# Patient Record
Sex: MALE | Race: WHITE | NOT HISPANIC OR LATINO | Employment: OTHER | URBAN - METROPOLITAN AREA
[De-identification: names, ages, dates, MRNs, and addresses within clinical notes are randomized per-mention and may not be internally consistent; named-entity substitution may affect disease eponyms.]

---

## 2017-04-27 ENCOUNTER — HOSPITAL ENCOUNTER (OUTPATIENT)
Dept: CARDIOLOGY | Facility: HOSPITAL | Age: 72
Discharge: HOME | End: 2017-04-27
Attending: INTERNAL MEDICINE | Admitting: INTERNAL MEDICINE

## 2017-08-29 ENCOUNTER — HOSPITAL ENCOUNTER (OUTPATIENT)
Dept: ORTHOPEDIC SURGERY | Facility: CLINIC | Age: 72
Discharge: HOME | End: 2017-08-29
Attending: ORTHOPAEDIC SURGERY | Admitting: ORTHOPAEDIC SURGERY

## 2017-10-24 ENCOUNTER — HOSPITAL ENCOUNTER (OUTPATIENT)
Dept: PAIN MEDICINE | Facility: HOSPITAL | Age: 72
Discharge: HOME | End: 2017-10-24
Attending: ANESTHESIOLOGY | Admitting: ANESTHESIOLOGY

## 2017-11-01 ENCOUNTER — HOSPITAL ENCOUNTER (OUTPATIENT)
Dept: PAIN MEDICINE | Facility: HOSPITAL | Age: 72
Discharge: HOME | End: 2017-11-01
Attending: ANESTHESIOLOGY | Admitting: ANESTHESIOLOGY

## 2017-11-29 ENCOUNTER — HOSPITAL ENCOUNTER (OUTPATIENT)
Dept: PAIN MEDICINE | Facility: HOSPITAL | Age: 72
Discharge: HOME | End: 2017-11-29
Attending: ANESTHESIOLOGY | Admitting: ANESTHESIOLOGY

## 2018-01-05 ENCOUNTER — HOSPITAL ENCOUNTER (OUTPATIENT)
Dept: PAIN MEDICINE | Facility: HOSPITAL | Age: 73
Discharge: HOME | End: 2018-01-05
Attending: ANESTHESIOLOGY | Admitting: ANESTHESIOLOGY

## 2018-03-29 ENCOUNTER — HOSPITAL ENCOUNTER (OUTPATIENT)
Dept: PAIN MEDICINE | Facility: HOSPITAL | Age: 73
Discharge: HOME | End: 2018-03-29
Attending: ANESTHESIOLOGY | Admitting: ANESTHESIOLOGY

## 2018-06-11 ENCOUNTER — HOSPITAL ENCOUNTER (OUTPATIENT)
Dept: OTHER | Facility: HOSPITAL | Age: 73
Discharge: HOME | End: 2018-06-11
Attending: INTERNAL MEDICINE | Admitting: INTERNAL MEDICINE

## 2018-06-11 LAB
ANION GAP SERPL CALC-SCNC: 14.2 MMOL/L (ref 10–20)
APTT BLD: 26.7 SEC (ref 24–31)
BASOPHILS # BLD AUTO: 0.1 10*3/UL (ref 0–0.2)
BASOPHILS NFR BLD AUTO: 1 % (ref 0–2)
BUN SERPL-MCNC: 19 MG/DL (ref 8–20)
BUN/CREAT SERPL: 17.3 (ref 6.2–20.3)
CALCIUM SERPL-MCNC: 9.1 MG/DL (ref 8.9–10.3)
CHLORIDE SERPL-SCNC: 102 MMOL/L (ref 101–111)
CONV CO2: 28 MMOL/L (ref 22–32)
CREAT UR-MCNC: 1.1 MG/DL (ref 0.7–1.2)
DIFFERENTIAL METHOD BLD: (no result)
EOSINOPHIL # BLD AUTO: 0.2 10*3/UL (ref 0–0.3)
EOSINOPHIL # BLD AUTO: 3 % (ref 0–3)
ERYTHROCYTE [DISTWIDTH] IN BLOOD BY AUTOMATED COUNT: 14.2 % (ref 11.5–14.5)
GLUCOSE SERPL-MCNC: 114 MG/DL (ref 65–99)
HCT VFR BLD AUTO: 33.4 % (ref 40–54)
HGB BLD-MCNC: 11 G/DL (ref 14–18)
INR PPP: 1.2
LYMPHOCYTES # BLD AUTO: 2 10*3/UL (ref 0.8–4.8)
LYMPHOCYTES NFR BLD AUTO: 31 % (ref 18–42)
MCH RBC QN AUTO: 29.2 PG (ref 26–32)
MCHC RBC AUTO-ENTMCNC: 33.1 G/DL (ref 32–36)
MCV RBC AUTO: 88.4 FL (ref 80–94)
MONOCYTES # BLD AUTO: 0.5 10*3/UL (ref 0.1–1.3)
MONOCYTES NFR BLD AUTO: 7 % (ref 2–11)
NEUTROPHILS # BLD AUTO: 3.9 10*3/UL (ref 2.3–8.6)
NEUTROPHILS NFR BLD AUTO: 58 % (ref 50–75)
NRBC BLD AUTO-RTO: 0 /100{WBCS}
NRBC/RBC NFR BLD MANUAL: 0 10*3/UL
PLATELET # BLD AUTO: (no result) 10*3/UL (ref 150–450)
PMV BLD AUTO: 7.8 FL (ref 7.4–10.4)
POTASSIUM SERPL-SCNC: 4.2 MMOL/L (ref 3.6–5.1)
PROTHROMBIN TIME: 12.9 SEC (ref 9.6–11.7)
RBC # BLD AUTO: 3.77 10*6/UL (ref 4.6–6)
SODIUM SERPL-SCNC: 140 MMOL/L (ref 136–144)
WBC # BLD AUTO: 6.6 10*3/UL (ref 4.5–11.5)

## 2018-09-04 ENCOUNTER — HOSPITAL ENCOUNTER (OUTPATIENT)
Dept: LAB | Facility: HOSPITAL | Age: 73
Discharge: HOME | End: 2018-09-04
Attending: INTERNAL MEDICINE | Admitting: INTERNAL MEDICINE

## 2018-09-04 LAB
ANION GAP SERPL CALC-SCNC: 15.5 MMOL/L (ref 10–20)
APTT BLD: 26 SEC (ref 24–31)
BASOPHILS # BLD AUTO: 0.1 10*3/UL (ref 0–0.2)
BASOPHILS NFR BLD AUTO: 1 % (ref 0–2)
BUN SERPL-MCNC: 21 MG/DL (ref 8–20)
BUN/CREAT SERPL: 17.5 (ref 6.2–20.3)
CALCIUM SERPL-MCNC: 9.7 MG/DL (ref 8.9–10.3)
CHLORIDE SERPL-SCNC: 101 MMOL/L (ref 101–111)
CONV CO2: 30 MMOL/L (ref 22–32)
CREAT UR-MCNC: 1.2 MG/DL (ref 0.7–1.2)
DIFFERENTIAL METHOD BLD: (no result)
EOSINOPHIL # BLD AUTO: 0.2 10*3/UL (ref 0–0.3)
EOSINOPHIL # BLD AUTO: 2 % (ref 0–3)
ERYTHROCYTE [DISTWIDTH] IN BLOOD BY AUTOMATED COUNT: 15.4 % (ref 11.5–14.5)
GLUCOSE SERPL-MCNC: 122 MG/DL (ref 65–99)
HCT VFR BLD AUTO: 35.1 % (ref 40–54)
HGB BLD-MCNC: 11.5 G/DL (ref 14–18)
INR PPP: 1.2
LYMPHOCYTES # BLD AUTO: 2.6 10*3/UL (ref 0.8–4.8)
LYMPHOCYTES NFR BLD AUTO: 31 % (ref 18–42)
MCH RBC QN AUTO: 27.5 PG (ref 26–32)
MCHC RBC AUTO-ENTMCNC: 32.6 G/DL (ref 32–36)
MCV RBC AUTO: 84.2 FL (ref 80–94)
MONOCYTES # BLD AUTO: 0.7 10*3/UL (ref 0.1–1.3)
MONOCYTES NFR BLD AUTO: 9 % (ref 2–11)
NEUTROPHILS # BLD AUTO: 4.8 10*3/UL (ref 2.3–8.6)
NEUTROPHILS NFR BLD AUTO: 57 % (ref 50–75)
NRBC BLD AUTO-RTO: 0 /100{WBCS}
NRBC/RBC NFR BLD MANUAL: 0 10*3/UL
PLATELET # BLD AUTO: 218 10*3/UL (ref 150–450)
PMV BLD AUTO: 7.8 FL (ref 7.4–10.4)
POTASSIUM SERPL-SCNC: 5.5 MMOL/L (ref 3.6–5.1)
PROTHROMBIN TIME: 12.3 SEC (ref 9.6–11.7)
RBC # BLD AUTO: 4.17 10*6/UL (ref 4.6–6)
SODIUM SERPL-SCNC: 141 MMOL/L (ref 136–144)
WBC # BLD AUTO: 8.3 10*3/UL (ref 4.5–11.5)

## 2018-10-15 ENCOUNTER — HOSPITAL ENCOUNTER (OUTPATIENT)
Dept: OTHER | Facility: HOSPITAL | Age: 73
Discharge: HOME | End: 2018-10-15
Attending: INTERNAL MEDICINE | Admitting: INTERNAL MEDICINE

## 2018-10-15 LAB
ALBUMIN SERPL-MCNC: 3.8 G/DL (ref 3.5–4.8)
ALBUMIN/GLOB SERPL: 1.6 {RATIO} (ref 1–1.7)
ALP SERPL-CCNC: 72 IU/L (ref 32–91)
ALT SERPL-CCNC: 21 IU/L (ref 17–63)
ANION GAP SERPL CALC-SCNC: 14.3 MMOL/L (ref 10–20)
AST SERPL-CCNC: 21 IU/L (ref 15–41)
BASOPHILS # BLD AUTO: 0.1 10*3/UL (ref 0–0.2)
BASOPHILS NFR BLD AUTO: 1 % (ref 0–2)
BILIRUB SERPL-MCNC: 0.8 MG/DL (ref 0.3–1.2)
BUN SERPL-MCNC: 19 MG/DL (ref 8–20)
BUN/CREAT SERPL: 19 (ref 6.2–20.3)
CALCIUM SERPL-MCNC: 9.4 MG/DL (ref 8.9–10.3)
CHLORIDE SERPL-SCNC: 104 MMOL/L (ref 101–111)
CONV CO2: 30 MMOL/L (ref 22–32)
CONV TOTAL PROTEIN: 6.2 G/DL (ref 6.1–7.9)
CREAT UR-MCNC: 1 MG/DL (ref 0.7–1.2)
DIFFERENTIAL METHOD BLD: (no result)
EOSINOPHIL # BLD AUTO: 0.1 10*3/UL (ref 0–0.3)
EOSINOPHIL # BLD AUTO: 2 % (ref 0–3)
ERYTHROCYTE [DISTWIDTH] IN BLOOD BY AUTOMATED COUNT: 16.1 % (ref 11.5–14.5)
GLOBULIN UR ELPH-MCNC: 2.4 G/DL (ref 2.5–3.8)
GLUCOSE SERPL-MCNC: 82 MG/DL (ref 65–99)
HCT VFR BLD AUTO: 33.3 % (ref 40–54)
HGB BLD-MCNC: 10.9 G/DL (ref 14–18)
LYMPHOCYTES # BLD AUTO: 2.1 10*3/UL (ref 0.8–4.8)
LYMPHOCYTES NFR BLD AUTO: 31 % (ref 18–42)
MAGNESIUM SERPL-MCNC: 1.6 MG/DL (ref 1.8–2.5)
MCH RBC QN AUTO: 26.8 PG (ref 26–32)
MCHC RBC AUTO-ENTMCNC: 32.7 G/DL (ref 32–36)
MCV RBC AUTO: 81.9 FL (ref 80–94)
MONOCYTES # BLD AUTO: 0.5 10*3/UL (ref 0.1–1.3)
MONOCYTES NFR BLD AUTO: 8 % (ref 2–11)
NEUTROPHILS # BLD AUTO: 4.1 10*3/UL (ref 2.3–8.6)
NEUTROPHILS NFR BLD AUTO: 58 % (ref 50–75)
NRBC BLD AUTO-RTO: 0 /100{WBCS}
NRBC/RBC NFR BLD MANUAL: 0 10*3/UL
PLATELET # BLD AUTO: 215 10*3/UL (ref 150–450)
PMV BLD AUTO: 7.5 FL (ref 7.4–10.4)
POTASSIUM SERPL-SCNC: 4.3 MMOL/L (ref 3.6–5.1)
RBC # BLD AUTO: 4.07 10*6/UL (ref 4.6–6)
SODIUM SERPL-SCNC: 144 MMOL/L (ref 136–144)
TSH SERPL-ACNC: 1.49 UIU/ML (ref 0.34–5.6)
WBC # BLD AUTO: 6.9 10*3/UL (ref 4.5–11.5)

## 2018-12-12 ENCOUNTER — HOSPITAL ENCOUNTER (OUTPATIENT)
Dept: PREADMISSION TESTING | Facility: HOSPITAL | Age: 73
Discharge: HOME | End: 2018-12-12
Attending: INTERNAL MEDICINE | Admitting: INTERNAL MEDICINE

## 2018-12-12 LAB
ANION GAP SERPL CALC-SCNC: 15.3 MMOL/L (ref 10–20)
APTT BLD: 24.5 SEC (ref 24–31)
BASOPHILS # BLD AUTO: 0 10*3/UL (ref 0–0.2)
BASOPHILS NFR BLD AUTO: 1 % (ref 0–2)
BUN SERPL-MCNC: 18 MG/DL (ref 8–20)
BUN/CREAT SERPL: 16.4 (ref 6.2–20.3)
CALCIUM SERPL-MCNC: 9.2 MG/DL (ref 8.9–10.3)
CHLORIDE SERPL-SCNC: 98 MMOL/L (ref 101–111)
CONV CO2: 29 MMOL/L (ref 22–32)
CREAT UR-MCNC: 1.1 MG/DL (ref 0.7–1.2)
DIFFERENTIAL METHOD BLD: (no result)
EOSINOPHIL # BLD AUTO: 0.1 10*3/UL (ref 0–0.3)
EOSINOPHIL # BLD AUTO: 2 % (ref 0–3)
ERYTHROCYTE [DISTWIDTH] IN BLOOD BY AUTOMATED COUNT: 17.3 % (ref 11.5–14.5)
GLUCOSE SERPL-MCNC: 114 MG/DL (ref 65–99)
HCT VFR BLD AUTO: 36.1 % (ref 40–54)
HGB BLD-MCNC: 11.6 G/DL (ref 14–18)
INR PPP: 1.1
LYMPHOCYTES # BLD AUTO: 2.4 10*3/UL (ref 0.8–4.8)
LYMPHOCYTES NFR BLD AUTO: 38 % (ref 18–42)
MCH RBC QN AUTO: 26.5 PG (ref 26–32)
MCHC RBC AUTO-ENTMCNC: 32.1 G/DL (ref 32–36)
MCV RBC AUTO: 82.6 FL (ref 80–94)
MICRO REPORT STATUS: NORMAL
MONOCYTES # BLD AUTO: 0.5 10*3/UL (ref 0.1–1.3)
MONOCYTES NFR BLD AUTO: 9 % (ref 2–11)
NEUTROPHILS # BLD AUTO: 3.3 10*3/UL (ref 2.3–8.6)
NEUTROPHILS NFR BLD AUTO: 50 % (ref 50–75)
NRBC BLD AUTO-RTO: 0 /100{WBCS}
NRBC/RBC NFR BLD MANUAL: 0 10*3/UL
PLATELET # BLD AUTO: 206 10*3/UL (ref 150–450)
PMV BLD AUTO: 8.1 FL (ref 7.4–10.4)
POTASSIUM SERPL-SCNC: 4.3 MMOL/L (ref 3.6–5.1)
PROTHROMBIN TIME: 11 SEC (ref 9.6–11.7)
RBC # BLD AUTO: 4.37 10*6/UL (ref 4.6–6)
SODIUM SERPL-SCNC: 138 MMOL/L (ref 136–144)
WBC # BLD AUTO: 6.4 10*3/UL (ref 4.5–11.5)

## 2019-07-10 ENCOUNTER — OFFICE VISIT (OUTPATIENT)
Dept: CARDIOLOGY | Facility: CLINIC | Age: 74
End: 2019-07-10

## 2019-07-10 VITALS
HEART RATE: 60 BPM | BODY MASS INDEX: 38.06 KG/M2 | HEIGHT: 72 IN | OXYGEN SATURATION: 97 % | WEIGHT: 281 LBS | SYSTOLIC BLOOD PRESSURE: 150 MMHG | DIASTOLIC BLOOD PRESSURE: 79 MMHG

## 2019-07-10 DIAGNOSIS — I10 ESSENTIAL HYPERTENSION: ICD-10-CM

## 2019-07-10 DIAGNOSIS — Z95.1 PRESENCE OF AORTOCORONARY BYPASS GRAFT: ICD-10-CM

## 2019-07-10 DIAGNOSIS — Z95.0 PRESENCE OF CARDIAC PACEMAKER: ICD-10-CM

## 2019-07-10 DIAGNOSIS — E78.2 MIXED HYPERLIPIDEMIA: ICD-10-CM

## 2019-07-10 DIAGNOSIS — I49.5 TACHYCARDIA-BRADYCARDIA (HCC): ICD-10-CM

## 2019-07-10 DIAGNOSIS — I25.10 CHRONIC CORONARY ARTERY DISEASE: ICD-10-CM

## 2019-07-10 DIAGNOSIS — I48.0 PAROXYSMAL ATRIAL FIBRILLATION (HCC): Primary | ICD-10-CM

## 2019-07-10 PROBLEM — IMO0002 EXCESSIVE ANTICOAGULATION: Status: ACTIVE | Noted: 2018-08-16

## 2019-07-10 PROBLEM — I48.91 ATRIAL FIBRILLATION: Status: ACTIVE | Noted: 2018-06-05

## 2019-07-10 PROBLEM — Z95.5 STATUS POST CORONARY ARTERY STENT PLACEMENT: Status: ACTIVE | Noted: 2018-06-05

## 2019-07-10 PROBLEM — I21.3 ST ELEVATION (STEMI) MYOCARDIAL INFARCTION: Status: ACTIVE | Noted: 2019-07-10

## 2019-07-10 PROBLEM — E78.5 HYPERLIPIDEMIA: Status: ACTIVE | Noted: 2019-07-10

## 2019-07-10 PROBLEM — R07.9 CHEST PAIN: Status: ACTIVE | Noted: 2018-08-28

## 2019-07-10 PROCEDURE — 99214 OFFICE O/P EST MOD 30 MIN: CPT | Performed by: INTERNAL MEDICINE

## 2019-07-10 PROCEDURE — 93000 ELECTROCARDIOGRAM COMPLETE: CPT | Performed by: INTERNAL MEDICINE

## 2019-07-10 RX ORDER — LISINOPRIL 20 MG/1
20 TABLET ORAL 2 TIMES DAILY
COMMUNITY
Start: 2013-07-25 | End: 2021-01-26 | Stop reason: SDUPTHER

## 2019-07-10 RX ORDER — METOPROLOL SUCCINATE 25 MG/1
25 TABLET, EXTENDED RELEASE ORAL 2 TIMES DAILY
COMMUNITY
Start: 2015-11-05 | End: 2022-02-17 | Stop reason: SDUPTHER

## 2019-07-10 RX ORDER — AMLODIPINE BESYLATE 5 MG/1
5 TABLET ORAL DAILY
COMMUNITY
End: 2021-01-25 | Stop reason: SDUPTHER

## 2019-07-10 RX ORDER — VITAMIN E 268 MG
400 CAPSULE ORAL DAILY
COMMUNITY
Start: 2015-11-19

## 2019-07-10 RX ORDER — GABAPENTIN 300 MG/1
300 CAPSULE ORAL 3 TIMES DAILY
COMMUNITY
Start: 2018-04-18 | End: 2020-12-03

## 2019-07-10 RX ORDER — CLOPIDOGREL BISULFATE 75 MG/1
75 TABLET ORAL DAILY
COMMUNITY
Start: 2015-11-19 | End: 2021-01-26 | Stop reason: SDUPTHER

## 2019-07-10 RX ORDER — CHLORAL HYDRATE 500 MG
CAPSULE ORAL
Status: ON HOLD | COMMUNITY
Start: 2015-11-17 | End: 2022-02-24

## 2019-07-10 RX ORDER — ATORVASTATIN CALCIUM 20 MG/1
20 TABLET, FILM COATED ORAL 2 TIMES DAILY
COMMUNITY
Start: 2013-07-25 | End: 2021-01-26 | Stop reason: SDUPTHER

## 2019-07-10 RX ORDER — FUROSEMIDE 40 MG/1
TABLET ORAL EVERY 24 HOURS
COMMUNITY
Start: 2018-09-18 | End: 2021-01-25 | Stop reason: SDUPTHER

## 2019-07-10 RX ORDER — CETIRIZINE HYDROCHLORIDE 10 MG/1
10 TABLET ORAL DAILY PRN
COMMUNITY
Start: 2018-05-15

## 2019-07-10 NOTE — PROGRESS NOTES
Encounter Date:07/10/2019  Last seen 4/8/2019      Patient ID: Benoit Newberry Jr. is a 73 y.o. male.    Chief Complaint:  Follow-up pacemaker atrial fibrillation coagulation management status post CABG      History of Present Illness     Since I have last seen, the patient has been without any chest discomfort ,shortness of breath, palpitations, dizziness or syncope.  Denies having any headache ,abdominal pain ,nausea, vomiting , diarrhea constipation, loss of weight or loss of appetite.  Denies having any excessive bruising ,hematuria or blood in the stool.    Review of all systems negative except as indicated      Assessment and Plan       [[[  Impression  =     -status post permanent dual-chamber pacemaker implantation ([employer]  MRI compatible) 12/04/2018     - atrial fibrillation.   status post Ablation 10/16/2018  Patient has converted to sinus  sinus bradycardia.   Patient had a recurrence of atrial fibrillation.  Status post cardioversion 06/13/2018 and 08/01/2018 09/05/2018.  Patient has converted but did not stay in sinus rhythm.      -anticoagulation Patient is on Xarelto.     -status post CABG  3/98     -Acute inferior myocardial infarction.  Status post stent placement to SVG to RCA for total occlusion .  11/02/2015   - Status post stent placement to totally occluded SVG to RCA11/02/2015 and stent placement to left main and mid circumflex coronary artery 11/04/2015.  Status post stent to PDA distal to SVG and native LAD beyond LIMA.  05/25/2018     cardiac catheterization 05/25/2018 revealed left ventricular diffuse hypocontractility with ejection fraction of 40%.  2+ mitral regurgitation.  Patent left main stent.  Total LAD.  Ramus intermedius has 90% disease in the proximal segment and distal vessel is filling from collaterals.  Patent circumflex stent.  Totally occluded RCA.  Anders to LAD is patent SVG to ramus branch marginal branch and RCA were patent.    Echocardiogram showed diffuse  left ventricular hypocontractility with ejection fraction of 40-45%) 04/27/2017)  Stress Cardiolite test revealed inferior myocardial infarction with mild inferior ischemia.     - diabetes dyslipidemia and hypertension   - status post impending inferior myocardial infarction prior to surgery    -family history of coronary artery disease   - History of asymptomatic right carotid bruit.  ===   Plan  ===   EKG showed dual-chamber pacemaker rhythm.Normal QTc interval at 452   Patient is on tikosyn at 500 mcg twice a day.  Patient is maintaining sinus rhythm  Medications were reviewed and updated.   patient is maintaining sinus rhythm  Continue tikosyn -observe Toxic effects  Anticoagulation status was reviewed  Continue Xarelto   patient is not having any angina pectoris or congestive heart failure.  Follow-up in office in 6 months with pacemaker interrogation  Further plan will depend on patient's progress  ]]]]]]]]]]]]]]]                 Diagnosis Plan   1. Paroxysmal atrial fibrillation (CMS/HCC)  ECG 12 Lead   2. Chronic coronary artery disease  ECG 12 Lead   3. Mixed hyperlipidemia  ECG 12 Lead   4. Essential hypertension  ECG 12 Lead   5. Presence of cardiac pacemaker  ECG 12 Lead   6. Presence of aortocoronary bypass graft  ECG 12 Lead   7. Tachycardia-bradycardia (CMS/HCC)  ECG 12 Lead   LAB RESULTS (LAST 7 DAYS)    CBC        BMP        CMP         BNP        TROPONIN        CoAg        Creatinine Clearance  CrCl cannot be calculated (Patient's most recent lab result is older than the maximum 30 days allowed.).    ABG        Radiology  No radiology results for the last day                The following portions of the patient's history were reviewed and updated as appropriate: allergies, current medications, past family history, past medical history, past social history, past surgical history and problem list.    Review of Systems   Constitution: Negative for malaise/fatigue.   Cardiovascular: Negative for chest  pain, leg swelling, palpitations and syncope.   Respiratory: Negative for shortness of breath.    Skin: Negative for rash.   Gastrointestinal: Negative for nausea and vomiting.   Neurological: Negative for dizziness, light-headedness and numbness.         Current Outpatient Medications:   •  amLODIPine (NORVASC) 5 MG tablet, Take 5 mg by mouth Daily., Disp: , Rfl:   •  aspirin 81 MG tablet, Take 81 mg by mouth Daily., Disp: , Rfl:   •  atorvastatin (LIPITOR) 20 MG tablet, Take 20 mg by mouth Daily., Disp: , Rfl:   •  cetirizine (zyrTEC) 10 MG tablet, Daily., Disp: , Rfl:   •  Cholecalciferol (VITAMIN D) 1000 units tablet, Take 1,000 Units by mouth Daily., Disp: , Rfl:   •  clopidogrel (PLAVIX) 75 MG tablet, Take 75 mg by mouth Daily., Disp: , Rfl:   •  furosemide (LASIX) 40 MG tablet, Daily., Disp: , Rfl:   •  gabapentin (NEURONTIN) 300 MG capsule, Take 300 mg by mouth 3 (Three) Times a Day., Disp: , Rfl:   •  lisinopril (PRINIVIL,ZESTRIL) 20 MG tablet, Take 20 mg by mouth Daily., Disp: , Rfl:   •  metFORMIN (GLUCOPHAGE) 1000 MG tablet, Take 1,000 mg by mouth., Disp: , Rfl:   •  metoprolol succinate XL (TOPROL-XL) 25 MG 24 hr tablet, Daily., Disp: , Rfl:   •  Multiple Vitamins-Minerals (MULTI VITAMIN/MINERALS) tablet, MULTI VITAMIN/MINERALS TABS, Disp: , Rfl:   •  Omega-3 Fatty Acids (FISH OIL) 1000 MG capsule capsule, FISH OIL 1000 MG CAPS, Disp: , Rfl:   •  rivaroxaban (XARELTO) 20 MG tablet, Daily., Disp: , Rfl:   •  vitamin E 400 UNIT capsule, Take 400 Units by mouth Daily., Disp: , Rfl:     Allergies   Allergen Reactions   • Clindamycin Hcl Unknown (See Comments)       Family History   Problem Relation Age of Onset   • Heart disease Mother    • Heart disease Father         MI   • Heart disease Sister         MI   • Heart disease Brother         s/p coronary stents in his 40's    • Stroke Maternal Grandfather        Past Surgical History:   Procedure Laterality Date   • APPENDECTOMY  1956   • CARDIAC ABLATION   "12/2018    x 1    • CARDIOVERSION  06/2018    Cardioversion 6/2018; x3  12/2018   • CORONARY ANGIOPLASTY Left 11/04/2015    Distal left main and in the mid circumflex artery    • CORONARY ANGIOPLASTY Right 11/02/2015    Right coronary artery    • CORONARY ARTERY BYPASS GRAFT  03/1998   • FOOT SURGERY  2010   • HERNIA REPAIR  2005   • OTHER SURGICAL HISTORY  05/2019    Stent    • PACEMAKER IMPLANTATION  12/14/2018    Dr. Saldaña    • REPLACEMENT TOTAL KNEE BILATERAL  2001       Past Medical History:   Diagnosis Date   • Arthritis    • Asymptomatic stenosis of right carotid artery    • Atrial fibrillation (CMS/HCC)    • Bradycardia    • Coronary artery disease    • Diabetes mellitus, type 2 (CMS/HCC)    • Hyperlipidemia    • Hypertension    • Myocardial infarction (CMS/HCC)        Family History   Problem Relation Age of Onset   • Heart disease Mother    • Heart disease Father         MI   • Heart disease Sister         MI   • Heart disease Brother         s/p coronary stents in his 40's    • Stroke Maternal Grandfather        Social History     Socioeconomic History   • Marital status:      Spouse name: Not on file   • Number of children: Not on file   • Years of education: Not on file   • Highest education level: Not on file   Tobacco Use   • Smoking status: Former Smoker   Substance and Sexual Activity   • Alcohol use: No     Frequency: Never   • Drug use: No           ECG 12 Lead  Date/Time: 7/10/2019 3:31 PM  Performed by: Karolina Saldaña MD  Authorized by: Karolina Saldaña MD   Comparison: compared with previous ECG   Comments: Dual-chamber pacemaker rhythm 60/min nonspecific ST-T wave changes indeterminate axis no ectopy.  No change from 4/8/2019.  Normal QTc interval              Objective:       Physical Exam    /79 (BP Location: Left arm, Patient Position: Sitting, Cuff Size: Large Adult)   Pulse 60   Ht 182.9 cm (72\")   Wt 127 kg (281 lb)   SpO2 97%   BMI 38.11 kg/m²   The patient is " alert, oriented and in no distress.    Vital signs as noted above.    Head and neck revealed no carotid bruits or jugular venous distension.  No thyromegaly or lymphadenopathy is present.    Lungs clear.  No wheezing.  Breath sounds are normal bilaterally.    Heart normal first and second heart sounds.  No murmur..  No pericardial rub is present.  No gallop is present.    Abdomen soft and nontender.  No organomegaly is present.    Extremities revealed good peripheral pulses without any pedal edema.    Skin warm and dry.Pacemaker site looks normal    Musculoskeletal system is grossly normal.    CNS grossly normal.

## 2019-08-21 ENCOUNTER — CLINICAL SUPPORT NO REQUIREMENTS (OUTPATIENT)
Dept: CARDIOLOGY | Facility: CLINIC | Age: 74
End: 2019-08-21

## 2019-08-21 DIAGNOSIS — I49.5 SICK SINUS SYNDROME (HCC): Primary | ICD-10-CM

## 2019-08-21 DIAGNOSIS — Z95.0 PACEMAKER: ICD-10-CM

## 2019-08-22 PROCEDURE — 93296 REM INTERROG EVL PM/IDS: CPT | Performed by: INTERNAL MEDICINE

## 2019-08-22 PROCEDURE — 93294 REM INTERROG EVL PM/LDLS PM: CPT | Performed by: INTERNAL MEDICINE

## 2019-11-25 ENCOUNTER — CLINICAL SUPPORT NO REQUIREMENTS (OUTPATIENT)
Dept: CARDIOLOGY | Facility: CLINIC | Age: 74
End: 2019-11-25

## 2019-11-25 DIAGNOSIS — I49.5 TACHY-BRADY SYNDROME (HCC): ICD-10-CM

## 2019-11-25 DIAGNOSIS — Z95.0 PACEMAKER: Primary | ICD-10-CM

## 2019-11-25 PROCEDURE — 93294 REM INTERROG EVL PM/LDLS PM: CPT | Performed by: INTERNAL MEDICINE

## 2019-11-25 PROCEDURE — 93296 REM INTERROG EVL PM/IDS: CPT | Performed by: INTERNAL MEDICINE

## 2020-01-10 RX ORDER — DOFETILIDE 0.5 MG/1
500 CAPSULE ORAL 2 TIMES DAILY
COMMUNITY
Start: 2019-10-15 | End: 2020-12-03 | Stop reason: SDUPTHER

## 2020-01-10 RX ORDER — INFLUENZA A VIRUS A/SINGAPORE/GP1908/2015 IVR-180A (H1N1) ANTIGEN (PROPIOLACTONE INACTIVATED), INFLUENZA A VIRUS A/SINGAPORE/INFIMH-16-0019/2016 IVR-186 (H3N2) ANTIGEN (PROPIOLACTONE INACTIVATED), INFLUENZA B VIRUS B/MARYLAND/15/2016 ANTIGEN (PROPIOLACTONE INACTIVATED), AND INFLUENZA B VIRUS B/PHUKET/3073/2013 BVR-1B ANTIGEN (PROPIOLACTONE INACTIVATED) 15; 15; 15; 15 UG/.5ML; UG/.5ML; UG/.5ML; UG/.5ML
INJECTION, SUSPENSION INTRAMUSCULAR
Refills: 0 | COMMUNITY
Start: 2019-10-17 | End: 2021-06-01

## 2020-01-10 RX ORDER — ASCORBIC ACID 500 MG
500 TABLET ORAL DAILY
COMMUNITY
Start: 2016-04-07

## 2020-01-22 ENCOUNTER — OFFICE VISIT (OUTPATIENT)
Dept: CARDIOLOGY | Facility: CLINIC | Age: 75
End: 2020-01-22

## 2020-01-22 ENCOUNTER — CLINICAL SUPPORT NO REQUIREMENTS (OUTPATIENT)
Dept: CARDIOLOGY | Facility: CLINIC | Age: 75
End: 2020-01-22

## 2020-01-22 VITALS
HEIGHT: 72 IN | SYSTOLIC BLOOD PRESSURE: 146 MMHG | DIASTOLIC BLOOD PRESSURE: 76 MMHG | OXYGEN SATURATION: 96 % | BODY MASS INDEX: 38.97 KG/M2 | WEIGHT: 287.75 LBS | HEART RATE: 60 BPM

## 2020-01-22 DIAGNOSIS — Z95.1 HX OF CABG: ICD-10-CM

## 2020-01-22 DIAGNOSIS — I49.5 TACHYCARDIA-BRADYCARDIA (HCC): ICD-10-CM

## 2020-01-22 DIAGNOSIS — I48.0 PAROXYSMAL ATRIAL FIBRILLATION (HCC): Primary | ICD-10-CM

## 2020-01-22 DIAGNOSIS — E78.2 MIXED HYPERLIPIDEMIA: ICD-10-CM

## 2020-01-22 DIAGNOSIS — Z95.5 STATUS POST CORONARY ARTERY STENT PLACEMENT: ICD-10-CM

## 2020-01-22 DIAGNOSIS — Z95.0 PRESENCE OF CARDIAC PACEMAKER: ICD-10-CM

## 2020-01-22 DIAGNOSIS — I10 ESSENTIAL HYPERTENSION: ICD-10-CM

## 2020-01-22 DIAGNOSIS — I25.10 CHRONIC CORONARY ARTERY DISEASE: ICD-10-CM

## 2020-01-22 PROCEDURE — 93000 ELECTROCARDIOGRAM COMPLETE: CPT | Performed by: INTERNAL MEDICINE

## 2020-01-22 PROCEDURE — 93280 PM DEVICE PROGR EVAL DUAL: CPT | Performed by: INTERNAL MEDICINE

## 2020-01-22 PROCEDURE — 99214 OFFICE O/P EST MOD 30 MIN: CPT | Performed by: INTERNAL MEDICINE

## 2020-04-23 ENCOUNTER — CLINICAL SUPPORT NO REQUIREMENTS (OUTPATIENT)
Dept: CARDIOLOGY | Facility: CLINIC | Age: 75
End: 2020-04-23

## 2020-04-23 DIAGNOSIS — I49.5 TACHYCARDIA-BRADYCARDIA (HCC): ICD-10-CM

## 2020-04-23 DIAGNOSIS — I48.0 PAROXYSMAL ATRIAL FIBRILLATION (HCC): Primary | ICD-10-CM

## 2020-04-23 DIAGNOSIS — Z95.0 PRESENCE OF CARDIAC PACEMAKER: ICD-10-CM

## 2020-04-23 PROCEDURE — 93294 REM INTERROG EVL PM/LDLS PM: CPT | Performed by: INTERNAL MEDICINE

## 2020-04-23 PROCEDURE — 93296 REM INTERROG EVL PM/IDS: CPT | Performed by: INTERNAL MEDICINE

## 2020-07-16 ENCOUNTER — CLINICAL SUPPORT NO REQUIREMENTS (OUTPATIENT)
Dept: CARDIOLOGY | Facility: CLINIC | Age: 75
End: 2020-07-16

## 2020-07-16 DIAGNOSIS — Z95.0 PACEMAKER: ICD-10-CM

## 2020-07-16 DIAGNOSIS — I49.5 TACHY-BRADY SYNDROME (HCC): Primary | ICD-10-CM

## 2020-08-11 ENCOUNTER — OFFICE VISIT (OUTPATIENT)
Dept: CARDIOLOGY | Facility: CLINIC | Age: 75
End: 2020-08-11

## 2020-08-11 ENCOUNTER — CLINICAL SUPPORT NO REQUIREMENTS (OUTPATIENT)
Dept: CARDIOLOGY | Facility: CLINIC | Age: 75
End: 2020-08-11

## 2020-08-11 VITALS
BODY MASS INDEX: 38.4 KG/M2 | DIASTOLIC BLOOD PRESSURE: 79 MMHG | WEIGHT: 283.5 LBS | OXYGEN SATURATION: 98 % | SYSTOLIC BLOOD PRESSURE: 148 MMHG | HEIGHT: 72 IN | HEART RATE: 60 BPM

## 2020-08-11 DIAGNOSIS — Z95.1 HX OF CABG: Primary | ICD-10-CM

## 2020-08-11 DIAGNOSIS — I49.5 TACHYCARDIA-BRADYCARDIA (HCC): ICD-10-CM

## 2020-08-11 DIAGNOSIS — I10 ESSENTIAL HYPERTENSION: ICD-10-CM

## 2020-08-11 DIAGNOSIS — I48.0 PAROXYSMAL ATRIAL FIBRILLATION (HCC): Primary | ICD-10-CM

## 2020-08-11 DIAGNOSIS — I25.10 CHRONIC CORONARY ARTERY DISEASE: ICD-10-CM

## 2020-08-11 DIAGNOSIS — Z95.1 PRESENCE OF AORTOCORONARY BYPASS GRAFT: ICD-10-CM

## 2020-08-11 DIAGNOSIS — Z95.0 PRESENCE OF CARDIAC PACEMAKER: ICD-10-CM

## 2020-08-11 DIAGNOSIS — I49.5 TACHY-BRADY SYNDROME (HCC): ICD-10-CM

## 2020-08-11 DIAGNOSIS — E78.2 MIXED HYPERLIPIDEMIA: ICD-10-CM

## 2020-08-11 PROCEDURE — 93280 PM DEVICE PROGR EVAL DUAL: CPT | Performed by: INTERNAL MEDICINE

## 2020-08-11 PROCEDURE — 99214 OFFICE O/P EST MOD 30 MIN: CPT | Performed by: INTERNAL MEDICINE

## 2020-08-11 PROCEDURE — 93000 ELECTROCARDIOGRAM COMPLETE: CPT | Performed by: INTERNAL MEDICINE

## 2020-08-11 NOTE — PROGRESS NOTES
Encounter Date:08/11/2020  Last seen 1/22/2020      Patient ID: Benoit Newberry Jr. is a 74 y.o. male.    Chief Complaint:  Status post CABG  Status post pacemaker  Past history of atrial fibrillation  Anticoagulation management-on Xarelto  Hypertension  Dyslipidemia  Diabetes        History of Present Illness    Since I have last seen, the patient has been without any chest discomfort , unusual shortness of breath, palpitations, dizziness or syncope.  Denies having any headache ,abdominal pain ,nausea, vomiting , diarrhea constipation, loss of weight or loss of appetite.  Denies having any excessive bruising ,hematuria or blood in the stool.     Review of all systems negative except as indicated     Assessment and Plan         [[[  Impression  =     -status post permanent dual-chamber pacemaker implantation ([employer]  MRI compatible) 12/04/2018      - atrial fibrillation.   status post Ablation 10/16/2018  Patient has converted to sinus  sinus bradycardia.   Patient had a recurrence of atrial fibrillation.  Status post cardioversion 06/13/2018 and 08/01/2018 09/05/2018.  Patient has converted but did not stay in sinus rhythm.       -anticoagulation Patient is on Xarelto.      -status post CABG  3/98      -Acute inferior myocardial infarction.  Status post stent placement to SVG to RCA for total occlusion .  11/02/2015   - Status post stent placement to totally occluded SVG to RCA11/02/2015 and stent placement to left main and mid circumflex coronary artery 11/04/2015.  Status post stent to PDA distal to SVG and native LAD beyond [place].  05/25/2018      cardiac catheterization 05/25/2018 revealed left ventricular diffuse hypocontractility with ejection fraction of 40%.  2+ mitral regurgitation.  Patent left main stent.  Total LAD.  Ramus intermedius has 90% disease in the proximal segment and distal vessel is filling from collaterals.  Patent circumflex stent.  Totally occluded RCA.  [place] to LAD is patent  SVG to ramus branch marginal branch and RCA were patent.     Echocardiogram showed diffuse left ventricular hypocontractility with ejection fraction of 40-45%) 04/27/2017)  Stress Cardiolite test revealed inferior myocardial infarction with mild inferior ischemia.      - diabetes dyslipidemia and hypertension   - status post impending inferior myocardial infarction prior to surgery    -family history of coronary artery disease   - History of asymptomatic right carotid bruit.  ===   Plan  ===   EKG showed dual-chamber pacemaker rhythm.Normal QTc interval at  479 ms  Patient is on tikosyn at 500 mcg twice a day.  Patient is maintaining sinus rhythm  Medications were reviewed and updated.  patient is maintaining sinus rhythm  Continue tikosyn -observe Toxic effects  Anticoagulation status was reviewed  Continue Xarelto  patient is not having any angina pectoris or congestive heart failure.  Interrogation of the pacemaker revealed excellent pacing parameters.  Follow-up in office in 6 months with pacemaker interrogation  Further plan will depend on patient's progress  ]]]]]]]]]]]]]]]                   Diagnosis Plan   1. Hx of CABG     2. Mixed hyperlipidemia     3. Tachy-gillian syndrome (CMS/HCC)     4. Presence of cardiac pacemaker     5. Essential hypertension     6. Chronic coronary artery disease     7. Presence of aortocoronary bypass graft     LAB RESULTS (LAST 7 DAYS)    CBC        BMP        CMP         BNP        TROPONIN        CoAg        Creatinine Clearance  CrCl cannot be calculated (Patient's most recent lab result is older than the maximum 30 days allowed.).    ABG        Radiology  No radiology results for the last day                The following portions of the patient's history were reviewed and updated as appropriate: allergies, current medications, past family history, past medical history, past social history, past surgical history and problem list.    Review of Systems   Constitution: Negative  for malaise/fatigue.   Cardiovascular: Negative for chest pain, leg swelling, palpitations and syncope.   Respiratory: Negative for shortness of breath.    Skin: Negative for rash.   Gastrointestinal: Negative for nausea and vomiting.   Neurological: Positive for light-headedness (at times). Negative for dizziness and numbness.         Current Outpatient Medications:   •  AFLURIA QUADRIVALENT 0.5 ML suspension prefilled syringe injection, inject 0.5 milliliters intramuscularly, Disp: , Rfl: 0  •  amLODIPine (NORVASC) 5 MG tablet, Take 5 mg by mouth Daily., Disp: , Rfl:   •  aspirin 81 MG tablet, Take 81 mg by mouth Daily., Disp: , Rfl:   •  atorvastatin (LIPITOR) 20 MG tablet, Take 20 mg by mouth Daily., Disp: , Rfl:   •  cetirizine (zyrTEC) 10 MG tablet, Daily., Disp: , Rfl:   •  Cholecalciferol (VITAMIN D) 1000 units tablet, Take 1,000 Units by mouth Daily., Disp: , Rfl:   •  clopidogrel (PLAVIX) 75 MG tablet, Take 75 mg by mouth Daily., Disp: , Rfl:   •  Coenzyme Q10 (CO Q 10) 100 MG capsule, Take 100 mg by mouth Daily., Disp: , Rfl:   •  dofetilide (TIKOSYN) 500 MCG capsule, Take 500 mcg by mouth Daily., Disp: , Rfl:   •  furosemide (LASIX) 40 MG tablet, Daily., Disp: , Rfl:   •  gabapentin (NEURONTIN) 300 MG capsule, Take 300 mg by mouth 3 (Three) Times a Day., Disp: , Rfl:   •  Insulin Glargine (LANTUS SOLOSTAR) 100 UNIT/ML injection pen, LANTUS SOLOSTAR 100 UNIT/ML SOPN, Disp: , Rfl:   •  insulin lispro (humaLOG) 100 UNIT/ML injection, Inject 10 Units under the skin into the appropriate area as directed 3 (Three) Times a Day Before Meals., Disp: , Rfl:   •  lisinopril (PRINIVIL,ZESTRIL) 20 MG tablet, Take 20 mg by mouth Daily., Disp: , Rfl:   •  metFORMIN (GLUCOPHAGE) 1000 MG tablet, Take 1,000 mg by mouth., Disp: , Rfl:   •  metoprolol succinate XL (TOPROL-XL) 25 MG 24 hr tablet, Daily., Disp: , Rfl:   •  Multiple Vitamins-Minerals (MULTI VITAMIN/MINERALS) tablet, MULTI VITAMIN/MINERALS TABS, Disp: , Rfl:    •  Omega-3 Fatty Acids (FISH OIL) 1000 MG capsule capsule, FISH OIL 1000 MG CAPS, Disp: , Rfl:   •  rivaroxaban (XARELTO) 20 MG tablet, Daily., Disp: , Rfl:   •  vitamin C (ASCORBIC ACID) 500 MG tablet, Take 500 mg by mouth Daily., Disp: , Rfl:   •  vitamin E 400 UNIT capsule, Take 400 Units by mouth Daily., Disp: , Rfl:     Allergies   Allergen Reactions   • Clindamycin Hcl Unknown (See Comments)       Family History   Problem Relation Age of Onset   • Heart disease Mother    • Heart disease Father         MI   • Heart disease Sister         MI   • Heart disease Brother         s/p coronary stents in his 40's    • Stroke Maternal Grandfather        Past Surgical History:   Procedure Laterality Date   • APPENDECTOMY  1956   • CARDIAC ABLATION  12/2018    x 1    • CARDIOVERSION  06/2018    Cardioversion 6/2018; x3  12/2018   • CORONARY ANGIOPLASTY Left 11/04/2015    Distal left main and in the mid circumflex artery    • CORONARY ANGIOPLASTY Right 11/02/2015    Right coronary artery    • CORONARY ARTERY BYPASS GRAFT  03/1998   • FOOT SURGERY  2010   • HERNIA REPAIR  2005   • OTHER SURGICAL HISTORY  05/2019    Stent    • PACEMAKER IMPLANTATION  12/14/2018    Dr. Saldaña    • REPLACEMENT TOTAL KNEE BILATERAL  2001   • SKIN BIOPSY         Past Medical History:   Diagnosis Date   • Arthritis    • Asymptomatic stenosis of right carotid artery    • Atrial fibrillation (CMS/HCC)    • Bradycardia    • Coronary artery disease    • Diabetes mellitus, type 2 (CMS/HCC)    • Hyperlipidemia    • Hypertension    • Myocardial infarction (CMS/HCC)        Family History   Problem Relation Age of Onset   • Heart disease Mother    • Heart disease Father         MI   • Heart disease Sister         MI   • Heart disease Brother         s/p coronary stents in his 40's    • Stroke Maternal Grandfather        Social History     Socioeconomic History   • Marital status:      Spouse name: Not on file   • Number of children: Not on file  "  • Years of education: Not on file   • Highest education level: Not on file   Tobacco Use   • Smoking status: Former Smoker   • Smokeless tobacco: Never Used   Substance and Sexual Activity   • Alcohol use: No     Frequency: Never   • Drug use: No           ECG 12 Lead  Date/Time: 8/11/2020 11:12 AM  Performed by: Karolina Saldaña MD  Authorized by: Karolina Saldaña MD   Comparison: compared with previous ECG   Similar to previous ECG  Comparison to previous ECG: Dual-chamber pacemaker rhythm 60/min nonspecific ST-T wave changes  ms no ectopy nonspecific ST-T wave changes compared to 1/22/2020 no significant changes seen                Objective:       Physical Exam    /79   Pulse 60   Ht 182.9 cm (72\")   Wt 129 kg (283 lb 8 oz)   SpO2 98%   BMI 38.45 kg/m²   The patient is alert, oriented and in no distress.    Vital signs as noted above.    Head and neck revealed no carotid bruits or jugular venous distension.  No thyromegaly or lymphadenopathy is present.    Lungs clear.  No wheezing.  Breath sounds are normal bilaterally.    Heart normal first and second heart sounds.  No murmur..  No pericardial rub is present.  No gallop is present.    Abdomen soft and nontender.  No organomegaly is present.    Extremities revealed good peripheral pulses without any pedal edema.    Skin warm and dry.  Pacemaker site looks normal.    Musculoskeletal system is grossly normal.    CNS grossly normal.        "

## 2020-10-26 ENCOUNTER — TELEPHONE (OUTPATIENT)
Dept: FAMILY MEDICINE CLINIC | Facility: CLINIC | Age: 75
End: 2020-10-26

## 2020-10-26 DIAGNOSIS — E78.2 MIXED HYPERLIPIDEMIA: Primary | ICD-10-CM

## 2020-10-26 DIAGNOSIS — E11.65 TYPE 2 DIABETES MELLITUS WITH HYPERGLYCEMIA, WITH LONG-TERM CURRENT USE OF INSULIN (HCC): ICD-10-CM

## 2020-10-26 DIAGNOSIS — I10 ESSENTIAL HYPERTENSION: ICD-10-CM

## 2020-10-26 DIAGNOSIS — Z12.5 SCREENING FOR PROSTATE CANCER: ICD-10-CM

## 2020-10-26 DIAGNOSIS — Z79.4 TYPE 2 DIABETES MELLITUS WITH HYPERGLYCEMIA, WITH LONG-TERM CURRENT USE OF INSULIN (HCC): ICD-10-CM

## 2020-11-04 NOTE — TELEPHONE ENCOUNTER
Spoke with patient and scheduled a [facility] lab appt on 11/25/2020 at 8:50am,.

## 2020-11-12 PROBLEM — E11.65 TYPE 2 DIABETES MELLITUS WITH HYPERGLYCEMIA: Status: ACTIVE | Noted: 2020-11-12

## 2020-11-12 PROBLEM — Z76.89 PERSONS ENCOUNTERING HEALTH SERVICES IN OTHER SPECIFIED CIRCUMSTANCES: Status: ACTIVE | Noted: 2018-07-30

## 2020-11-25 ENCOUNTER — LAB (OUTPATIENT)
Dept: FAMILY MEDICINE CLINIC | Facility: CLINIC | Age: 75
End: 2020-11-25

## 2020-11-25 DIAGNOSIS — E78.2 MIXED HYPERLIPIDEMIA: ICD-10-CM

## 2020-11-25 DIAGNOSIS — E11.65 TYPE 2 DIABETES MELLITUS WITH HYPERGLYCEMIA, WITH LONG-TERM CURRENT USE OF INSULIN (HCC): ICD-10-CM

## 2020-11-25 DIAGNOSIS — Z12.5 SCREENING FOR PROSTATE CANCER: ICD-10-CM

## 2020-11-25 DIAGNOSIS — I10 ESSENTIAL HYPERTENSION: ICD-10-CM

## 2020-11-25 DIAGNOSIS — Z79.4 TYPE 2 DIABETES MELLITUS WITH HYPERGLYCEMIA, WITH LONG-TERM CURRENT USE OF INSULIN (HCC): ICD-10-CM

## 2020-11-25 LAB
ALBUMIN SERPL-MCNC: 4.6 G/DL (ref 3.5–5.2)
ALBUMIN UR-MCNC: 1.3 MG/DL
ALBUMIN/GLOB SERPL: 2.3 G/DL
ALP SERPL-CCNC: 59 U/L (ref 39–117)
ALT SERPL W P-5'-P-CCNC: 23 U/L (ref 1–41)
ANION GAP SERPL CALCULATED.3IONS-SCNC: 10.6 MMOL/L (ref 5–15)
AST SERPL-CCNC: 24 U/L (ref 1–40)
BASOPHILS # BLD AUTO: 0.05 10*3/MM3 (ref 0–0.2)
BASOPHILS NFR BLD AUTO: 0.8 % (ref 0–1.5)
BILIRUB SERPL-MCNC: 0.7 MG/DL (ref 0–1.2)
BILIRUB UR QL STRIP: NEGATIVE
BUN SERPL-MCNC: 17 MG/DL (ref 8–23)
BUN/CREAT SERPL: 18.1 (ref 7–25)
CALCIUM SPEC-SCNC: 9.8 MG/DL (ref 8.6–10.5)
CHLORIDE SERPL-SCNC: 101 MMOL/L (ref 98–107)
CHOLEST SERPL-MCNC: 138 MG/DL (ref 0–200)
CLARITY UR: CLEAR
CO2 SERPL-SCNC: 29.4 MMOL/L (ref 22–29)
COLOR UR: YELLOW
CREAT SERPL-MCNC: 0.94 MG/DL (ref 0.76–1.27)
DEPRECATED RDW RBC AUTO: 39.6 FL (ref 37–54)
EOSINOPHIL # BLD AUTO: 0.18 10*3/MM3 (ref 0–0.4)
EOSINOPHIL NFR BLD AUTO: 2.9 % (ref 0.3–6.2)
ERYTHROCYTE [DISTWIDTH] IN BLOOD BY AUTOMATED COUNT: 12 % (ref 12.3–15.4)
GFR SERPL CREATININE-BSD FRML MDRD: 79 ML/MIN/1.73
GLOBULIN UR ELPH-MCNC: 2 GM/DL
GLUCOSE SERPL-MCNC: 110 MG/DL (ref 65–99)
GLUCOSE UR STRIP-MCNC: NEGATIVE MG/DL
HBA1C MFR BLD: 6.4 % (ref 3.5–5.6)
HCT VFR BLD AUTO: 38.2 % (ref 37.5–51)
HDLC SERPL-MCNC: 38 MG/DL (ref 40–60)
HGB BLD-MCNC: 12.7 G/DL (ref 13–17.7)
HGB UR QL STRIP.AUTO: NEGATIVE
IMM GRANULOCYTES # BLD AUTO: 0.02 10*3/MM3 (ref 0–0.05)
IMM GRANULOCYTES NFR BLD AUTO: 0.3 % (ref 0–0.5)
KETONES UR QL STRIP: NEGATIVE
LDLC SERPL CALC-MCNC: 82 MG/DL (ref 0–100)
LDLC/HDLC SERPL: 2.12 {RATIO}
LEUKOCYTE ESTERASE UR QL STRIP.AUTO: NEGATIVE
LYMPHOCYTES # BLD AUTO: 3.14 10*3/MM3 (ref 0.7–3.1)
LYMPHOCYTES NFR BLD AUTO: 50.4 % (ref 19.6–45.3)
MCH RBC QN AUTO: 30.2 PG (ref 26.6–33)
MCHC RBC AUTO-ENTMCNC: 33.2 G/DL (ref 31.5–35.7)
MCV RBC AUTO: 91 FL (ref 79–97)
MONOCYTES # BLD AUTO: 0.5 10*3/MM3 (ref 0.1–0.9)
MONOCYTES NFR BLD AUTO: 8 % (ref 5–12)
NEUTROPHILS NFR BLD AUTO: 2.34 10*3/MM3 (ref 1.7–7)
NEUTROPHILS NFR BLD AUTO: 37.6 % (ref 42.7–76)
NITRITE UR QL STRIP: NEGATIVE
NRBC BLD AUTO-RTO: 0 /100 WBC (ref 0–0.2)
PH UR STRIP.AUTO: 6.5 [PH] (ref 5–8)
PLATELET # BLD AUTO: 206 10*3/MM3 (ref 140–450)
PMV BLD AUTO: 9.5 FL (ref 6–12)
POTASSIUM SERPL-SCNC: 4.1 MMOL/L (ref 3.5–5.2)
PROT SERPL-MCNC: 6.6 G/DL (ref 6–8.5)
PROT UR QL STRIP: NEGATIVE
PSA SERPL-MCNC: 1.85 NG/ML (ref 0–4)
RBC # BLD AUTO: 4.2 10*6/MM3 (ref 4.14–5.8)
SODIUM SERPL-SCNC: 141 MMOL/L (ref 136–145)
SP GR UR STRIP: 1.01 (ref 1–1.03)
TRIGL SERPL-MCNC: 97 MG/DL (ref 0–150)
TSH SERPL DL<=0.05 MIU/L-ACNC: 1.64 UIU/ML (ref 0.27–4.2)
UROBILINOGEN UR QL STRIP: NORMAL
VLDLC SERPL-MCNC: 18 MG/DL (ref 5–40)
WBC # BLD AUTO: 6.23 10*3/MM3 (ref 3.4–10.8)

## 2020-11-25 PROCEDURE — 82043 UR ALBUMIN QUANTITATIVE: CPT | Performed by: INTERNAL MEDICINE

## 2020-11-25 PROCEDURE — 83036 HEMOGLOBIN GLYCOSYLATED A1C: CPT | Performed by: INTERNAL MEDICINE

## 2020-11-25 PROCEDURE — 81003 URINALYSIS AUTO W/O SCOPE: CPT | Performed by: INTERNAL MEDICINE

## 2020-11-25 PROCEDURE — 80053 COMPREHEN METABOLIC PANEL: CPT | Performed by: INTERNAL MEDICINE

## 2020-11-25 PROCEDURE — 84443 ASSAY THYROID STIM HORMONE: CPT | Performed by: INTERNAL MEDICINE

## 2020-11-25 PROCEDURE — 85025 COMPLETE CBC W/AUTO DIFF WBC: CPT | Performed by: INTERNAL MEDICINE

## 2020-11-25 PROCEDURE — G0103 PSA SCREENING: HCPCS | Performed by: INTERNAL MEDICINE

## 2020-11-25 PROCEDURE — 80061 LIPID PANEL: CPT | Performed by: INTERNAL MEDICINE

## 2020-12-03 ENCOUNTER — OFFICE VISIT (OUTPATIENT)
Dept: FAMILY MEDICINE CLINIC | Facility: CLINIC | Age: 75
End: 2020-12-03

## 2020-12-03 VITALS
DIASTOLIC BLOOD PRESSURE: 80 MMHG | BODY MASS INDEX: 37.38 KG/M2 | HEART RATE: 75 BPM | SYSTOLIC BLOOD PRESSURE: 160 MMHG | TEMPERATURE: 97.1 F | RESPIRATION RATE: 16 BRPM | OXYGEN SATURATION: 95 % | WEIGHT: 276 LBS | HEIGHT: 72 IN

## 2020-12-03 DIAGNOSIS — E11.65 TYPE 2 DIABETES MELLITUS WITH HYPERGLYCEMIA, WITH LONG-TERM CURRENT USE OF INSULIN (HCC): ICD-10-CM

## 2020-12-03 DIAGNOSIS — I10 ESSENTIAL HYPERTENSION: ICD-10-CM

## 2020-12-03 DIAGNOSIS — Z00.00 MEDICARE ANNUAL WELLNESS VISIT, SUBSEQUENT: Primary | ICD-10-CM

## 2020-12-03 DIAGNOSIS — I25.10 CHRONIC CORONARY ARTERY DISEASE: ICD-10-CM

## 2020-12-03 DIAGNOSIS — I48.0 PAROXYSMAL ATRIAL FIBRILLATION (HCC): ICD-10-CM

## 2020-12-03 DIAGNOSIS — Z79.4 TYPE 2 DIABETES MELLITUS WITH HYPERGLYCEMIA, WITH LONG-TERM CURRENT USE OF INSULIN (HCC): ICD-10-CM

## 2020-12-03 PROCEDURE — G0439 PPPS, SUBSEQ VISIT: HCPCS | Performed by: INTERNAL MEDICINE

## 2020-12-03 PROCEDURE — 99213 OFFICE O/P EST LOW 20 MIN: CPT | Performed by: INTERNAL MEDICINE

## 2020-12-03 PROCEDURE — 90732 PPSV23 VACC 2 YRS+ SUBQ/IM: CPT | Performed by: INTERNAL MEDICINE

## 2020-12-03 PROCEDURE — G0009 ADMIN PNEUMOCOCCAL VACCINE: HCPCS | Performed by: INTERNAL MEDICINE

## 2020-12-03 RX ORDER — DOFETILIDE 0.5 MG/1
500 CAPSULE ORAL 2 TIMES DAILY
Qty: 60 CAPSULE | Refills: 12 | Status: SHIPPED | OUTPATIENT
Start: 2020-12-03 | End: 2021-01-21 | Stop reason: SDUPTHER

## 2020-12-03 NOTE — PROGRESS NOTES
The ABCs of the Annual Wellness Visit  Subsequent Medicare Wellness Visit    Chief Complaint   Patient presents with   • Medicare Wellness-subsequent       Subjective   History of Present Illness:  Benoit Newberry Jr. is a 74 y.o. male who presents for a Subsequent Medicare Wellness Visit and other concerns.  Sleeping well- awakes well rested using CPAP- no nocturia.   Riding bicycle 11 miles everyday inside.  Got on scale 3/2020 and  Was 320- so down almost 50 lbs.  Feels so much better-  Learning new life style.   no chest pain or difficulty breathing. No moles changing.- is a .   discussed all labs-  doing well A1c is down to 6.4  No swelling, no depression or anxiety   has decrease insulin 30 novolog   and lantus at 27 to 25 pending the days.  Was on 40 last year. Will forget from time to time      HEALTH RISK ASSESSMENT    Recent Hospitalizations:  No hospitalization(s) within the last year.    Current Medical Providers:  Patient Care Team:  Yasmine Live MD as PCP - Karolina Parish MD as Consulting Physician (Cardiology)    Smoking Status:  Social History     Tobacco Use   Smoking Status Former Smoker   • Quit date: 12/3/1971   • Years since quittin.0   Smokeless Tobacco Never Used       Alcohol Consumption:  Social History     Substance and Sexual Activity   Alcohol Use No   • Frequency: Never       Depression Screen:   PHQ-2/PHQ-9 Depression Screening 12/3/2020   Little interest or pleasure in doing things 0   Feeling down, depressed, or hopeless 0   Total Score 0       Fall Risk Screen:  PRIYANKA Fall Risk Assessment was completed, and patient is at MODERATE risk for falls. Assessment completed on:12/3/2020    Health Habits and Functional and Cognitive Screening:  Functional & Cognitive Status 12/3/2020   Do you have difficulty preparing food and eating? No   Do you have difficulty bathing yourself, getting dressed or grooming yourself? No   Do you have difficulty using the  toilet? No   Do you have difficulty moving around from place to place? No   Do you have trouble with steps or getting out of a bed or a chair? No   Current Diet Well Balanced Diet   Dental Exam Up to date   Eye Exam Up to date   Exercise (times per week) 7 times per week   Current Exercise Activities Include Cardiovasular Workout on Exercise Equipment   Do you need help using the phone?  No   Are you deaf or do you have serious difficulty hearing?  No   Do you need help with transportation? No   Do you need help shopping? No   Do you need help preparing meals?  No   Do you need help with housework?  No   Do you need help with laundry? No   Do you need help taking your medications? No   Do you need help managing money? No   Do you ever drive or ride in a car without wearing a seat belt? No   Have you felt unusual stress, anger or loneliness in the last month? No   Who do you live with? Spouse   If you need help, do you have trouble finding someone available to you? No   Do you have difficulty concentrating, remembering or making decisions? No         Does the patient have evidence of cognitive impairment? No    Asprin use counseling:Taking ASA appropriately as indicated    Age-appropriate Screening Schedule:  Refer to the list below for future screening recommendations based on patient's age, sex and/or medical conditions. Orders for these recommended tests are listed in the plan section. The patient has been provided with a written plan.    Health Maintenance   Topic Date Due   • COLONOSCOPY  1945   • TDAP/TD VACCINES (1 - Tdap) 12/27/1966   • ZOSTER VACCINE (1 of 2) 12/27/1997   • DIABETIC FOOT EXAM  07/02/2019   • HEMOGLOBIN A1C  05/25/2021   • DIABETIC EYE EXAM  09/21/2021   • LIPID PANEL  11/25/2021   • URINE MICROALBUMIN  11/25/2021   • INFLUENZA VACCINE  Completed          The following portions of the patient's history were reviewed and updated as appropriate: allergies, current medications, past family  history, past medical history, past social history, past surgical history and problem list.    Outpatient Medications Prior to Visit   Medication Sig Dispense Refill   • amLODIPine (NORVASC) 5 MG tablet Take 5 mg by mouth Daily.     • atorvastatin (LIPITOR) 20 MG tablet Take 20 mg by mouth 2 (two) times a day.     • Cholecalciferol (VITAMIN D) 1000 units tablet Take 500 Units by mouth Daily.     • clopidogrel (PLAVIX) 75 MG tablet Take 75 mg by mouth Daily.     • Coenzyme Q10 (CO Q 10) 100 MG capsule Take 400 mg by mouth Daily.     • furosemide (LASIX) 40 MG tablet Daily.     • Insulin Glargine (LANTUS SOLOSTAR) 100 UNIT/ML injection pen 30-40 units qhs     • insulin lispro (humaLOG) 100 UNIT/ML injection Inject 10 Units under the skin into the appropriate area as directed 3 (Three) Times a Day Before Meals.     • lisinopril (PRINIVIL,ZESTRIL) 20 MG tablet Take 20 mg by mouth 2 (two) times a day.     • metFORMIN (GLUCOPHAGE) 1000 MG tablet Take 1,000 mg by mouth 2 (Two) Times a Day With Meals.     • metoprolol succinate XL (TOPROL-XL) 25 MG 24 hr tablet 25 mg 2 (two) times a day.     • Multiple Vitamins-Minerals (MULTI VITAMIN/MINERALS) tablet MULTI VITAMIN/MINERALS TABS     • Omega-3 Fatty Acids (FISH OIL) 1000 MG capsule capsule FISH OIL 1000 MG CAPS     • rivaroxaban (XARELTO) 20 MG tablet Daily.     • vitamin C (ASCORBIC ACID) 500 MG tablet Take 500 mg by mouth Daily.     • vitamin E 400 UNIT capsule Take 400 Units by mouth Daily.     • dofetilide (TIKOSYN) 500 MCG capsule Take 500 mcg by mouth 2 (Two) Times a Day.     • AFLURIA QUADRIVALENT 0.5 ML suspension prefilled syringe injection inject 0.5 milliliters intramuscularly  0   • cetirizine (zyrTEC) 10 MG tablet Daily.     • aspirin 81 MG tablet Take 81 mg by mouth Daily.     • gabapentin (NEURONTIN) 300 MG capsule Take 300 mg by mouth 3 (Three) Times a Day.       No facility-administered medications prior to visit.        Patient Active Problem List  "  Diagnosis   • Atrial fibrillation (CMS/[facility])   • Chest pain   • Chronic coronary artery disease   • Diabetes mellitus (CMS/[facility])   • Excessive anticoagulation   • Hyperlipidemia   • Hypertension   • Presence of aortocoronary bypass graft   • Presence of cardiac pacemaker   • ST elevation (STEMI) myocardial infarction (CMS/[facility])   • Status post coronary artery stent placement   • Tachycardia-bradycardia (CMS/HCC)   • Hx of CABG   • Persons encountering health services in other specified circumstances   • Status post percutaneous transluminal coronary angioplasty   • Type 2 diabetes mellitus with hyperglycemia (CMS/[facility])   • Medicare annual wellness visit, subsequent       Advanced Care Planning:  ACP discussion was held with the patient during this visit. Patient has an advance directive in EMR which is still valid.     Review of Systems   Constitutional: Negative for activity change and fatigue.   HENT: Negative for congestion.    Respiratory: Negative for apnea and wheezing.    Cardiovascular: Negative for chest pain and palpitations.   Genitourinary: Negative for urgency.   Musculoskeletal: Positive for arthralgias.   Neurological: Negative for weakness and headaches.   Psychiatric/Behavioral: Positive for sleep disturbance.       Compared to one year ago, the patient feels his physical health is better.  Compared to one year ago, the patient feels his mental health is better.    Reviewed chart for potential of high risk medication in the elderly: yes  Reviewed chart for potential of harmful drug interactions in the elderly:yes    Objective         Vitals:    12/03/20 1034   BP: 160/80   BP Location: Left arm   Pulse: 75   Resp: 16   Temp: 97.1 °F (36.2 °C)   SpO2: 95%   Weight: 125 kg (276 lb)   Height: 182.9 cm (72\")       Body mass index is 37.43 kg/m².  Discussed the patient's BMI with him. The BMI is above average; BMI management plan is completed.    Physical Exam  Vitals signs and nursing note reviewed. "   Constitutional:       Appearance: Normal appearance. He is well-developed. He is obese.   HENT:      Head: Normocephalic and atraumatic.      Right Ear: Tympanic membrane normal.      Left Ear: Tympanic membrane normal.   Eyes:      Pupils: Pupils are equal, round, and reactive to light.   Neck:      Musculoskeletal: Normal range of motion and neck supple.   Cardiovascular:      Rate and Rhythm: Normal rate and regular rhythm.      Heart sounds: No murmur.   Pulmonary:      Effort: Pulmonary effort is normal.      Breath sounds: Normal breath sounds.   Abdominal:      General: Bowel sounds are normal. There is no distension.      Palpations: Abdomen is soft.   Musculoskeletal:         General: Tenderness present. No deformity.   Skin:     Capillary Refill: Capillary refill takes less than 2 seconds.      Coloration: Skin is not jaundiced.   Neurological:      General: No focal deficit present.      Mental Status: He is oriented to person, place, and time.   Psychiatric:         Behavior: Behavior normal.         Lab Results   Component Value Date    TRIG 97 11/25/2020    HDL 38 (L) 11/25/2020    LDL 82 11/25/2020    VLDL 18 11/25/2020    HGBA1C 6.4 (H) 11/25/2020        Assessment/Plan   Medicare Risks and Personalized Health Plan  CMS Preventative Services Quick Reference  Fall Risk    The above risks/problems have been discussed with the patient.  Pertinent information has been shared with the patient in the After Visit Summary.  Follow up plans and orders are seen below in the Assessment/Plan Section.    Diagnoses and all orders for this visit:    1. Medicare annual wellness visit, subsequent (Primary)  Comments:  discussed all recommendations    2. Type 2 diabetes mellitus with hyperglycemia, with long-term current use of insulin (CMS/[facility])  Comments:  doing better with weight loss- decreased insulin amount    3. Chronic coronary artery disease    4. Essential hypertension  Comments:  at home been good- will  watch and call if staying up    5. Paroxysmal atrial fibrillation (CMS/[facility])  Comments:  in NSR currently    Other orders  -     dofetilide (TIKOSYN) 500 MCG capsule; Take 1 capsule by mouth 2 (Two) Times a Day.  Dispense: 60 capsule; Refill: 12  -     Pneumococcal Polysaccharide Vaccine 23-Valent Greater Than or Equal To 1yo Subcutaneous / IM      Follow Up:  Return in about 6 months (around 6/3/2021), or if symptoms worsen or fail to improve.     An After Visit Summary and PPPS were given to the patient.    has living will needs to keep on file  Does not want c scope at this time     During this visit for their annual exam, we reviewed their personal history, social history and family history.  We went over their medications and all the recommended health maintenence items for their age group. They were given the opportunity to ask questions and discuss other concerns.

## 2020-12-06 PROBLEM — Z00.00 MEDICARE ANNUAL WELLNESS VISIT, SUBSEQUENT: Status: ACTIVE | Noted: 2020-12-06

## 2021-01-21 RX ORDER — DOFETILIDE 0.5 MG/1
500 CAPSULE ORAL 2 TIMES DAILY
Qty: 180 CAPSULE | Refills: 3 | Status: SHIPPED | OUTPATIENT
Start: 2021-01-21 | End: 2021-01-25 | Stop reason: SDUPTHER

## 2021-01-25 ENCOUNTER — TELEPHONE (OUTPATIENT)
Dept: FAMILY MEDICINE CLINIC | Facility: CLINIC | Age: 76
End: 2021-01-25

## 2021-01-25 NOTE — TELEPHONE ENCOUNTER
Caller: Benoit Newberry Jr.    Relationship to patient: Self    Best call back number: [phone]    Patient is needing: PATIENT IS REQUESTING CALL BACK WHEN HIS MEDICATION REFILLS ARE TAKING CARE OF. HE JUST SPOKE WITH [employer] AND THEY WILL FAX REQUEST.

## 2021-01-26 RX ORDER — AMLODIPINE BESYLATE 5 MG/1
5 TABLET ORAL DAILY
Qty: 90 TABLET | Refills: 1 | Status: SHIPPED | OUTPATIENT
Start: 2021-01-26 | End: 2021-01-26 | Stop reason: SDUPTHER

## 2021-01-26 RX ORDER — FUROSEMIDE 40 MG/1
40 TABLET ORAL EVERY 24 HOURS
Qty: 90 TABLET | Refills: 1 | Status: SHIPPED | OUTPATIENT
Start: 2021-01-26 | End: 2021-06-01

## 2021-01-26 RX ORDER — FUROSEMIDE 40 MG/1
40 TABLET ORAL EVERY 24 HOURS
Qty: 90 TABLET | Refills: 1 | Status: SHIPPED | OUTPATIENT
Start: 2021-01-26 | End: 2021-01-26 | Stop reason: SDUPTHER

## 2021-01-26 RX ORDER — LISINOPRIL 20 MG/1
20 TABLET ORAL 2 TIMES DAILY
Qty: 180 TABLET | Refills: 1 | Status: SHIPPED | OUTPATIENT
Start: 2021-01-26 | End: 2021-06-01

## 2021-01-26 RX ORDER — CLOPIDOGREL BISULFATE 75 MG/1
75 TABLET ORAL DAILY
Qty: 90 TABLET | Refills: 1 | Status: SHIPPED | OUTPATIENT
Start: 2021-01-26 | End: 2021-06-01

## 2021-01-26 RX ORDER — AMLODIPINE BESYLATE 5 MG/1
5 TABLET ORAL DAILY
Qty: 90 TABLET | Refills: 1 | Status: SHIPPED | OUTPATIENT
Start: 2021-01-26 | End: 2021-06-01

## 2021-01-26 RX ORDER — DOFETILIDE 0.5 MG/1
500 CAPSULE ORAL 2 TIMES DAILY
Qty: 180 CAPSULE | Refills: 1 | Status: SHIPPED | OUTPATIENT
Start: 2021-01-26 | End: 2021-06-01

## 2021-01-26 RX ORDER — ATORVASTATIN CALCIUM 20 MG/1
20 TABLET, FILM COATED ORAL 2 TIMES DAILY
Qty: 180 TABLET | Refills: 1 | Status: SHIPPED | OUTPATIENT
Start: 2021-01-26 | End: 2021-06-01

## 2021-03-02 ENCOUNTER — OFFICE VISIT (OUTPATIENT)
Dept: CARDIOLOGY | Facility: CLINIC | Age: 76
End: 2021-03-02

## 2021-03-02 ENCOUNTER — CLINICAL SUPPORT NO REQUIREMENTS (OUTPATIENT)
Dept: CARDIOLOGY | Facility: CLINIC | Age: 76
End: 2021-03-02

## 2021-03-02 VITALS
TEMPERATURE: 97.5 F | DIASTOLIC BLOOD PRESSURE: 71 MMHG | HEIGHT: 72 IN | BODY MASS INDEX: 37.52 KG/M2 | WEIGHT: 277 LBS | SYSTOLIC BLOOD PRESSURE: 147 MMHG

## 2021-03-02 DIAGNOSIS — I49.5 TACHY-BRADY SYNDROME (HCC): ICD-10-CM

## 2021-03-02 DIAGNOSIS — Z95.0 PRESENCE OF CARDIAC PACEMAKER: ICD-10-CM

## 2021-03-02 DIAGNOSIS — Z95.0 PRESENCE OF CARDIAC PACEMAKER: Primary | ICD-10-CM

## 2021-03-02 DIAGNOSIS — Z95.1 HX OF CABG: Primary | ICD-10-CM

## 2021-03-02 DIAGNOSIS — Z95.5 STATUS POST CORONARY ARTERY STENT PLACEMENT: ICD-10-CM

## 2021-03-02 DIAGNOSIS — I10 ESSENTIAL HYPERTENSION: ICD-10-CM

## 2021-03-02 DIAGNOSIS — I49.5 TACHYCARDIA-BRADYCARDIA (HCC): ICD-10-CM

## 2021-03-02 DIAGNOSIS — Z79.01 CHRONIC ANTICOAGULATION: ICD-10-CM

## 2021-03-02 DIAGNOSIS — E78.2 MIXED HYPERLIPIDEMIA: ICD-10-CM

## 2021-03-02 PROCEDURE — 93000 ELECTROCARDIOGRAM COMPLETE: CPT | Performed by: INTERNAL MEDICINE

## 2021-03-02 PROCEDURE — 99214 OFFICE O/P EST MOD 30 MIN: CPT | Performed by: INTERNAL MEDICINE

## 2021-03-02 PROCEDURE — 93280 PM DEVICE PROGR EVAL DUAL: CPT | Performed by: INTERNAL MEDICINE

## 2021-03-02 NOTE — PROGRESS NOTES
Encounter Date:03/02/2021  Last seen 8/11/2020      Patient ID: Benoit Newberry Jr. is a 75 y.o. male.    Chief Complaint:  Status post CABG  Status post pacemaker  Past history of atrial fibrillation  Anticoagulation management-on Xarelto  Hypertension  Dyslipidemia  Diabetes        History of Present Illness     Since I have last seen, the patient has been without any chest discomfort ,shortness of breath, palpitations, dizziness or syncope.  Denies having any headache ,abdominal pain ,nausea, vomiting , diarrhea constipation, loss of weight or loss of appetite.  Denies having any excessive bruising ,hematuria or blood in the stool.    Review of all systems negative except as indicated.    Reviewed ROS.  Assessment and Plan         [[[  Impression  =     -status post permanent dual-chamber pacemaker implantation (Atlas Local  MRI compatible) 12/04/2018      - atrial fibrillation.   status post Ablation 10/16/2018  Patient has converted to sinus  sinus bradycardia.   Patient had a recurrence of atrial fibrillation.  Status post cardioversion 06/13/2018 and 08/01/2018 09/05/2018.  Patient has converted but did not stay in sinus rhythm.       -anticoagulation Patient is on Xarelto.      -status post CABG  3/98      -Acute inferior myocardial infarction.  Status post stent placement to SVG to RCA for total occlusion .  11/02/2015   - Status post stent placement to totally occluded SVG to RCA11/02/2015 and stent placement to left main and mid circumflex coronary artery 11/04/2015.  Status post stent to PDA distal to SVG and native LAD beyond [place].  05/25/2018      cardiac catheterization 05/25/2018 revealed left ventricular diffuse hypocontractility with ejection fraction of 40%.  2+ mitral regurgitation.  Patent left main stent.  Total LAD.  Ramus intermedius has 90% disease in the proximal segment and distal vessel is filling from collaterals.  Patent circumflex stent.  Totally occluded RCA.  Lima to LAD is  patent SVG to ramus branch marginal branch and RCA were patent.     Echocardiogram showed diffuse left ventricular hypocontractility with ejection fraction of 40-45%) 04/27/2017)  Stress Cardiolite test revealed inferior myocardial infarction with mild inferior ischemia.      - diabetes dyslipidemia and hypertension   - status post impending inferior myocardial infarction prior to surgery    -family history of coronary artery disease   - History of asymptomatic right carotid bruit.  ===   Plan  ===  Status post CABG.  Patient is not having any angina pectoris or congestive heart failure.    History of atrial fibrillation-patient is maintaining sinus rhythm.  Patient apparently stopped taking Tikosyn for 3 months.  Patient did not have any recurrence of atrial fibrillation.  Continue to hold Tikosyn since patient has been maintaining sinus rhythm without Tikosyn at this time.    Anticoagulation status reviewed.  Patient is concerned about cost of Xarelto.  I have discussed with him about the options that included switching to Coumadin and asked him to check with his pharmacy to see if other alternatives are better such as Eliquis Pradaxa etc.    Status post pacemaker.  Interrogation of the pacemaker revealed excellent pacing parameters.    EKG showed dual-chamber pacemaker rhythm.Normal QTc interval at   444 ms    Medications were reviewed and updated.  Patient is not having any angina pectoris or congestive heart failure.  Follow-up in office in 6 months with pacemaker interrogation  Further plan will depend on patient's progress  ]]]]]]]]]]]]]]]                Diagnosis Plan   1. Hx of CABG  ECG 12 Lead   2. Mixed hyperlipidemia  ECG 12 Lead   3. Tachy-gillian syndrome (CMS/HCC)  ECG 12 Lead   4. Essential hypertension  ECG 12 Lead   5. Presence of cardiac pacemaker     6. Status post coronary artery stent placement     7. Chronic anticoagulation     LAB RESULTS (LAST 7 DAYS)    CBC        BMP        CMP          BNP        TROPONIN        CoAg        Creatinine Clearance  CrCl cannot be calculated (Patient's most recent lab result is older than the maximum 30 days allowed.).    ABG        Radiology  No radiology results for the last day                The following portions of the patient's history were reviewed and updated as appropriate: allergies, current medications, past family history, past medical history, past social history, past surgical history and problem list.    Review of Systems   Constitution: Negative for malaise/fatigue.   Cardiovascular: Negative for chest pain, leg swelling, palpitations and syncope.   Respiratory: Negative for shortness of breath.    Skin: Negative for rash.   Gastrointestinal: Negative for nausea and vomiting.   Neurological: Negative for dizziness, light-headedness and numbness.         Current Outpatient Medications:   •  AFLURIA QUADRIVALENT 0.5 ML suspension prefilled syringe injection, inject 0.5 milliliters intramuscularly, Disp: , Rfl: 0  •  amLODIPine (NORVASC) 5 MG tablet, Take 1 tablet by mouth Daily., Disp: 90 tablet, Rfl: 1  •  atorvastatin (LIPITOR) 20 MG tablet, Take 1 tablet by mouth 2 (two) times a day., Disp: 180 tablet, Rfl: 1  •  cetirizine (zyrTEC) 10 MG tablet, Daily., Disp: , Rfl:   •  Cholecalciferol (VITAMIN D) 1000 units tablet, Take 500 Units by mouth Daily., Disp: , Rfl:   •  clopidogrel (PLAVIX) 75 MG tablet, Take 1 tablet by mouth Daily., Disp: 90 tablet, Rfl: 1  •  Coenzyme Q10 (CO Q 10) 100 MG capsule, Take 400 mg by mouth Daily., Disp: , Rfl:   •  dofetilide (TIKOSYN) 500 MCG capsule, Take 1 capsule by mouth 2 (Two) Times a Day., Disp: 180 capsule, Rfl: 1  •  furosemide (LASIX) 40 MG tablet, Take 1 tablet by mouth Daily., Disp: 90 tablet, Rfl: 1  •  Insulin Glargine (LANTUS SOLOSTAR) 100 UNIT/ML injection pen, 30-40 units qhs, Disp: , Rfl:   •  insulin lispro (humaLOG) 100 UNIT/ML injection, Inject 10 Units under the skin into the appropriate area  as directed 3 (Three) Times a Day Before Meals., Disp: , Rfl:   •  lisinopril (PRINIVIL,ZESTRIL) 20 MG tablet, Take 1 tablet by mouth 2 (two) times a day., Disp: 180 tablet, Rfl: 1  •  metFORMIN (GLUCOPHAGE) 1000 MG tablet, Take 1,000 mg by mouth 2 (Two) Times a Day With Meals., Disp: , Rfl:   •  metoprolol succinate XL (TOPROL-XL) 25 MG 24 hr tablet, 25 mg 2 (two) times a day., Disp: , Rfl:   •  Multiple Vitamins-Minerals (MULTI VITAMIN/MINERALS) tablet, MULTI VITAMIN/MINERALS TABS, Disp: , Rfl:   •  Omega-3 Fatty Acids (FISH OIL) 1000 MG capsule capsule, FISH OIL 1000 MG CAPS, Disp: , Rfl:   •  rivaroxaban (Xarelto) 20 MG tablet, Take 1 tablet by mouth Daily., Disp: 90 tablet, Rfl: 1  •  vitamin C (ASCORBIC ACID) 500 MG tablet, Take 500 mg by mouth Daily., Disp: , Rfl:   •  vitamin E 400 UNIT capsule, Take 400 Units by mouth Daily., Disp: , Rfl:     Allergies   Allergen Reactions   • Clindamycin Hcl Unknown (See Comments)       Family History   Problem Relation Age of Onset   • Heart disease Mother    • Heart disease Father         MI   • Heart disease Sister         MI   • Heart disease Brother         s/p coronary stents in his 40's    • Stroke Maternal Grandfather        Past Surgical History:   Procedure Laterality Date   • APPENDECTOMY  1956   • CARDIAC ABLATION  12/2018    x 1    • CARDIOVERSION  06/2018    Cardioversion 6/2018; x3  12/2018   • CORONARY ANGIOPLASTY Left 11/04/2015    Distal left main and in the mid circumflex artery    • CORONARY ANGIOPLASTY Right 11/02/2015    Right coronary artery    • CORONARY ARTERY BYPASS GRAFT  03/1998   • FOOT SURGERY  2010   • HERNIA REPAIR  2005   • OTHER SURGICAL HISTORY  05/2019    Stent    • PACEMAKER IMPLANTATION  12/14/2018    Dr. Saldaña    • REPLACEMENT TOTAL KNEE BILATERAL  2001   • SKIN BIOPSY         Past Medical History:   Diagnosis Date   • Arthritis    • Asymptomatic stenosis of right carotid artery    • Atrial fibrillation (CMS/HCC)    • Bradycardia    •  "Coronary artery disease    • Diabetes mellitus, type 2 (CMS/HCC)    • Hyperlipidemia    • Hypertension    • Myocardial infarction (CMS/HCC)        Family History   Problem Relation Age of Onset   • Heart disease Mother    • Heart disease Father         MI   • Heart disease Sister         MI   • Heart disease Brother         s/p coronary stents in his 40's    • Stroke Maternal Grandfather        Social History     Socioeconomic History   • Marital status:      Spouse name: Not on file   • Number of children: Not on file   • Years of education: Not on file   • Highest education level: Not on file   Tobacco Use   • Smoking status: Former Smoker     Quit date: 12/3/1971     Years since quittin.2   • Smokeless tobacco: Never Used   Substance and Sexual Activity   • Alcohol use: No     Frequency: Never   • Drug use: No           ECG 12 Lead    Date/Time: 3/2/2021 1:24 PM  Performed by: Karolina Saldaña MD  Authorized by: Karolina Saldaña MD   Comparison: compared with previous ECG   Similar to previous ECG  Comparison to previous ECG: Dual-chamber pacemaker rhythm 60/min nonspecific ST-T wave changes no ectopy no significant change from 3/11/2020                Objective:       Physical Exam    /71   Temp 97.5 °F (36.4 °C)   Ht 182.9 cm (72\")   Wt 126 kg (277 lb)   BMI 37.57 kg/m²   The patient is alert, oriented and in no distress.    Vital signs as noted above.    Head and neck revealed no carotid bruits or jugular venous distension.  No thyromegaly or lymphadenopathy is present.    Lungs clear.  No wheezing.  Breath sounds are normal bilaterally.    Heart normal first and second heart sounds.  No murmur..  No pericardial rub is present.  No gallop is present.    Abdomen soft and nontender.  No organomegaly is present.    Extremities revealed good peripheral pulses without any pedal edema.    Skin warm and dry.  Pacemaker site looks normal.    Musculoskeletal system is grossly normal.    CNS " grossly normal.    Reviewed and unchanged from last visit.

## 2021-05-03 ENCOUNTER — TELEPHONE (OUTPATIENT)
Dept: CARDIOLOGY | Facility: CLINIC | Age: 76
End: 2021-05-03

## 2021-05-03 NOTE — TELEPHONE ENCOUNTER
Patient called to verify [employer] adapter update. Verified this is legitimate, patient will order new one.

## 2021-05-11 ENCOUNTER — TELEPHONE (OUTPATIENT)
Dept: FAMILY MEDICINE CLINIC | Facility: CLINIC | Age: 76
End: 2021-05-11

## 2021-05-11 NOTE — TELEPHONE ENCOUNTER
Caller: Benoit Newberry Jr.    Relationship: Self    Best call back number:[phone]    What orders are you requesting (i.e. lab or imaging): LABS    In what timeframe would the patient need to come in: PRIOR TO 6/1/21 APPOINTMENT    Where will you receive your lab/imaging services:     Additional notes: WOULD LIKE A CALL TO SCHEDULE A LAB APPOINTMENT

## 2021-05-26 ENCOUNTER — LAB (OUTPATIENT)
Dept: FAMILY MEDICINE CLINIC | Facility: CLINIC | Age: 76
End: 2021-05-26

## 2021-05-26 DIAGNOSIS — I10 ESSENTIAL HYPERTENSION: ICD-10-CM

## 2021-05-26 DIAGNOSIS — Z79.4 TYPE 2 DIABETES MELLITUS WITH HYPERGLYCEMIA, WITH LONG-TERM CURRENT USE OF INSULIN (HCC): Primary | ICD-10-CM

## 2021-05-26 DIAGNOSIS — E78.2 MIXED HYPERLIPIDEMIA: ICD-10-CM

## 2021-05-26 DIAGNOSIS — E11.65 TYPE 2 DIABETES MELLITUS WITH HYPERGLYCEMIA, WITH LONG-TERM CURRENT USE OF INSULIN (HCC): Primary | ICD-10-CM

## 2021-05-26 LAB
ALBUMIN SERPL-MCNC: 4.2 G/DL (ref 3.5–5.2)
ALBUMIN UR-MCNC: 1.3 MG/DL
ALBUMIN/GLOB SERPL: 1.8 G/DL
ALP SERPL-CCNC: 68 U/L (ref 39–117)
ALT SERPL W P-5'-P-CCNC: 25 U/L (ref 1–41)
ANION GAP SERPL CALCULATED.3IONS-SCNC: 9.2 MMOL/L (ref 5–15)
AST SERPL-CCNC: 21 U/L (ref 1–40)
BILIRUB SERPL-MCNC: 0.3 MG/DL (ref 0–1.2)
BUN SERPL-MCNC: 23 MG/DL (ref 8–23)
BUN/CREAT SERPL: 26.1 (ref 7–25)
CALCIUM SPEC-SCNC: 9.5 MG/DL (ref 8.6–10.5)
CHLORIDE SERPL-SCNC: 101 MMOL/L (ref 98–107)
CHOLEST SERPL-MCNC: 141 MG/DL (ref 0–200)
CO2 SERPL-SCNC: 28.8 MMOL/L (ref 22–29)
CREAT SERPL-MCNC: 0.88 MG/DL (ref 0.76–1.27)
CREAT UR-MCNC: 48.9 MG/DL
DEPRECATED RDW RBC AUTO: 41.2 FL (ref 37–54)
ERYTHROCYTE [DISTWIDTH] IN BLOOD BY AUTOMATED COUNT: 11.8 % (ref 12.3–15.4)
GFR SERPL CREATININE-BSD FRML MDRD: 85 ML/MIN/1.73
GLOBULIN UR ELPH-MCNC: 2.3 GM/DL
GLUCOSE SERPL-MCNC: 129 MG/DL (ref 65–99)
HCT VFR BLD AUTO: 38.1 % (ref 37.5–51)
HDLC SERPL-MCNC: 38 MG/DL (ref 40–60)
HGB BLD-MCNC: 12.8 G/DL (ref 13–17.7)
LDLC SERPL CALC-MCNC: 86 MG/DL (ref 0–100)
LDLC/HDLC SERPL: 2.26 {RATIO}
MCH RBC QN AUTO: 31.4 PG (ref 26.6–33)
MCHC RBC AUTO-ENTMCNC: 33.6 G/DL (ref 31.5–35.7)
MCV RBC AUTO: 93.6 FL (ref 79–97)
MICROALBUMIN/CREAT UR: 26.6 MG/G
PLATELET # BLD AUTO: 229 10*3/MM3 (ref 140–450)
PMV BLD AUTO: 9.4 FL (ref 6–12)
POTASSIUM SERPL-SCNC: 4.7 MMOL/L (ref 3.5–5.2)
PROT SERPL-MCNC: 6.5 G/DL (ref 6–8.5)
RBC # BLD AUTO: 4.07 10*6/MM3 (ref 4.14–5.8)
SODIUM SERPL-SCNC: 139 MMOL/L (ref 136–145)
TRIGL SERPL-MCNC: 86 MG/DL (ref 0–150)
VLDLC SERPL-MCNC: 17 MG/DL (ref 5–40)
WBC # BLD AUTO: 7.79 10*3/MM3 (ref 3.4–10.8)

## 2021-05-26 PROCEDURE — 83036 HEMOGLOBIN GLYCOSYLATED A1C: CPT | Performed by: INTERNAL MEDICINE

## 2021-05-26 PROCEDURE — 80061 LIPID PANEL: CPT | Performed by: INTERNAL MEDICINE

## 2021-05-26 PROCEDURE — 85027 COMPLETE CBC AUTOMATED: CPT | Performed by: INTERNAL MEDICINE

## 2021-05-26 PROCEDURE — 82570 ASSAY OF URINE CREATININE: CPT | Performed by: INTERNAL MEDICINE

## 2021-05-26 PROCEDURE — 82043 UR ALBUMIN QUANTITATIVE: CPT | Performed by: INTERNAL MEDICINE

## 2021-05-26 PROCEDURE — 36415 COLL VENOUS BLD VENIPUNCTURE: CPT

## 2021-05-26 PROCEDURE — 80053 COMPREHEN METABOLIC PANEL: CPT | Performed by: INTERNAL MEDICINE

## 2021-05-27 LAB — HBA1C MFR BLD: 6.7 % (ref 3.5–5.6)

## 2021-06-01 ENCOUNTER — OFFICE VISIT (OUTPATIENT)
Dept: FAMILY MEDICINE CLINIC | Facility: CLINIC | Age: 76
End: 2021-06-01

## 2021-06-01 VITALS
WEIGHT: 278.2 LBS | RESPIRATION RATE: 12 BRPM | HEART RATE: 70 BPM | SYSTOLIC BLOOD PRESSURE: 131 MMHG | DIASTOLIC BLOOD PRESSURE: 70 MMHG | HEIGHT: 72 IN | OXYGEN SATURATION: 94 % | BODY MASS INDEX: 37.68 KG/M2

## 2021-06-01 DIAGNOSIS — I10 ESSENTIAL HYPERTENSION: ICD-10-CM

## 2021-06-01 DIAGNOSIS — Z95.0 PRESENCE OF CARDIAC PACEMAKER: ICD-10-CM

## 2021-06-01 DIAGNOSIS — E11.65 TYPE 2 DIABETES MELLITUS WITH HYPERGLYCEMIA, WITH LONG-TERM CURRENT USE OF INSULIN (HCC): Primary | ICD-10-CM

## 2021-06-01 DIAGNOSIS — Z79.4 TYPE 2 DIABETES MELLITUS WITH HYPERGLYCEMIA, WITH LONG-TERM CURRENT USE OF INSULIN (HCC): Primary | ICD-10-CM

## 2021-06-01 DIAGNOSIS — M19.90 ARTHRITIS: ICD-10-CM

## 2021-06-01 DIAGNOSIS — E11.3599 TYPE 2 DIABETES MELLITUS WITH PROLIFERATIVE RETINOPATHY WITHOUT MACULAR EDEMA, WITH LONG-TERM CURRENT USE OF INSULIN, UNSPECIFIED LATERALITY (HCC): ICD-10-CM

## 2021-06-01 DIAGNOSIS — Z79.4 TYPE 2 DIABETES MELLITUS WITH PROLIFERATIVE RETINOPATHY WITHOUT MACULAR EDEMA, WITH LONG-TERM CURRENT USE OF INSULIN, UNSPECIFIED LATERALITY (HCC): ICD-10-CM

## 2021-06-01 DIAGNOSIS — I48.0 PAROXYSMAL ATRIAL FIBRILLATION (HCC): ICD-10-CM

## 2021-06-01 PROCEDURE — 99214 OFFICE O/P EST MOD 30 MIN: CPT | Performed by: INTERNAL MEDICINE

## 2021-06-01 RX ORDER — AMLODIPINE BESYLATE 5 MG/1
TABLET ORAL
Qty: 90 TABLET | Refills: 1 | Status: SHIPPED | OUTPATIENT
Start: 2021-06-01 | End: 2021-11-30

## 2021-06-01 RX ORDER — FUROSEMIDE 40 MG/1
TABLET ORAL
Qty: 90 TABLET | Refills: 1 | Status: SHIPPED | OUTPATIENT
Start: 2021-06-01 | End: 2021-11-30

## 2021-06-01 RX ORDER — ATORVASTATIN CALCIUM 20 MG/1
TABLET, FILM COATED ORAL
Qty: 180 TABLET | Refills: 1 | Status: SHIPPED | OUTPATIENT
Start: 2021-06-01 | End: 2021-11-30

## 2021-06-01 RX ORDER — RIVAROXABAN 20 MG/1
TABLET, FILM COATED ORAL
Qty: 90 TABLET | Refills: 1 | Status: SHIPPED | OUTPATIENT
Start: 2021-06-01 | End: 2021-10-13

## 2021-06-01 RX ORDER — LISINOPRIL 20 MG/1
TABLET ORAL
Qty: 180 TABLET | Refills: 1 | Status: SHIPPED | OUTPATIENT
Start: 2021-06-01 | End: 2021-11-30

## 2021-06-01 RX ORDER — CLOPIDOGREL BISULFATE 75 MG/1
TABLET ORAL
Qty: 90 TABLET | Refills: 1 | Status: SHIPPED | OUTPATIENT
Start: 2021-06-01 | End: 2021-11-30

## 2021-06-01 NOTE — PROGRESS NOTES
Rooming Tab(CC,VS,Pt Hx,Fall Screen)  Chief Complaint   Patient presents with   • Immobility   • Diabetes   • Hypertension   • Hyperlipidemia       Subjective   Pt here for DM check and immobility check-    Pt has had multiple epidurals in past that help with pain- but now on blood thinners and unable to get. [place]ried [place] walker [place] zoo- and had a very hard time keeping up and feels like unable to do family outings anymore. Has a wheelchair but cant push self up the hill, can go down with pushing himself.   He is able to transfer independently. Can walk 75 years with walker then has to sit and rest-   Before can get up again.  Gait is wide set and slow.   using CPAP at night- no O2 needed.   neuropathy persisting- wearing beach shoes on vacation-  Interested in insulin pump for price.       I have reviewed and updated his medications, medical history and problem list during today's office visit.     Patient Care Team:  Yasmine Live MD as PCP - Karolina Parish MD as Consulting Physician (Cardiology)    Problem List Tab  Medications Tab  Synopsis Tab  Chart Review Tab  Care Everywhere Tab  Immunizations Tab  Patient History Tab    Social History     Tobacco Use   • Smoking status: Former Smoker     Quit date: 12/3/1971     Years since quittin.5   • Smokeless tobacco: Never Used   Substance Use Topics   • Alcohol use: No       Review of Systems  Answers for HPI/ROS submitted by the patient on 2021  What is the primary reason for your visit?: High Blood Pressure  Chronicity: recurrent  Onset: more than 1 year ago  Progression since onset: waxing and waning  Condition status: controlled  anxiety: No  headaches: No  malaise/fatigue: Yes  orthopnea: No  peripheral edema: Yes  PND: No  sweats: No  CAD risks: diabetes mellitus, dyslipidemia, family history, obesity, sedentary lifestyle      Objective     Rooming Tab(CC,VS,Pt Hx,Fall Screen)  /70   Pulse 70   Resp 12   Ht 182.9 cm  "(72\")   Wt 126 kg (278 lb 3.2 oz)   SpO2 94%   BMI 37.73 kg/m²     Body mass index is 37.73 kg/m².    Physical Exam  Vitals and nursing note reviewed.   Constitutional:       Appearance: Normal appearance. He is well-developed. He is obese.      Comments: walker   HENT:      Head: Normocephalic and atraumatic.      Right Ear: Tympanic membrane normal.      Left Ear: Tympanic membrane normal.      Nose: No rhinorrhea.      Mouth/Throat:      Pharynx: No posterior oropharyngeal erythema.   Eyes:      Pupils: Pupils are equal, round, and reactive to light.   Cardiovascular:      Rate and Rhythm: Normal rate and regular rhythm.      Pulses: Normal pulses.      Heart sounds: Normal heart sounds. No murmur heard.     Pulmonary:      Effort: Pulmonary effort is normal.      Breath sounds: Normal breath sounds.   Abdominal:      General: Bowel sounds are normal. There is no distension.      Palpations: Abdomen is soft.   Musculoskeletal:         General: Swelling and tenderness present.      Cervical back: Normal range of motion and neck supple.   Skin:     Capillary Refill: Capillary refill takes less than 2 seconds.      Findings: No erythema.   Neurological:      Mental Status: He is alert and oriented to person, place, and time.      Sensory: Sensory deficit present.      Motor: Weakness present.   Psychiatric:         Mood and Affect: Mood normal.         Behavior: Behavior normal.          Statin Choice Calculator  Data Reviewed:         The data below has been reviewed by Yasmine Live MD on 06/01/2021.      Lab Results   Component Value Date    BUN 23 05/26/2021    CREATININE 0.88 05/26/2021    EGFRIFNONA 85 05/26/2021     05/26/2021    K 4.7 05/26/2021     05/26/2021    CALCIUM 9.5 05/26/2021    ALBUMIN 4.20 05/26/2021    BILITOT 0.3 05/26/2021    ALKPHOS 68 05/26/2021    AST 21 05/26/2021    ALT 25 05/26/2021    TRIG 86 05/26/2021    HDL 38 (L) 05/26/2021    VLDL 17 05/26/2021    LDL 86 " 05/26/2021    LDLHDL 2.26 05/26/2021    MICROALBUR 1.3 05/26/2021    WBC 7.79 05/26/2021    RBC 4.07 (L) 05/26/2021    HCT 38.1 05/26/2021    MCV 93.6 05/26/2021    MCH 31.4 05/26/2021      Assessment/Plan   Order Review Tab  Health Maintenance Tab  Patient Plan/Order Tab  Diagnoses and all orders for this visit:    1. Type 2 diabetes mellitus with hyperglycemia, with long-term current use of insulin (CMS/[facility]) (Primary)  Comments:  working on improving diet  Orders:  -     Ambulatory Referral to Endocrinology    2. Paroxysmal atrial fibrillation (CMS/[facility])    3. Presence of cardiac pacemaker    4. Essential hypertension    5. Arthritis  Assessment & Plan:  significant DJD and DDD making ambulation difficult. Not a surgery candidate      6. Body mass index (BMI)40.0-44.9, adult (CMS/[facility])  Comments:  difficult with exercise with joint pain    7. 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       They were informed of the diagnosis and treatment plan and directed to f/u for any further problems or concerns.      Pt has DJD and DDD with limiting gait outside of house. He is capable of using walker inside house. A motorized scooter or hoverround would work- unable to use manual wheelchair as upper body weakness if pushing self. motivated to use the power mobilityfdevice

## 2021-06-10 DIAGNOSIS — E11.65 UNCONTROLLED TYPE 2 DIABETES MELLITUS WITH HYPERGLYCEMIA (HCC): Primary | ICD-10-CM

## 2021-06-18 ENCOUNTER — OFFICE VISIT (OUTPATIENT)
Dept: ENDOCRINOLOGY | Facility: CLINIC | Age: 76
End: 2021-06-18

## 2021-06-18 DIAGNOSIS — Z79.4 TYPE 2 DIABETES MELLITUS WITH HYPERGLYCEMIA, WITH LONG-TERM CURRENT USE OF INSULIN (HCC): ICD-10-CM

## 2021-06-18 DIAGNOSIS — E11.65 TYPE 2 DIABETES MELLITUS WITH HYPERGLYCEMIA, WITH LONG-TERM CURRENT USE OF INSULIN (HCC): ICD-10-CM

## 2021-06-18 PROCEDURE — G0108 DIAB MANAGE TRN  PER INDIV: HCPCS | Performed by: DIETITIAN, REGISTERED

## 2021-06-24 ENCOUNTER — TELEPHONE (OUTPATIENT)
Dept: ENDOCRINOLOGY | Facility: CLINIC | Age: 76
End: 2021-06-24

## 2021-06-24 ENCOUNTER — OFFICE VISIT (OUTPATIENT)
Dept: ENDOCRINOLOGY | Facility: CLINIC | Age: 76
End: 2021-06-24

## 2021-06-24 DIAGNOSIS — Z79.4 TYPE 2 DIABETES MELLITUS WITH HYPERGLYCEMIA, WITH LONG-TERM CURRENT USE OF INSULIN (HCC): ICD-10-CM

## 2021-06-24 DIAGNOSIS — E11.65 TYPE 2 DIABETES MELLITUS WITH HYPERGLYCEMIA, WITH LONG-TERM CURRENT USE OF INSULIN (HCC): ICD-10-CM

## 2021-06-24 DIAGNOSIS — E11.65 UNCONTROLLED TYPE 2 DIABETES MELLITUS WITH HYPERGLYCEMIA (HCC): Primary | ICD-10-CM

## 2021-06-24 PROCEDURE — G0108 DIAB MANAGE TRN  PER INDIV: HCPCS | Performed by: DIETITIAN, REGISTERED

## 2021-06-24 RX ORDER — LANCETS 33 GAUGE
EACH MISCELLANEOUS
COMMUNITY
End: 2021-06-24 | Stop reason: SDUPTHER

## 2021-06-24 NOTE — TELEPHONE ENCOUNTER
Pt starts to see Dr. STAFFORD in November but needs rx for test strips in order to check his BG's QID. Rx ready for signature

## 2021-06-27 RX ORDER — LANCETS 33 GAUGE
1 EACH MISCELLANEOUS DAILY
Qty: 100 EACH | Refills: 1 | Status: SHIPPED | OUTPATIENT
Start: 2021-06-27 | End: 2021-06-30

## 2021-06-30 DIAGNOSIS — E11.65 UNCONTROLLED TYPE 2 DIABETES MELLITUS WITH HYPERGLYCEMIA (HCC): ICD-10-CM

## 2021-06-30 RX ORDER — LANCETS
EACH MISCELLANEOUS
Qty: 300 EACH | Refills: 3 | Status: ON HOLD | OUTPATIENT
Start: 2021-06-30 | End: 2022-02-24

## 2021-06-30 RX ORDER — PERPHENAZINE 16 MG/1
TABLET, FILM COATED ORAL
Qty: 350 EACH | Refills: 3 | Status: ON HOLD | OUTPATIENT
Start: 2021-06-30 | End: 2022-02-24

## 2021-07-06 ENCOUNTER — TELEPHONE (OUTPATIENT)
Dept: ENDOCRINOLOGY | Facility: CLINIC | Age: 76
End: 2021-07-06

## 2021-07-08 NOTE — PROGRESS NOTES
Encounter Date:01/22/2020  Last seen 7/10/2019      Patient ID: Benoit Newberry Jr. is a 74 y.o. male.    Chief Complaint:  Status post CABG  Status post pacemaker  Past history of atrial fibrillation  Anticoagulation management-on Xarelto  Hypertension  Dyslipidemia  Diabetes      History of Present Illness  Since I have last seen, the patient has been without any chest discomfort , unusual shortness of breath, palpitations, dizziness or syncope.  Denies having any headache ,abdominal pain ,nausea, vomiting , diarrhea constipation, loss of weight or loss of appetite.  Denies having any excessive bruising ,hematuria or blood in the stool.    Review of all systems negative except as indicated    Assessment and Plan       [[[  Impression  =     -status post permanent dual-chamber pacemaker implantation ([employer]  MRI compatible) 12/04/2018      - atrial fibrillation.   status post Ablation 10/16/2018  Patient has converted to sinus  sinus bradycardia.   Patient had a recurrence of atrial fibrillation.  Status post cardioversion 06/13/2018 and 08/01/2018 09/05/2018.  Patient has converted but did not stay in sinus rhythm.       -anticoagulation Patient is on Xarelto.      -status post CABG  3/98      -Acute inferior myocardial infarction.  Status post stent placement to SVG to RCA for total occlusion .  11/02/2015   - Status post stent placement to totally occluded SVG to RCA11/02/2015 and stent placement to left main and mid circumflex coronary artery 11/04/2015.  Status post stent to PDA distal to SVG and native LAD beyond H[place].  05/25/2018      cardiac catheterization 05/25/2018 revealed left ventricular diffuse hypocontractility with ejection fraction of 40%.  2+ mitral regurgitation.  Patent left main stent.  Total LAD.  Ramus intermedius has 90% disease in the proximal segment and distal vessel is filling from collaterals.  Patent circumflex stent.  Totally occluded RCA.  [place] to LAD is patent SVG to  Mariza Mccartney is a 70 year old female presenting with cyst to her left shoulder.  Patient states today at work she bumped a sign at work and it got big.  She reports it is painful.    Symptoms started 3 days ago.     OTC medications used: Excedrin at 1700  Denies  known Latex allergy or symptoms of Latex sensitivity.  Social History     Tobacco Use   Smoking Status Current Every Day Smoker   • Packs/day: 1.50   • Years: 20.00   • Pack years: 30.00   • Types: Cigarettes   Smokeless Tobacco Never Used     All allergies and medications reviewed.  PCP verified:  None at this time   Pharmacy verified:  [facility]  Patient would like communication of their results via:        Cell Phone:   Telephone Information:   Mobile [phone]     Okay to leave a message containing results? Yes     ramus branch marginal branch and RCA were patent.     Echocardiogram showed diffuse left ventricular hypocontractility with ejection fraction of 40-45%) 04/27/2017)  Stress Cardiolite test revealed inferior myocardial infarction with mild inferior ischemia.      - diabetes dyslipidemia and hypertension   - status post impending inferior myocardial infarction prior to surgery    -family history of coronary artery disease   - History of asymptomatic right carotid bruit.  ===   Plan  ===   EKG showed dual-chamber pacemaker rhythm.Normal QTc interval at 447 ms  Patient is on tikosyn at 500 mcg twice a day.  Patient is maintaining sinus rhythm  Medications were reviewed and updated.  patient is maintaining sinus rhythm  Continue tikosyn -observe Toxic effects  Anticoagulation status was reviewed  Continue Xarelto   patient is not having any angina pectoris or congestive heart failure.  Interrogation of the pacemaker revealed excellent pacing parameters.  Follow-up in office in 6 months with pacemaker interrogation  Further plan will depend on patient's progress  ]]]]]]]]]]]]]]]            Diagnosis Plan   1. Paroxysmal atrial fibrillation (CMS/HCC)  ECG 12 Lead   2. Chronic coronary artery disease  ECG 12 Lead   3. Mixed hyperlipidemia     4. Essential hypertension     5. Presence of cardiac pacemaker     6. Status post coronary artery stent placement     7. Hx of CABG     LAB RESULTS (LAST 7 DAYS)    CBC        BMP        CMP         BNP        TROPONIN        CoAg        Creatinine Clearance  CrCl cannot be calculated (Patient's most recent lab result is older than the maximum 30 days allowed.).    ABG        Radiology  No radiology results for the last day                The following portions of the patient's history were reviewed and updated as appropriate: allergies, current medications, past family history, past medical history, past social history, past surgical history and problem list.    Review of Systems    Constitution: Negative for malaise/fatigue.   Cardiovascular: Negative for chest pain, leg swelling, palpitations and syncope.   Respiratory: Positive for shortness of breath (only with exertion).    Skin: Negative for rash.   Gastrointestinal: Negative for nausea and vomiting.   Neurological: Negative for dizziness, light-headedness and numbness.         Current Outpatient Medications:   •  AFLURIA QUADRIVALENT 0.5 ML suspension prefilled syringe injection, inject 0.5 milliliters intramuscularly, Disp: , Rfl: 0  •  amLODIPine (NORVASC) 5 MG tablet, Take 5 mg by mouth Daily., Disp: , Rfl:   •  atorvastatin (LIPITOR) 20 MG tablet, Take 20 mg by mouth Daily., Disp: , Rfl:   •  cetirizine (zyrTEC) 10 MG tablet, Daily., Disp: , Rfl:   •  Cholecalciferol (VITAMIN D) 1000 units tablet, Take 1,000 Units by mouth Daily., Disp: , Rfl:   •  clopidogrel (PLAVIX) 75 MG tablet, Take 75 mg by mouth Daily., Disp: , Rfl:   •  Coenzyme Q10 (CO Q 10) 100 MG capsule, Take 100 mg by mouth Daily., Disp: , Rfl:   •  dofetilide (TIKOSYN) 500 MCG capsule, Take 500 mcg by mouth Daily., Disp: , Rfl:   •  furosemide (LASIX) 40 MG tablet, Daily., Disp: , Rfl:   •  Insulin Glargine (LANTUS SOLOSTAR) 100 UNIT/ML injection pen, LANTUS SOLOSTAR 100 UNIT/ML SOPN, Disp: , Rfl:   •  insulin lispro (humaLOG) 100 UNIT/ML injection, Inject 10 Units under the skin into the appropriate area as directed 3 (Three) Times a Day Before Meals., Disp: , Rfl:   •  lisinopril (PRINIVIL,ZESTRIL) 20 MG tablet, Take 20 mg by mouth Daily., Disp: , Rfl:   •  metFORMIN (GLUCOPHAGE) 1000 MG tablet, Take 1,000 mg by mouth., Disp: , Rfl:   •  metoprolol succinate XL (TOPROL-XL) 25 MG 24 hr tablet, Daily., Disp: , Rfl:   •  Multiple Vitamins-Minerals (MULTI VITAMIN/MINERALS) tablet, MULTI VITAMIN/MINERALS TABS, Disp: , Rfl:   •  Omega-3 Fatty Acids (FISH OIL) 1000 MG capsule capsule, FISH OIL 1000 MG CAPS, Disp: , Rfl:   •  rivaroxaban (XARELTO) 20 MG tablet, Daily.,  Disp: , Rfl:   •  vitamin C (ASCORBIC ACID) 500 MG tablet, Take 500 mg by mouth Daily., Disp: , Rfl:   •  vitamin E 400 UNIT capsule, Take 400 Units by mouth Daily., Disp: , Rfl:   •  aspirin 81 MG tablet, Take 81 mg by mouth Daily., Disp: , Rfl:   •  gabapentin (NEURONTIN) 300 MG capsule, Take 300 mg by mouth 3 (Three) Times a Day., Disp: , Rfl:     Allergies   Allergen Reactions   • Clindamycin Hcl Unknown (See Comments)       Family History   Problem Relation Age of Onset   • Heart disease Mother    • Heart disease Father         MI   • Heart disease Sister         MI   • Heart disease Brother         s/p coronary stents in his 40's    • Stroke Maternal Grandfather        Past Surgical History:   Procedure Laterality Date   • APPENDECTOMY  1956   • CARDIAC ABLATION  12/2018    x 1    • CARDIOVERSION  06/2018    Cardioversion 6/2018; x3  12/2018   • CORONARY ANGIOPLASTY Left 11/04/2015    Distal left main and in the mid circumflex artery    • CORONARY ANGIOPLASTY Right 11/02/2015    Right coronary artery    • CORONARY ARTERY BYPASS GRAFT  03/1998   • FOOT SURGERY  2010   • HERNIA REPAIR  2005   • OTHER SURGICAL HISTORY  05/2019    Stent    • PACEMAKER IMPLANTATION  12/14/2018    Dr. Saldaña    • REPLACEMENT TOTAL KNEE BILATERAL  2001       Past Medical History:   Diagnosis Date   • Arthritis    • Asymptomatic stenosis of right carotid artery    • Atrial fibrillation (CMS/HCC)    • Bradycardia    • Coronary artery disease    • Diabetes mellitus, type 2 (CMS/HCC)    • Hyperlipidemia    • Hypertension    • Myocardial infarction (CMS/HCC)        Family History   Problem Relation Age of Onset   • Heart disease Mother    • Heart disease Father         MI   • Heart disease Sister         MI   • Heart disease Brother         s/p coronary stents in his 40's    • Stroke Maternal Grandfather        Social History     Socioeconomic History   • Marital status:      Spouse name: Not on file   • Number of children: Not on  "file   • Years of education: Not on file   • Highest education level: Not on file   Tobacco Use   • Smoking status: Former Smoker   • Smokeless tobacco: Never Used   Substance and Sexual Activity   • Alcohol use: No     Frequency: Never   • Drug use: No           ECG 12 Lead  Date/Time: 1/22/2020 3:02 PM  Performed by: Karolina Saldaña MD  Authorized by: Karolina Saldaña MD   Comparison: compared with previous ECG   Similar to previous ECG  Comments: Atrial sensed ventricular paced rhythm 60/min nonspecific ST-T wave changes normal axis normal intervals no ectopy.  No change from previous EKG              Objective:       Physical Exam    /76   Pulse 60   Ht 182.9 cm (72\")   Wt 131 kg (287 lb 12 oz)   SpO2 96%   BMI 39.03 kg/m²   The patient is alert, oriented and in no distress.    Vital signs as noted above.  Exogenous obesity.    Head and neck revealed no carotid bruits or jugular venous distension.  No thyromegaly or lymphadenopathy is present.    Lungs clear.  No wheezing.  Breath sounds are normal bilaterally.    Heart normal first and second heart sounds.  No murmur..  No pericardial rub is present.  No gallop is present.    Abdomen soft and nontender.  No organomegaly is present.    Extremities revealed good peripheral pulses without any pedal edema.    Skin warm and dry.  Pacemaker site looks normal.    Musculoskeletal system is grossly normal.    CNS grossly normal.        "

## 2021-07-23 NOTE — TELEPHONE ENCOUNTER
Another DWO filled out and put on Dr. Tolentino's desk, I have no proof the last one was faxed back.   wine

## 2021-08-20 PROCEDURE — 93294 REM INTERROG EVL PM/LDLS PM: CPT | Performed by: INTERNAL MEDICINE

## 2021-08-20 PROCEDURE — 93296 REM INTERROG EVL PM/IDS: CPT | Performed by: INTERNAL MEDICINE

## 2021-09-16 ENCOUNTER — OFFICE VISIT (OUTPATIENT)
Dept: CARDIOLOGY | Facility: CLINIC | Age: 76
End: 2021-09-16

## 2021-09-16 ENCOUNTER — CLINICAL SUPPORT NO REQUIREMENTS (OUTPATIENT)
Dept: CARDIOLOGY | Facility: CLINIC | Age: 76
End: 2021-09-16

## 2021-09-16 VITALS
DIASTOLIC BLOOD PRESSURE: 81 MMHG | BODY MASS INDEX: 38.47 KG/M2 | WEIGHT: 284 LBS | HEART RATE: 59 BPM | OXYGEN SATURATION: 97 % | SYSTOLIC BLOOD PRESSURE: 157 MMHG | HEIGHT: 72 IN

## 2021-09-16 DIAGNOSIS — I25.10 CHRONIC CORONARY ARTERY DISEASE: ICD-10-CM

## 2021-09-16 DIAGNOSIS — I48.0 PAROXYSMAL ATRIAL FIBRILLATION (HCC): ICD-10-CM

## 2021-09-16 DIAGNOSIS — Z95.5 STATUS POST CORONARY ARTERY STENT PLACEMENT: ICD-10-CM

## 2021-09-16 DIAGNOSIS — Z79.01 CHRONIC ANTICOAGULATION: ICD-10-CM

## 2021-09-16 DIAGNOSIS — Z95.1 HX OF CABG: ICD-10-CM

## 2021-09-16 DIAGNOSIS — I10 ESSENTIAL HYPERTENSION: ICD-10-CM

## 2021-09-16 DIAGNOSIS — Z95.0 PRESENCE OF CARDIAC PACEMAKER: Primary | ICD-10-CM

## 2021-09-16 DIAGNOSIS — I49.5 TACHYCARDIA-BRADYCARDIA (HCC): ICD-10-CM

## 2021-09-16 DIAGNOSIS — E78.2 MIXED HYPERLIPIDEMIA: ICD-10-CM

## 2021-09-16 PROCEDURE — 93280 PM DEVICE PROGR EVAL DUAL: CPT | Performed by: INTERNAL MEDICINE

## 2021-09-16 PROCEDURE — 99214 OFFICE O/P EST MOD 30 MIN: CPT | Performed by: INTERNAL MEDICINE

## 2021-09-16 PROCEDURE — 93000 ELECTROCARDIOGRAM COMPLETE: CPT | Performed by: INTERNAL MEDICINE

## 2021-09-16 NOTE — PROGRESS NOTES
Encounter Date:09/16/2021  Last seen 3/2/2021      Patient ID: Benoit Newberry Jr. is a 75 y.o. male.    Chief Complaint:    Status post CABG  Status post pacemaker  Past history of atrial fibrillation  Anticoagulation management-on Xarelto  Hypertension  Dyslipidemia  Diabetes        History of Present Illness     Since I have last seen, the patient has been without any chest discomfort ,shortness of breath, palpitations, dizziness or syncope.  Denies having any headache ,abdominal pain ,nausea, vomiting , diarrhea constipation, loss of weight or loss of appetite.  Denies having any excessive bruising ,hematuria or blood in the stool.     Review of all systems negative except as indicated.    Reviewed ROS.  Assessment and Plan         [[[  Impression  =     -status post permanent dual-chamber pacemaker implantation ([employer]  MRI compatible) 12/04/2018      - atrial fibrillation.   status post Ablation 10/16/2018  Patient has converted to sinus  sinus bradycardia.   Patient had a recurrence of atrial fibrillation.  Status post cardioversion 06/13/2018 and 08/01/2018 09/05/2018.  Patient has converted but did not stay in sinus rhythm.       -anticoagulation Patient is on Xarelto.      -status post CABG  3/98      -Acute inferior myocardial infarction.  Status post stent placement to SVG to RCA for total occlusion .  11/02/2015   - Status post stent placement to totally occluded SVG to RCA11/02/2015 and stent placement to left main and mid circumflex coronary artery 11/04/2015.  Status post stent to PDA distal to SVG and native LAD beyond [place].  05/25/2018      cardiac catheterization 05/25/2018 revealed left ventricular diffuse hypocontractility with ejection fraction of 40%.  2+ mitral regurgitation.  Patent left main stent.  Total LAD.  Ramus intermedius has 90% disease in the proximal segment and distal vessel is filling from collaterals.  Patent circumflex stent.  Totally occluded RCA.  Lima to LAD is  patent SVG to ramus branch marginal branch and RCA were patent.     Echocardiogram showed diffuse left ventricular hypocontractility with ejection fraction of 40-45%) 04/27/2017)  Stress Cardiolite test revealed inferior myocardial infarction with mild inferior ischemia.      - diabetes dyslipidemia and hypertension   - status post impending inferior myocardial infarction prior to surgery    -family history of coronary artery disease   - History of asymptomatic right carotid bruit.  ===   Plan  ===  Status post CABG.  Patient is not having any angina pectoris or congestive heart failure.     History of atrial fibrillation-patient is maintaining sinus rhythm.  Patient is off Tikosyn.  Patient did not have any recurrence of atrial fibrillation.  Continue to hold Tikosyn since patient has been maintaining sinus rhythm without Tikosyn at this time.     Anticoagulation status reviewed.  Patient is concerned about cost of Xarelto.  I have discussed with him about the options that included switching to Coumadin.  Patient prefers to be switched over to Coumadin.  Patient was given prescription for Coumadin 5 mg a day.  Patient to stop taking Xarelto on the same day of starting Coumadin.  PT/INR on Monday.  PT/INR at least every 4 weeks.    Status post pacemaker.  Interrogation of the pacemaker revealed excellent pacing parameters.  Battery status is 5.5 years.  Pacemaker site looks normal.    EKG showed dual-chamber pacemaker rhythm.Normal QTc interval at   444 ms     Medications were reviewed and updated.   Patient is not having any angina pectoris or congestive heart failure.    Follow-up in office in 6 months with pacemaker interrogation  Further plan will depend on patient's progress  ]]]]]]]]]]]]]]]                    Diagnosis Plan   1. Presence of cardiac pacemaker  ECG 12 Lead   2. Hx of CABG  ECG 12 Lead   3. Essential hypertension  ECG 12 Lead   4. Mixed hyperlipidemia  ECG 12 Lead   5. Tachycardia-bradycardia  (CMS/[facility])  ECG 12 Lead   6. Status post coronary artery stent placement     7. Chronic anticoagulation     8. Chronic coronary artery disease     9. Paroxysmal atrial fibrillation (CMS/[facility])     LAB RESULTS (LAST 7 DAYS)    CBC        BMP        CMP         BNP        TROPONIN        CoAg        Creatinine Clearance  CrCl cannot be calculated (Patient's most recent lab result is older than the maximum 30 days allowed.).    ABG        Radiology  No radiology results for the last day                The following portions of the patient's history were reviewed and updated as appropriate: allergies, current medications, past family history, past medical history, past social history, past surgical history and problem list.    Review of Systems   Constitutional: Negative for malaise/fatigue.   Cardiovascular: Negative for chest pain, leg swelling and palpitations.   Respiratory: Negative for shortness of breath.    Skin: Negative for rash.   Neurological: Negative for dizziness, light-headedness and numbness.         Current Outpatient Medications:   •  amLODIPine (NORVASC) 5 MG tablet, TAKE 1 TABLET EVERY DAY, Disp: 90 tablet, Rfl: 1  •  atorvastatin (LIPITOR) 20 MG tablet, TAKE 1 TABLET TWICE DAILY, Disp: 180 tablet, Rfl: 1  •  cetirizine (zyrTEC) 10 MG tablet, Daily., Disp: , Rfl:   •  Cholecalciferol (VITAMIN D) 1000 units tablet, Take 500 Units by mouth Daily., Disp: , Rfl:   •  clopidogrel (PLAVIX) 75 MG tablet, TAKE 1 TABLET EVERY DAY, Disp: 90 tablet, Rfl: 1  •  Coenzyme Q10 (CO Q 10) 100 MG capsule, Take 400 mg by mouth Daily., Disp: , Rfl:   •  Contour Next Test test strip, CHECK BLOOD SUGAR FOUR TIMES DAILY, Disp: 350 each, Rfl: 3  •  furosemide (LASIX) 40 MG tablet, TAKE 1 TABLET EVERY DAY, Disp: 90 tablet, Rfl: 1  •  Insulin Glargine (LANTUS SOLOSTAR) 100 UNIT/ML injection pen, 30-40 units qhs, Disp: , Rfl:   •  insulin lispro (humaLOG) 100 UNIT/ML injection, Inject 10 Units under the skin into the appropriate  area as directed 3 (Three) Times a Day Before Meals., Disp: , Rfl:   •  lisinopril (PRINIVIL,ZESTRIL) 20 MG tablet, TAKE 1 TABLET TWICE DAILY, Disp: 180 tablet, Rfl: 1  •  metFORMIN (GLUCOPHAGE) 1000 MG tablet, Take 1,000 mg by mouth 2 (Two) Times a Day With Meals., Disp: , Rfl:   •  metoprolol succinate XL (TOPROL-XL) 25 MG 24 hr tablet, 25 mg 2 (two) times a day., Disp: , Rfl:   •  Microlet Lancets misc, USE TO CHECK BLOOD SUGAR FOUR TIMES DAILY, Disp: 300 each, Rfl: 3  •  Multiple Vitamins-Minerals (MULTI VITAMIN/MINERALS) tablet, MULTI VITAMIN/MINERALS TABS, Disp: , Rfl:   •  Omega-3 Fatty Acids (FISH OIL) 1000 MG capsule capsule, FISH OIL 1000 MG CAPS, Disp: , Rfl:   •  vitamin C (ASCORBIC ACID) 500 MG tablet, Take 500 mg by mouth Daily., Disp: , Rfl:   •  vitamin E 400 UNIT capsule, Take 400 Units by mouth Daily., Disp: , Rfl:   •  Xarelto 20 MG tablet, TAKE 1 TABLET EVERY DAY, Disp: 90 tablet, Rfl: 1    Allergies   Allergen Reactions   • Clindamycin Hcl Unknown (See Comments)       Family History   Problem Relation Age of Onset   • Heart disease Mother    • Heart disease Father         MI   • Heart disease Sister         MI   • Heart disease Brother         s/p coronary stents in his 40's    • Stroke Maternal Grandfather        Past Surgical History:   Procedure Laterality Date   • APPENDECTOMY  1956   • CARDIAC ABLATION  12/2018    x 1    • CARDIOVERSION  06/2018    Cardioversion 6/2018; x3  12/2018   • CORONARY ANGIOPLASTY Left 11/04/2015    Distal left main and in the mid circumflex artery    • CORONARY ANGIOPLASTY Right 11/02/2015    Right coronary artery    • CORONARY ARTERY BYPASS GRAFT  03/1998   • FOOT SURGERY  2010   • HERNIA REPAIR  2005   • OTHER SURGICAL HISTORY  05/2019    Stent    • PACEMAKER IMPLANTATION  12/14/2018    Dr. Saldaña    • REPLACEMENT TOTAL KNEE BILATERAL  2001   • SKIN BIOPSY         Past Medical History:   Diagnosis Date   • Arthritis    • Asymptomatic stenosis of right carotid  "artery    • Atrial fibrillation (CMS/HCC)    • Bradycardia    • Coronary artery disease    • Diabetes mellitus, type 2 (CMS/HCC)    • Hyperlipidemia    • Hypertension    • Myocardial infarction (CMS/HCC)        Family History   Problem Relation Age of Onset   • Heart disease Mother    • Heart disease Father         MI   • Heart disease Sister         MI   • Heart disease Brother         s/p coronary stents in his 40's    • Stroke Maternal Grandfather        Social History     Socioeconomic History   • Marital status:      Spouse name: Not on file   • Number of children: Not on file   • Years of education: Not on file   • Highest education level: Not on file   Tobacco Use   • Smoking status: Former Smoker     Quit date: 12/3/1971     Years since quittin.8   • Smokeless tobacco: Never Used   Substance and Sexual Activity   • Alcohol use: No   • Drug use: No           ECG 12 Lead    Date/Time: 2021 11:27 AM  Performed by: Karolina Saldaña MD  Authorized by: Karolina Saldaña MD   Comparison: compared with previous ECG   Similar to previous ECG  Comparison to previous ECG: Dual-chamber pacemaker rhythm 60/min nonspecific ST-T wave changes no ectopy no significant change from 3/2/2021                Objective:       Physical Exam    /81   Pulse 59   Ht 182.9 cm (72\")   Wt 129 kg (284 lb)   SpO2 97%   BMI 38.52 kg/m²   The patient is alert, oriented and in no distress.    Vital signs as noted above.  Exogenous obesity.  (BMI 39)    Head and neck revealed no carotid bruits or jugular venous distension.  No thyromegaly or lymphadenopathy is present.    Lungs clear.  No wheezing.  Breath sounds are normal bilaterally.    Heart normal first and second heart sounds.  No murmur..  No pericardial rub is present.  No gallop is present.    Abdomen soft and nontender.  No organomegaly is present.    Extremities revealed good peripheral pulses without any pedal edema.    Skin warm and dry.  Pacemaker " site looks normal.    Musculoskeletal system is grossly normal.    CNS grossly normal.

## 2021-09-20 ENCOUNTER — ANTICOAGULATION VISIT (OUTPATIENT)
Dept: CARDIOLOGY | Facility: CLINIC | Age: 76
End: 2021-09-20

## 2021-09-20 VITALS — BODY MASS INDEX: 38.79 KG/M2 | WEIGHT: 286 LBS

## 2021-09-20 DIAGNOSIS — I48.11 LONGSTANDING PERSISTENT ATRIAL FIBRILLATION (HCC): Primary | ICD-10-CM

## 2021-09-20 DIAGNOSIS — Z79.01 LONG TERM (CURRENT) USE OF ANTICOAGULANTS: ICD-10-CM

## 2021-09-20 LAB — INR PPP: 1.6 (ref 0.9–1.1)

## 2021-09-20 PROCEDURE — 36416 COLLJ CAPILLARY BLOOD SPEC: CPT | Performed by: INTERNAL MEDICINE

## 2021-09-20 PROCEDURE — 85610 PROTHROMBIN TIME: CPT | Performed by: INTERNAL MEDICINE

## 2021-09-28 ENCOUNTER — ANTICOAGULATION VISIT (OUTPATIENT)
Dept: CARDIOLOGY | Facility: CLINIC | Age: 76
End: 2021-09-28

## 2021-09-28 VITALS — DIASTOLIC BLOOD PRESSURE: 75 MMHG | SYSTOLIC BLOOD PRESSURE: 165 MMHG | HEART RATE: 61 BPM

## 2021-09-28 DIAGNOSIS — Z79.01 LONG TERM (CURRENT) USE OF ANTICOAGULANTS: Primary | ICD-10-CM

## 2021-09-28 LAB — INR PPP: 3.1 (ref 0.9–1.1)

## 2021-09-28 PROCEDURE — 85610 PROTHROMBIN TIME: CPT | Performed by: INTERNAL MEDICINE

## 2021-09-28 PROCEDURE — 36416 COLLJ CAPILLARY BLOOD SPEC: CPT | Performed by: INTERNAL MEDICINE

## 2021-10-13 ENCOUNTER — ANTICOAGULATION VISIT (OUTPATIENT)
Dept: CARDIOLOGY | Facility: CLINIC | Age: 76
End: 2021-10-13

## 2021-10-13 VITALS
BODY MASS INDEX: 38.25 KG/M2 | SYSTOLIC BLOOD PRESSURE: 167 MMHG | DIASTOLIC BLOOD PRESSURE: 79 MMHG | HEART RATE: 61 BPM | WEIGHT: 282 LBS

## 2021-10-13 DIAGNOSIS — Z79.01 LONG TERM (CURRENT) USE OF ANTICOAGULANTS: Primary | ICD-10-CM

## 2021-10-13 LAB — INR PPP: 1.9 (ref 0.9–1.1)

## 2021-10-13 PROCEDURE — 36416 COLLJ CAPILLARY BLOOD SPEC: CPT | Performed by: INTERNAL MEDICINE

## 2021-10-13 PROCEDURE — 85610 PROTHROMBIN TIME: CPT | Performed by: INTERNAL MEDICINE

## 2021-10-14 ENCOUNTER — TELEPHONE (OUTPATIENT)
Dept: ENDOCRINOLOGY | Facility: CLINIC | Age: 76
End: 2021-10-14

## 2021-11-03 ENCOUNTER — ANTICOAGULATION VISIT (OUTPATIENT)
Dept: CARDIOLOGY | Facility: CLINIC | Age: 76
End: 2021-11-03

## 2021-11-03 VITALS
DIASTOLIC BLOOD PRESSURE: 78 MMHG | SYSTOLIC BLOOD PRESSURE: 169 MMHG | HEART RATE: 66 BPM | WEIGHT: 283 LBS | BODY MASS INDEX: 38.38 KG/M2

## 2021-11-03 DIAGNOSIS — Z79.01 LONG TERM (CURRENT) USE OF ANTICOAGULANTS: ICD-10-CM

## 2021-11-03 DIAGNOSIS — I48.11 LONGSTANDING PERSISTENT ATRIAL FIBRILLATION (HCC): Primary | ICD-10-CM

## 2021-11-03 LAB — INR PPP: 1.5 (ref 0.9–1.1)

## 2021-11-03 PROCEDURE — 36416 COLLJ CAPILLARY BLOOD SPEC: CPT | Performed by: INTERNAL MEDICINE

## 2021-11-03 PROCEDURE — 85610 PROTHROMBIN TIME: CPT | Performed by: INTERNAL MEDICINE

## 2021-11-03 RX ORDER — WARFARIN SODIUM 5 MG/1
5 TABLET ORAL
COMMUNITY
End: 2022-02-17 | Stop reason: SDUPTHER

## 2021-11-03 NOTE — TELEPHONE ENCOUNTER
Was able to get into contact with pt. His PCP was able to answer his question. Once he sees Dr. STAFFORD on 11/11, his pump ppw can be initiated.

## 2021-11-11 ENCOUNTER — OFFICE VISIT (OUTPATIENT)
Dept: ENDOCRINOLOGY | Facility: CLINIC | Age: 76
End: 2021-11-11

## 2021-11-11 ENCOUNTER — TELEPHONE (OUTPATIENT)
Dept: ENDOCRINOLOGY | Facility: CLINIC | Age: 76
End: 2021-11-11

## 2021-11-11 VITALS
SYSTOLIC BLOOD PRESSURE: 155 MMHG | BODY MASS INDEX: 39.01 KG/M2 | OXYGEN SATURATION: 96 % | HEIGHT: 72 IN | DIASTOLIC BLOOD PRESSURE: 80 MMHG | WEIGHT: 288 LBS | TEMPERATURE: 97.1 F | HEART RATE: 65 BPM

## 2021-11-11 DIAGNOSIS — E11.65 TYPE 2 DIABETES MELLITUS WITH HYPERGLYCEMIA, WITH LONG-TERM CURRENT USE OF INSULIN (HCC): Primary | ICD-10-CM

## 2021-11-11 DIAGNOSIS — E78.2 MIXED HYPERLIPIDEMIA: ICD-10-CM

## 2021-11-11 DIAGNOSIS — Z79.4 TYPE 2 DIABETES MELLITUS WITH HYPERGLYCEMIA, WITH LONG-TERM CURRENT USE OF INSULIN (HCC): Primary | ICD-10-CM

## 2021-11-11 DIAGNOSIS — I10 PRIMARY HYPERTENSION: ICD-10-CM

## 2021-11-11 PROBLEM — S32.020S: Status: ACTIVE | Noted: 2017-08-29

## 2021-11-11 PROBLEM — IMO0001 TRANSITION OF PATIENT BETWEEN CARE SETTINGS: Status: ACTIVE | Noted: 2018-06-07

## 2021-11-11 PROBLEM — M43.8X9 SAGITTAL PLANE IMBALANCE: Status: ACTIVE | Noted: 2018-04-18

## 2021-11-11 PROBLEM — R06.02 EXERTIONAL SHORTNESS OF BREATH: Status: ACTIVE | Noted: 2017-04-13

## 2021-11-11 PROBLEM — M54.2 NECK PAIN: Status: ACTIVE | Noted: 2017-08-29

## 2021-11-11 PROBLEM — M50.323 OTHER CERVICAL DISC DEGENERATION AT C6-C7 LEVEL: Status: ACTIVE | Noted: 2017-08-29

## 2021-11-11 PROBLEM — M62.50 MUSCULAR ATROPHY: Status: ACTIVE | Noted: 2017-08-29

## 2021-11-11 PROBLEM — M79.7 FIBROMYOSITIS: Status: ACTIVE | Noted: 2017-10-24

## 2021-11-11 PROBLEM — G89.29 CHRONIC PAIN: Status: ACTIVE | Noted: 2017-10-24

## 2021-11-11 PROBLEM — K59.00 CONSTIPATION: Status: ACTIVE | Noted: 2018-07-19

## 2021-11-11 PROBLEM — M48.061 LUMBAR FORAMINAL STENOSIS: Status: ACTIVE | Noted: 2018-04-18

## 2021-11-11 PROBLEM — M54.16 LUMBAR RADICULOPATHY: Status: ACTIVE | Noted: 2017-10-11

## 2021-11-11 PROBLEM — Z78.9 TRANSITION OF PATIENT BETWEEN CARE SETTINGS: Status: ACTIVE | Noted: 2018-06-07

## 2021-11-11 PROBLEM — M41.9 ACQUIRED SCOLIOSIS: Status: ACTIVE | Noted: 2017-08-29

## 2021-11-11 PROBLEM — M47.817 OSTEOARTHRITIS OF LUMBOSACRAL SPINE WITHOUT MYELOPATHY: Status: ACTIVE | Noted: 2017-10-24

## 2021-11-11 LAB — GLUCOSE BLDC GLUCOMTR-MCNC: 89 MG/DL (ref 70–105)

## 2021-11-11 PROCEDURE — 82962 GLUCOSE BLOOD TEST: CPT | Performed by: INTERNAL MEDICINE

## 2021-11-11 PROCEDURE — 99204 OFFICE O/P NEW MOD 45 MIN: CPT | Performed by: INTERNAL MEDICINE

## 2021-11-11 RX ORDER — AMOXICILLIN 500 MG/1
CAPSULE ORAL
COMMUNITY
Start: 2021-10-25 | End: 2021-11-11

## 2021-11-11 RX ORDER — HYDROCODONE BITARTRATE AND ACETAMINOPHEN 5; 325 MG/1; MG/1
TABLET ORAL
COMMUNITY
Start: 2021-10-25 | End: 2021-11-11

## 2021-11-11 NOTE — TELEPHONE ENCOUNTER
Pt was put on Jardince but he takes a Water pill is that going to be OK tp take together. Pleaseadvise

## 2021-11-11 NOTE — TELEPHONE ENCOUNTER
Pt called interested in starting the process to get an Omnipod and Dexcom.  Pt to come by, , fill out and sign Omnipod AOB and then we will fax it to [employer] with last OV, insurance cards and demographic sheet. Initiated orders for Dexcom via [facility] and put ppw on MD desk to sign and then will be faxed to [employer]

## 2021-11-11 NOTE — PROGRESS NOTES
Endocrine Consult Outpatient  Referred by Dr. Yasmine Live for uncontrolled diabetes consultation  Patient Care Team:  [facility] Live MD [facility] PCP [facility]vez MD as Consulting Physician (Cardiology)     Chief Complaint: Diabetes mellitus type 2        HPI: This is a 75-year-old male with history of type 2 diabetes, hypertension, hyperlipidemia, CAD with CABG and stents placement in the past as well as pacemaker and obesity is referred for diabetes evaluation.  He is interested in insulin pump and continuous monitoring system.  He is accompanied with his wife today.  Diabetes mellitus type 2: He is currently taking Lantus 28 units at night with Humalog about 10 units with each meal as well as sliding scale and Metformin thousand twice a day.  He has not tried any one of the new medications.  He did bring in blood sugar records for review has been checking 4 times a day mostly running between 90-1 50.  He has done diabetes education in the past about 6 months ago.  Is interested in insulin pump specifically Omni pod Dash system along with CGM S with Dexcom monitoring system.  Hypertension: Well-controlled  Hyperlipidemia: On atorvastatin.    Past Medical History:   Diagnosis Date   • Arthritis    • Asymptomatic stenosis of right carotid artery    • Atrial fibrillation (HCC)    • Bradycardia    • Cataract    • Coronary artery disease    • Diabetes mellitus, type 2 (HCC)    • Hyperlipidemia    • Hypertension    • Myocardial infarction (HCC)        Social History     Socioeconomic History   • Marital status:    Tobacco Use   • Smoking status: Former Smoker     Quit date: 12/3/1971     Years since quittin.9   • Smokeless tobacco: Never Used   Vaping Use   • Vaping Use: Never used   Substance and Sexual Activity   • Alcohol use: Yes     Comment: rare   • Drug use: No   • Sexual activity: Defer       Family History   Problem Relation Age of Onset   • Heart disease Mother    •  Hypertension Mother    • Hyperlipidemia Mother    • Heart disease Father         MI   • Hypertension Father    • Hyperlipidemia Father    • Heart disease Sister         MI   • Hypertension Sister    • Hyperlipidemia Sister    • Heart disease Brother         s/p coronary stents in his 40's    • Hypertension Brother    • Hyperlipidemia Brother    • Stroke Maternal Grandfather    • Diabetes Maternal Grandmother        Allergies   Allergen Reactions   • Clindamycin Hcl Unknown (See Comments)       ROS:   Constitutional:  Denies fatigue, tiredness.    Eyes:  Denies change in visual acuity   HENT:  Denies nasal congestion or sore throat   Respiratory: denies cough, shortness of breath.   Cardiovascular:  denies chest pain, edema   GI:  Denies abdominal pain, nausea, vomiting.    :  Denies dysuria   Musculoskeletal:  Denies back pain or joint pain   Integument:  Denies dry skin, rash   Neurologic:  Denies headache, focal weakness or sensory changes   Endocrine:  Denies polyuria or polydipsia   Psychiatric:  Denies depression or anxiety      Vitals:    11/11/21 0847   BP: 155/80   Pulse: 65   Temp: 97.1 °F (36.2 °C)   SpO2: 96%     Body mass index is 39.06 kg/m².      Physical Exam:  GEN: NAD, conversant  EYES: EOMI, PERRL, no conjunctival erythema  NECK: no thyromegaly, full ROM   CV: RRR, no murmurs/rubs/gallops, no peripheral edema  LUNG: CTAB, no wheezes/rales/ronchi  SKIN: no rashes, no acanthosis  MSK: no deformities, full ROM of all extremities  NEURO: no tremors, DTR normal  PSYCH: AOX3, appropriate mood, affect normal      Results Review:     I reviewed the patient's new clinical results.    Lab Results   Component Value Date    GLUCOSE 129 (H) 05/26/2021    BUN 23 05/26/2021    CREATININE 0.88 05/26/2021    EGFRIFNONA 85 05/26/2021    BCR 26.1 (H) 05/26/2021    K 4.7 05/26/2021    CO2 28.8 05/26/2021    CALCIUM 9.5 05/26/2021    ALBUMIN 4.20 05/26/2021    LABIL2 1.6 10/15/2018    AST 21 05/26/2021    ALT 25  05/26/2021    CHOL 141 05/26/2021    TRIG 86 05/26/2021    HDL 38 (L) 05/26/2021    LDL 86 05/26/2021     Lab Results   Component Value Date    HGBA1C 6.7 (H) 05/26/2021    HGBA1C 6.4 (H) 11/25/2020     Lab Results   Component Value Date    MICROALBUR 1.3 05/26/2021    CREATININE 0.88 05/26/2021     Lab Results   Component Value Date    TSH 1.640 11/25/2020       Medication Review: Reviewed.       Current Outpatient Medications:   •  amLODIPine (NORVASC) 5 MG tablet, TAKE 1 TABLET EVERY DAY, Disp: 90 tablet, Rfl: 1  •  atorvastatin (LIPITOR) 20 MG tablet, TAKE 1 TABLET TWICE DAILY, Disp: 180 tablet, Rfl: 1  •  cetirizine (zyrTEC) 10 MG tablet, Daily., Disp: , Rfl:   •  Cholecalciferol (VITAMIN D) 1000 units tablet, Take 500 Units by mouth Daily., Disp: , Rfl:   •  clopidogrel (PLAVIX) 75 MG tablet, TAKE 1 TABLET EVERY DAY, Disp: 90 tablet, Rfl: 1  •  Coenzyme Q10 (CO Q 10) 100 MG capsule, Take 400 mg by mouth Daily., Disp: , Rfl:   •  Contour Next Test test strip, CHECK BLOOD SUGAR FOUR TIMES DAILY, Disp: 350 each, Rfl: 3  •  furosemide (LASIX) 40 MG tablet, TAKE 1 TABLET EVERY DAY, Disp: 90 tablet, Rfl: 1  •  Insulin Glargine (LANTUS SOLOSTAR) 100 UNIT/ML injection pen, 30-40 units qhs, Disp: , Rfl:   •  insulin lispro (humaLOG) 100 UNIT/ML injection, Inject 10 Units under the skin into the appropriate area as directed 3 (Three) Times a Day Before Meals., Disp: , Rfl:   •  lisinopril (PRINIVIL,ZESTRIL) 20 MG tablet, TAKE 1 TABLET TWICE DAILY, Disp: 180 tablet, Rfl: 1  •  metFORMIN (GLUCOPHAGE) 1000 MG tablet, Take 1,000 mg by mouth 2 (Two) Times a Day With Meals., Disp: , Rfl:   •  metoprolol succinate XL (TOPROL-XL) 25 MG 24 hr tablet, 25 mg 2 (two) times a day., Disp: , Rfl:   •  Microlet Lancets misc, USE TO CHECK BLOOD SUGAR FOUR TIMES DAILY, Disp: 300 each, Rfl: 3  •  Multiple Vitamins-Minerals (MULTI VITAMIN/MINERALS) tablet, MULTI VITAMIN/MINERALS TABS, Disp: , Rfl:   •  Omega-3 Fatty Acids (FISH OIL) 1000  MG capsule capsule, FISH OIL 1000 MG CAPS, Disp: , Rfl:   •  vitamin C (ASCORBIC ACID) 500 MG tablet, Take 500 mg by mouth Daily., Disp: , Rfl:   •  vitamin E 400 UNIT capsule, Take 400 Units by mouth Daily., Disp: , Rfl:   •  warfarin (COUMADIN) 5 MG tablet, Take 5 mg by mouth Daily., Disp: , Rfl:     Assessment/Plan   1.  Diabetes mellitus type 2: At this time his diabetes is well controlled, A1c 6.7%.  We talked about one of the newer therapies like Jardiance which can help him with cardiac protection as well as renal protection along with helping him with very weight loss as well as improving the diabetes.  He is interested in trying that.  We will put Jardiance 10 mg once a day.  Side effect of excessive urination and yeast infection discussed with him and he is advised to call if he develops any itching or rash in the genital region.  I am also going to set him up for diabetes educators to process the insulin pump evaluation as well as Dexcom evaluation.  2.  Hypertension: Well-controlled  3.  Hyperlipidemia: On atorvastatin.    Thank you very much for the consultation.           John Tolentino MD FACE.

## 2021-11-11 NOTE — PATIENT INSTRUCTIONS
Start Jardiance 10 mg p.o. daily  Drink plenty of fluids and if you develop any itching or rash in the genital region then call me  Arrange appointment with diabetes educators for insulin pump and Dexcom G6 system evaluation  Always keep glucose source in case of low blood sugar  Call if your blood sugars are dropping less than 100.

## 2021-11-17 ENCOUNTER — ANTICOAGULATION VISIT (OUTPATIENT)
Dept: CARDIOLOGY | Facility: CLINIC | Age: 76
End: 2021-11-17

## 2021-11-17 ENCOUNTER — TELEPHONE (OUTPATIENT)
Dept: ENDOCRINOLOGY | Facility: CLINIC | Age: 76
End: 2021-11-17

## 2021-11-17 VITALS
DIASTOLIC BLOOD PRESSURE: 78 MMHG | SYSTOLIC BLOOD PRESSURE: 165 MMHG | HEART RATE: 62 BPM | WEIGHT: 285 LBS | BODY MASS INDEX: 38.65 KG/M2

## 2021-11-17 DIAGNOSIS — Z79.01 LONG TERM (CURRENT) USE OF ANTICOAGULANTS: Primary | ICD-10-CM

## 2021-11-17 LAB — INR PPP: 1.6 (ref 0.9–1.1)

## 2021-11-17 PROCEDURE — 36416 COLLJ CAPILLARY BLOOD SPEC: CPT | Performed by: INTERNAL MEDICINE

## 2021-11-17 PROCEDURE — 85610 PROTHROMBIN TIME: CPT | Performed by: INTERNAL MEDICINE

## 2021-11-17 NOTE — TELEPHONE ENCOUNTER
Pt recv'd his Dexcom supplies and was hoping to be r/s for an earlier appt. Was able to r/s pt for 11/19 at 9 am with Eloisa. Pt has not heard from [employer] rep yet, but will reach out if he still doesn't hear back in another week.

## 2021-11-19 ENCOUNTER — OFFICE VISIT (OUTPATIENT)
Dept: ENDOCRINOLOGY | Facility: CLINIC | Age: 76
End: 2021-11-19

## 2021-11-19 DIAGNOSIS — Z79.4 TYPE 2 DIABETES MELLITUS WITH HYPERGLYCEMIA, WITH LONG-TERM CURRENT USE OF INSULIN (HCC): ICD-10-CM

## 2021-11-19 DIAGNOSIS — E11.65 TYPE 2 DIABETES MELLITUS WITH HYPERGLYCEMIA, WITH LONG-TERM CURRENT USE OF INSULIN (HCC): ICD-10-CM

## 2021-11-19 PROCEDURE — 93296 REM INTERROG EVL PM/IDS: CPT | Performed by: INTERNAL MEDICINE

## 2021-11-19 PROCEDURE — 93294 REM INTERROG EVL PM/LDLS PM: CPT | Performed by: INTERNAL MEDICINE

## 2021-11-19 PROCEDURE — G0108 DIAB MANAGE TRN  PER INDIV: HCPCS | Performed by: DIETITIAN, REGISTERED

## 2021-11-19 NOTE — PROGRESS NOTES
Pt called back and LVM to go ahead and send Omnipod AOB. Faxed with last OV, insurance cards and demo sheet

## 2021-11-19 NOTE — PROGRESS NOTES
Spent one hour with patient and wife.  Set up Dexcom  with low alert at 80 and high alert at 250.  Pt was able to successfully insert sensor and transmitter on his abdomen.  Gave Dexcom patches to use prn.  Pt was not able to get Dexcom G6 lisa on his iphone due to not knowing his AppleID.  Pt states he will ask his family to help him with this.  Scheduled MNT appt on 11/29 at 8 am to learn carb counting for when he goes on the Omnipod.  Pt signed AOB for Omnipod but I will wait to send it until he calls us back.  Pt to meet with insurance person to find out which Medicare plan to go with in 2022.  Pt states he still has not started Jardiance yet due to cost.  Gave pt other SGLT2 brand names for find out which one may be covered by insurance better.

## 2021-11-29 ENCOUNTER — TELEPHONE (OUTPATIENT)
Dept: ENDOCRINOLOGY | Facility: CLINIC | Age: 76
End: 2021-11-29

## 2021-11-29 ENCOUNTER — OFFICE VISIT (OUTPATIENT)
Dept: ENDOCRINOLOGY | Facility: CLINIC | Age: 76
End: 2021-11-29

## 2021-11-29 DIAGNOSIS — E11.65 TYPE 2 DIABETES MELLITUS WITH HYPERGLYCEMIA, WITH LONG-TERM CURRENT USE OF INSULIN (HCC): ICD-10-CM

## 2021-11-29 DIAGNOSIS — Z79.4 TYPE 2 DIABETES MELLITUS WITH HYPERGLYCEMIA, WITH LONG-TERM CURRENT USE OF INSULIN (HCC): ICD-10-CM

## 2021-11-29 PROCEDURE — 97802 MEDICAL NUTRITION INDIV IN: CPT | Performed by: DIETITIAN, REGISTERED

## 2021-11-29 RX ORDER — PEN NEEDLE, DIABETIC 31 GX5/16"
NEEDLE, DISPOSABLE MISCELLANEOUS
COMMUNITY
End: 2021-11-29 | Stop reason: SDUPTHER

## 2021-11-29 RX ORDER — PEN NEEDLE, DIABETIC 31 GX5/16"
NEEDLE, DISPOSABLE MISCELLANEOUS
Qty: 100 EACH | Refills: 1 | Status: ON HOLD | OUTPATIENT
Start: 2021-11-29 | End: 2022-02-24

## 2021-11-29 NOTE — PROGRESS NOTES
Spent 50 mins with pt for individual MNT appt. Pt's wife was in attendance as well. Taught pt how to carb count for his Omnipod pump that he is in the process of getting. With pt's insulin usage, his ICR was calculated to be 1:5. Pt has Dexcom lisa on his phone now. Sent email invitation to share data. Pt will have his daughter help him accept this invite.

## 2021-11-30 RX ORDER — AMLODIPINE BESYLATE 5 MG/1
TABLET ORAL
Qty: 90 TABLET | Refills: 1 | Status: SHIPPED | OUTPATIENT
Start: 2021-11-30 | End: 2022-02-17 | Stop reason: SDUPTHER

## 2021-11-30 RX ORDER — CLOPIDOGREL BISULFATE 75 MG/1
TABLET ORAL
Qty: 90 TABLET | Refills: 1 | Status: SHIPPED | OUTPATIENT
Start: 2021-11-30 | End: 2022-02-17 | Stop reason: SDUPTHER

## 2021-11-30 RX ORDER — FUROSEMIDE 40 MG/1
TABLET ORAL
Qty: 90 TABLET | Refills: 1 | Status: SHIPPED | OUTPATIENT
Start: 2021-11-30 | End: 2022-02-17 | Stop reason: SDUPTHER

## 2021-11-30 RX ORDER — LISINOPRIL 20 MG/1
TABLET ORAL
Qty: 180 TABLET | Refills: 1 | Status: SHIPPED | OUTPATIENT
Start: 2021-11-30 | End: 2022-02-17 | Stop reason: SDUPTHER

## 2021-11-30 RX ORDER — ATORVASTATIN CALCIUM 20 MG/1
TABLET, FILM COATED ORAL
Qty: 180 TABLET | Refills: 1 | Status: SHIPPED | OUTPATIENT
Start: 2021-11-30 | End: 2022-02-17 | Stop reason: SDUPTHER

## 2021-11-30 NOTE — TELEPHONE ENCOUNTER
Another office filled Coumadin on 11/03/21   Epic is warning very high side effects with plavix .     Ok to fill or stay with coumadin?     (cardiology filled)

## 2021-12-02 ENCOUNTER — TELEPHONE (OUTPATIENT)
Dept: ENDOCRINOLOGY | Facility: CLINIC | Age: 76
End: 2021-12-02

## 2021-12-02 ENCOUNTER — ANTICOAGULATION VISIT (OUTPATIENT)
Dept: CARDIOLOGY | Facility: CLINIC | Age: 76
End: 2021-12-02

## 2021-12-02 VITALS
DIASTOLIC BLOOD PRESSURE: 78 MMHG | BODY MASS INDEX: 38.52 KG/M2 | WEIGHT: 284 LBS | HEART RATE: 64 BPM | SYSTOLIC BLOOD PRESSURE: 179 MMHG

## 2021-12-02 DIAGNOSIS — Z79.01 LONG TERM (CURRENT) USE OF ANTICOAGULANTS: ICD-10-CM

## 2021-12-02 DIAGNOSIS — I48.11 LONGSTANDING PERSISTENT ATRIAL FIBRILLATION (HCC): Primary | ICD-10-CM

## 2021-12-02 LAB — INR PPP: 1.8 (ref 0.9–1.1)

## 2021-12-02 PROCEDURE — 85610 PROTHROMBIN TIME: CPT | Performed by: INTERNAL MEDICINE

## 2021-12-02 PROCEDURE — 36416 COLLJ CAPILLARY BLOOD SPEC: CPT | Performed by: INTERNAL MEDICINE

## 2021-12-02 NOTE — TELEPHONE ENCOUNTER
When attempting to do a PA via covermymeds for pods, it won't let me put in his Medicare number and it says he has [employer] (old insurance).  Emailed [employer] rep if Medicare requires PA.  They should have received the Medicare part D determination form we faxed on 11/29/21.  Waiting to hear back form rep and my have to start PA from scratch.

## 2021-12-16 NOTE — TELEPHONE ENCOUNTER
[employer] rep says pt has Medicare [employer] drug plan.  Resubmitted pa but still receive error message that I sent back to [employer] rep.

## 2021-12-21 ENCOUNTER — OFFICE VISIT (OUTPATIENT)
Dept: FAMILY MEDICINE CLINIC | Facility: CLINIC | Age: 76
End: 2021-12-21

## 2021-12-21 VITALS
RESPIRATION RATE: 20 BRPM | WEIGHT: 287 LBS | HEIGHT: 72 IN | TEMPERATURE: 97 F | DIASTOLIC BLOOD PRESSURE: 82 MMHG | OXYGEN SATURATION: 97 % | SYSTOLIC BLOOD PRESSURE: 164 MMHG | BODY MASS INDEX: 38.87 KG/M2 | HEART RATE: 76 BPM

## 2021-12-21 DIAGNOSIS — E55.9 VITAMIN D DEFICIENCY: ICD-10-CM

## 2021-12-21 DIAGNOSIS — Z79.4 TYPE 2 DIABETES MELLITUS WITH HYPERGLYCEMIA, WITH LONG-TERM CURRENT USE OF INSULIN (HCC): ICD-10-CM

## 2021-12-21 DIAGNOSIS — E11.65 TYPE 2 DIABETES MELLITUS WITH HYPERGLYCEMIA, WITH LONG-TERM CURRENT USE OF INSULIN (HCC): ICD-10-CM

## 2021-12-21 DIAGNOSIS — Z12.5 SCREENING FOR PROSTATE CANCER: Primary | ICD-10-CM

## 2021-12-21 DIAGNOSIS — E78.2 MIXED HYPERLIPIDEMIA: ICD-10-CM

## 2021-12-21 LAB
25(OH)D3 SERPL-MCNC: 42.9 NG/ML
ALBUMIN SERPL-MCNC: 4.6 G/DL (ref 3.5–5.2)
ALBUMIN/GLOB SERPL: 2.1 G/DL
ALP SERPL-CCNC: 68 U/L (ref 39–117)
ALT SERPL W P-5'-P-CCNC: 29 U/L (ref 1–41)
ANION GAP SERPL CALCULATED.3IONS-SCNC: 9.4 MMOL/L (ref 5–15)
AST SERPL-CCNC: 28 U/L (ref 1–40)
BILIRUB SERPL-MCNC: 0.5 MG/DL (ref 0–1.2)
BUN SERPL-MCNC: 18 MG/DL (ref 8–23)
BUN/CREAT SERPL: 18.2 (ref 7–25)
CALCIUM SPEC-SCNC: 9.8 MG/DL (ref 8.6–10.5)
CHLORIDE SERPL-SCNC: 99 MMOL/L (ref 98–107)
CHOLEST SERPL-MCNC: 155 MG/DL (ref 0–200)
CO2 SERPL-SCNC: 29.6 MMOL/L (ref 22–29)
CREAT SERPL-MCNC: 0.99 MG/DL (ref 0.76–1.27)
GFR SERPL CREATININE-BSD FRML MDRD: 74 ML/MIN/1.73
GLOBULIN UR ELPH-MCNC: 2.2 GM/DL
GLUCOSE SERPL-MCNC: 252 MG/DL (ref 65–99)
HDLC SERPL-MCNC: 38 MG/DL (ref 40–60)
LDLC SERPL CALC-MCNC: 84 MG/DL (ref 0–100)
LDLC/HDLC SERPL: 2.04 {RATIO}
POTASSIUM SERPL-SCNC: 4.1 MMOL/L (ref 3.5–5.2)
PROT SERPL-MCNC: 6.8 G/DL (ref 6–8.5)
PSA SERPL-MCNC: 2.05 NG/ML (ref 0–4)
SODIUM SERPL-SCNC: 138 MMOL/L (ref 136–145)
TRIGL SERPL-MCNC: 197 MG/DL (ref 0–150)
VLDLC SERPL-MCNC: 33 MG/DL (ref 5–40)

## 2021-12-21 PROCEDURE — 80061 LIPID PANEL: CPT | Performed by: INTERNAL MEDICINE

## 2021-12-21 PROCEDURE — 80053 COMPREHEN METABOLIC PANEL: CPT | Performed by: INTERNAL MEDICINE

## 2021-12-21 PROCEDURE — 99214 OFFICE O/P EST MOD 30 MIN: CPT | Performed by: INTERNAL MEDICINE

## 2021-12-21 PROCEDURE — 36415 COLL VENOUS BLD VENIPUNCTURE: CPT | Performed by: INTERNAL MEDICINE

## 2021-12-21 PROCEDURE — 82306 VITAMIN D 25 HYDROXY: CPT | Performed by: INTERNAL MEDICINE

## 2021-12-21 PROCEDURE — G0103 PSA SCREENING: HCPCS | Performed by: INTERNAL MEDICINE

## 2021-12-21 NOTE — PROGRESS NOTES
Rooming Tab(CC,VS,Pt Hx,Fall Screen)  Chief Complaint   Patient presents with   • Diabetes   • Hyperlipidemia   • Hypertension     The patient consents to recording with SEGUNDO.    Subjective   Benoit Newberry is a 75-year-old male who presents for diabetes, hyperlipidemia, and hypertension.    Diabetes  The patient reports that his blood sugars are doing well. He is not currently on Lantus insulin. He uses the Dexcom monitoring system. He states that he gets more low's than high's. The low's are typically in the 70's. The alarms are currently set at 75 for low and 250 for high. He would like to have the low reset to 70. The patient states that it seems that the Dexcom readings run higher as opposed to when he was pricking his finger. He notes that he has been told that the Dexcom readings appear to be more sensitive. The sensors last 10 days. He is very pleased with the Dexcom. The patient's weight is up at today's visit. He reports that he is sleeping well.     The patient states that when he consumes carbohydrates, his blood sugar does rise, though he notes that it is very gradual and slowly.     Warfarin  The patient reports that he is on Warfarin,  managed by Dr. Saldaña's office. He reports that his weight was 3 to 4 pounds less at his last visit there than it was at today's visit.     Joint Pain  The patient reports that his joints are doing well so far. He tried to obtain a Hover Round, but his insurance would not approve this because he does not use it in the house. He lives in a bi-level. He reports that he did buy an electric wheelchair, which he has used 2 to 3 times for going out.     Hypertension  The patient reports that he checks his blood pressure at home, and it is usually normal. Today in the office, his systolic is 164.     Lower Extremity Edema  The patient states that his lower extremity edema is controlled with compression stockings.       I have reviewed and updated his medications, medical  "history and problem list during today's office visit.     Patient Care Team:  aYsmine Live MD as PCP - General  Karolina Saldaña MD as Consulting Physician (Cardiology)    Problem List Tab  Medications Tab  Synopsis Tab  Chart Review Tab  Care Everywhere Tab  Immunizations Tab  Patient History Tab    Social History     Tobacco Use   • Smoking status: Former Smoker     Quit date: 12/3/1971     Years since quittin.0   • Smokeless tobacco: Never Used   Substance Use Topics   • Alcohol use: Yes     Comment: rare       Review of Systems  Answers for HPI/ROS submitted by the patient on 2021  What is the primary reason for your visit?: Back Pain  Chronicity: recurrent  Onset: more than 1 year ago  Frequency: constantly  Progression since onset: waxing and waning  Pain location: sacro-iliac  Pain quality: aching  Radiates to: does not radiate  Pain - numeric: 7/10  Pain is: the same all the time  Aggravated by: bending, standing  Stiffness is present: all day  bowel incontinence: No  headaches: No  leg pain: No  paresis: No  paresthesias: No  pelvic pain: No  perianal numbness: No  tingling: Yes  Risk factors: obesity, recent trauma, sedentary lifestyle      Objective     Rooming Tab(CC,VS,Pt Hx,Fall Screen)  /82 (BP Location: Left arm, Patient Position: Sitting, Cuff Size: Adult)   Pulse 76   Temp 97 °F (36.1 °C) (Temporal)   Resp 20   Ht 182.9 cm (72\")   Wt 130 kg (287 lb)   SpO2 97%   BMI 38.92 kg/m²     Body mass index is 38.92 kg/m².    Physical Exam  Vitals and nursing note reviewed.   Constitutional:       Appearance: Normal appearance. He is well-developed.   HENT:      Head: Normocephalic and atraumatic.      Right Ear: Tympanic membrane normal.      Left Ear: Tympanic membrane normal.      Nose: No rhinorrhea.      Mouth/Throat:      Pharynx: No posterior oropharyngeal erythema.   Eyes:      Pupils: Pupils are equal, round, and reactive to light.   Cardiovascular:      Rate and " Rhythm: Normal rate and regular rhythm.      Pulses: Normal pulses.      Heart sounds: Normal heart sounds. No murmur heard.      Pulmonary:      Effort: Pulmonary effort is normal.      Breath sounds: Normal breath sounds.   Abdominal:      General: Bowel sounds are normal. There is no distension.      Palpations: Abdomen is soft.   Musculoskeletal:         General: No tenderness.      Cervical back: Normal range of motion and neck supple.   Skin:     Capillary Refill: Capillary refill takes less than 2 seconds.   Neurological:      Mental Status: He is alert and oriented to person, place, and time.   Psychiatric:         Mood and Affect: Mood normal.         Behavior: Behavior normal.          Statin Choice Calculator  Data Reviewed:         The data below has been reviewed by Yasmine Live MD on 12/21/2021.      Lab Results   Component Value Date    BUN 18 12/21/2021    CREATININE 0.99 12/21/2021    EGFRIFNONA 74 12/21/2021     12/21/2021    K 4.1 12/21/2021    CL 99 12/21/2021    CALCIUM 9.8 12/21/2021    ALBUMIN 4.60 12/21/2021    BILITOT 0.5 12/21/2021    ALKPHOS 68 12/21/2021    AST 28 12/21/2021    ALT 29 12/21/2021    TRIG 197 (H) 12/21/2021    HDL 38 (L) 12/21/2021    VLDL 33 12/21/2021    LDL 84 12/21/2021    LDLHDL 2.04 12/21/2021    PSA 2.050 12/21/2021    INR 1.80 (A) 12/02/2021    HDGQ03SY 42.9 12/21/2021      Assessment/Plan   Order Review Tab  Health Maintenance Tab  Patient Plan/Order Tab  Diagnoses and all orders for this visit:    1. Screening for prostate cancer (Primary)  Comments:  -  Ordered labs for renal function  -  Ordered labs for hepatic function  -  Ordered PSA  Orders:  -     PSA Screen    2. Mixed hyperlipidemia  -     Comprehensive Metabolic Panel  -     Lipid Panel    3. Vitamin D deficiency  -     Vitamin D 25 Hydroxy    4. Type 2 diabetes mellitus with hyperglycemia, with long-term current use of insulin (HCC)  Comments:  continue with current regimen    4.  Hypertension  - Blood pressure is elevated today with a systolic of 164. We will not adjust medication today.   -  The patient will continue to monitor blood pressure at home. If elevation persists, may consider increasing medication.     5. Vaccinations  -  Patient is up to date on COVID-19 vaccination including booster as well as influenza and pneumonia vaccines.     Wrapup Tab  Return if symptoms worsen or fail to improve.       They were informed of the diagnosis and treatment plan and directed to f/u for any further problems or concerns.      Transcribed from ambient dictation for Yasmine Live MD by Elizabeth Suarez  12/21/21   19:21 EST    Patient verbalized consent to the visit recording.  I have personally performed the services described in this document as transcribed by the above individual, and it is both accurate and complete.  Yasmine Live MD  12/26/2021  14:36 EST

## 2021-12-22 ENCOUNTER — TELEPHONE (OUTPATIENT)
Dept: ENDOCRINOLOGY | Facility: CLINIC | Age: 76
End: 2021-12-22

## 2021-12-22 NOTE — TELEPHONE ENCOUNTER
email from [employer] rep:    'I apologize for all of the confusion! Just noticed that this patients Medicare [employer] Part D card just  so I got his updated one. He now has [employer] as his Part D plan. I updated his information in [employer]. Try to use the following key code:    [id number]    Since his [employer] , that’s why we kept getting the error code. This one should go through this time.     It might ask you for more clarification on why this patient needs the Omnipod. Below is an example of medical necessity that should be customized based on the patients medical history. Thanks for your help!    Example of medication authorization:  Patient has failed on MDI Therapy. This patient is not able to use VGo because it does not offer incremental dosage adjustments. This patient needs a more efficient way to deliver insulin and requires ability to have multiple/customizable basal rates and incremental insulin to carbohydrate ratios to help improve blood sugar levels    RegardsNeville'     Initiated PA via [employer] and waiting for response

## 2021-12-26 PROBLEM — Z12.5 SCREENING FOR PROSTATE CANCER: Status: ACTIVE | Noted: 2021-12-26

## 2021-12-26 PROBLEM — E55.9 VITAMIN D DEFICIENCY: Status: ACTIVE | Noted: 2021-12-26

## 2021-12-27 ENCOUNTER — TELEPHONE (OUTPATIENT)
Dept: ENDOCRINOLOGY | Facility: CLINIC | Age: 76
End: 2021-12-27

## 2021-12-27 NOTE — TELEPHONE ENCOUNTER
[employer] approved Omnipods but [facility] denied Omnipods.  Scanned into phone note.  Emailed VINNY King with [employer] about which one is accurate and what to do next

## 2021-12-28 ENCOUNTER — TELEPHONE (OUTPATIENT)
Dept: FAMILY MEDICINE CLINIC | Facility: CLINIC | Age: 76
End: 2021-12-28

## 2021-12-28 NOTE — TELEPHONE ENCOUNTER
I SPOKE TO PATIENT AND HE SAID HE HAS BEEN ON INSULIN FOR 25 YEARS BUT THE LAST SEVERAL YEARS HAS GOTTEN IT THROUGH A CLINIC AT HIS [employer] THAN GOING THROUGH [facility] [employer]. I GAVE HIM OUR FAX NUMBER TO PASS ON TO [facility] SO THEY CAN LET US KNOW WHAT THEY NEED TO APPROVE. HE JUST SAW [facility] A COUPLE WEEKS AGO FOR A MED REVIEW.

## 2021-12-28 NOTE — TELEPHONE ENCOUNTER
Caller: Benoit Newberry Jr.    Relationship: Self    Best call back number: [phone]    What medications are you currently taking:   Current Outpatient Medications on File Prior to Visit   Medication Sig Dispense Refill   • Alcohol Swabs (Alcohol Prep) pads Use when testing BG 4 times/day 100 each 1   • amLODIPine (NORVASC) 5 MG tablet TAKE 1 TABLET EVERY DAY 90 tablet 1   • atorvastatin (LIPITOR) 20 MG tablet TAKE 1 TABLET TWICE DAILY 180 tablet 1   • cetirizine (zyrTEC) 10 MG tablet Daily.     • Cholecalciferol (VITAMIN D) 1000 units tablet Take 500 Units by mouth Daily.     • clopidogrel (PLAVIX) 75 MG tablet TAKE 1 TABLET EVERY DAY 90 tablet 1   • Coenzyme Q10 (CO Q 10) 100 MG capsule Take 400 mg by mouth Daily.     • Contour Next Test test strip CHECK BLOOD SUGAR FOUR TIMES DAILY 350 each 3   • furosemide (LASIX) 40 MG tablet TAKE 1 TABLET EVERY DAY 90 tablet 1   • Insulin Glargine (LANTUS SOLOSTAR) 100 UNIT/ML injection pen 30-40 units qhs     • insulin lispro (humaLOG) 100 UNIT/ML injection Inject 10 Units under the skin into the appropriate area as directed 3 (Three) Times a Day Before Meals.     • lisinopril (PRINIVIL,ZESTRIL) 20 MG tablet TAKE 1 TABLET TWICE DAILY 180 tablet 1   • metFORMIN (GLUCOPHAGE) 1000 MG tablet Take 1 tablet by mouth 2 (Two) Times a Day With Meals. 180 tablet 1   • metoprolol succinate XL (TOPROL-XL) 25 MG 24 hr tablet 25 mg 2 (two) times a day.     • Microlet Lancets misc USE TO CHECK BLOOD SUGAR FOUR TIMES DAILY 300 each 3   • Multiple Vitamins-Minerals (MULTI VITAMIN/MINERALS) tablet MULTI VITAMIN/MINERALS TABS     • Omega-3 Fatty Acids (FISH OIL) 1000 MG capsule capsule FISH OIL 1000 MG CAPS     • vitamin C (ASCORBIC ACID) 500 MG tablet Take 500 mg by mouth Daily.     • vitamin E 400 UNIT capsule Take 400 Units by mouth Daily.     • warfarin (COUMADIN) 5 MG tablet Take 5 mg by mouth Daily.       No current facility-administered medications on file prior to visit.       Which medication are you concerned about: Insulin Glargine (LANTUS SOLOSTAR) 100 UNIT/ML injection pen AND insulin lispro (humaLOG) 100 UNIT/ML injection    Who prescribed you this medication: HAS BEEN GETTING THIS THROUGH THE CLINIC AT [facility], [facility] BUT SHE HAS RETIRED    What are your concerns: IS NOT APPROVED THROUGH [employer] BECAUSE THEY HAVE NO RECORD OF HIM BEING INSULIN DEPENDENT.    PATIENT WOULD LIKE TO KNOW WHAT NEEDS TO BE DONE TO WHERE HE CAN GET HIS INSULIN.

## 2022-01-03 ENCOUNTER — ANTICOAGULATION VISIT (OUTPATIENT)
Dept: CARDIOLOGY | Facility: CLINIC | Age: 77
End: 2022-01-03

## 2022-01-03 VITALS
HEART RATE: 73 BPM | WEIGHT: 284 LBS | BODY MASS INDEX: 38.52 KG/M2 | SYSTOLIC BLOOD PRESSURE: 170 MMHG | DIASTOLIC BLOOD PRESSURE: 80 MMHG

## 2022-01-03 DIAGNOSIS — I48.91 ATRIAL FIBRILLATION, UNSPECIFIED TYPE: Primary | ICD-10-CM

## 2022-01-03 DIAGNOSIS — Z79.01 LONG TERM (CURRENT) USE OF ANTICOAGULANTS: ICD-10-CM

## 2022-01-03 LAB — INR PPP: 1.7 (ref 0.9–1.1)

## 2022-01-03 PROCEDURE — 85610 PROTHROMBIN TIME: CPT | Performed by: INTERNAL MEDICINE

## 2022-01-03 PROCEDURE — 36416 COLLJ CAPILLARY BLOOD SPEC: CPT | Performed by: INTERNAL MEDICINE

## 2022-01-04 ENCOUNTER — TELEPHONE (OUTPATIENT)
Dept: FAMILY MEDICINE CLINIC | Facility: CLINIC | Age: 77
End: 2022-01-04

## 2022-01-04 NOTE — TELEPHONE ENCOUNTER
Caller: Benoit Newberry Jr.    Relationship: Self    Best call back number: [phone]     What medication are you requesting: DR. ECHO VARGAS     What are your current symptoms: CONGESTION, COUGH, LOSS OF VOICE, JAW PAIN      How long have you been experiencing symptoms: 2 DAYS     Have you had these symptoms before:    [] Yes  [x] No    Have you been treated for these symptoms before:   [] Yes  [x] No    If a prescription is needed, what is your preferred pharmacy and phone number:    [id number] [address] AT [facility] & [address] - [phone] [facility] [phone]   [phone]

## 2022-01-05 RX ORDER — AMOXICILLIN 875 MG/1
875 TABLET, COATED ORAL 2 TIMES DAILY
Qty: 20 TABLET | Refills: 0 | Status: SHIPPED | OUTPATIENT
Start: 2022-01-05 | End: 2022-01-15

## 2022-01-06 ENCOUNTER — TELEPHONE (OUTPATIENT)
Dept: ENDOCRINOLOGY | Facility: CLINIC | Age: 77
End: 2022-01-06

## 2022-01-06 DIAGNOSIS — E11.65 TYPE 2 DIABETES MELLITUS WITH HYPERGLYCEMIA, WITH LONG-TERM CURRENT USE OF INSULIN: Primary | ICD-10-CM

## 2022-01-06 DIAGNOSIS — Z79.4 TYPE 2 DIABETES MELLITUS WITH HYPERGLYCEMIA, WITH LONG-TERM CURRENT USE OF INSULIN: Primary | ICD-10-CM

## 2022-01-06 RX ORDER — INSULIN PUMP CONTROLLER
EACH MISCELLANEOUS
COMMUNITY
End: 2022-01-06 | Stop reason: SDUPTHER

## 2022-01-06 RX ORDER — INSULIN PUMP CONTROLLER
1 EACH MISCELLANEOUS CONTINUOUS
Qty: 30 EACH | Refills: 3 | Status: SHIPPED | OUTPATIENT
Start: 2022-01-06 | End: 2022-01-24

## 2022-01-14 NOTE — TELEPHONE ENCOUNTER
Pt stated that he was notified that his pods were shipping. He will call when he receives supplies to schedule training.

## 2022-01-24 ENCOUNTER — TELEPHONE (OUTPATIENT)
Dept: ENDOCRINOLOGY | Facility: CLINIC | Age: 77
End: 2022-01-24

## 2022-01-24 DIAGNOSIS — E11.65 TYPE 2 DIABETES MELLITUS WITH HYPERGLYCEMIA, WITH LONG-TERM CURRENT USE OF INSULIN: ICD-10-CM

## 2022-01-24 DIAGNOSIS — Z79.4 TYPE 2 DIABETES MELLITUS WITH HYPERGLYCEMIA, WITH LONG-TERM CURRENT USE OF INSULIN: ICD-10-CM

## 2022-01-24 RX ORDER — INSULIN PUMP CONTROLLER
1 EACH MISCELLANEOUS CONTINUOUS
Qty: 30 EACH | Refills: 3 | Status: SHIPPED | OUTPATIENT
Start: 2022-01-24 | End: 2022-12-21 | Stop reason: SDUPTHER

## 2022-02-17 ENCOUNTER — ANTICOAGULATION VISIT (OUTPATIENT)
Dept: CARDIOLOGY | Facility: CLINIC | Age: 77
End: 2022-02-17

## 2022-02-17 VITALS
WEIGHT: 284 LBS | HEART RATE: 65 BPM | DIASTOLIC BLOOD PRESSURE: 80 MMHG | BODY MASS INDEX: 38.52 KG/M2 | SYSTOLIC BLOOD PRESSURE: 177 MMHG

## 2022-02-17 DIAGNOSIS — I48.0 PAROXYSMAL ATRIAL FIBRILLATION: Primary | ICD-10-CM

## 2022-02-17 DIAGNOSIS — I48.11 LONGSTANDING PERSISTENT ATRIAL FIBRILLATION: Primary | ICD-10-CM

## 2022-02-17 DIAGNOSIS — Z79.01 LONG TERM (CURRENT) USE OF ANTICOAGULANTS: ICD-10-CM

## 2022-02-17 LAB — INR PPP: 2.4 (ref 0.9–1.1)

## 2022-02-17 PROCEDURE — 85610 PROTHROMBIN TIME: CPT | Performed by: INTERNAL MEDICINE

## 2022-02-17 PROCEDURE — 36416 COLLJ CAPILLARY BLOOD SPEC: CPT | Performed by: INTERNAL MEDICINE

## 2022-02-17 RX ORDER — ATORVASTATIN CALCIUM 20 MG/1
20 TABLET, FILM COATED ORAL 2 TIMES DAILY
Qty: 180 TABLET | Refills: 3 | Status: SHIPPED | OUTPATIENT
Start: 2022-02-17 | End: 2022-03-24

## 2022-02-17 RX ORDER — METOPROLOL SUCCINATE 25 MG/1
25 TABLET, EXTENDED RELEASE ORAL 2 TIMES DAILY
Qty: 180 TABLET | Refills: 3 | Status: SHIPPED | OUTPATIENT
Start: 2022-02-17 | End: 2022-12-21 | Stop reason: SDUPTHER

## 2022-02-17 RX ORDER — WARFARIN SODIUM 5 MG/1
5 TABLET ORAL
Qty: 90 TABLET | Refills: 0 | Status: SHIPPED | OUTPATIENT
Start: 2022-02-17 | End: 2022-10-21 | Stop reason: SDUPTHER

## 2022-02-17 RX ORDER — FUROSEMIDE 40 MG/1
40 TABLET ORAL DAILY
Qty: 90 TABLET | Refills: 3 | Status: SHIPPED | OUTPATIENT
Start: 2022-02-17 | End: 2022-12-21 | Stop reason: SDUPTHER

## 2022-02-17 RX ORDER — AMLODIPINE BESYLATE 5 MG/1
5 TABLET ORAL DAILY
Qty: 90 TABLET | Refills: 3 | Status: SHIPPED | OUTPATIENT
Start: 2022-02-17 | End: 2022-12-21 | Stop reason: SDUPTHER

## 2022-02-17 RX ORDER — CLOPIDOGREL BISULFATE 75 MG/1
75 TABLET ORAL DAILY
Qty: 90 TABLET | Refills: 3 | Status: SHIPPED | OUTPATIENT
Start: 2022-02-17 | End: 2022-12-21 | Stop reason: SDUPTHER

## 2022-02-17 RX ORDER — LISINOPRIL 20 MG/1
20 TABLET ORAL 2 TIMES DAILY
Qty: 180 TABLET | Refills: 3 | Status: SHIPPED | OUTPATIENT
Start: 2022-02-17 | End: 2022-12-21 | Stop reason: SDUPTHER

## 2022-02-17 NOTE — TELEPHONE ENCOUNTER
Rx Refill Note  Requested Prescriptions     Pending Prescriptions Disp Refills   • warfarin (COUMADIN) 5 MG tablet 90 tablet 0     Sig: Take 1 tablet by mouth Daily.      Last office visit with prescribing clinician: 9/16/2021      Next office visit with prescribing clinician: 3/31/2022              Anticoagulation Visit (02/17/2022)        Juanita Lopez LPN  02/17/22, 09:04 EST

## 2022-02-17 NOTE — TELEPHONE ENCOUNTER
Rx Refill Note  Requested Prescriptions     Pending Prescriptions Disp Refills   • amLODIPine (NORVASC) 5 MG tablet 90 tablet 3     Sig: Take 1 tablet by mouth Daily.   • atorvastatin (LIPITOR) 20 MG tablet 180 tablet 3     Sig: Take 1 tablet by mouth 2 (Two) Times a Day.   • clopidogrel (PLAVIX) 75 MG tablet 90 tablet 3     Sig: Take 1 tablet by mouth Daily.   • furosemide (LASIX) 40 MG tablet 90 tablet 3     Sig: Take 1 tablet by mouth Daily.   • lisinopril (PRINIVIL,ZESTRIL) 20 MG tablet 180 tablet 3     Sig: Take 1 tablet by mouth 2 (Two) Times a Day.   • metoprolol succinate XL (TOPROL-XL) 25 MG 24 hr tablet 180 tablet 3     Sig: Take 1 tablet by mouth 2 (Two) Times a Day.      Last office visit with prescribing clinician: 9/16/2021      Next office visit with prescribing clinician: 3/31/2022            Deisy Gutiérrez MA  02/17/22, 09:12 EST

## 2022-02-18 PROCEDURE — 93296 REM INTERROG EVL PM/IDS: CPT | Performed by: INTERNAL MEDICINE

## 2022-02-18 PROCEDURE — 93294 REM INTERROG EVL PM/LDLS PM: CPT | Performed by: INTERNAL MEDICINE

## 2022-02-21 ENCOUNTER — OFFICE VISIT (OUTPATIENT)
Dept: ENDOCRINOLOGY | Facility: CLINIC | Age: 77
End: 2022-02-21

## 2022-02-21 DIAGNOSIS — E11.65 TYPE 2 DIABETES MELLITUS WITH HYPERGLYCEMIA, WITH LONG-TERM CURRENT USE OF INSULIN: Primary | ICD-10-CM

## 2022-02-21 DIAGNOSIS — Z79.4 TYPE 2 DIABETES MELLITUS WITH HYPERGLYCEMIA, WITH LONG-TERM CURRENT USE OF INSULIN: Primary | ICD-10-CM

## 2022-02-21 NOTE — PROGRESS NOTES
No charge put in for patient.  Will be reimbursed by pump company.  Pt needed refill on his Metformin to [facility]. Stats, orders, training checklists and Dexcom reports scanned into visit. Scheduled f/u for one month.

## 2022-02-22 ENCOUNTER — APPOINTMENT (OUTPATIENT)
Dept: CARDIOLOGY | Facility: HOSPITAL | Age: 77
End: 2022-02-22

## 2022-02-22 ENCOUNTER — APPOINTMENT (OUTPATIENT)
Dept: CT IMAGING | Facility: HOSPITAL | Age: 77
End: 2022-02-22

## 2022-02-22 ENCOUNTER — APPOINTMENT (OUTPATIENT)
Dept: GENERAL RADIOLOGY | Facility: HOSPITAL | Age: 77
End: 2022-02-22

## 2022-02-22 ENCOUNTER — HOSPITAL ENCOUNTER (INPATIENT)
Facility: HOSPITAL | Age: 77
LOS: 1 days | Discharge: HOME | End: 2022-02-25
Attending: EMERGENCY MEDICINE | Admitting: EMERGENCY MEDICINE

## 2022-02-22 DIAGNOSIS — Z79.4 TYPE 2 DIABETES MELLITUS WITH HYPERGLYCEMIA, WITH LONG-TERM CURRENT USE OF INSULIN: ICD-10-CM

## 2022-02-22 DIAGNOSIS — R94.39 ABNORMAL NUCLEAR STRESS TEST: Primary | ICD-10-CM

## 2022-02-22 DIAGNOSIS — E11.65 TYPE 2 DIABETES MELLITUS WITH HYPERGLYCEMIA, WITH LONG-TERM CURRENT USE OF INSULIN: ICD-10-CM

## 2022-02-22 DIAGNOSIS — R06.09 EXERTIONAL DYSPNEA: ICD-10-CM

## 2022-02-22 DIAGNOSIS — R07.9 CHEST PAIN, UNSPECIFIED TYPE: ICD-10-CM

## 2022-02-22 DIAGNOSIS — R51.9 NONINTRACTABLE HEADACHE, UNSPECIFIED CHRONICITY PATTERN, UNSPECIFIED HEADACHE TYPE: ICD-10-CM

## 2022-02-22 DIAGNOSIS — Z98.61 STATUS POST PERCUTANEOUS TRANSLUMINAL CORONARY ANGIOPLASTY: ICD-10-CM

## 2022-02-22 LAB
ALBUMIN SERPL-MCNC: 4.2 G/DL (ref 3.5–5.2)
ALBUMIN/GLOB SERPL: 1.8 G/DL
ALP SERPL-CCNC: 77 U/L (ref 39–117)
ALT SERPL W P-5'-P-CCNC: 30 U/L (ref 1–41)
ANION GAP SERPL CALCULATED.3IONS-SCNC: 12 MMOL/L (ref 5–15)
APTT PPP: 45.2 SECONDS (ref 24–31)
AST SERPL-CCNC: 27 U/L (ref 1–40)
BASOPHILS # BLD AUTO: 0.1 10*3/MM3 (ref 0–0.2)
BASOPHILS NFR BLD AUTO: 0.8 % (ref 0–1.5)
BILIRUB SERPL-MCNC: 0.6 MG/DL (ref 0–1.2)
BUN SERPL-MCNC: 16 MG/DL (ref 8–23)
BUN/CREAT SERPL: 15.5 (ref 7–25)
CALCIUM SPEC-SCNC: 9.6 MG/DL (ref 8.6–10.5)
CHLORIDE SERPL-SCNC: 99 MMOL/L (ref 98–107)
CO2 SERPL-SCNC: 28 MMOL/L (ref 22–29)
CREAT SERPL-MCNC: 1.03 MG/DL (ref 0.76–1.27)
DEPRECATED RDW RBC AUTO: 42.4 FL (ref 37–54)
EOSINOPHIL # BLD AUTO: 0.1 10*3/MM3 (ref 0–0.4)
EOSINOPHIL NFR BLD AUTO: 1.8 % (ref 0.3–6.2)
ERYTHROCYTE [DISTWIDTH] IN BLOOD BY AUTOMATED COUNT: 13.4 % (ref 12.3–15.4)
GFR SERPL CREATININE-BSD FRML MDRD: 71 ML/MIN/1.73
GLOBULIN UR ELPH-MCNC: 2.3 GM/DL
GLUCOSE BLDC GLUCOMTR-MCNC: 122 MG/DL (ref 70–105)
GLUCOSE SERPL-MCNC: 133 MG/DL (ref 65–99)
HCT VFR BLD AUTO: 36.2 % (ref 37.5–51)
HGB BLD-MCNC: 12.6 G/DL (ref 13–17.7)
INR PPP: 3.97 (ref 0.93–1.1)
LYMPHOCYTES # BLD AUTO: 2.4 10*3/MM3 (ref 0.7–3.1)
LYMPHOCYTES NFR BLD AUTO: 33 % (ref 19.6–45.3)
MCH RBC QN AUTO: 31.5 PG (ref 26.6–33)
MCHC RBC AUTO-ENTMCNC: 34.8 G/DL (ref 31.5–35.7)
MCV RBC AUTO: 90.5 FL (ref 79–97)
MONOCYTES # BLD AUTO: 0.5 10*3/MM3 (ref 0.1–0.9)
MONOCYTES NFR BLD AUTO: 6.6 % (ref 5–12)
NEUTROPHILS NFR BLD AUTO: 4.2 10*3/MM3 (ref 1.7–7)
NEUTROPHILS NFR BLD AUTO: 57.8 % (ref 42.7–76)
NRBC BLD AUTO-RTO: 0 /100 WBC (ref 0–0.2)
NT-PROBNP SERPL-MCNC: 632.9 PG/ML (ref 0–900)
PLATELET # BLD AUTO: 219 10*3/MM3 (ref 140–450)
PMV BLD AUTO: 7.1 FL (ref 6–12)
POTASSIUM SERPL-SCNC: 4.6 MMOL/L (ref 3.5–5.2)
PROT SERPL-MCNC: 6.5 G/DL (ref 6–8.5)
PROTHROMBIN TIME: 39.8 SECONDS (ref 9.6–11.7)
QT INTERVAL: 457 MS
RBC # BLD AUTO: 4.01 10*6/MM3 (ref 4.14–5.8)
SARS-COV-2 RNA PNL SPEC NAA+PROBE: NOT DETECTED
SODIUM SERPL-SCNC: 139 MMOL/L (ref 136–145)
TROPONIN T SERPL-MCNC: 0.01 NG/ML (ref 0–0.03)
TROPONIN T SERPL-MCNC: <0.01 NG/ML (ref 0–0.03)
WBC NRBC COR # BLD: 7.3 10*3/MM3 (ref 3.4–10.8)

## 2022-02-22 PROCEDURE — G0378 HOSPITAL OBSERVATION PER HR: HCPCS

## 2022-02-22 PROCEDURE — 99214 OFFICE O/P EST MOD 30 MIN: CPT | Performed by: INTERNAL MEDICINE

## 2022-02-22 PROCEDURE — 93306 TTE W/DOPPLER COMPLETE: CPT

## 2022-02-22 PROCEDURE — 70450 CT HEAD/BRAIN W/O DYE: CPT

## 2022-02-22 PROCEDURE — 93306 TTE W/DOPPLER COMPLETE: CPT | Performed by: INTERNAL MEDICINE

## 2022-02-22 PROCEDURE — 36415 COLL VENOUS BLD VENIPUNCTURE: CPT | Performed by: EMERGENCY MEDICINE

## 2022-02-22 PROCEDURE — 85025 COMPLETE CBC W/AUTO DIFF WBC: CPT | Performed by: EMERGENCY MEDICINE

## 2022-02-22 PROCEDURE — 71045 X-RAY EXAM CHEST 1 VIEW: CPT

## 2022-02-22 PROCEDURE — 82962 GLUCOSE BLOOD TEST: CPT

## 2022-02-22 PROCEDURE — 84484 ASSAY OF TROPONIN QUANT: CPT | Performed by: EMERGENCY MEDICINE

## 2022-02-22 PROCEDURE — 87635 SARS-COV-2 COVID-19 AMP PRB: CPT | Performed by: EMERGENCY MEDICINE

## 2022-02-22 PROCEDURE — 36415 COLL VENOUS BLD VENIPUNCTURE: CPT

## 2022-02-22 PROCEDURE — 83880 ASSAY OF NATRIURETIC PEPTIDE: CPT | Performed by: EMERGENCY MEDICINE

## 2022-02-22 PROCEDURE — 85730 THROMBOPLASTIN TIME PARTIAL: CPT | Performed by: EMERGENCY MEDICINE

## 2022-02-22 PROCEDURE — 99284 EMERGENCY DEPT VISIT MOD MDM: CPT

## 2022-02-22 PROCEDURE — 84484 ASSAY OF TROPONIN QUANT: CPT | Performed by: NURSE PRACTITIONER

## 2022-02-22 PROCEDURE — 93005 ELECTROCARDIOGRAM TRACING: CPT | Performed by: EMERGENCY MEDICINE

## 2022-02-22 PROCEDURE — 80053 COMPREHEN METABOLIC PANEL: CPT | Performed by: EMERGENCY MEDICINE

## 2022-02-22 PROCEDURE — 85610 PROTHROMBIN TIME: CPT | Performed by: EMERGENCY MEDICINE

## 2022-02-22 RX ORDER — SODIUM CHLORIDE 0.9 % (FLUSH) 0.9 %
10 SYRINGE (ML) INJECTION AS NEEDED
Status: DISCONTINUED | OUTPATIENT
Start: 2022-02-22 | End: 2022-02-25 | Stop reason: HOSPADM

## 2022-02-22 RX ORDER — SODIUM CHLORIDE 0.9 % (FLUSH) 0.9 %
10 SYRINGE (ML) INJECTION EVERY 12 HOURS SCHEDULED
Status: DISCONTINUED | OUTPATIENT
Start: 2022-02-22 | End: 2022-02-25 | Stop reason: HOSPADM

## 2022-02-22 RX ORDER — AMLODIPINE BESYLATE 5 MG/1
5 TABLET ORAL DAILY
Status: DISCONTINUED | OUTPATIENT
Start: 2022-02-23 | End: 2022-02-25 | Stop reason: HOSPADM

## 2022-02-22 RX ORDER — ACETAMINOPHEN 160 MG/5ML
650 SOLUTION ORAL EVERY 4 HOURS PRN
Status: DISCONTINUED | OUTPATIENT
Start: 2022-02-22 | End: 2022-02-25 | Stop reason: HOSPADM

## 2022-02-22 RX ORDER — ACETAMINOPHEN 325 MG/1
650 TABLET ORAL EVERY 4 HOURS PRN
Status: DISCONTINUED | OUTPATIENT
Start: 2022-02-22 | End: 2022-02-25 | Stop reason: SDUPTHER

## 2022-02-22 RX ORDER — INSULIN GLARGINE 100 [IU]/ML
30 INJECTION, SOLUTION SUBCUTANEOUS NIGHTLY
Status: DISCONTINUED | OUTPATIENT
Start: 2022-02-22 | End: 2022-02-22

## 2022-02-22 RX ORDER — CETIRIZINE HYDROCHLORIDE 10 MG/1
5 TABLET ORAL EVERY 24 HOURS
Status: DISCONTINUED | OUTPATIENT
Start: 2022-02-22 | End: 2022-02-25 | Stop reason: HOSPADM

## 2022-02-22 RX ORDER — ACETAMINOPHEN 650 MG/1
650 SUPPOSITORY RECTAL EVERY 4 HOURS PRN
Status: DISCONTINUED | OUTPATIENT
Start: 2022-02-22 | End: 2022-02-25 | Stop reason: HOSPADM

## 2022-02-22 RX ORDER — LISINOPRIL 20 MG/1
20 TABLET ORAL ONCE
Status: COMPLETED | OUTPATIENT
Start: 2022-02-22 | End: 2022-02-22

## 2022-02-22 RX ORDER — METOPROLOL SUCCINATE 25 MG/1
25 TABLET, EXTENDED RELEASE ORAL ONCE
Status: COMPLETED | OUTPATIENT
Start: 2022-02-22 | End: 2022-02-22

## 2022-02-22 RX ORDER — ONDANSETRON 4 MG/1
4 TABLET, FILM COATED ORAL EVERY 6 HOURS PRN
Status: DISCONTINUED | OUTPATIENT
Start: 2022-02-22 | End: 2022-02-25 | Stop reason: SDUPTHER

## 2022-02-22 RX ORDER — CLOPIDOGREL BISULFATE 75 MG/1
75 TABLET ORAL DAILY
Status: DISCONTINUED | OUTPATIENT
Start: 2022-02-23 | End: 2022-02-25 | Stop reason: HOSPADM

## 2022-02-22 RX ORDER — INSULIN LISPRO 100 [IU]/ML
7 INJECTION, SOLUTION INTRAVENOUS; SUBCUTANEOUS
Status: DISCONTINUED | OUTPATIENT
Start: 2022-02-22 | End: 2022-02-22

## 2022-02-22 RX ORDER — ONDANSETRON 2 MG/ML
4 INJECTION INTRAMUSCULAR; INTRAVENOUS EVERY 6 HOURS PRN
Status: DISCONTINUED | OUTPATIENT
Start: 2022-02-22 | End: 2022-02-25 | Stop reason: SDUPTHER

## 2022-02-22 RX ORDER — ATORVASTATIN CALCIUM 20 MG/1
20 TABLET, FILM COATED ORAL ONCE
Status: COMPLETED | OUTPATIENT
Start: 2022-02-22 | End: 2022-02-22

## 2022-02-22 RX ADMIN — METOPROLOL SUCCINATE 25 MG: 25 TABLET, EXTENDED RELEASE ORAL at 20:21

## 2022-02-22 RX ADMIN — LISINOPRIL 20 MG: 20 TABLET ORAL at 20:21

## 2022-02-22 RX ADMIN — ATORVASTATIN CALCIUM 20 MG: 20 TABLET, FILM COATED ORAL at 20:21

## 2022-02-22 RX ADMIN — CETIRIZINE HYDROCHLORIDE 5 MG: 10 TABLET, FILM COATED ORAL at 20:21

## 2022-02-22 RX ADMIN — ACETAMINOPHEN 650 MG: 325 TABLET ORAL at 18:09

## 2022-02-22 RX ADMIN — Medication 10 ML: at 20:22

## 2022-02-23 ENCOUNTER — APPOINTMENT (OUTPATIENT)
Dept: NUCLEAR MEDICINE | Facility: HOSPITAL | Age: 77
End: 2022-02-23

## 2022-02-23 PROBLEM — R94.39 ABNORMAL NUCLEAR STRESS TEST: Status: ACTIVE | Noted: 2022-02-22

## 2022-02-23 LAB
ANION GAP SERPL CALCULATED.3IONS-SCNC: 12 MMOL/L (ref 5–15)
BASOPHILS # BLD AUTO: 0.1 10*3/MM3 (ref 0–0.2)
BASOPHILS NFR BLD AUTO: 0.8 % (ref 0–1.5)
BH CV ECHO MEAS - ACS: 2.19 CM
BH CV ECHO MEAS - AO MAX PG: 10.3 MMHG
BH CV ECHO MEAS - AO MEAN PG: 5.6 MMHG
BH CV ECHO MEAS - AO ROOT DIAM: 3.3 CM
BH CV ECHO MEAS - AO V2 MAX: 160.6 CM/SEC
BH CV ECHO MEAS - AO V2 VTI: 35.1 CM
BH CV ECHO MEAS - AVA(I,D): 2.12 CM2
BH CV ECHO MEAS - EDV(CUBED): 111.9 ML
BH CV ECHO MEAS - EDV(MOD-SP4): 136.7 ML
BH CV ECHO MEAS - EF(MOD-BP): 48 %
BH CV ECHO MEAS - EF(MOD-SP4): 47.7 %
BH CV ECHO MEAS - ESV(CUBED): 50.8 ML
BH CV ECHO MEAS - ESV(MOD-SP4): 71.5 ML
BH CV ECHO MEAS - FS: 23.1 %
BH CV ECHO MEAS - IVS/LVPW: 1.4 CM
BH CV ECHO MEAS - IVSD: 1.38 CM
BH CV ECHO MEAS - LA DIMENSION(2D): 5.1 CM
BH CV ECHO MEAS - LA DIMENSION: 5.5 CM
BH CV ECHO MEAS - LV DIASTOLIC VOL/BSA (35-75): 55.5 CM2
BH CV ECHO MEAS - LV MASS(C)D: 215.5 GRAMS
BH CV ECHO MEAS - LV MAX PG: 5.9 MMHG
BH CV ECHO MEAS - LV MEAN PG: 3.2 MMHG
BH CV ECHO MEAS - LV SYSTOLIC VOL/BSA (12-30): 29 CM2
BH CV ECHO MEAS - LV V1 MAX: 121.5 CM/SEC
BH CV ECHO MEAS - LV V1 VTI: 26.8 CM
BH CV ECHO MEAS - LVIDD: 4.8 CM
BH CV ECHO MEAS - LVIDS: 3.7 CM
BH CV ECHO MEAS - LVOT AREA: 2.8 CM2
BH CV ECHO MEAS - LVOT DIAM: 1.88 CM
BH CV ECHO MEAS - LVPWD: 0.99 CM
BH CV ECHO MEAS - MV A MAX VEL: 114.5 CM/SEC
BH CV ECHO MEAS - MV DEC SLOPE: 741.9 CM/SEC2
BH CV ECHO MEAS - MV DEC TIME: 0.17 MSEC
BH CV ECHO MEAS - MV E MAX VEL: 126 CM/SEC
BH CV ECHO MEAS - MV E/A: 1.1
BH CV ECHO MEAS - MV MAX PG: 7.9 MMHG
BH CV ECHO MEAS - MV MEAN PG: 4 MMHG
BH CV ECHO MEAS - MV V2 VTI: 45.7 CM
BH CV ECHO MEAS - MVA(VTI): 1.63 CM2
BH CV ECHO MEAS - PA ACC TIME: 0.05 SEC
BH CV ECHO MEAS - PA PR(ACCEL): 58.4 MMHG
BH CV ECHO MEAS - PA V2 MAX: 121.9 CM/SEC
BH CV ECHO MEAS - PI END-D VEL: 119.9 CM/SEC
BH CV ECHO MEAS - RAP SYSTOLE: 3 MMHG
BH CV ECHO MEAS - RV MAX PG: 1.28 MMHG
BH CV ECHO MEAS - RV V1 MAX: 56.5 CM/SEC
BH CV ECHO MEAS - RV V1 VTI: 12.5 CM
BH CV ECHO MEAS - RVDD: 4.4 CM
BH CV ECHO MEAS - RVSP: 32.9 MMHG
BH CV ECHO MEAS - SI(MOD-SP4): 26.5 ML/M2
BH CV ECHO MEAS - SV(LVOT): 74.3 ML
BH CV ECHO MEAS - SV(MOD-SP4): 65.2 ML
BH CV ECHO MEAS - TR MAX PG: 29.9 MMHG
BH CV ECHO MEAS - TR MAX VEL: 273.1 CM/SEC
BH CV NUCLEAR PRIOR STUDY: 3
BH CV REST NUCLEAR ISOTOPE DOSE: 7.6 MCI
BH CV STRESS BP STAGE 1: NORMAL
BH CV STRESS BP STAGE 2: NORMAL
BH CV STRESS COMMENTS STAGE 1: NORMAL
BH CV STRESS COMMENTS STAGE 2: NORMAL
BH CV STRESS DOSE REGADENOSON STAGE 1: 0.4
BH CV STRESS DURATION MIN STAGE 1: 0
BH CV STRESS DURATION MIN STAGE 2: 4
BH CV STRESS DURATION SEC STAGE 1: 10
BH CV STRESS DURATION SEC STAGE 2: 0
BH CV STRESS HR STAGE 1: 63
BH CV STRESS HR STAGE 2: 83
BH CV STRESS NUCLEAR ISOTOPE DOSE: 21.2 MCI
BH CV STRESS PROTOCOL 1: NORMAL
BH CV STRESS RECOVERY BP: NORMAL MMHG
BH CV STRESS RECOVERY HR: 73 BPM
BH CV STRESS STAGE 1: 1
BH CV STRESS STAGE 2: 2
BUN SERPL-MCNC: 14 MG/DL (ref 8–23)
BUN/CREAT SERPL: 14.9 (ref 7–25)
CALCIUM SPEC-SCNC: 9.6 MG/DL (ref 8.6–10.5)
CHLORIDE SERPL-SCNC: 101 MMOL/L (ref 98–107)
CO2 SERPL-SCNC: 25 MMOL/L (ref 22–29)
CREAT SERPL-MCNC: 0.94 MG/DL (ref 0.76–1.27)
DEPRECATED RDW RBC AUTO: 43.3 FL (ref 37–54)
EOSINOPHIL # BLD AUTO: 0.2 10*3/MM3 (ref 0–0.4)
EOSINOPHIL NFR BLD AUTO: 2.7 % (ref 0.3–6.2)
ERYTHROCYTE [DISTWIDTH] IN BLOOD BY AUTOMATED COUNT: 13.7 % (ref 12.3–15.4)
GFR SERPL CREATININE-BSD FRML MDRD: 78 ML/MIN/1.73
GLUCOSE BLDC GLUCOMTR-MCNC: 101 MG/DL (ref 70–105)
GLUCOSE BLDC GLUCOMTR-MCNC: 139 MG/DL (ref 70–105)
GLUCOSE BLDC GLUCOMTR-MCNC: 186 MG/DL (ref 70–105)
GLUCOSE BLDC GLUCOMTR-MCNC: 234 MG/DL (ref 70–105)
GLUCOSE SERPL-MCNC: 89 MG/DL (ref 65–99)
HCT VFR BLD AUTO: 36.9 % (ref 37.5–51)
HGB BLD-MCNC: 12.7 G/DL (ref 13–17.7)
INR PPP: 2.67 (ref 2–3)
LV EF 2D ECHO EST: 55 %
LYMPHOCYTES # BLD AUTO: 3.3 10*3/MM3 (ref 0.7–3.1)
LYMPHOCYTES NFR BLD AUTO: 48.6 % (ref 19.6–45.3)
MAGNESIUM SERPL-MCNC: 1.8 MG/DL (ref 1.6–2.4)
MAXIMAL PREDICTED HEART RATE: 146 BPM
MAXIMAL PREDICTED HEART RATE: 146 BPM
MCH RBC QN AUTO: 31.3 PG (ref 26.6–33)
MCHC RBC AUTO-ENTMCNC: 34.6 G/DL (ref 31.5–35.7)
MCV RBC AUTO: 90.5 FL (ref 79–97)
MONOCYTES # BLD AUTO: 0.6 10*3/MM3 (ref 0.1–0.9)
MONOCYTES NFR BLD AUTO: 8.2 % (ref 5–12)
NEUTROPHILS NFR BLD AUTO: 2.7 10*3/MM3 (ref 1.7–7)
NEUTROPHILS NFR BLD AUTO: 39.7 % (ref 42.7–76)
NRBC BLD AUTO-RTO: 0.1 /100 WBC (ref 0–0.2)
PERCENT MAX PREDICTED HR: 56.85 %
PLATELET # BLD AUTO: 219 10*3/MM3 (ref 140–450)
PMV BLD AUTO: 7 FL (ref 6–12)
POTASSIUM SERPL-SCNC: 4.1 MMOL/L (ref 3.5–5.2)
PROTHROMBIN TIME: 27.6 SECONDS (ref 19.4–28.5)
RBC # BLD AUTO: 4.08 10*6/MM3 (ref 4.14–5.8)
SODIUM SERPL-SCNC: 138 MMOL/L (ref 136–145)
STRESS BASELINE BP: NORMAL MMHG
STRESS BASELINE HR: 63 BPM
STRESS PERCENT HR: 67 %
STRESS POST PEAK BP: NORMAL MMHG
STRESS POST PEAK HR: 83 BPM
STRESS TARGET HR: 124 BPM
STRESS TARGET HR: 124 BPM
TROPONIN T SERPL-MCNC: <0.01 NG/ML (ref 0–0.03)
WBC NRBC COR # BLD: 6.9 10*3/MM3 (ref 3.4–10.8)

## 2022-02-23 PROCEDURE — 84484 ASSAY OF TROPONIN QUANT: CPT | Performed by: NURSE PRACTITIONER

## 2022-02-23 PROCEDURE — 93017 CV STRESS TEST TRACING ONLY: CPT

## 2022-02-23 PROCEDURE — 82962 GLUCOSE BLOOD TEST: CPT

## 2022-02-23 PROCEDURE — 93005 ELECTROCARDIOGRAM TRACING: CPT | Performed by: INTERNAL MEDICINE

## 2022-02-23 PROCEDURE — 99214 OFFICE O/P EST MOD 30 MIN: CPT | Performed by: INTERNAL MEDICINE

## 2022-02-23 PROCEDURE — 83735 ASSAY OF MAGNESIUM: CPT | Performed by: INTERNAL MEDICINE

## 2022-02-23 PROCEDURE — 93010 ELECTROCARDIOGRAM REPORT: CPT | Performed by: INTERNAL MEDICINE

## 2022-02-23 PROCEDURE — 0 TECHNETIUM TETROFOSMIN KIT: Performed by: EMERGENCY MEDICINE

## 2022-02-23 PROCEDURE — 0 PHYTONADIONE 10 MG/ML SOLUTION: Performed by: INTERNAL MEDICINE

## 2022-02-23 PROCEDURE — G0378 HOSPITAL OBSERVATION PER HR: HCPCS

## 2022-02-23 PROCEDURE — 25010000002 REGADENOSON 0.4 MG/5ML SOLUTION: Performed by: EMERGENCY MEDICINE

## 2022-02-23 PROCEDURE — 78452 HT MUSCLE IMAGE SPECT MULT: CPT

## 2022-02-23 PROCEDURE — 85610 PROTHROMBIN TIME: CPT | Performed by: NURSE PRACTITIONER

## 2022-02-23 PROCEDURE — A9502 TC99M TETROFOSMIN: HCPCS | Performed by: EMERGENCY MEDICINE

## 2022-02-23 PROCEDURE — 85025 COMPLETE CBC W/AUTO DIFF WBC: CPT | Performed by: NURSE PRACTITIONER

## 2022-02-23 PROCEDURE — 80048 BASIC METABOLIC PNL TOTAL CA: CPT | Performed by: NURSE PRACTITIONER

## 2022-02-23 PROCEDURE — 78452 HT MUSCLE IMAGE SPECT MULT: CPT | Performed by: INTERNAL MEDICINE

## 2022-02-23 PROCEDURE — 93018 CV STRESS TEST I&R ONLY: CPT | Performed by: INTERNAL MEDICINE

## 2022-02-23 RX ORDER — PHYTONADIONE 10 MG/ML
10 INJECTION, EMULSION INTRAMUSCULAR; INTRAVENOUS; SUBCUTANEOUS ONCE
Status: COMPLETED | OUTPATIENT
Start: 2022-02-23 | End: 2022-02-23

## 2022-02-23 RX ORDER — SODIUM CHLORIDE 0.9 % (FLUSH) 0.9 %
3-10 SYRINGE (ML) INJECTION AS NEEDED
Status: CANCELLED | OUTPATIENT
Start: 2022-02-23

## 2022-02-23 RX ORDER — HYDROCODONE BITARTRATE AND ACETAMINOPHEN 7.5; 325 MG/1; MG/1
1 TABLET ORAL EVERY 6 HOURS PRN
Status: DISCONTINUED | OUTPATIENT
Start: 2022-02-23 | End: 2022-02-25 | Stop reason: HOSPADM

## 2022-02-23 RX ORDER — HYDROCODONE BITARTRATE AND ACETAMINOPHEN 7.5; 325 MG/1; MG/1
1 TABLET ORAL EVERY 6 HOURS PRN
Qty: 12 TABLET | Refills: 0 | Status: CANCELLED | OUTPATIENT
Start: 2022-02-23 | End: 2022-02-26

## 2022-02-23 RX ORDER — SODIUM CHLORIDE 0.9 % (FLUSH) 0.9 %
3 SYRINGE (ML) INJECTION EVERY 12 HOURS SCHEDULED
Status: CANCELLED | OUTPATIENT
Start: 2022-02-23

## 2022-02-23 RX ADMIN — REGADENOSON 0.4 MG: 0.08 INJECTION, SOLUTION INTRAVENOUS at 10:45

## 2022-02-23 RX ADMIN — CLOPIDOGREL BISULFATE 75 MG: 75 TABLET, FILM COATED ORAL at 09:14

## 2022-02-23 RX ADMIN — Medication 10 ML: at 09:18

## 2022-02-23 RX ADMIN — TETROFOSMIN 1 DOSE: 1.38 INJECTION, POWDER, LYOPHILIZED, FOR SOLUTION INTRAVENOUS at 10:45

## 2022-02-23 RX ADMIN — HYDROCODONE BITARTRATE AND ACETAMINOPHEN 1 TABLET: 7.5; 325 TABLET ORAL at 09:38

## 2022-02-23 RX ADMIN — HYDROCODONE BITARTRATE AND ACETAMINOPHEN 1 TABLET: 7.5; 325 TABLET ORAL at 22:20

## 2022-02-23 RX ADMIN — AMLODIPINE BESYLATE 5 MG: 5 TABLET ORAL at 09:14

## 2022-02-23 RX ADMIN — Medication 10 ML: at 22:21

## 2022-02-23 RX ADMIN — TETROFOSMIN 1 DOSE: 1.38 INJECTION, POWDER, LYOPHILIZED, FOR SOLUTION INTRAVENOUS at 08:55

## 2022-02-23 RX ADMIN — PHYTONADIONE 10 MG: 10 INJECTION, EMULSION INTRAMUSCULAR; INTRAVENOUS; SUBCUTANEOUS at 22:20

## 2022-02-24 PROBLEM — R51.9 NONINTRACTABLE HEADACHE: Status: ACTIVE | Noted: 2022-02-24

## 2022-02-24 LAB
ANION GAP SERPL CALCULATED.3IONS-SCNC: 9 MMOL/L (ref 5–15)
BASOPHILS # BLD AUTO: 0 10*3/MM3 (ref 0–0.2)
BASOPHILS NFR BLD AUTO: 0.8 % (ref 0–1.5)
BUN SERPL-MCNC: 15 MG/DL (ref 8–23)
BUN/CREAT SERPL: 17.2 (ref 7–25)
CALCIUM SPEC-SCNC: 9 MG/DL (ref 8.6–10.5)
CHLORIDE SERPL-SCNC: 102 MMOL/L (ref 98–107)
CO2 SERPL-SCNC: 29 MMOL/L (ref 22–29)
CREAT SERPL-MCNC: 0.87 MG/DL (ref 0.76–1.27)
DEPRECATED RDW RBC AUTO: 42.4 FL (ref 37–54)
DEPRECATED RDW RBC AUTO: 43.3 FL (ref 37–54)
EOSINOPHIL # BLD AUTO: 0.2 10*3/MM3 (ref 0–0.4)
EOSINOPHIL NFR BLD AUTO: 2.9 % (ref 0.3–6.2)
ERYTHROCYTE [DISTWIDTH] IN BLOOD BY AUTOMATED COUNT: 13.4 % (ref 12.3–15.4)
ERYTHROCYTE [DISTWIDTH] IN BLOOD BY AUTOMATED COUNT: 13.6 % (ref 12.3–15.4)
GFR SERPL CREATININE-BSD FRML MDRD: 86 ML/MIN/1.73
GLUCOSE BLDC GLUCOMTR-MCNC: 129 MG/DL (ref 70–105)
GLUCOSE BLDC GLUCOMTR-MCNC: 158 MG/DL (ref 70–105)
GLUCOSE BLDC GLUCOMTR-MCNC: 160 MG/DL (ref 70–105)
GLUCOSE BLDC GLUCOMTR-MCNC: 203 MG/DL (ref 70–105)
GLUCOSE SERPL-MCNC: 151 MG/DL (ref 65–99)
HCT VFR BLD AUTO: 35.2 % (ref 37.5–51)
HCT VFR BLD AUTO: 36.6 % (ref 37.5–51)
HGB BLD-MCNC: 12.1 G/DL (ref 13–17.7)
HGB BLD-MCNC: 12.6 G/DL (ref 13–17.7)
INR PPP: 1.75 (ref 2–3)
LYMPHOCYTES # BLD AUTO: 2.7 10*3/MM3 (ref 0.7–3.1)
LYMPHOCYTES NFR BLD AUTO: 42 % (ref 19.6–45.3)
MCH RBC QN AUTO: 30.9 PG (ref 26.6–33)
MCH RBC QN AUTO: 31.3 PG (ref 26.6–33)
MCHC RBC AUTO-ENTMCNC: 34.4 G/DL (ref 31.5–35.7)
MCHC RBC AUTO-ENTMCNC: 34.5 G/DL (ref 31.5–35.7)
MCV RBC AUTO: 90 FL (ref 79–97)
MCV RBC AUTO: 90.7 FL (ref 79–97)
MONOCYTES # BLD AUTO: 0.5 10*3/MM3 (ref 0.1–0.9)
MONOCYTES NFR BLD AUTO: 7.4 % (ref 5–12)
NEUTROPHILS NFR BLD AUTO: 3 10*3/MM3 (ref 1.7–7)
NEUTROPHILS NFR BLD AUTO: 46.9 % (ref 42.7–76)
NRBC BLD AUTO-RTO: 0.1 /100 WBC (ref 0–0.2)
PLATELET # BLD AUTO: 198 10*3/MM3 (ref 140–450)
PLATELET # BLD AUTO: 219 10*3/MM3 (ref 140–450)
PMV BLD AUTO: 6.9 FL (ref 6–12)
PMV BLD AUTO: 7.2 FL (ref 6–12)
POTASSIUM SERPL-SCNC: 4.4 MMOL/L (ref 3.5–5.2)
PROTHROMBIN TIME: 18.6 SECONDS (ref 19.4–28.5)
RBC # BLD AUTO: 3.87 10*6/MM3 (ref 4.14–5.8)
RBC # BLD AUTO: 4.07 10*6/MM3 (ref 4.14–5.8)
SODIUM SERPL-SCNC: 140 MMOL/L (ref 136–145)
WBC NRBC COR # BLD: 6.4 10*3/MM3 (ref 3.4–10.8)
WBC NRBC COR # BLD: 7.8 10*3/MM3 (ref 3.4–10.8)

## 2022-02-24 PROCEDURE — 80048 BASIC METABOLIC PNL TOTAL CA: CPT | Performed by: INTERNAL MEDICINE

## 2022-02-24 PROCEDURE — 99233 SBSQ HOSP IP/OBS HIGH 50: CPT | Performed by: INTERNAL MEDICINE

## 2022-02-24 PROCEDURE — 85025 COMPLETE CBC W/AUTO DIFF WBC: CPT | Performed by: INTERNAL MEDICINE

## 2022-02-24 PROCEDURE — 85610 PROTHROMBIN TIME: CPT | Performed by: NURSE PRACTITIONER

## 2022-02-24 PROCEDURE — 85027 COMPLETE CBC AUTOMATED: CPT | Performed by: INTERNAL MEDICINE

## 2022-02-24 PROCEDURE — 83735 ASSAY OF MAGNESIUM: CPT | Performed by: INTERNAL MEDICINE

## 2022-02-24 PROCEDURE — 82962 GLUCOSE BLOOD TEST: CPT

## 2022-02-24 PROCEDURE — 99232 SBSQ HOSP IP/OBS MODERATE 35: CPT | Performed by: INTERNAL MEDICINE

## 2022-02-24 RX ORDER — BIOTIN 5 MG
1 TABLET ORAL DAILY
COMMUNITY

## 2022-02-24 RX ADMIN — AMLODIPINE BESYLATE 5 MG: 5 TABLET ORAL at 10:27

## 2022-02-24 RX ADMIN — Medication 10 ML: at 21:02

## 2022-02-24 RX ADMIN — HYDROCODONE BITARTRATE AND ACETAMINOPHEN 1 TABLET: 7.5; 325 TABLET ORAL at 22:19

## 2022-02-24 RX ADMIN — HYDROCODONE BITARTRATE AND ACETAMINOPHEN 1 TABLET: 7.5; 325 TABLET ORAL at 15:40

## 2022-02-24 RX ADMIN — HYDROCODONE BITARTRATE AND ACETAMINOPHEN 1 TABLET: 7.5; 325 TABLET ORAL at 05:49

## 2022-02-24 RX ADMIN — CLOPIDOGREL BISULFATE 75 MG: 75 TABLET, FILM COATED ORAL at 10:27

## 2022-02-25 ENCOUNTER — READMISSION MANAGEMENT (OUTPATIENT)
Dept: CALL CENTER | Facility: HOSPITAL | Age: 77
End: 2022-02-25

## 2022-02-25 VITALS
HEIGHT: 72 IN | OXYGEN SATURATION: 95 % | BODY MASS INDEX: 38.06 KG/M2 | WEIGHT: 281 LBS | HEART RATE: 83 BPM | TEMPERATURE: 97.7 F | DIASTOLIC BLOOD PRESSURE: 56 MMHG | SYSTOLIC BLOOD PRESSURE: 131 MMHG | RESPIRATION RATE: 16 BRPM

## 2022-02-25 LAB
ANION GAP SERPL CALCULATED.3IONS-SCNC: 10 MMOL/L (ref 5–15)
BUN SERPL-MCNC: 18 MG/DL (ref 8–23)
BUN/CREAT SERPL: 18.2 (ref 7–25)
CALCIUM SPEC-SCNC: 9.3 MG/DL (ref 8.6–10.5)
CHLORIDE SERPL-SCNC: 103 MMOL/L (ref 98–107)
CO2 SERPL-SCNC: 26 MMOL/L (ref 22–29)
CREAT SERPL-MCNC: 0.99 MG/DL (ref 0.76–1.27)
GFR SERPL CREATININE-BSD FRML MDRD: 74 ML/MIN/1.73
GLUCOSE BLDC GLUCOMTR-MCNC: 126 MG/DL (ref 70–105)
GLUCOSE BLDC GLUCOMTR-MCNC: 94 MG/DL (ref 70–105)
GLUCOSE SERPL-MCNC: 85 MG/DL (ref 65–99)
INR PPP: 1.24 (ref 2–3)
MAGNESIUM SERPL-MCNC: 1.9 MG/DL (ref 1.6–2.4)
POTASSIUM SERPL-SCNC: 4.4 MMOL/L (ref 3.5–5.2)
PROTHROMBIN TIME: 13.5 SECONDS (ref 19.4–28.5)
QT INTERVAL: 500 MS
SODIUM SERPL-SCNC: 139 MMOL/L (ref 136–145)

## 2022-02-25 PROCEDURE — 82962 GLUCOSE BLOOD TEST: CPT

## 2022-02-25 PROCEDURE — C1894 INTRO/SHEATH, NON-LASER: HCPCS | Performed by: INTERNAL MEDICINE

## 2022-02-25 PROCEDURE — C1769 GUIDE WIRE: HCPCS | Performed by: INTERNAL MEDICINE

## 2022-02-25 PROCEDURE — B2131ZZ FLUOROSCOPY OF MULTIPLE CORONARY ARTERY BYPASS GRAFTS USING LOW OSMOLAR CONTRAST: ICD-10-PCS | Performed by: INTERNAL MEDICINE

## 2022-02-25 PROCEDURE — 93455 CORONARY ART/GRFT ANGIO S&I: CPT | Performed by: INTERNAL MEDICINE

## 2022-02-25 PROCEDURE — 99152 MOD SED SAME PHYS/QHP 5/>YRS: CPT | Performed by: INTERNAL MEDICINE

## 2022-02-25 PROCEDURE — 4A023N7 MEASUREMENT OF CARDIAC SAMPLING AND PRESSURE, LEFT HEART, PERCUTANEOUS APPROACH: ICD-10-PCS | Performed by: INTERNAL MEDICINE

## 2022-02-25 PROCEDURE — B2181ZZ FLUOROSCOPY OF LEFT INTERNAL MAMMARY BYPASS GRAFT USING LOW OSMOLAR CONTRAST: ICD-10-PCS | Performed by: INTERNAL MEDICINE

## 2022-02-25 PROCEDURE — 99238 HOSP IP/OBS DSCHRG MGMT 30/<: CPT | Performed by: INTERNAL MEDICINE

## 2022-02-25 PROCEDURE — 25010000002 MIDAZOLAM PER 1 MG: Performed by: INTERNAL MEDICINE

## 2022-02-25 PROCEDURE — 99233 SBSQ HOSP IP/OBS HIGH 50: CPT | Performed by: INTERNAL MEDICINE

## 2022-02-25 PROCEDURE — B2111ZZ FLUOROSCOPY OF MULTIPLE CORONARY ARTERIES USING LOW OSMOLAR CONTRAST: ICD-10-PCS | Performed by: INTERNAL MEDICINE

## 2022-02-25 PROCEDURE — 25010000002 FENTANYL CITRATE (PF) 100 MCG/2ML SOLUTION: Performed by: INTERNAL MEDICINE

## 2022-02-25 PROCEDURE — 99153 MOD SED SAME PHYS/QHP EA: CPT | Performed by: INTERNAL MEDICINE

## 2022-02-25 PROCEDURE — 0 IOPAMIDOL PER 1 ML: Performed by: INTERNAL MEDICINE

## 2022-02-25 RX ORDER — DIPHENHYDRAMINE HCL 25 MG
25 CAPSULE ORAL EVERY 6 HOURS PRN
Status: DISCONTINUED | OUTPATIENT
Start: 2022-02-25 | End: 2022-02-25 | Stop reason: HOSPADM

## 2022-02-25 RX ORDER — LIDOCAINE HYDROCHLORIDE 20 MG/ML
INJECTION, SOLUTION INFILTRATION; PERINEURAL AS NEEDED
Status: DISCONTINUED | OUTPATIENT
Start: 2022-02-25 | End: 2022-02-25 | Stop reason: HOSPADM

## 2022-02-25 RX ORDER — MIDAZOLAM HYDROCHLORIDE 1 MG/ML
INJECTION INTRAMUSCULAR; INTRAVENOUS AS NEEDED
Status: DISCONTINUED | OUTPATIENT
Start: 2022-02-25 | End: 2022-02-25 | Stop reason: HOSPADM

## 2022-02-25 RX ORDER — SODIUM CHLORIDE 9 MG/ML
250 INJECTION, SOLUTION INTRAVENOUS ONCE AS NEEDED
Status: DISCONTINUED | OUTPATIENT
Start: 2022-02-25 | End: 2022-02-25 | Stop reason: HOSPADM

## 2022-02-25 RX ORDER — FENTANYL CITRATE 50 UG/ML
INJECTION, SOLUTION INTRAMUSCULAR; INTRAVENOUS AS NEEDED
Status: DISCONTINUED | OUTPATIENT
Start: 2022-02-25 | End: 2022-02-25 | Stop reason: HOSPADM

## 2022-02-25 RX ORDER — ACETAMINOPHEN 325 MG/1
650 TABLET ORAL EVERY 4 HOURS PRN
Status: DISCONTINUED | OUTPATIENT
Start: 2022-02-25 | End: 2022-02-25 | Stop reason: HOSPADM

## 2022-02-25 RX ORDER — SODIUM CHLORIDE 9 MG/ML
INJECTION, SOLUTION INTRAVENOUS CONTINUOUS PRN
Status: COMPLETED | OUTPATIENT
Start: 2022-02-25 | End: 2022-02-25

## 2022-02-25 RX ORDER — ONDANSETRON 4 MG/1
4 TABLET, FILM COATED ORAL EVERY 6 HOURS PRN
Status: DISCONTINUED | OUTPATIENT
Start: 2022-02-25 | End: 2022-02-25 | Stop reason: HOSPADM

## 2022-02-25 RX ORDER — ONDANSETRON 2 MG/ML
4 INJECTION INTRAMUSCULAR; INTRAVENOUS EVERY 6 HOURS PRN
Status: DISCONTINUED | OUTPATIENT
Start: 2022-02-25 | End: 2022-02-25 | Stop reason: HOSPADM

## 2022-02-25 RX ADMIN — CLOPIDOGREL BISULFATE 75 MG: 75 TABLET, FILM COATED ORAL at 09:15

## 2022-02-25 RX ADMIN — AMLODIPINE BESYLATE 5 MG: 5 TABLET ORAL at 09:15

## 2022-02-25 RX ADMIN — HYDROCODONE BITARTRATE AND ACETAMINOPHEN 1 TABLET: 7.5; 325 TABLET ORAL at 09:15

## 2022-02-26 NOTE — OUTREACH NOTE
Prep Survey      Responses   [facility] facility patient discharged from? [facility]   Is LACE score < 7 ? No   Emergency Room discharge w/ pulse ox? No   Eligibility TCM   Hospital [facility]   Date of Admission 02/22/22   Date of Discharge 02/25/22   Discharge Disposition Home or Self Care   Discharge diagnosis Nonintractable headache, exertional dyspnea, abnormal stress test, heart cath    Does the patient have one of the following disease processes/diagnoses(primary or secondary)? Other   Does the patient have Home health ordered? No   Is there a DME ordered? No   Prep survey completed? Yes          Sarah Mcculolugh RN

## 2022-02-28 ENCOUNTER — TRANSITIONAL CARE MANAGEMENT TELEPHONE ENCOUNTER (OUTPATIENT)
Dept: CALL CENTER | Facility: HOSPITAL | Age: 77
End: 2022-02-28

## 2022-02-28 NOTE — OUTREACH NOTE
Call Center TCM Note      Responses   [facility] patient discharged from? [facility]   Does the patient have one of the following disease processes/diagnoses(primary or secondary)? Other   TCM attempt successful? No   Unsuccessful attempts Attempt 1          Kristel Mendoza MA    2/28/2022, 15:27 EST

## 2022-02-28 NOTE — OUTREACH NOTE
"Call Center TCM Note      Responses   [facility] patient discharged from? [facility]   Does the patient have one of the following disease processes/diagnoses(primary or secondary)? Other   TCM attempt successful? Yes   Discharge diagnosis Nonintractable headache, exertional dyspnea, abnormal stress test, heart cath    Meds reviewed with patient/caregiver? Yes   Is the patient having any side effects they believe may be caused by any medication additions or changes? No   Does the patient have all medications ordered at discharge? Yes   Is the patient taking all medications as directed (includes completed medication regime)? Yes   Does the patient have a primary care provider?  Yes   Does the patient have an appointment with their PCP within 7 days of discharge? No   Comments regarding PCP Pt wishes to keep 06/2022 fwp with PCP Dr Live, he states \"she will call if she wants to see me sooner\". CARDIO MD fwp is 03/31/2022.   Has the patient kept scheduled appointments due by today? N/A   Has home health visited the patient within 72 hours of discharge? N/A   Psychosocial issues? No   Did the patient receive a copy of their discharge instructions? Yes   Nursing interventions Reviewed instructions with patient   What is the patient's perception of their health status since discharge? Improving   Is the patient/caregiver able to teach back signs and symptoms related to disease process for when to call PCP? Yes   Is the patient/caregiver able to teach back signs and symptoms related to disease process for when to call 911? Yes   Is the patient/caregiver able to teach back the hierarchy of who to call/visit for symptoms/problems? PCP, Specialist, Home health nurse, Urgent Care, ED, 911 Yes   If the patient is a current smoker, are they able to teach back resources for cessation? Not a smoker   TCM call completed? Yes   Wrap up additional comments Pt doing fine, feels well. No questions or concerns. Pt wishes to keep " "06/2022 fwp with PCP Dr Live, he states \"she will call if she wants to see me sooner\". CARDIO MD fwp is 03/31/2022.          Kristel Mendoza MA    2/28/2022, 16:47 EST      "

## 2022-03-08 ENCOUNTER — READMISSION MANAGEMENT (OUTPATIENT)
Dept: CALL CENTER | Facility: HOSPITAL | Age: 77
End: 2022-03-08

## 2022-03-08 NOTE — OUTREACH NOTE
Medical Week 2 Survey    Flowsheet Row Responses   [facility] facility patient discharged from? [facility]   Does the patient have one of the following disease processes/diagnoses(primary or secondary)? Other   Week 2 attempt successful? No   Unsuccessful attempts Attempt 1          CHIKI ANDERS - Licensed Nurse

## 2022-03-14 ENCOUNTER — READMISSION MANAGEMENT (OUTPATIENT)
Dept: CALL CENTER | Facility: HOSPITAL | Age: 77
End: 2022-03-14

## 2022-03-14 NOTE — OUTREACH NOTE
Medical Week 2 Survey    Flowsheet Row Responses   [facility] facility patient discharged from? [facility]   Does the patient have one of the following disease processes/diagnoses(primary or secondary)? Other   Week 2 attempt successful? No   Unsuccessful attempts Attempt 2          ROQUE MCCONNELL - Registered Nurse

## 2022-03-22 ENCOUNTER — OFFICE VISIT (OUTPATIENT)
Dept: ENDOCRINOLOGY | Facility: CLINIC | Age: 77
End: 2022-03-22

## 2022-03-22 DIAGNOSIS — Z79.4 TYPE 2 DIABETES MELLITUS WITH HYPERGLYCEMIA, WITH LONG-TERM CURRENT USE OF INSULIN: ICD-10-CM

## 2022-03-22 DIAGNOSIS — E11.65 TYPE 2 DIABETES MELLITUS WITH HYPERGLYCEMIA, WITH LONG-TERM CURRENT USE OF INSULIN: ICD-10-CM

## 2022-03-22 PROCEDURE — G0108 DIAB MANAGE TRN  PER INDIV: HCPCS | Performed by: DIETITIAN, REGISTERED

## 2022-03-22 NOTE — PROGRESS NOTES
Spent 30 minutes with patient.  Dexcom reports scanned into visit.  Pt c/o low blood sugars towards HS.  Per MD s/o, instructed pt to change his basal rates to 12 am 1.0; 8 pm 0.90.  Showed pt how to change this pump setting.  Instructed pt on use of extended bolus and temp basal. Pt denies any problems with his Omnipod and rotating his Omnipod and Dexcom on his abdomen. Pt c/o recent problem with a Dexcom sensor and his [employer] lisa is not showing the graph.  Instructed pt to calibrate when he gets home and if he still sees no graph to change sensor (gave pt sample) and then call Dexcom if that does not help.

## 2022-03-23 ENCOUNTER — READMISSION MANAGEMENT (OUTPATIENT)
Dept: CALL CENTER | Facility: HOSPITAL | Age: 77
End: 2022-03-23

## 2022-03-23 NOTE — OUTREACH NOTE
Medical Week 3 Survey    Flowsheet Row Responses   [facility] facility patient discharged from? [facility]   Does the patient have one of the following disease processes/diagnoses(primary or secondary)? Other   Week 3 attempt successful? No   Unsuccessful attempts Attempt 1          CAT NOYOLA - Registered Nurse

## 2022-03-24 RX ORDER — ATORVASTATIN CALCIUM 40 MG/1
20 TABLET, FILM COATED ORAL 2 TIMES DAILY
Qty: 90 TABLET | Refills: 3 | Status: SHIPPED | OUTPATIENT
Start: 2022-03-24 | End: 2022-12-21 | Stop reason: SDUPTHER

## 2022-03-24 NOTE — TELEPHONE ENCOUNTER
Lipid Panel (12/21/2021 12:28)  Basic Metabolic Panel (02/24/2022 23:23)        Rx Refill Note  Requested Prescriptions     Pending Prescriptions Disp Refills   • atorvastatin (LIPITOR) 40 MG tablet 90 tablet 3     Sig: Take 0.5 tablets by mouth 2 (Two) Times a Day.      Last office visit with prescribing clinician: 9/16/2021      Next office visit with prescribing clinician: 3/31/2022            Deisy Gutiérrez MA  03/24/22, 16:00 EDT

## 2022-03-31 ENCOUNTER — ANTICOAGULATION VISIT (OUTPATIENT)
Dept: CARDIOLOGY | Facility: CLINIC | Age: 77
End: 2022-03-31

## 2022-03-31 ENCOUNTER — OFFICE VISIT (OUTPATIENT)
Dept: CARDIOLOGY | Facility: CLINIC | Age: 77
End: 2022-03-31

## 2022-03-31 VITALS — WEIGHT: 277 LBS | OXYGEN SATURATION: 96 % | HEART RATE: 72 BPM | BODY MASS INDEX: 37.57 KG/M2

## 2022-03-31 VITALS
HEART RATE: 72 BPM | DIASTOLIC BLOOD PRESSURE: 78 MMHG | WEIGHT: 277 LBS | SYSTOLIC BLOOD PRESSURE: 163 MMHG | HEIGHT: 72 IN | BODY MASS INDEX: 37.52 KG/M2

## 2022-03-31 DIAGNOSIS — Z95.0 PRESENCE OF CARDIAC PACEMAKER: ICD-10-CM

## 2022-03-31 DIAGNOSIS — I48.11 LONGSTANDING PERSISTENT ATRIAL FIBRILLATION: ICD-10-CM

## 2022-03-31 DIAGNOSIS — Z79.01 LONG TERM (CURRENT) USE OF ANTICOAGULANTS: Primary | ICD-10-CM

## 2022-03-31 DIAGNOSIS — Z79.01 LONG TERM (CURRENT) USE OF ANTICOAGULANTS: ICD-10-CM

## 2022-03-31 DIAGNOSIS — Z95.1 HX OF CABG: ICD-10-CM

## 2022-03-31 DIAGNOSIS — I10 ESSENTIAL HYPERTENSION: ICD-10-CM

## 2022-03-31 DIAGNOSIS — I48.0 PAROXYSMAL ATRIAL FIBRILLATION: Primary | ICD-10-CM

## 2022-03-31 DIAGNOSIS — Z95.5 STATUS POST CORONARY ARTERY STENT PLACEMENT: ICD-10-CM

## 2022-03-31 DIAGNOSIS — Z79.01 CHRONIC ANTICOAGULATION: ICD-10-CM

## 2022-03-31 DIAGNOSIS — E78.2 MIXED HYPERLIPIDEMIA: ICD-10-CM

## 2022-03-31 LAB — INR PPP: 2.7 (ref 0.9–1.1)

## 2022-03-31 PROCEDURE — 85610 PROTHROMBIN TIME: CPT | Performed by: INTERNAL MEDICINE

## 2022-03-31 PROCEDURE — 99214 OFFICE O/P EST MOD 30 MIN: CPT | Performed by: INTERNAL MEDICINE

## 2022-03-31 PROCEDURE — 36416 COLLJ CAPILLARY BLOOD SPEC: CPT | Performed by: INTERNAL MEDICINE

## 2022-03-31 NOTE — PROGRESS NOTES
Encounter Date:03/31/2022  Last seen 2/25/2022      Patient ID: Benoit Newberry Jr. is a 76 y.o. male.    Chief Complaint:    Status post CABG  Status post stent  Anticoagulation management.  History of atrial fibrillation  Status post pacemaker    History of Present Illness  Patient recently had chest discomfort and shortness of breath and had cardiac catheterization and coronary arteriography and was discharged home.    Since I have last seen, the patient has been without any chest discomfort ,shortness of breath, palpitations, dizziness or syncope.  Denies having any headache ,abdominal pain ,nausea, vomiting , diarrhea constipation, loss of weight or loss of appetite.  Denies having any excessive bruising ,hematuria or blood in the stool.    Review of all systems negative except as indicated.    Reviewed ROS.  Since I have last seen, the patient has been without any chest discomfort ,shortness of breath, palpitations, dizziness or syncope.  Denies having any headache ,abdominal pain ,nausea, vomiting , diarrhea constipation, loss of weight or loss of appetite.  Denies having any excessive bruising ,hematuria or blood in the stool.    Review of all systems negative except as indicated.    Reviewed ROS.    Assessment and Plan       [[[]]]]]]]]]]]]]]]]]]]]  Impression  ===================     -status post permanent dual-chamber pacemaker implantation (Monstrous  MRI compatible) 12/04/2018      - atrial fibrillation.   status post Ablation 10/16/2018  Patient has converted to sinus  sinus bradycardia.   Patient had a recurrence of atrial fibrillation.  Status post cardioversion 06/13/2018 and 08/01/2018 09/05/2018.  Patient has converted but did not stay in sinus rhythm.  Patient was on Tikosyn but off now due to maintaining sinus rhythm.     -anticoagulation Patient is competent.      -status post CABG  3/98      -Acute inferior myocardial infarction.  Status post stent placement to SVG to RCA for total  occlusion .  11/02/2015   - Status post stent placement to totally occluded SVG to RCA11/02/2015 and stent placement to left main and mid circumflex coronary artery 11/04/2015.  Status post stent to PDA distal to SVG and native LAD beyond [place].  05/25/2018    Cardiac catheterization February 25, 2022 revealed  Left ventricle angiogram was not performed due to difficulty in crossing the aortic valve.  Left main coronary artery is normal.  Left anterior descending artery is totally occluded in the proximal segment and LIMA is filling the LAD.  Circumflex coronary artery has proximal 20 to 30% disease.  Ramus intermedius is totally occluded.  Filling from SVG.  Right coronary artery is totally occluded in the proximal segment.  LIMA to LAD is patent.  SVG to diagonal branch is patent.  SVG to ramus intermedius is patent.  SVG to PDA is patent      - diabetes dyslipidemia and hypertension   - status post impending inferior myocardial infarction prior to surgery    -family history of coronary artery disease   - History of asymptomatic right carotid bruit.  ===   Plan  ===  Chest discomfort and shortness of breath-improved.     Status post CABG.  Cardiac catheterization 2/25/2022-as above.  Have educated patient regarding the results.  Medical treatment    Anticoagulation status reviewed.  Continue Coumadin.  INR today 2.7.  PT/INR on a monthly basis.    History of atrial fibrillation-patient is maintaining sinus rhythm.  Patient is off Tikosyn.  Patient did not have any recurrence of atrial fibrillation.  Continue to hold Tikosyn since patient has been maintaining sinus rhythm without Tikosyn at this time.      Status post pacemaker.  Recent interrogation of the pacemaker revealed excellent pacing parameters.  Battery status is 5.5 years.  Pacemaker site looks normal.     Follow-up in the office next week with pacemaker interrogation.  Patient also to have an appointment with pacemaker interrogation in 6  months.    Further plan will depend on patient's progress  ]]]]]]]]]]]]]]]                Diagnosis Plan   1. Paroxysmal atrial fibrillation (HCC)     2. Longstanding persistent atrial fibrillation (HCC)     3. Long term (current) use of anticoagulants     4. Hx of CABG     5. Presence of cardiac pacemaker     6. Essential hypertension     7. Mixed hyperlipidemia     8. Chronic anticoagulation     9. Status post coronary artery stent placement     LAB RESULTS (LAST 7 DAYS)    CBC        BMP        CMP         BNP        TROPONIN        CoAg  Results from last 7 days   Lab Units 03/31/22  0919   INR  2.70*       Creatinine Clearance  CrCl cannot be calculated (Patient's most recent lab result is older than the maximum 30 days allowed.).    ABG        Radiology  No radiology results for the last day                The following portions of the patient's history were reviewed and updated as appropriate: allergies, current medications, past family history, past medical history, past social history, past surgical history and problem list.    Review of Systems   Constitutional: Negative for malaise/fatigue.   Cardiovascular: Negative for chest pain, leg swelling, palpitations and syncope.   Respiratory: Negative for shortness of breath.    Skin: Negative for rash.   Gastrointestinal: Negative for nausea and vomiting.   Neurological: Negative for dizziness, light-headedness and numbness.   All other systems reviewed and are negative.        Current Outpatient Medications:   •  amLODIPine (NORVASC) 5 MG tablet, Take 1 tablet by mouth Daily., Disp: 90 tablet, Rfl: 3  •  atorvastatin (LIPITOR) 40 MG tablet, Take 0.5 tablets by mouth 2 (Two) Times a Day., Disp: 90 tablet, Rfl: 3  •  cetirizine (zyrTEC) 10 MG tablet, Take 10 mg by mouth Daily As Needed for Allergies., Disp: , Rfl:   •  Cholecalciferol (VITAMIN D) 1000 units tablet, Take 500 Units by mouth Daily., Disp: , Rfl:   •  clopidogrel (PLAVIX) 75 MG tablet, Take 1  tablet by mouth Daily., Disp: 90 tablet, Rfl: 3  •  Coenzyme Q10 (CO Q 10 PO), Take 1 capsule by mouth Daily., Disp: , Rfl:   •  furosemide (LASIX) 40 MG tablet, Take 1 tablet by mouth Daily., Disp: 90 tablet, Rfl: 3  •  Insulin Disposable Pump (OmniPod Dash 5 Pack Pods) misc, 1 each Continuous. Pt to change pod every 3 days, Disp: 30 each, Rfl: 3  •  Krill Oil 1000 MG capsule, Take 1 capsule by mouth Daily., Disp: , Rfl:   •  lisinopril (PRINIVIL,ZESTRIL) 20 MG tablet, Take 1 tablet by mouth 2 (Two) Times a Day., Disp: 180 tablet, Rfl: 3  •  metFORMIN (GLUCOPHAGE) 1000 MG tablet, Take 1 tablet by mouth 2 (Two) Times a Day With Meals., Disp: 180 tablet, Rfl: 3  •  metoprolol succinate XL (TOPROL-XL) 25 MG 24 hr tablet, Take 1 tablet by mouth 2 (Two) Times a Day., Disp: 180 tablet, Rfl: 3  •  Multiple Vitamins-Minerals (MULTI VITAMIN/MINERALS) tablet, Take 1 tablet by mouth Daily., Disp: , Rfl:   •  vitamin C (ASCORBIC ACID) 500 MG tablet, Take 500 mg by mouth Daily., Disp: , Rfl:   •  vitamin E 400 UNIT capsule, Take 400 Units by mouth Daily., Disp: , Rfl:   •  warfarin (COUMADIN) 5 MG tablet, Take 1 tablet by mouth Daily., Disp: 90 tablet, Rfl: 0  •  NovoLOG 100 UNIT/ML injection, Pt to use as directed in insulin pump.  MDD: 65, Disp: 60 mL, Rfl: 3    Allergies   Allergen Reactions   • Clindamycin Hcl Unknown (See Comments)   • Vancomycin Rash       Family History   Problem Relation Age of Onset   • Heart disease Mother    • Hypertension Mother    • Hyperlipidemia Mother    • Heart disease Father         MI   • Hypertension Father    • Hyperlipidemia Father    • Heart disease Sister         MI   • Hypertension Sister    • Hyperlipidemia Sister    • Heart disease Brother         s/p coronary stents in his 40's    • Hypertension Brother    • Hyperlipidemia Brother    • Stroke Maternal Grandfather    • Diabetes Maternal Grandmother        Past Surgical History:   Procedure Laterality Date   • APPENDECTOMY  1956   •  CARDIAC ABLATION  12/2018    x 1    • CARDIAC CATHETERIZATION Left 2022    Procedure: Left Heart Cath and coronary angiogram;  Surgeon: Karolina Saldaña MD;  Location: [facility] CATH INVASIVE LOCATION;  Service: Cardiovascular;  Laterality: Left;   • CARDIAC CATHETERIZATION  2022    Procedure: Saphenous Vein Graft;  Surgeon: Karolina Saldaña MD;  Location: [facility] CATH INVASIVE LOCATION;  Service: Cardiovascular;;   • CARDIOVERSION  2018    Cardioversion 2018; x3  2018   • CORONARY ANGIOPLASTY Left 2015    Distal left main and in the mid circumflex artery    • CORONARY ANGIOPLASTY Right 2015    Right coronary artery    • CORONARY ARTERY BYPASS GRAFT  1998   • FOOT SURGERY     • HERNIA REPAIR     • OTHER SURGICAL HISTORY  2019    Stent    • PACEMAKER IMPLANTATION  2018    Dr. Saldaña    • REPLACEMENT TOTAL KNEE BILATERAL     • SKIN BIOPSY         Past Medical History:   Diagnosis Date   • Arthritis    • Asymptomatic stenosis of right carotid artery    • Atrial fibrillation (HCC)    • Bradycardia    • Cataract    • Coronary artery disease    • Diabetes mellitus, type 2 (HCC)    • Hyperlipidemia    • Hypertension    • Myocardial infarction (HCC)        Family History   Problem Relation Age of Onset   • Heart disease Mother    • Hypertension Mother    • Hyperlipidemia Mother    • Heart disease Father         MI   • Hypertension Father    • Hyperlipidemia Father    • Heart disease Sister         MI   • Hypertension Sister    • Hyperlipidemia Sister    • Heart disease Brother         s/p coronary stents in his 40's    • Hypertension Brother    • Hyperlipidemia Brother    • Stroke Maternal Grandfather    • Diabetes Maternal Grandmother        Social History     Socioeconomic History   • Marital status:    Tobacco Use   • Smoking status: Former Smoker     Quit date: 12/3/1971     Years since quittin.3   • Smokeless tobacco: Never Used   Vaping Use   • Vaping Use:  "Never used   Substance and Sexual Activity   • Alcohol use: Yes     Comment: rare   • Drug use: No   • Sexual activity: Defer         Procedures      Objective:       Physical Exam    /78   Pulse 72   Ht 182.9 cm (72\")   Wt 126 kg (277 lb)   BMI 37.57 kg/m²   The patient is alert, oriented and in no distress.    Vital signs as noted above.  Exogenous obesity (BMI 38)    Head and neck revealed no carotid bruits or jugular venous distension.  No thyromegaly or lymphadenopathy is present.    Lungs clear.  No wheezing.  Breath sounds are normal bilaterally.    Heart normal first and second heart sounds.  No murmur..  No pericardial rub is present.  No gallop is present.    Abdomen soft and nontender.  No organomegaly is present.    Extremities revealed good peripheral pulses without any pedal edema.    Skin warm and dry.  Pacemaker site looks normal.    Musculoskeletal system is grossly normal.    CNS grossly normal.    Reviewed and updated.        "

## 2022-04-06 ENCOUNTER — CLINICAL SUPPORT NO REQUIREMENTS (OUTPATIENT)
Dept: CARDIOLOGY | Facility: CLINIC | Age: 77
End: 2022-04-06

## 2022-04-06 DIAGNOSIS — I49.5 TACHYCARDIA-BRADYCARDIA: ICD-10-CM

## 2022-04-06 DIAGNOSIS — Z95.0 PRESENCE OF CARDIAC PACEMAKER: Primary | ICD-10-CM

## 2022-04-06 PROCEDURE — 93280 PM DEVICE PROGR EVAL DUAL: CPT | Performed by: INTERNAL MEDICINE

## 2022-05-05 ENCOUNTER — ANTICOAGULATION VISIT (OUTPATIENT)
Dept: CARDIOLOGY | Facility: CLINIC | Age: 77
End: 2022-05-05

## 2022-05-05 VITALS — OXYGEN SATURATION: 96 % | SYSTOLIC BLOOD PRESSURE: 153 MMHG | HEART RATE: 60 BPM | DIASTOLIC BLOOD PRESSURE: 69 MMHG

## 2022-05-05 DIAGNOSIS — Z79.01 LONG TERM (CURRENT) USE OF ANTICOAGULANTS: ICD-10-CM

## 2022-05-05 DIAGNOSIS — I48.11 LONGSTANDING PERSISTENT ATRIAL FIBRILLATION: Primary | ICD-10-CM

## 2022-05-05 LAB — INR PPP: 2.5 (ref 0.9–1.1)

## 2022-05-05 PROCEDURE — 85610 PROTHROMBIN TIME: CPT | Performed by: INTERNAL MEDICINE

## 2022-05-05 PROCEDURE — 36416 COLLJ CAPILLARY BLOOD SPEC: CPT | Performed by: INTERNAL MEDICINE

## 2022-05-09 ENCOUNTER — LAB (OUTPATIENT)
Dept: LAB | Facility: HOSPITAL | Age: 77
End: 2022-05-09

## 2022-05-09 DIAGNOSIS — E11.65 TYPE 2 DIABETES MELLITUS WITH HYPERGLYCEMIA, WITH LONG-TERM CURRENT USE OF INSULIN: ICD-10-CM

## 2022-05-09 DIAGNOSIS — Z79.4 TYPE 2 DIABETES MELLITUS WITH HYPERGLYCEMIA, WITH LONG-TERM CURRENT USE OF INSULIN: ICD-10-CM

## 2022-05-09 LAB
ALBUMIN SERPL-MCNC: 4.6 G/DL (ref 3.5–5.2)
ALBUMIN UR-MCNC: 1.4 MG/DL
ALBUMIN/GLOB SERPL: 1.8 G/DL
ALP SERPL-CCNC: 67 U/L (ref 39–117)
ALT SERPL W P-5'-P-CCNC: 37 U/L (ref 1–41)
ANION GAP SERPL CALCULATED.3IONS-SCNC: 12.1 MMOL/L (ref 5–15)
AST SERPL-CCNC: 33 U/L (ref 1–40)
BILIRUB SERPL-MCNC: 0.5 MG/DL (ref 0–1.2)
BUN SERPL-MCNC: 24 MG/DL (ref 8–23)
BUN/CREAT SERPL: 19.7 (ref 7–25)
CALCIUM SPEC-SCNC: 9.7 MG/DL (ref 8.6–10.5)
CHLORIDE SERPL-SCNC: 101 MMOL/L (ref 98–107)
CHOLEST SERPL-MCNC: 157 MG/DL (ref 0–200)
CO2 SERPL-SCNC: 24.9 MMOL/L (ref 22–29)
CREAT SERPL-MCNC: 1.22 MG/DL (ref 0.76–1.27)
CREAT UR-MCNC: 17.6 MG/DL
EGFRCR SERPLBLD CKD-EPI 2021: 62.2 ML/MIN/1.73
GLOBULIN UR ELPH-MCNC: 2.5 GM/DL
GLUCOSE SERPL-MCNC: 137 MG/DL (ref 65–99)
HBA1C MFR BLD: 6.7 % (ref 3.5–5.6)
HDLC SERPL-MCNC: 44 MG/DL (ref 40–60)
LDLC SERPL CALC-MCNC: 93 MG/DL (ref 0–100)
LDLC/HDLC SERPL: 2.07 {RATIO}
MICROALBUMIN/CREAT UR: 79.5 MG/G
POTASSIUM SERPL-SCNC: 5.3 MMOL/L (ref 3.5–5.2)
PROT SERPL-MCNC: 7.1 G/DL (ref 6–8.5)
SODIUM SERPL-SCNC: 138 MMOL/L (ref 136–145)
TRIGL SERPL-MCNC: 110 MG/DL (ref 0–150)
VLDLC SERPL-MCNC: 20 MG/DL (ref 5–40)

## 2022-05-09 PROCEDURE — 36415 COLL VENOUS BLD VENIPUNCTURE: CPT

## 2022-05-09 PROCEDURE — 82043 UR ALBUMIN QUANTITATIVE: CPT

## 2022-05-09 PROCEDURE — 83036 HEMOGLOBIN GLYCOSYLATED A1C: CPT

## 2022-05-09 PROCEDURE — 80061 LIPID PANEL: CPT

## 2022-05-09 PROCEDURE — 80053 COMPREHEN METABOLIC PANEL: CPT

## 2022-05-09 PROCEDURE — 82570 ASSAY OF URINE CREATININE: CPT

## 2022-05-17 ENCOUNTER — OFFICE VISIT (OUTPATIENT)
Dept: ENDOCRINOLOGY | Facility: CLINIC | Age: 77
End: 2022-05-17

## 2022-05-17 VITALS
OXYGEN SATURATION: 96 % | SYSTOLIC BLOOD PRESSURE: 135 MMHG | DIASTOLIC BLOOD PRESSURE: 75 MMHG | HEIGHT: 72 IN | WEIGHT: 283 LBS | BODY MASS INDEX: 38.33 KG/M2 | HEART RATE: 64 BPM

## 2022-05-17 DIAGNOSIS — I10 PRIMARY HYPERTENSION: ICD-10-CM

## 2022-05-17 DIAGNOSIS — E78.2 MIXED HYPERLIPIDEMIA: ICD-10-CM

## 2022-05-17 DIAGNOSIS — Z79.4 TYPE 2 DIABETES MELLITUS WITH HYPERGLYCEMIA, WITH LONG-TERM CURRENT USE OF INSULIN: Primary | ICD-10-CM

## 2022-05-17 DIAGNOSIS — E11.65 TYPE 2 DIABETES MELLITUS WITH HYPERGLYCEMIA, WITH LONG-TERM CURRENT USE OF INSULIN: Primary | ICD-10-CM

## 2022-05-17 PROCEDURE — 95251 CONT GLUC MNTR ANALYSIS I&R: CPT | Performed by: INTERNAL MEDICINE

## 2022-05-17 PROCEDURE — 99214 OFFICE O/P EST MOD 30 MIN: CPT | Performed by: INTERNAL MEDICINE

## 2022-05-17 NOTE — PATIENT INSTRUCTIONS
Continue current management  Please watch for low blood sugars  Always keep glucose source in case of low blood sugar  Annual eye exam and flu vaccine  Labs before follow-up

## 2022-05-17 NOTE — PROGRESS NOTES
Endocrine Progress Note Outpatient     Patient Care Team:  Yasmine Live MD as PCP - General  Karolina Saldaña MD as Consulting Physician (Cardiology)    Chief Complaint: Follow-up type 2 diabetes          HPI: This is a 76-year-old male history of type 2 diabetes, hypertension, hyperlipidemia, CAD and obesity is here for follow-up.    For type 2 diabetes: He is now on Omni pod insulin pump.  He has been using insulin pump since 2022.  He is using NovoLog insulin the pump.  His current pump settings are as follows basal rate midnight is 1.0 units/h.  At 8 PM is 0.90 units/h.  His insulin carb ratio is 1 unit for each 5 g of carbohydrate.  Correction scale is 1 unit for each 30 mg blood sugar with a target blood sugar of 140 and active insulin is 4 hours.  He is also using Dexcom monitoring system with it.    Hypertension: Fair control    Hyperlipidemia: Currently on atorvastatin.    Past Medical History:   Diagnosis Date   • Arthritis    • Asymptomatic stenosis of right carotid artery    • Atrial fibrillation (HCC)    • Bradycardia    • Cataract    • Coronary artery disease    • Diabetes mellitus, type 2 (HCC)    • Hyperlipidemia    • Hypertension    • Myocardial infarction (HCC)        Social History     Socioeconomic History   • Marital status:      Spouse name: Muade   • Number of children: 3   • Years of education: 14   Tobacco Use   • Smoking status: Former Smoker     Quit date: 12/3/1971     Years since quittin.4   • Smokeless tobacco: Never Used   Vaping Use   • Vaping Use: Never used   Substance and Sexual Activity   • Alcohol use: Yes     Comment: rare   • Drug use: No   • Sexual activity: Defer       Family History   Problem Relation Age of Onset   • Heart disease Mother    • Hypertension Mother    • Hyperlipidemia Mother    • Heart disease Father         MI   • Hypertension Father    • Hyperlipidemia Father    • Heart disease Sister         MI   • Hypertension Sister    •  Hyperlipidemia Sister    • Heart disease Brother         s/p coronary stents in his 40's    • Hypertension Brother    • Hyperlipidemia Brother    • Stroke Maternal Grandfather    • Diabetes Maternal Grandmother        Allergies   Allergen Reactions   • Clindamycin Hcl Unknown (See Comments)   • Vancomycin Rash       ROS:   Constitutional:  Denies fatigue, tiredness.    Eyes:  Denies change in visual acuity   HENT:  Denies nasal congestion or sore throat   Respiratory: denies cough, shortness of breath.   Cardiovascular:  denies chest pain, edema   GI:  Denies abdominal pain, nausea, vomiting.   Musculoskeletal:  Denies back pain or joint pain   Integument:  Denies dry skin and rash   Neurologic:  Denies headache, focal weakness or sensory changes   Endocrine:  Denies polyuria or polydipsia   Psychiatric:  Denies depression or anxiety      Vitals:    05/17/22 1101   BP: 135/75   Pulse: 64   SpO2: 96%     Body mass index is 38.38 kg/m².     Physical Exam:  GEN: NAD, conversant  EYES: EOMI, PERRL, no conjunctival erythema  NECK: no thyromegaly, full ROM   CV: RRR, no murmurs/rubs/gallops, no peripheral edema  LUNG: CTAB, no wheezes/rales/ronchi  SKIN: no rashes, no acanthosis  MSK: no deformities, full ROM of all extremities  NEURO: no tremors, DTR normal  PSYCH: AOX3, appropriate mood, affect normal      Results Review:     I reviewed the patient's new clinical results.    Lab Results   Component Value Date    HGBA1C 6.7 (H) 05/09/2022    HGBA1C 6.7 (H) 05/26/2021    HGBA1C 6.4 (H) 11/25/2020      Lab Results   Component Value Date    GLUCOSE 137 (H) 05/09/2022    BUN 24 (H) 05/09/2022    CREATININE 1.22 05/09/2022    EGFRIFNONA 74 02/24/2022    BCR 19.7 05/09/2022    K 5.3 (H) 05/09/2022    CO2 24.9 05/09/2022    CALCIUM 9.7 05/09/2022    ALBUMIN 4.60 05/09/2022    LABIL2 1.6 10/15/2018    AST 33 05/09/2022    ALT 37 05/09/2022    CHOL 157 05/09/2022    TRIG 110 05/09/2022    LDL 93 05/09/2022    HDL 44 05/09/2022      Lab Results   Component Value Date    TSH 1.640 11/25/2020     Dexcom download interpretation: Dates covered was between May 4, 2022 to May 17, 2022.  Average blood sugar was 143.  He is spending 79% in the range and 20% above range.  Using sensor 93% of the time.  Glucose graph did show post prandial hyperglycemia.  No significant issues with low blood sugars.    Medication Review: Reviewed.       Current Outpatient Medications:   •  amLODIPine (NORVASC) 5 MG tablet, Take 1 tablet by mouth Daily., Disp: 90 tablet, Rfl: 3  •  atorvastatin (LIPITOR) 40 MG tablet, Take 0.5 tablets by mouth 2 (Two) Times a Day., Disp: 90 tablet, Rfl: 3  •  cetirizine (zyrTEC) 10 MG tablet, Take 10 mg by mouth Daily As Needed for Allergies., Disp: , Rfl:   •  Cholecalciferol (VITAMIN D) 1000 units tablet, Take 500 Units by mouth Daily., Disp: , Rfl:   •  clopidogrel (PLAVIX) 75 MG tablet, Take 1 tablet by mouth Daily., Disp: 90 tablet, Rfl: 3  •  Coenzyme Q10 (CO Q 10 PO), Take 1 capsule by mouth Daily., Disp: , Rfl:   •  furosemide (LASIX) 40 MG tablet, Take 1 tablet by mouth Daily., Disp: 90 tablet, Rfl: 3  •  Insulin Disposable Pump (OmniPod Dash 5 Pack Pods) misc, 1 each Continuous. Pt to change pod every 3 days, Disp: 30 each, Rfl: 3  •  Krill Oil 1000 MG capsule, Take 1 capsule by mouth Daily., Disp: , Rfl:   •  lisinopril (PRINIVIL,ZESTRIL) 20 MG tablet, Take 1 tablet by mouth 2 (Two) Times a Day., Disp: 180 tablet, Rfl: 3  •  metFORMIN (GLUCOPHAGE) 1000 MG tablet, Take 1 tablet by mouth 2 (Two) Times a Day With Meals., Disp: 180 tablet, Rfl: 3  •  metoprolol succinate XL (TOPROL-XL) 25 MG 24 hr tablet, Take 1 tablet by mouth 2 (Two) Times a Day., Disp: 180 tablet, Rfl: 3  •  Multiple Vitamins-Minerals (MULTI VITAMIN/MINERALS) tablet, Take 1 tablet by mouth Daily., Disp: , Rfl:   •  NovoLOG 100 UNIT/ML injection, Pt to use as directed in insulin pump.  MDD: 65, Disp: 60 mL, Rfl: 3  •  vitamin C (ASCORBIC ACID) 500 MG tablet,  Take 500 mg by mouth Daily., Disp: , Rfl:   •  vitamin E 400 UNIT capsule, Take 400 Units by mouth Daily., Disp: , Rfl:   •  warfarin (COUMADIN) 5 MG tablet, Take 1 tablet by mouth Daily., Disp: 90 tablet, Rfl: 0          Assessment and plan:  Diabetes mellitus type 2: Mostly doing well, he does have some postprandial hyperglycemia, I have changed his insulin carb ratio to 1 unit for each 4 g of carbohydrate.  We will continue to follow blood sugars and make adjustments as needed.  We again talked about Jardiance, he is not good at using pills.  He will think about it.  He does have history of CAD and I have advised him that Jardiance does help protection of the heart.    Hypertension: Well-controlled, continue current medication    Hyperlipidemia: Currently on atorvastatin 40 mg p.o. daily.           John Tolentino MD FACE.

## 2022-05-19 ENCOUNTER — TELEPHONE (OUTPATIENT)
Dept: ENDOCRINOLOGY | Facility: CLINIC | Age: 77
End: 2022-05-19

## 2022-05-20 PROCEDURE — 93296 REM INTERROG EVL PM/IDS: CPT | Performed by: INTERNAL MEDICINE

## 2022-05-20 PROCEDURE — 93294 REM INTERROG EVL PM/LDLS PM: CPT | Performed by: INTERNAL MEDICINE

## 2022-06-10 ENCOUNTER — ANTICOAGULATION VISIT (OUTPATIENT)
Dept: CARDIOLOGY | Facility: CLINIC | Age: 77
End: 2022-06-10

## 2022-06-10 VITALS — DIASTOLIC BLOOD PRESSURE: 80 MMHG | SYSTOLIC BLOOD PRESSURE: 166 MMHG | HEART RATE: 62 BPM

## 2022-06-10 DIAGNOSIS — Z79.01 LONG TERM (CURRENT) USE OF ANTICOAGULANTS: Primary | ICD-10-CM

## 2022-06-10 LAB — INR PPP: 3 (ref 0.9–1.1)

## 2022-06-10 PROCEDURE — 85610 PROTHROMBIN TIME: CPT | Performed by: INTERNAL MEDICINE

## 2022-06-10 PROCEDURE — 36416 COLLJ CAPILLARY BLOOD SPEC: CPT | Performed by: INTERNAL MEDICINE

## 2022-06-21 ENCOUNTER — OFFICE VISIT (OUTPATIENT)
Dept: FAMILY MEDICINE CLINIC | Facility: CLINIC | Age: 77
End: 2022-06-21

## 2022-06-21 VITALS
HEART RATE: 61 BPM | OXYGEN SATURATION: 99 % | BODY MASS INDEX: 39.82 KG/M2 | DIASTOLIC BLOOD PRESSURE: 76 MMHG | WEIGHT: 294 LBS | RESPIRATION RATE: 18 BRPM | SYSTOLIC BLOOD PRESSURE: 134 MMHG | TEMPERATURE: 96.8 F | HEIGHT: 72 IN

## 2022-06-21 DIAGNOSIS — Z95.5 STATUS POST CORONARY ARTERY STENT PLACEMENT: ICD-10-CM

## 2022-06-21 DIAGNOSIS — E78.00 HYPERCHOLESTEROLEMIA: ICD-10-CM

## 2022-06-21 DIAGNOSIS — I10 PRIMARY HYPERTENSION: ICD-10-CM

## 2022-06-21 DIAGNOSIS — Z79.4 TYPE 2 DIABETES MELLITUS WITH HYPERGLYCEMIA, WITH LONG-TERM CURRENT USE OF INSULIN: ICD-10-CM

## 2022-06-21 DIAGNOSIS — Z12.11 ENCOUNTER FOR SCREENING FOR MALIGNANT NEOPLASM OF COLON: Primary | ICD-10-CM

## 2022-06-21 DIAGNOSIS — E11.65 TYPE 2 DIABETES MELLITUS WITH HYPERGLYCEMIA, WITH LONG-TERM CURRENT USE OF INSULIN: ICD-10-CM

## 2022-06-21 DIAGNOSIS — M25.559 HIP PAIN: ICD-10-CM

## 2022-06-21 DIAGNOSIS — M51.37 DDD (DEGENERATIVE DISC DISEASE), LUMBOSACRAL: ICD-10-CM

## 2022-06-21 PROCEDURE — 1159F MED LIST DOCD IN RCRD: CPT | Performed by: INTERNAL MEDICINE

## 2022-06-21 PROCEDURE — 1160F RVW MEDS BY RX/DR IN RCRD: CPT | Performed by: INTERNAL MEDICINE

## 2022-06-21 PROCEDURE — G0439 PPPS, SUBSEQ VISIT: HCPCS | Performed by: INTERNAL MEDICINE

## 2022-06-21 PROCEDURE — 1170F FXNL STATUS ASSESSED: CPT | Performed by: INTERNAL MEDICINE

## 2022-06-21 PROCEDURE — 99213 OFFICE O/P EST LOW 20 MIN: CPT | Performed by: INTERNAL MEDICINE

## 2022-06-21 NOTE — ASSESSMENT & PLAN NOTE
- Continue to monitor swelling and wear support hose. If you notice the swelling does not decrease in a week, notify me.

## 2022-06-21 NOTE — PROGRESS NOTES
The ABCs of the Annual Wellness Visit  Subsequent Medicare Wellness Visit    Chief Complaint   Patient presents with   • Medicare Wellness-subsequent      Subjective    History of Present Illness:  Benoit Newberry Jr. is a 76 y.o. male who presents for a Subsequent Medicare Wellness Visit and other concerns.    Diabetes.  The patient reports that his blood glucose at this time is 141 mg/dL. The patient states that he likes having is continuous glucose monitor. He reports that he does not typically have a high reading, but will have lower readings. He reports having too many readings below 70 mg/dL. The low values happen at certain times of the day. He just went for his appointment and had his carb ratio changed. He reports not wanting Jardiance because it cost approximately $90. With both of them being retired it is not in the budget. He continues to take metformin. He denies diarrhea. The patient reports that his weight usually remains in the 280s pounds. He believes it could be water weight. He does not eat as much as he used to. He was out the last 3 days so he ate out every day. He always wears support hose, however, he did not wear his support hose the last 3 days. He has a tendency to sweat more. He is drinking more fluids, including water and tea. He denies having kidney stones.     Back pain.  The patient reports that his back is his only problem. He reports that last night, 06/20/2022, he took his walker to the softball game. It is too far for him to walk so he walks as far as he can then turns around and sits down. He sleeps in his lazy boy recliner because he cannot lay flat. He has tried propping up with pillows. He states that every time he goes to a back doctor, they suggest surgery to have a ya placed. He does not want to have that surgery until he just cannot move anymore. He states that he has seen 2 physicians approximately 10 years ago. He has not had an epidural in a long time. His concern is  that he would need to get off of warfarin in order to have the procedure completed.  The patient reports that his knees worry him because he was told it would last 15 years and that was 20-21 years ago.     Health maintenance.  The patient denies ever being diagnosed with COVID-19. He has had 2 COVID-19 vaccines and 1 booster vaccine. The patient states the cyst on the back of his head started acting up so he went to the hospital to be seen. When he arrived, they would not let him have anything except ice chips because his blood pressure was too high. At the time his blood pressure was 207/97 mmHg. He was admitted for 3-4 days. He was seen a week later by a dermatologist to have the cyst removed. He has his prothrombin time checked every 5 weeks. He denies having issues sleeping. He denies naps. He does not drive at night. He uses his walker as needed. He states that in his home he does not need to use the walker because he is walking short distances.     The following portions of the patient's history were reviewed and   updated as appropriate: allergies, current medications, past family history, past medical history, past social history, past surgical history and problem list.    Compared to one year ago, the patient feels his physical   health is worse.    Compared to one year ago, the patient feels his mental   health is the same.    Recent Hospitalizations:  This patient has had a [facility] operated by [facility] admission record on file within the last 365 days.    Current Medical Providers:  Patient Care Team:  Yasmine Live MD as PCP - Karolina Parish MD as Consulting Physician (Cardiology)    Outpatient Medications Prior to Visit   Medication Sig Dispense Refill   • amLODIPine (NORVASC) 5 MG tablet Take 1 tablet by mouth Daily. 90 tablet 3   • atorvastatin (LIPITOR) 40 MG tablet Take 0.5 tablets by mouth 2 (Two) Times a Day. 90 tablet 3   • cetirizine (zyrTEC) 10 MG tablet Take 10 mg by mouth Daily As  Needed for Allergies.     • Cholecalciferol (VITAMIN D) 1000 units tablet Take 500 Units by mouth Daily.     • clopidogrel (PLAVIX) 75 MG tablet Take 1 tablet by mouth Daily. 90 tablet 3   • Coenzyme Q10 (CO Q 10 PO) Take 1 capsule by mouth Daily.     • furosemide (LASIX) 40 MG tablet Take 1 tablet by mouth Daily. 90 tablet 3   • Insulin Disposable Pump (OmniPod Dash 5 Pack Pods) misc 1 each Continuous. Pt to change pod every 3 days 30 each 3   • Krill Oil 1000 MG capsule Take 1 capsule by mouth Daily.     • lisinopril (PRINIVIL,ZESTRIL) 20 MG tablet Take 1 tablet by mouth 2 (Two) Times a Day. 180 tablet 3   • metFORMIN (GLUCOPHAGE) 1000 MG tablet Take 1 tablet by mouth 2 (Two) Times a Day With Meals. 180 tablet 3   • metoprolol succinate XL (TOPROL-XL) 25 MG 24 hr tablet Take 1 tablet by mouth 2 (Two) Times a Day. 180 tablet 3   • Multiple Vitamins-Minerals (MULTI VITAMIN/MINERALS) tablet Take 1 tablet by mouth Daily.     • NovoLOG 100 UNIT/ML injection Pt to use as directed in insulin pump.  MDD: 67 60 mL 3   • vitamin C (ASCORBIC ACID) 500 MG tablet Take 500 mg by mouth Daily.     • vitamin E 400 UNIT capsule Take 400 Units by mouth Daily.     • warfarin (COUMADIN) 5 MG tablet Take 1 tablet by mouth Daily. 90 tablet 0     No facility-administered medications prior to visit.       No opioid medication identified on active medication list. I have reviewed chart for other potential  high risk medication/s and harmful drug interactions in the elderly.          Aspirin is not on active medication list.  Aspirin use is not indicated based on review of current medical condition/s. Risk of harm outweighs potential benefits.  .    Patient Active Problem List   Diagnosis   • Atrial fibrillation (HCC)   • Chest pain   • Chronic coronary artery disease   • Diabetes mellitus (HCC)   • Excessive anticoagulation   • Mixed hyperlipidemia   • Primary hypertension   • Presence of aortocoronary bypass graft   • Presence of cardiac  "pacemaker   • ST elevation (STEMI) myocardial infarction (HCC)   • Status post coronary artery stent placement   • Tachycardia-bradycardia (HCC)   • Hx of CABG   • Persons encountering health services in other specified circumstances   • Status post percutaneous transluminal coronary angioplasty   • Type 2 diabetes mellitus with hyperglycemia, with long-term current use of insulin (HCC)   • Medicare annual wellness visit, subsequent   • Arthritis   • Body mass index (BMI)40.0-44.9, adult   • Long term (current) use of anticoagulants   • Acquired scoliosis   • Actinic keratosis   • Asthma   • Chronic pain   • Constipation   • Exertional shortness of breath   • Fibromyositis   • Lumbar foraminal stenosis   • Muscular atrophy   • Lumbar radiculopathy   • Neck pain   • Onychomycosis   • Osteoarthritis of lumbosacral spine without myelopathy   • Other cervical disc degeneration at C6-C7 level   • Personal history of other drug therapy   • Sagittal plane imbalance   • Transition of patient between care settings   • Wedge compression fracture of second lumbar vertebra, sequela   • Encounter for general adult medical examination without abnormal findings   • Hypercholesterolemia   • Vitamin D deficiency   • Screening for prostate cancer   • Exertional dyspnea   • Abnormal nuclear stress test   • Nonintractable headache     Advance Care Planning  Advance Directive is not on file.  ACP discussion was held with the patient during this visit. Patient has an advance directive (not in EMR), copy requested.          Objective    Vitals:    06/21/22 1029   BP: 134/76   BP Location: Left arm   Patient Position: Sitting   Cuff Size: Large Adult   Pulse: 61   Resp: 18   Temp: 96.8 °F (36 °C)   TempSrc: Infrared   SpO2: 99%   Weight: 133 kg (294 lb)   Height: 182.9 cm (72\")     Estimated body mass index is 39.87 kg/m² as calculated from the following:    Height as of this encounter: 182.9 cm (72\").    Weight as of this encounter: 133 " kg (294 lb).    Class 2 Severe Obesity (BMI >=35 and <=39.9). Obesity-related health conditions include the following: none. Obesity is unchanged. BMI is is above average; BMI management plan is completed. We discussed portion control and increasing exercise.      Does the patient have evidence of cognitive impairment? No    Physical Exam  Vitals and nursing note reviewed.   Constitutional:       Appearance: Normal appearance. He is well-developed. He is obese.   HENT:      Head: Normocephalic and atraumatic.      Right Ear: Tympanic membrane normal.      Left Ear: Tympanic membrane normal.      Nose: No rhinorrhea.      Mouth/Throat:      Pharynx: No posterior oropharyngeal erythema.   Eyes:      Pupils: Pupils are equal, round, and reactive to light.   Cardiovascular:      Rate and Rhythm: Normal rate and regular rhythm.      Pulses: Normal pulses.      Heart sounds: Normal heart sounds. No murmur heard.  Pulmonary:      Effort: Pulmonary effort is normal.      Breath sounds: Normal breath sounds.   Abdominal:      General: Bowel sounds are normal. There is no distension.      Palpations: Abdomen is soft.   Musculoskeletal:         General: Tenderness present.      Cervical back: Normal range of motion and neck supple.   Skin:     Capillary Refill: Capillary refill takes less than 2 seconds.   Neurological:      Mental Status: He is alert and oriented to person, place, and time.      Sensory: Sensory deficit present.      Motor: Weakness present.   Psychiatric:         Mood and Affect: Mood normal.         Behavior: Behavior normal.       Lab Results   Component Value Date    TRIG 110 05/09/2022    HDL 44 05/09/2022    LDL 93 05/09/2022    VLDL 20 05/09/2022    HGBA1C 6.7 (H) 05/09/2022            HEALTH RISK ASSESSMENT    Smoking Status:  Social History     Tobacco Use   Smoking Status Former Smoker   • Packs/day: 2.00   • Years: 7.00   • Pack years: 14.00   • Types: Cigarettes   • Start date: 8/1/1965   • Quit  date: 12/3/1973   • Years since quittin.5   Smokeless Tobacco Never Used   Tobacco Comment    Haven’t smoked in almost 50 years     Alcohol Consumption:  Social History     Substance and Sexual Activity   Alcohol Use Not Currently    Comment: Maybe 1 drink this year     Fall Risk Screen:    PRIYANKA Fall Risk Assessment was completed, and patient is at MODERATE risk for falls. Assessment completed on:2022    Depression Screening:  PHQ-2/PHQ-9 Depression Screening 2022   Retired Total Score -   Little Interest or Pleasure in Doing Things 0-->not at all   Feeling Down, Depressed or Hopeless 0-->not at all   PHQ-9: Brief Depression Severity Measure Score 0       Health Habits and Functional and Cognitive Screening:  Functional & Cognitive Status 2022   Do you have difficulty preparing food and eating? No   Do you have difficulty bathing yourself, getting dressed or grooming yourself? No   Do you have difficulty using the toilet? No   Do you have difficulty moving around from place to place? No   Do you have trouble with steps or getting out of a bed or a chair? No   Current Diet Well Balanced Diet   Dental Exam Up to date   Eye Exam Up to date   Exercise (times per week) 0 times per week   Current Exercises Include No Regular Exercise   Current Exercise Activities Include -   Do you need help using the phone?  No   Are you deaf or do you have serious difficulty hearing?  No   Do you need help with transportation? No   Do you need help shopping? No   Do you need help preparing meals?  No   Do you need help with housework?  No   Do you need help with laundry? No   Do you need help taking your medications? No   Do you need help managing money? No   Do you ever drive or ride in a car without wearing a seat belt? No   Have you felt unusual stress, anger or loneliness in the last month? No   Who do you live with? Spouse   If you need help, do you have trouble finding someone available to you? No   Have you  been bothered in the last four weeks by sexual problems? No   Do you have difficulty concentrating, remembering or making decisions? No       Age-appropriate Screening Schedule:  Refer to the list below for future screening recommendations based on patient's age, sex and/or medical conditions. Orders for these recommended tests are listed in the plan section. The patient has been provided with a written plan.    Health Maintenance   Topic Date Due   • TDAP/TD VACCINES (1 - Tdap) Never done   • ZOSTER VACCINE (1 of 2) Never done   • DIABETIC FOOT EXAM  06/01/2022   • INFLUENZA VACCINE  10/01/2022   • HEMOGLOBIN A1C  11/09/2022   • DIABETIC EYE EXAM  04/18/2023   • LIPID PANEL  05/09/2023   • URINE MICROALBUMIN  05/09/2023              Assessment & Plan   CMS Preventative Services Quick Reference  Risk Factors Identified During Encounter  Cardiovascular Disease  Chronic Pain   Depression/Dysphoria  Fall Risk-High or Moderate  The above risks/problems have been discussed with the patient.  Follow up actions/plans if indicated are seen below in the Assessment/Plan Section.  Pertinent information has been shared with the patient in the After Visit Summary.    Diagnoses and all orders for this visit:    1. Encounter for screening for malignant neoplasm of colon (Primary)  -     Ambulatory Referral For Screening Colonoscopy    2. DDD (degenerative disc disease), lumbosacral  -     Ambulatory Referral For Screening Colonoscopy  -     XR Hip With or Without Pelvis 2 - 3 View Right; Future  -     MRI Lumbar Spine Without Contrast; Future    3. Hip pain  -     XR Hip With or Without Pelvis 2 - 3 View Right; Future    4. Hypercholesterolemia    5. Primary hypertension  Assessment & Plan:  - Continue to monitor swelling and wear support hose. If you notice the swelling does not decrease in a week, notify me.      6. Status post coronary artery stent placement    7. Type 2 diabetes mellitus with hyperglycemia, with long-term  current use of insulin (HCC)      Follow Up:   Return if symptoms worsen or fail to improve.     An After Visit Summary and PPPS were made available to the patient.    During this visit for their annual exam, we reviewed their personal history, social history and family history.  We went over their medications and all the recommended health maintenence items for their age group. They were given the opportunity to ask questions and discuss other concerns.    During this visit for their annual exam, we reviewed their personal history, social history and family history.  We went over their medications and all the recommended health maintenence items for their age group. They were given the opportunity to ask questions and discuss other concerns.         Transcribed from ambient dictation for Yasmine Live MD by Lamar Singleton.  06/21/22   13:32 EDT    Patient verbalized consent to the visit recording.

## 2022-06-30 ENCOUNTER — TELEPHONE (OUTPATIENT)
Dept: FAMILY MEDICINE CLINIC | Facility: CLINIC | Age: 77
End: 2022-06-30

## 2022-06-30 DIAGNOSIS — M51.37 DDD (DEGENERATIVE DISC DISEASE), LUMBOSACRAL: Primary | ICD-10-CM

## 2022-07-18 ENCOUNTER — ANTICOAGULATION VISIT (OUTPATIENT)
Dept: CARDIOLOGY | Facility: CLINIC | Age: 77
End: 2022-07-18

## 2022-07-18 VITALS
HEART RATE: 60 BPM | WEIGHT: 288 LBS | BODY MASS INDEX: 39.06 KG/M2 | SYSTOLIC BLOOD PRESSURE: 167 MMHG | DIASTOLIC BLOOD PRESSURE: 91 MMHG

## 2022-07-18 DIAGNOSIS — Z79.01 LONG TERM (CURRENT) USE OF ANTICOAGULANTS: ICD-10-CM

## 2022-07-18 DIAGNOSIS — M48.07 SPINAL STENOSIS OF LUMBOSACRAL REGION: ICD-10-CM

## 2022-07-18 DIAGNOSIS — M51.37 DDD (DEGENERATIVE DISC DISEASE), LUMBOSACRAL: Primary | ICD-10-CM

## 2022-07-18 DIAGNOSIS — I48.11 LONGSTANDING PERSISTENT ATRIAL FIBRILLATION: Primary | ICD-10-CM

## 2022-07-18 LAB — INR PPP: 2.5 (ref 0.9–1.1)

## 2022-07-18 PROCEDURE — 36416 COLLJ CAPILLARY BLOOD SPEC: CPT | Performed by: INTERNAL MEDICINE

## 2022-07-18 PROCEDURE — 85610 PROTHROMBIN TIME: CPT | Performed by: INTERNAL MEDICINE

## 2022-08-01 ENCOUNTER — TELEPHONE (OUTPATIENT)
Dept: FAMILY MEDICINE CLINIC | Facility: CLINIC | Age: 77
End: 2022-08-01

## 2022-08-01 DIAGNOSIS — M25.511 ACUTE PAIN OF RIGHT SHOULDER: Primary | ICD-10-CM

## 2022-08-01 NOTE — TELEPHONE ENCOUNTER
[facility] CALLED ALSO AND SAID SHE IS ABOUT TO XRAY PATIENT'S RIGHT SHOULDER BUT DOESN'T EXPECT IT TO SHOW MUCH. SHE SAID PATIENT IS SCHEDULED TOMORROW FOR A CT AND SHE WANTED TO KNOW IF YOU WOULD ADD THE RIGHT SHOULDER TO THAT AS WELL.

## 2022-08-01 NOTE — TELEPHONE ENCOUNTER
Caller: Nilda Newberry Jr.    Relationship: Self    Best call back number:[phone]     What orders are you requesting (i.e. lab or imaging): X RAY ROTATOR CUP ON RIGHT ARM PAIN     In what timeframe would the patient need to come in: 8/2/2022    Where will you receive your lab/imaging services: [facility]   Additional notes:    NILDA HAS INJURED HIS ROTATOR CUP ON RIGHT ARM HE IS WANTING TO KNOW IF DR. DODGE COULD ADDEND ORDER TO INCLUDE XRAY OF ROTATOR CUP WITH THE LUMBAR SPINE     HEAT AND COLD COMPRESS NOT HELPING       APPOINTMENT IS AT 1:45 PM 8/2/2022

## 2022-08-01 NOTE — TELEPHONE ENCOUNTER
Nhi see if can find order for CT of shoulder ( pt has pacer not compatible with MRI)- not seeing CT reconstruction for shoulders in epic.

## 2022-08-02 ENCOUNTER — HOSPITAL ENCOUNTER (OUTPATIENT)
Dept: CT IMAGING | Facility: HOSPITAL | Age: 77
Discharge: HOME | End: 2022-08-02
Admitting: INTERNAL MEDICINE

## 2022-08-02 DIAGNOSIS — M51.37 DDD (DEGENERATIVE DISC DISEASE), LUMBOSACRAL: ICD-10-CM

## 2022-08-02 PROCEDURE — 72131 CT LUMBAR SPINE W/O DYE: CPT

## 2022-08-03 NOTE — PROGRESS NOTES
Significant arthritis and stenosis of lower back- would recommend seeing neurosurgery and pain management for epidural treatment as well-

## 2022-08-17 ENCOUNTER — HOSPITAL ENCOUNTER (OUTPATIENT)
Dept: CT IMAGING | Facility: HOSPITAL | Age: 77
Discharge: HOME | End: 2022-08-17
Admitting: INTERNAL MEDICINE

## 2022-08-17 DIAGNOSIS — M25.511 ACUTE PAIN OF RIGHT SHOULDER: ICD-10-CM

## 2022-08-17 PROCEDURE — 73200 CT UPPER EXTREMITY W/O DYE: CPT

## 2022-08-18 ENCOUNTER — OFFICE VISIT (OUTPATIENT)
Dept: PAIN MEDICINE | Facility: CLINIC | Age: 77
End: 2022-08-18

## 2022-08-18 VITALS
HEIGHT: 72 IN | HEART RATE: 61 BPM | SYSTOLIC BLOOD PRESSURE: 146 MMHG | RESPIRATION RATE: 16 BRPM | BODY MASS INDEX: 39.01 KG/M2 | OXYGEN SATURATION: 95 % | WEIGHT: 288 LBS | DIASTOLIC BLOOD PRESSURE: 58 MMHG

## 2022-08-18 DIAGNOSIS — M54.16 LUMBAR RADICULITIS: ICD-10-CM

## 2022-08-18 DIAGNOSIS — G89.4 CHRONIC PAIN SYNDROME: Primary | ICD-10-CM

## 2022-08-18 DIAGNOSIS — M46.1 SACROILIITIS: ICD-10-CM

## 2022-08-18 DIAGNOSIS — M47.817 LUMBOSACRAL SPONDYLOSIS WITHOUT MYELOPATHY: ICD-10-CM

## 2022-08-18 PROCEDURE — 99204 OFFICE O/P NEW MOD 45 MIN: CPT | Performed by: ANESTHESIOLOGY

## 2022-08-18 RX ORDER — INSULIN ASPART 100 [IU]/ML
INJECTION, SOLUTION INTRAVENOUS; SUBCUTANEOUS
COMMUNITY
Start: 2022-07-01 | End: 2022-12-21

## 2022-08-18 NOTE — PROGRESS NOTES
Subjective   CC back pain, right leg pain  Benoit Newberry Jr. is a 76 y.o. male with multiple comorbidity including obesity, DM, CAD S/p PCi on plavix/Coumadin,  chronic back pain here for initial evaluation.  Referred by PCP.  Complains of continued and worsening chronic back pain mostly right-sided radiating to right buttock and lateral lower leg.  Pain is constant but worse with activity.  Denies new weakness, saddle anesthesia, bladder bowel continence.  Seen several years ago and had caudal ANA with good relief.  He had tried physical therapy, anti-inflammatory muscle relaxers without relief.  Chronic pain impairing ADL interfering with sleep.      Imaging reviewed  L-spine CT  1. Dextroscoliosis of the lumbar spine. . Grade 1 anterior spondylolisthesis of L5 on S1 measuring 5 mm. This is secondary to chronic pars defects. There is also retrolisthesis of L2 on L3 measuring 5 mm.  No acute lumbar fracture. Degenerative changes. Varying degrees of canal stenosis and neural foraminal narrowing     Pain Assessment   Location of Pain: Lower Back, R Hip, L Hip, R Leg,   Description of Pain: Dull/Aching, Throbbing, Stabbing  Previous Pain Rating :5  Current Pain Ratin  Aggravating Factors: Activity  Alleviating Factors: Rest, Medication  Pain onset over 12 weeks  Interferes with ADL's.   [place] back pain disability scale on chart    PEG Assessment   What number best describes your pain on average in the past week?5  What number best describes how, during the past week, pain has interfered with your enjoyment of life?3  What number best describes how, during the past week, pain has interfered with your general activity?  10    The following portions of the patient's history were reviewed and updated as appropriate: allergies, current medications, past family history, past medical history, past social history, past surgical history and problem list.     has a past medical history of Arthritis, Asymptomatic  stenosis of right carotid artery, Atrial fibrillation (HCC), Bradycardia, Cataract, Coronary artery disease, Deep vein thrombosis (HCC), Diabetes mellitus, type 2 (HCC), Erectile dysfunction (), Heart attack (), Hyperlipidemia, Hypertension, Low back pain (), Myocardial infarction (HCC), Obesity (45 years), Pacemaker, Scoliosis (20 years), and Visual impairment (Last 15 years).   has a past surgical history that includes Coronary artery bypass graft (1998); Coronary angioplasty (Left, 2015); Coronary angioplasty (Right, 2015); Hernia repair (); Appendectomy (); Replacement total knee bilateral (); Foot surgery (); Other surgical history (2019); Cardioversion (2018); Cardiac Ablation (2018); Pacemaker Implantation (2019); Skin biopsy; Cardiac catheterization (Left, 2022); Cardiac catheterization (2022); and Eye surgery.  family history includes Diabetes in his maternal grandmother and sister; Heart disease in his brother, father, mother, and sister; Hyperlipidemia in his brother, father, mother, and sister; Hypertension in his brother, father, mother, and sister; Stroke in his maternal grandfather.  Social History     Tobacco Use   • Smoking status: Former Smoker     Packs/day: 2.00     Years: 7.00     Pack years: 14.00     Types: Cigarettes     Start date: 1965     Quit date: 12/3/1973     Years since quittin.7   • Smokeless tobacco: Never Used   • Tobacco comment: Haven’t smoked in almost 50 years   Substance Use Topics   • Alcohol use: Not Currently     Comment: Maybe 1 drink this year       Review of Systems   Musculoskeletal: Positive for back pain.        Right leg pain   All other systems reviewed and are negative.      Objective   Physical Exam  Vitals reviewed.   Constitutional:       General: He is not in acute distress.     Appearance: He is obese.      Comments: Ambulating with walker   Musculoskeletal:      Lumbar back: Tenderness  "present. Decreased range of motion. Positive right straight leg raise test.      Comments: Lumbar loading positive, pain on extension of low back past 5 degrees.  TTP on the lumbar facets noted.  Positive Demetrio right, positive compression test right, positive [place]       /58   Pulse 61   Resp 16   Ht 182.9 cm (72\")   Wt 131 kg (288 lb) Comment: pt stated  SpO2 95%   BMI 39.06 kg/m²     PHQ 9 on chart  Opioid risk tool low risk      Assessment & Plan   Diagnoses and all orders for this visit:    1. Chronic pain syndrome (Primary)    2. Lumbosacral spondylosis without myelopathy    3. Sacroiliitis (HCC)  -     SI Joint Injection    4. Lumbar radiculitis    Summary  Benoit Newberry Jr. is a 76 y.o. male with multiple comorbidity including obesity, DM, CAD S/p PCi on plavix/Coumadin,  chronic back pain here for initial evaluation.  Referred by PCP.  Complains of continued and worsening chronic back pain mostly right-sided radiating to right buttock and lateral lower leg.  Pain is constant but worse with activity.  Denies new weakness, saddle anesthesia, bladder bowel continence.  Seen several years ago and had caudal ANA with good relief.  He had tried physical therapy, anti-inflammatory muscle relaxers without relief.  Chronic pain impairing ADL interfering with sleep.    L-spine CT with degenerative changes and anterolisthesis L5 on S1.  His pain is a combination of radicular pain right L5-S1 and SI dysfunction.  Right SI provocative test positive.  To minimize risk of stopping anticoagulation we will start with a diagnostic right SI injection.  Risks and benefits discussed.  Patient is on Plavix and Coumadin.    If not effective will consider caudal ANA or transforaminal ANA    RTC for procedure        "

## 2022-08-19 PROCEDURE — 93294 REM INTERROG EVL PM/LDLS PM: CPT | Performed by: INTERNAL MEDICINE

## 2022-08-19 PROCEDURE — 93296 REM INTERROG EVL PM/IDS: CPT | Performed by: INTERNAL MEDICINE

## 2022-08-24 ENCOUNTER — ANTICOAGULATION VISIT (OUTPATIENT)
Dept: CARDIOLOGY | Facility: CLINIC | Age: 77
End: 2022-08-24

## 2022-08-24 VITALS
BODY MASS INDEX: 39.74 KG/M2 | HEART RATE: 65 BPM | SYSTOLIC BLOOD PRESSURE: 168 MMHG | DIASTOLIC BLOOD PRESSURE: 73 MMHG | WEIGHT: 293 LBS

## 2022-08-24 DIAGNOSIS — Z79.01 LONG TERM (CURRENT) USE OF ANTICOAGULANTS: ICD-10-CM

## 2022-08-24 DIAGNOSIS — I48.0 PAROXYSMAL ATRIAL FIBRILLATION: Primary | ICD-10-CM

## 2022-08-24 LAB — INR PPP: 2.2 (ref 0.9–1.1)

## 2022-08-24 PROCEDURE — 85610 PROTHROMBIN TIME: CPT | Performed by: INTERNAL MEDICINE

## 2022-08-24 PROCEDURE — 36416 COLLJ CAPILLARY BLOOD SPEC: CPT | Performed by: INTERNAL MEDICINE

## 2022-08-25 ENCOUNTER — OFFICE VISIT (OUTPATIENT)
Dept: ORTHOPEDIC SURGERY | Facility: CLINIC | Age: 77
End: 2022-08-25

## 2022-08-25 VITALS — HEIGHT: 72 IN | WEIGHT: 291 LBS | HEART RATE: 79 BPM | BODY MASS INDEX: 39.42 KG/M2

## 2022-08-25 DIAGNOSIS — M19.011 OSTEOARTHRITIS OF RIGHT GLENOHUMERAL JOINT: Primary | ICD-10-CM

## 2022-08-25 PROCEDURE — 99203 OFFICE O/P NEW LOW 30 MIN: CPT | Performed by: ORTHOPAEDIC SURGERY

## 2022-08-25 NOTE — PROGRESS NOTES
Patient ID: Benoit Newberry Jr. is a 76 y.o. male.    Chief Complaint:    Chief Complaint   Patient presents with   • Right Shoulder - Initial Evaluation     Pain 3       HPI:  Elias is a 76-year-old gentleman here with several months of right shoulder pain after injuring it playing with his granddaughter.  With ice and heat and oral medication since that time pain is about a 2 out of 10.  He still has a little bit of difficulty lifting his arm  Past Medical History:   Diagnosis Date   • Arthritis    • Asymptomatic stenosis of right carotid artery    • Atrial fibrillation (HCC)    • Bradycardia    • Cataract    • Coronary artery disease    • Deep vein thrombosis (HCC)    • Diabetes mellitus, type 2 (HCC)    • Erectile dysfunction 2005   • Heart attack (HCC)     x2   with stent placement     2015/2018   • Hyperlipidemia    • Hypertension    • Low back pain 2014   • Myocardial infarction (HCC)    • Obesity 45 years   • Pacemaker    • Scoliosis 20 years   • Visual impairment Last 15 years       Past Surgical History:   Procedure Laterality Date   • APPENDECTOMY  1956   • CARDIAC ABLATION  12/2018    x 1    • CARDIAC CATHETERIZATION Left 02/25/2022    Procedure: Left Heart Cath and coronary angiogram;  Surgeon: Karolina Saldaña MD;  Location: [facility] CATH INVASIVE LOCATION;  Service: Cardiovascular;  Laterality: Left;   • CARDIAC CATHETERIZATION  02/25/2022    Procedure: Saphenous Vein Graft;  Surgeon: Karolina Saldaña MD;  Location: [facility] CATH INVASIVE LOCATION;  Service: Cardiovascular;;   • CARDIOVERSION  06/2018    Cardioversion 6/2018; x3  12/2018   • CORONARY ANGIOPLASTY Left 11/04/2015    Distal left main and in the mid circumflex artery    • CORONARY ANGIOPLASTY Right 11/02/2015    Right coronary artery    • CORONARY ARTERY BYPASS GRAFT  03/1998   • EYE SURGERY     • FOOT SURGERY  2010   • HERNIA REPAIR  2005   • OTHER SURGICAL HISTORY  05/2019    Stent    • PACEMAKER IMPLANTATION  01/2019    Dr. Saldaña   "  • REPLACEMENT TOTAL KNEE BILATERAL     • SKIN BIOPSY         Family History   Problem Relation Age of Onset   • Heart disease Mother          of heart failure age of 84   • Hypertension Mother    • Hyperlipidemia Mother    • Heart disease Father          of heart attack age of 68   • Hypertension Father    • Hyperlipidemia Father    • Heart disease Sister         Heart problems   • Hypertension Sister    • Hyperlipidemia Sister    • Diabetes Sister    • Heart disease Brother         Heart problems two different times with heart problems   • Hypertension Brother    • Hyperlipidemia Brother    • Diabetes Maternal Grandmother    • Stroke Maternal Grandfather           Social History     Occupational History   • Not on file   Tobacco Use   • Smoking status: Former Smoker     Packs/day: 2.00     Years: 7.00     Pack years: 14.00     Types: Cigarettes     Start date: 1965     Quit date: 12/3/1973     Years since quittin.7   • Smokeless tobacco: Never Used   • Tobacco comment: Haven’t smoked in almost 50 years   Vaping Use   • Vaping Use: Never used   Substance and Sexual Activity   • Alcohol use: Not Currently     Comment: Maybe 1 drink this year   • Drug use: No   • Sexual activity: Not Currently     Partners: Female     Comment: 74 years old past time      Review of Systems   Cardiovascular: Negative for chest pain.   Musculoskeletal: Positive for arthralgias.       Objective:    Pulse 79   Ht 182.9 cm (72\")   Wt 132 kg (291 lb)   BMI 39.47 kg/m²     Physical Examination:  Right shoulder demonstrates no scars with supraspinatus atrophy passive elevation 150 abduction 130 external rotation 20 internal rotation the right hip with pain and weakness on Speed, [place], supraspinatus testing.  Belly press and liftoff are 4/5.  Sensory and motor exam are intact all distributions. Radial pulse is palpable and capillary refill is less than two seconds to all digits.    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  If symptoms flare back up or become more constant see me as needed consider possible injection      Procedures         Disclaimer: Part of this note may be an electronic transcription/translation of spoken language to printed text using the Dragon Dictation System

## 2022-08-26 ENCOUNTER — PATIENT ROUNDING (BHMG ONLY) (OUTPATIENT)
Dept: ORTHOPEDIC SURGERY | Facility: CLINIC | Age: 77
End: 2022-08-26

## 2022-08-26 NOTE — PROGRESS NOTES
A my chart message has been sent to the patient for PATIENT ROUNDING with [facility]

## 2022-09-15 ENCOUNTER — ANTICOAGULATION VISIT (OUTPATIENT)
Dept: CARDIOLOGY | Facility: CLINIC | Age: 77
End: 2022-09-15

## 2022-09-15 DIAGNOSIS — I48.0 PAROXYSMAL ATRIAL FIBRILLATION: Primary | ICD-10-CM

## 2022-09-15 DIAGNOSIS — Z79.01 LONG TERM (CURRENT) USE OF ANTICOAGULANTS: ICD-10-CM

## 2022-09-15 LAB — INR PPP: 2.7

## 2022-09-21 ENCOUNTER — ANTICOAGULATION VISIT (OUTPATIENT)
Dept: CARDIOLOGY | Facility: CLINIC | Age: 77
End: 2022-09-21

## 2022-09-21 DIAGNOSIS — I48.0 PAROXYSMAL ATRIAL FIBRILLATION: Primary | ICD-10-CM

## 2022-09-21 DIAGNOSIS — Z79.01 LONG TERM (CURRENT) USE OF ANTICOAGULANTS: ICD-10-CM

## 2022-09-21 LAB — INR PPP: 1.7

## 2022-09-26 ENCOUNTER — HOSPITAL ENCOUNTER (OUTPATIENT)
Dept: PAIN MEDICINE | Facility: HOSPITAL | Age: 77
Discharge: HOME | End: 2022-09-26

## 2022-09-26 VITALS
OXYGEN SATURATION: 96 % | BODY MASS INDEX: 39.42 KG/M2 | WEIGHT: 291 LBS | HEART RATE: 60 BPM | TEMPERATURE: 96.9 F | HEIGHT: 72 IN | SYSTOLIC BLOOD PRESSURE: 162 MMHG | RESPIRATION RATE: 16 BRPM | DIASTOLIC BLOOD PRESSURE: 83 MMHG

## 2022-09-26 DIAGNOSIS — R52 PAIN: ICD-10-CM

## 2022-09-26 DIAGNOSIS — M46.1 SACROILIITIS: ICD-10-CM

## 2022-09-26 PROCEDURE — 0 IOPAMIDOL 41 % SOLUTION: Performed by: ANESTHESIOLOGY

## 2022-09-26 PROCEDURE — 25010000002 METHYLPREDNISOLONE PER 40 MG: Performed by: ANESTHESIOLOGY

## 2022-09-26 PROCEDURE — 77003 FLUOROGUIDE FOR SPINE INJECT: CPT

## 2022-09-26 PROCEDURE — 27096 INJECT SACROILIAC JOINT: CPT | Performed by: ANESTHESIOLOGY

## 2022-09-26 RX ORDER — BUPIVACAINE HYDROCHLORIDE 2.5 MG/ML
10 INJECTION, SOLUTION EPIDURAL; INFILTRATION; INTRACAUDAL ONCE
Status: COMPLETED | OUTPATIENT
Start: 2022-09-26 | End: 2022-09-26

## 2022-09-26 RX ORDER — METHYLPREDNISOLONE ACETATE 40 MG/ML
40 INJECTION, SUSPENSION INTRA-ARTICULAR; INTRALESIONAL; INTRAMUSCULAR; SOFT TISSUE ONCE
Status: COMPLETED | OUTPATIENT
Start: 2022-09-26 | End: 2022-09-26

## 2022-09-26 RX ADMIN — IOPAMIDOL 3 ML: 408 INJECTION, SOLUTION INTRATHECAL at 11:46

## 2022-09-26 RX ADMIN — METHYLPREDNISOLONE ACETATE 40 MG: 40 INJECTION, SUSPENSION INTRA-ARTICULAR; INTRALESIONAL; INTRAMUSCULAR; INTRASYNOVIAL; SOFT TISSUE at 11:47

## 2022-09-26 RX ADMIN — BUPIVACAINE HYDROCHLORIDE 10 ML: 2.5 INJECTION, SOLUTION EPIDURAL; INFILTRATION; INTRACAUDAL; PERINEURAL at 11:47

## 2022-09-26 NOTE — DISCHARGE INSTRUCTIONS
Kids can get up to 6-8 viral illnesses every year. With viral illnesses, symptoms like fever, cough, congestion and runny nose are usually the worst at days 4-7. Fevers can continue to climb the first few days of illness. Generally, symptoms start to improve and fevers start to trend down by day 7. Most viral illnesses last 10-14 days. The nasal discharge may become yellow/greenish but will eventually lighten out. A cough can last a couple weeks after other symptoms, like runny nose, improve. Antibiotics are not beneficial for Viral Syndrome. Fever (temperature >100.4F) is a sign of your child's body fighting off an infection and is not harmful. It is OK to treat a fever if your child is fussy or uncomfortable with fever. We encourage tylenol or motrin (If older than 6 months), once every 6 hours as needed to help with symptoms. Keep your child well hydrated with good fluid intake while having a fever and illness. Your child should urinate at least 3 times per day (once every 8 hours) to ensure adequate hydration. Please call the office at [phone] to schedule an appointment or take them to the Emergency Dept immediately if any of the following are true:   Fevers are still very high after day 4-5 of illness   Your child develops a new fever a few days into the illness   Symptoms worsen after a period of several days of improvement   Your child is not drinking enough to urinate at least 3 times per day   If your child is struggling to get a breath or seems like they cannot breathe or have any color change of the face    For cough/congestion symptoms:  · Apply Vicks to feet and back or chest twice per day for 4-5 days  · Cool mist humidifier in the room  · Nasal saline drops, 1 drop to each nostril 2-3 times per day and/or before suctioning for 4-5 days. It is best to suction before feeding to help your child feed better.   · Smaller, more frequent feeds may be needed for Sacroiliac Joint Injection, Care After  This sheet gives you information about how to care for yourself after your procedure. Your health care provider may also give you more specific instructions. If you have problems or questions, contact your health care provider.  What can I expect after the procedure?  After the procedure, it is common to have bruising or soreness at the injection site.  Follow these instructions at home:  Managing pain and swelling         Keep a record of your pain (a pain log) to share with your health care provider at your follow-up visit. If a long-acting anti-inflammatory medicine (steroid) was included in your injection, you may not notice an improvement in your pain level for a few days.  Do not use a heating pad, and do not apply heat directly to the area after the procedure for 24-48 hours  If directed, put ice on the affected area. To do this:  Put ice in a plastic bag.  Place a towel between your skin and the bag.  Leave the ice on for 20 minutes, 2-3 times a day.  Remove the ice if your skin turns bright red. This is very important. If you cannot feel pain, heat, or cold, you have a greater risk of damage to the area.  Injection site care  Check your injection site every day for signs of infection. Check for:  Redness, swelling, or pain.  Fluid or blood.  Warmth.  Pus or a bad smell.  Do not take baths, swim, or use a hot tub until your health care provider says that it is safe. You may take showers.  Activity  Rest on the day of your procedure. Avoid doing a lot of activity on that day.  Avoid any activities that take a lot of effort for 24 hours after the injection.  Return to your normal activities the following day or as instructed by your health care provider. Ask your health care provider what activities are safe for you.  General instructions  Take over-the-counter and prescription medicines only as told by your health care provider.  Since dye was used during your  comfort  · Over-the-counter remedies such as zarbees or hylands are OK to use a couple times per day as well to help with cough/congestion symptoms. · Be sure to watch for the age range on the box    · If influenza or RSV are tested and are positive - it is very contagious; advised to stay away from people for the next 72 hours. · If COVID testing is done and is positive, please adhere to the most recent quarantine guidelines    Reputable websites which may help with further questions:   [employer]. org  Www.cdc.gov  http://health.nih.gov/publicmedhealth          SURVEY:    You may be receiving a survey from [employer] regarding your visit today. Please complete the survey to enable us to provide the highest quality of care to you and your family. If you cannot score us a very good on any question, please call the office to discuss how we could have made your experience a very good one. Thank you.     Your Provider today: Dr. Solomon Lucy  Your LPN today: Efraín Ruiz procedure, drink plenty of water to flush the dye out of your body.  No driving for 24 hours after your procedure if directed by your provider  If you are a Diabetic, monitor your blood sugar for the next 48 hours.  If your blood sugar is above 250, contact the physician that helps you monitor your blood sugar.  Keep all follow-up visits. This is important.  Contact a health care provider if:  Your pain does not improve or it gets worse.  You have numbness, tingling, or weakness.  You have a fever or chills.  You have redness, swelling, or pain around your injection site.  You have fluid or blood coming from your injection site.  Your injection site feels warm to the touch.  You have pus or a bad smell coming from your injection site.  Get help right away if:  You have chest pain or shortness of breath.  These symptoms may represent a serious problem that is an emergency. Do not wait to see if the symptoms will go away. Get medical help right away. Call your local emergency services (911 in the [place] Do not drive yourself to the hospital.  Summary  After the procedure, it is common to have bruising or soreness at the injection site.  Keep a record of your pain (a pain log) to share with your health care provider at your follow-up visit.  Return to your normal activities as told by your health care provider. Ask your health care provider what activities are safe for you.  Contact your health care provider if you have pain that is getting worse, weakness, numbness, or any sign of infection at your injection site.  This information is not intended to replace advice given to you by your health care provider. Make sure you discuss any questions you have with your health care provider.  Document Revised: 04/29/2021 Document Reviewed: 04/29/2021  Elsevier Patient Education © 2021 [employer]

## 2022-09-26 NOTE — PROCEDURES
"Subjective   CC back, right hip pain  Benoit Newberry Jr. is a 76 y.o. male with sacroiliitis here for right SI injection.  On Plavix and Coumadin, risks discussed..     Pain Assessment   Location of Pain: Lower Back, R Hip, L Hip,   Description of Pain: Dull/Aching, Throbbing, Stabbing  Previous Pain Rating :7  Current Pain Ratin  Aggravating Factors: Activity  Alleviating Factors: Rest, Medication  Pain onset over 12 weeks  Pain interferes with ADL's    The following portions of the patient's history were reviewed and updated as appropriate: allergies, current medications, past family history, past medical history, past social history, past surgical history and problem list.      Review of Systems  As in HPI  Objective   Physical Exam  Constitutional:       General: He is not in acute distress.  Cardiovascular:      Rate and Rhythm: Normal rate.   Pulmonary:      Effort: Pulmonary effort is normal.   Musculoskeletal:      Lumbar back: Tenderness present. Decreased range of motion.   Neurological:      Mental Status: He is alert and oriented to person, place, and time.       BP (!) 196/83 (BP Location: Left arm, Patient Position: Sitting)   Pulse 76   Temp 96.9 °F (36.1 °C) (Skin)   Resp 16   Ht 182.9 cm (72\")   Wt 132 kg (291 lb)   SpO2 94%   BMI 39.47 kg/m²     Assessment & Plan    underwent right SI injection    RTC 4-6 weeks or as needed for repeat      DATE OF PROCEDURE:  2022    PREOPERATIVE DIAGNOSIS: sacroiliitis    POSTOPERATIVE DIAGNOSIS: same    PROCEDURE PERFORMED: Right SACROILIAC JOINT INJECTION    The patient understands the risks and benefits of the procedure and wishes to proceed. The patient was seen in the preoperative area. Patient's consent was obtained and updated. Vitals were taken. Patient was then brought to the procedure suite and placed in prone position for  sacroiliac joint injection. The appropriate anatomic area was widely prepped with Chloraprep and draped in a " sterile fashion. Noninvasive monitoring per routine anesthesia protocol was placed. Under fluoroscopic guidance using an AP view, a 22 gauge curved tip spinal needle was passed through skin anesthetized with 1% Lidocaine without epinephrine. The needle tip was guided to the lower pole of the joint using fluoroscopy. 1 mL of  preservative free contrast was injected into the joint to confirm location. A clear outline was obtained and 5 mL of steroid solution containing 4 mL 0.25% bupivacaine, and 1mL 40mg Depomedrol was injected. The patient tolerated with no elpidio-procedural complications.  A sterile dressing was placed over the puncture sites.

## 2022-09-27 ENCOUNTER — TELEPHONE (OUTPATIENT)
Dept: PAIN MEDICINE | Facility: HOSPITAL | Age: 77
End: 2022-09-27

## 2022-09-28 ENCOUNTER — ANTICOAGULATION VISIT (OUTPATIENT)
Dept: CARDIOLOGY | Facility: CLINIC | Age: 77
End: 2022-09-28

## 2022-09-28 DIAGNOSIS — Z79.01 LONG TERM (CURRENT) USE OF ANTICOAGULANTS: ICD-10-CM

## 2022-09-28 DIAGNOSIS — I48.0 PAROXYSMAL ATRIAL FIBRILLATION: Primary | ICD-10-CM

## 2022-09-28 LAB — INR PPP: 2.8

## 2022-10-03 DIAGNOSIS — Z79.4 TYPE 2 DIABETES MELLITUS WITH HYPERGLYCEMIA, WITH LONG-TERM CURRENT USE OF INSULIN: ICD-10-CM

## 2022-10-03 DIAGNOSIS — E11.65 TYPE 2 DIABETES MELLITUS WITH HYPERGLYCEMIA, WITH LONG-TERM CURRENT USE OF INSULIN: ICD-10-CM

## 2022-10-05 ENCOUNTER — ANTICOAGULATION VISIT (OUTPATIENT)
Dept: CARDIOLOGY | Facility: CLINIC | Age: 77
End: 2022-10-05

## 2022-10-05 DIAGNOSIS — Z79.01 LONG TERM (CURRENT) USE OF ANTICOAGULANTS: ICD-10-CM

## 2022-10-05 DIAGNOSIS — I48.0 PAROXYSMAL ATRIAL FIBRILLATION: Primary | ICD-10-CM

## 2022-10-05 LAB — INR PPP: 3

## 2022-10-06 ENCOUNTER — TELEPHONE (OUTPATIENT)
Dept: ENDOCRINOLOGY | Facility: CLINIC | Age: 77
End: 2022-10-06

## 2022-10-06 NOTE — TELEPHONE ENCOUNTER
On MD desk ready for signature and then [facility]. Pt added to cx list since [facility] is needing OV notes dated within last 6 months soon.

## 2022-10-11 ENCOUNTER — OFFICE VISIT (OUTPATIENT)
Dept: CARDIOLOGY | Facility: CLINIC | Age: 77
End: 2022-10-11

## 2022-10-11 ENCOUNTER — CLINICAL SUPPORT NO REQUIREMENTS (OUTPATIENT)
Dept: CARDIOLOGY | Facility: CLINIC | Age: 77
End: 2022-10-11

## 2022-10-11 VITALS
DIASTOLIC BLOOD PRESSURE: 71 MMHG | BODY MASS INDEX: 38.36 KG/M2 | HEIGHT: 72 IN | OXYGEN SATURATION: 95 % | WEIGHT: 283.2 LBS | HEART RATE: 66 BPM | SYSTOLIC BLOOD PRESSURE: 150 MMHG

## 2022-10-11 DIAGNOSIS — Z79.01 LONG TERM (CURRENT) USE OF ANTICOAGULANTS: ICD-10-CM

## 2022-10-11 DIAGNOSIS — I48.91 ATRIAL FIBRILLATION, UNSPECIFIED TYPE: ICD-10-CM

## 2022-10-11 DIAGNOSIS — E78.2 MIXED HYPERLIPIDEMIA: ICD-10-CM

## 2022-10-11 DIAGNOSIS — Z95.0 PRESENCE OF CARDIAC PACEMAKER: ICD-10-CM

## 2022-10-11 DIAGNOSIS — Z95.1 HX OF CABG: ICD-10-CM

## 2022-10-11 DIAGNOSIS — I48.11 LONGSTANDING PERSISTENT ATRIAL FIBRILLATION: ICD-10-CM

## 2022-10-11 DIAGNOSIS — I49.5 TACHYCARDIA-BRADYCARDIA: ICD-10-CM

## 2022-10-11 DIAGNOSIS — Z79.01 CHRONIC ANTICOAGULATION: ICD-10-CM

## 2022-10-11 DIAGNOSIS — I48.0 PAROXYSMAL ATRIAL FIBRILLATION: Primary | ICD-10-CM

## 2022-10-11 DIAGNOSIS — Z95.0 PRESENCE OF CARDIAC PACEMAKER: Primary | ICD-10-CM

## 2022-10-11 DIAGNOSIS — I10 ESSENTIAL HYPERTENSION: ICD-10-CM

## 2022-10-11 DIAGNOSIS — Z95.5 STATUS POST CORONARY ARTERY STENT PLACEMENT: ICD-10-CM

## 2022-10-11 PROCEDURE — 93280 PM DEVICE PROGR EVAL DUAL: CPT | Performed by: INTERNAL MEDICINE

## 2022-10-11 PROCEDURE — 93000 ELECTROCARDIOGRAM COMPLETE: CPT | Performed by: INTERNAL MEDICINE

## 2022-10-11 PROCEDURE — 99214 OFFICE O/P EST MOD 30 MIN: CPT | Performed by: INTERNAL MEDICINE

## 2022-10-11 NOTE — PROGRESS NOTES
Encounter Date:10/11/2022  Last seen 3/31/2022      Patient ID: Benoit Newberry Jr. is a 76 y.o. male.    Chief Complaint:  Status post CABG  Status post stent  Anticoagulation management.  History of atrial fibrillation  Status post pacemaker     History of Present Illness  Having significant back problems.  May be considered for epidural.    Since I have last seen, the patient has been without any chest discomfort ,shortness of breath, palpitations, dizziness or syncope.  Denies having any headache ,abdominal pain ,nausea, vomiting , diarrhea constipation, loss of weight or loss of appetite.  Denies having any excessive bruising ,hematuria or blood in the stool.    Review of all systems negative except as indicated.    Reviewed ROS.  Assessment and Plan         [[[]]]]]]]]]]]]]]]]]]]]  Impression  ===================     -status post permanent dual-chamber pacemaker implantation ([employer]  MRI compatible) 12/04/2018      - atrial fibrillation.   status post Ablation 10/16/2018  Patient has converted to sinus  sinus bradycardia.   Patient had a recurrence of atrial fibrillation.  Status post cardioversion 06/13/2018 and 08/01/2018 09/05/2018.  Patient has converted but did not stay in sinus rhythm.  Patient was on Tikosyn but off now due to maintaining sinus rhythm.     -anticoagulation Patient is competent.      -status post CABG  3/98      -Acute inferior myocardial infarction.  Status post stent placement to SVG to RCA for total occlusion .  11/02/2015   - Status post stent placement to totally occluded SVG to RCA11/02/2015 and stent placement to left main and mid circumflex coronary artery 11/04/2015.  Status post stent to PDA distal to SVG and native LAD beyond [place].  05/25/2018     Cardiac catheterization February 25, 2022 revealed  Left ventricle angiogram was not performed due to difficulty in crossing the aortic valve.  Left main coronary artery is normal.  Left anterior descending artery is  totally occluded in the proximal segment and LIMA is filling the LAD.  Circumflex coronary artery has proximal 20 to 30% disease.  Ramus intermedius is totally occluded.  Filling from SVG.  Right coronary artery is totally occluded in the proximal segment.  LIMA to LAD is patent.  SVG to diagonal branch is patent.  SVG to ramus intermedius is patent.  SVG to PDA is patent      - diabetes dyslipidemia and hypertension   - status post impending inferior myocardial infarction prior to surgery    -family history of coronary artery disease   - History of asymptomatic right carotid bruit.  ===   Plan  ===  Chest discomfort and shortness of breath-improved.     Status post CABG.  Patient is not having any angina pectoris or congestive heart failure.    Anticoagulation status reviewed.  Continue Coumadin.  INR today 3.0 PT/INR on a monthly basis.     History of atrial fibrillation-patient is maintaining sinus rhythm.  Patient is off Tikosyn.  Patient did not have any recurrence of atrial fibrillation.  EKG 10/11/2022 revealed dual-chamber pacemaker rhythm.  Continue to hold Tikosyn since patient has been maintaining sinus rhythm without Tikosyn at this time.      Status post pacemaker.  Interrogation of the pacemaker revealed excellent pacing parameters.  Battery status is 4.5 years.  Pacemaker site looks normal.     Follow-up in the office in 6 months with pacemaker interrogation.    Patient is considering possible epidural injections.  Okay with holding Coumadin for 3 to 4 days and Plavix for 5 to 7 days (patient had last stent in 2018)     Further plan will depend on patient's progress  ]]]]]]]]]]]]]]]                     Diagnosis Plan   1. Paroxysmal atrial fibrillation (HCC)        2. Long term (current) use of anticoagulants        3. Longstanding persistent atrial fibrillation (HCC)        4. Hx of CABG        5. Tachycardia-bradycardia (HCC)        6. Presence of cardiac pacemaker        7. Essential hypertension         8. Mixed hyperlipidemia        9. Chronic anticoagulation        10. Status post coronary artery stent placement        11. Atrial fibrillation, unspecified type (HCC)        LAB RESULTS (LAST 7 DAYS)    CBC        BMP        CMP         BNP        TROPONIN        CoAg  Results from last 7 days   Lab Units 10/05/22  0000   INR  3.00       Creatinine Clearance  CrCl cannot be calculated (Patient's most recent lab result is older than the maximum 30 days allowed.).    ABG        Radiology  No radiology results for the last day                The following portions of the patient's history were reviewed and updated as appropriate: allergies, current medications, past family history, past medical history, past social history, past surgical history and problem list.    Review of Systems   Constitutional: Positive for malaise/fatigue. Negative for fever.   Cardiovascular: Negative for chest pain, dyspnea on exertion and palpitations.   Respiratory: Negative for cough and shortness of breath.    Skin: Negative for rash.   Gastrointestinal: Negative for abdominal pain, nausea and vomiting.   Neurological: Positive for dizziness, light-headedness and weakness. Negative for focal weakness and headaches.   All other systems reviewed and are negative.        Current Outpatient Medications:   •  amLODIPine (NORVASC) 5 MG tablet, Take 1 tablet by mouth Daily., Disp: 90 tablet, Rfl: 3  •  atorvastatin (LIPITOR) 40 MG tablet, Take 0.5 tablets by mouth 2 (Two) Times a Day., Disp: 90 tablet, Rfl: 3  •  cetirizine (zyrTEC) 10 MG tablet, Take 10 mg by mouth Daily As Needed for Allergies., Disp: , Rfl:   •  Cholecalciferol (VITAMIN D) 1000 units tablet, Take 500 Units by mouth Daily., Disp: , Rfl:   •  clopidogrel (PLAVIX) 75 MG tablet, Take 1 tablet by mouth Daily., Disp: 90 tablet, Rfl: 3  •  Coenzyme Q10 (CO Q 10 PO), Take 1 capsule by mouth Daily., Disp: , Rfl:   •  furosemide (LASIX) 40 MG tablet, Take 1 tablet by mouth  Daily., Disp: 90 tablet, Rfl: 3  •  Insulin Disposable Pump (OmniPod Dash 5 Pack Pods) misc, 1 each Continuous. Pt to change pod every 3 days, Disp: 30 each, Rfl: 3  •  Krill Oil 1000 MG capsule, Take 1 capsule by mouth Daily., Disp: , Rfl:   •  lisinopril (PRINIVIL,ZESTRIL) 20 MG tablet, Take 1 tablet by mouth 2 (Two) Times a Day., Disp: 180 tablet, Rfl: 3  •  metFORMIN (GLUCOPHAGE) 1000 MG tablet, TAKE 1 TABLET TWICE DAILY  WITH MEALS, Disp: 180 tablet, Rfl: 3  •  metoprolol succinate XL (TOPROL-XL) 25 MG 24 hr tablet, Take 1 tablet by mouth 2 (Two) Times a Day., Disp: 180 tablet, Rfl: 3  •  Multiple Vitamins-Minerals (MULTI VITAMIN/MINERALS) tablet, Take 1 tablet by mouth Daily., Disp: , Rfl:   •  NovoLOG 100 UNIT/ML injection, Pt to use as directed in insulin pump.  MDD: 65, Disp: 60 mL, Rfl: 3  •  NovoLOG 100 UNIT/ML injection, Insulin pump, Disp: , Rfl:   •  vitamin C (ASCORBIC ACID) 500 MG tablet, Take 500 mg by mouth Daily., Disp: , Rfl:   •  vitamin E 400 UNIT capsule, Take 400 Units by mouth Daily., Disp: , Rfl:   •  warfarin (COUMADIN) 5 MG tablet, Take 1 tablet by mouth Daily., Disp: 90 tablet, Rfl: 0    Allergies   Allergen Reactions   • Vancomycin Rash       Family History   Problem Relation Age of Onset   • Heart disease Mother          of heart failure age of 84   • Hypertension Mother    • Hyperlipidemia Mother    • Heart disease Father          of heart attack age of 68   • Hypertension Father    • Hyperlipidemia Father    • Heart disease Sister         Heart problems   • Hypertension Sister    • Hyperlipidemia Sister    • Diabetes Sister    • Heart disease Brother         Heart problems two different times with heart problems   • Hypertension Brother    • Hyperlipidemia Brother    • Diabetes Maternal Grandmother    • Stroke Maternal Grandfather        Past Surgical History:   Procedure Laterality Date   • APPENDECTOMY     • CARDIAC ABLATION  12/2018    x 1    • CARDIAC CATHETERIZATION  Left 2022    Procedure: Left Heart Cath and coronary angiogram;  Surgeon: Karolina Saldaña MD;  Location: [facility] CATH INVASIVE LOCATION;  Service: Cardiovascular;  Laterality: Left;   • CARDIAC CATHETERIZATION  2022    Procedure: Saphenous Vein Graft;  Surgeon: Karolina Saldaña MD;  Location: [facility] CATH INVASIVE LOCATION;  Service: Cardiovascular;;   • CARDIOVERSION  2018    Cardioversion 2018; x3  2018   • CORONARY ANGIOPLASTY Left 2015    Distal left main and in the mid circumflex artery    • CORONARY ANGIOPLASTY Right 2015    Right coronary artery    • CORONARY ARTERY BYPASS GRAFT  1998   • EYE SURGERY     • FOOT SURGERY     • HERNIA REPAIR     • OTHER SURGICAL HISTORY  2019    Stent    • PACEMAKER IMPLANTATION  2019    Dr. Saldaña    • REPLACEMENT TOTAL KNEE BILATERAL     • SKIN BIOPSY         Past Medical History:   Diagnosis Date   • Arthritis    • Asymptomatic stenosis of right carotid artery    • Atrial fibrillation (HCC)    • Bradycardia    • Cataract    • Coronary artery disease    • Deep vein thrombosis (HCC)    • Diabetes mellitus, type 2 (HCC)    • Erectile dysfunction    • Heart attack (HCC)     x2   with stent placement        • Hyperlipidemia    • Hypertension    • Low back pain    • Myocardial infarction (HCC)    • Obesity 45 years   • Pacemaker    • Scoliosis 20 years   • Visual impairment Last 15 years       Family History   Problem Relation Age of Onset   • Heart disease Mother          of heart failure age of 84   • Hypertension Mother    • Hyperlipidemia Mother    • Heart disease Father          of heart attack age of 68   • Hypertension Father    • Hyperlipidemia Father    • Heart disease Sister         Heart problems   • Hypertension Sister    • Hyperlipidemia Sister    • Diabetes Sister    • Heart disease Brother         Heart problems two different times with heart problems   • Hypertension Brother    •  "Hyperlipidemia Brother    • Diabetes Maternal Grandmother    • Stroke Maternal Grandfather        Social History     Socioeconomic History   • Marital status:      Spouse name: Maude   • Number of children: 3   • Years of education: 14   Tobacco Use   • Smoking status: Former     Packs/day: 2.00     Years: 7.00     Pack years: 14.00     Types: Cigarettes     Start date: 1965     Quit date: 12/3/1973     Years since quittin.8   • Smokeless tobacco: Never   • Tobacco comments:     Haven’t smoked in almost 50 years   Vaping Use   • Vaping Use: Never used   Substance and Sexual Activity   • Alcohol use: Not Currently     Comment: Maybe 1 drink this year   • Drug use: No   • Sexual activity: Not Currently     Partners: Female     Comment: 74 years old past time           ECG 12 Lead    Date/Time: 10/11/2022 11:08 AM  Performed by: Karolina Saldaña MD  Authorized by: Karolina Saldaña MD   Comparison: compared with previous ECG   Similar to previous ECG  Comparison to previous ECG: Dual-chamber pacemaker rhythm 64/min nonspecific ST-T wave changes no ectopy no significant change from 2022                Objective:       Physical Exam    /71 (BP Location: Left arm, Patient Position: Sitting, Cuff Size: Adult)   Pulse 66   Ht 182.9 cm (72\")   Wt 128 kg (283 lb 3.2 oz)   SpO2 95%   BMI 38.41 kg/m²   The patient is alert, oriented and in no distress.    Vital signs as noted above.  Exogenous obesity (BMI 38)    Head and neck revealed no carotid bruits or jugular venous distension.  No thyromegaly or lymphadenopathy is present.    Lungs clear.  No wheezing.  Breath sounds are normal bilaterally.    Heart normal first and second heart sounds.  No murmur..  No pericardial rub is present.  No gallop is present.    Abdomen soft and nontender.  No organomegaly is present.    Extremities revealed good peripheral pulses without any pedal edema.    Skin warm and dry.  Pacemaker site looks " normal.    Musculoskeletal system is grossly normal.    CNS grossly normal.    Reviewed and updated.

## 2022-10-12 ENCOUNTER — ANTICOAGULATION VISIT (OUTPATIENT)
Dept: CARDIOLOGY | Facility: CLINIC | Age: 77
End: 2022-10-12

## 2022-10-12 DIAGNOSIS — I48.0 PAROXYSMAL ATRIAL FIBRILLATION: Primary | ICD-10-CM

## 2022-10-12 DIAGNOSIS — Z79.01 LONG TERM (CURRENT) USE OF ANTICOAGULANTS: ICD-10-CM

## 2022-10-12 LAB — INR PPP: 3.8

## 2022-10-12 NOTE — PROGRESS NOTES
Left voicemail for pt to hold tonights dosage of Warfarin since todays INR was 3.8. Advised pt to call back if any further questions or concerns. cjRN

## 2022-10-21 ENCOUNTER — ANTICOAGULATION VISIT (OUTPATIENT)
Dept: CARDIOLOGY | Facility: CLINIC | Age: 77
End: 2022-10-21

## 2022-10-21 DIAGNOSIS — I48.0 PAROXYSMAL ATRIAL FIBRILLATION: Primary | ICD-10-CM

## 2022-10-21 DIAGNOSIS — Z79.01 LONG TERM (CURRENT) USE OF ANTICOAGULANTS: ICD-10-CM

## 2022-10-21 DIAGNOSIS — I48.0 PAROXYSMAL ATRIAL FIBRILLATION: ICD-10-CM

## 2022-10-21 LAB — INR PPP: 4.6

## 2022-10-21 RX ORDER — WARFARIN SODIUM 5 MG/1
TABLET ORAL
Qty: 90 TABLET | Refills: 0 | Status: SHIPPED | OUTPATIENT
Start: 2022-10-21 | End: 2022-12-21 | Stop reason: SDUPTHER

## 2022-10-21 NOTE — TELEPHONE ENCOUNTER
Rx Refill Note  Requested Prescriptions     Pending Prescriptions Disp Refills   • warfarin (COUMADIN) 5 MG tablet 90 tablet 0     Sig: Take 1 tablet by mouth Daily. Take one half tablet by mouth on Monday and Friday and one tablet by mouth all other days or as directed      Last office visit with prescribing clinician: 10/11/2022      Next office visit with prescribing clinician: 4/25/2023              Anticoagulation Visit with Karolina Saldaña MD (10/21/2022)      Juanita Lopez LPN  10/21/22, 13:53 EDT

## 2022-10-29 LAB — INR PPP: 1.3

## 2022-10-31 ENCOUNTER — ANTICOAGULATION VISIT (OUTPATIENT)
Dept: CARDIOLOGY | Facility: CLINIC | Age: 77
End: 2022-10-31

## 2022-10-31 DIAGNOSIS — Z79.01 LONG TERM (CURRENT) USE OF ANTICOAGULANTS: ICD-10-CM

## 2022-10-31 DIAGNOSIS — I48.0 PAROXYSMAL ATRIAL FIBRILLATION: Primary | ICD-10-CM

## 2022-11-01 ENCOUNTER — TELEPHONE (OUTPATIENT)
Dept: ENDOCRINOLOGY | Facility: CLINIC | Age: 77
End: 2022-11-01

## 2022-11-01 NOTE — TELEPHONE ENCOUNTER
Pt is on cx list for a sooner appt with Dr. Tolentino. Attempted to contact pt to offer appt in December. Lm on pts vm.

## 2022-11-07 ENCOUNTER — ANTICOAGULATION VISIT (OUTPATIENT)
Dept: CARDIOLOGY | Facility: CLINIC | Age: 77
End: 2022-11-07

## 2022-11-07 DIAGNOSIS — I48.0 PAROXYSMAL ATRIAL FIBRILLATION: Primary | ICD-10-CM

## 2022-11-07 DIAGNOSIS — Z79.01 LONG TERM (CURRENT) USE OF ANTICOAGULANTS: ICD-10-CM

## 2022-11-07 LAB — INR PPP: 2.1

## 2022-11-09 ENCOUNTER — OFFICE VISIT (OUTPATIENT)
Dept: PAIN MEDICINE | Facility: CLINIC | Age: 77
End: 2022-11-09

## 2022-11-09 VITALS
RESPIRATION RATE: 16 BRPM | HEART RATE: 61 BPM | OXYGEN SATURATION: 97 % | DIASTOLIC BLOOD PRESSURE: 74 MMHG | SYSTOLIC BLOOD PRESSURE: 161 MMHG

## 2022-11-09 DIAGNOSIS — M47.817 LUMBOSACRAL SPONDYLOSIS WITHOUT MYELOPATHY: ICD-10-CM

## 2022-11-09 DIAGNOSIS — M46.1 SACROILIITIS: ICD-10-CM

## 2022-11-09 DIAGNOSIS — M54.16 LUMBAR RADICULITIS: ICD-10-CM

## 2022-11-09 DIAGNOSIS — G89.4 CHRONIC PAIN SYNDROME: Primary | ICD-10-CM

## 2022-11-09 PROCEDURE — 99214 OFFICE O/P EST MOD 30 MIN: CPT | Performed by: ANESTHESIOLOGY

## 2022-11-09 RX ORDER — GLUCOSAMINE HCL/CHONDROITIN SU 500-400 MG
CAPSULE ORAL
COMMUNITY
Start: 2022-10-21

## 2022-11-09 NOTE — PROGRESS NOTES
Subjective   CC back pain, right leg pain  Benoit Newberry Jr. is a 76 y.o. male with multiple comorbidity including obesity, DM, CAD S/p PCi on plavix/Coumadin,  chronic back pain here for follow-up.  right SI injection last visit reports marginal relief.  Continued right-sided back/buttock pain radiating to the right lower extremity significantly impairing ADL and interfering with sleep.  Chronic back pain mostly right-sided radiating to right buttock and lateral lower leg.  Pain is constant but worse with activity.  Denies new weakness, saddle anesthesia, bladder bowel continence.  Seen several years ago and had caudal ANA with good relief.  He had tried physical therapy, anti-inflammatory muscle relaxers without relief.  Chronic pain impairing ADL interfering with sleep.      Imaging reviewed  L-spine CT  1. Dextroscoliosis of the lumbar spine. . Grade 1 anterior spondylolisthesis of L5 on S1 measuring 5 mm. This is secondary to chronic pars defects. There is also retrolisthesis of L2 on L3 measuring 5 mm.  No acute lumbar fracture. Degenerative changes. Varying degrees of canal stenosis and neural foraminal narrowing     Pain Assessment   Location of Pain: Lower Back, R Hip, L Hip, R Leg,   Description of Pain: Dull/Aching, Throbbing, Stabbing  Previous Pain Rating :5  Current Pain Ratin  Aggravating Factors: Activity  Alleviating Factors: Rest, Medication  Pain onset over 12 weeks  Interferes with ADL's.   [place] back pain disability scale on chart    PEG Assessment   What number best describes your pain on average in the past week?5  What number best describes how, during the past week, pain has interfered with your enjoyment of life?7  What number best describes how, during the past week, pain has interfered with your general activity?  10    The following portions of the patient's history were reviewed and updated as appropriate: allergies, current medications, past family history, past medical  history, past social history, past surgical history and problem list.     has a past medical history of Arthritis, Asymptomatic stenosis of right carotid artery, Atrial fibrillation (HCC), Bradycardia, Cataract, Coronary artery disease, Deep vein thrombosis (), Diabetes mellitus, type 2 (), Erectile dysfunction (), Heart attack (), Hyperlipidemia, Hypertension, Low back pain (), Myocardial infarction (), Obesity (45 years), Pacemaker, Scoliosis (20 years), and Visual impairment (Last 15 years).   has a past surgical history that includes Coronary artery bypass graft (1998); Coronary angioplasty (Left, 2015); Coronary angioplasty (Right, 2015); Hernia repair (); Appendectomy (); Replacement total knee bilateral (); Foot surgery (); Other surgical history (2019); Cardioversion (2018); Cardiac Ablation (2018); Pacemaker Implantation (2019); Skin biopsy; Cardiac catheterization (Left, 2022); Cardiac catheterization (2022); and Eye surgery.  family history includes Diabetes in his maternal grandmother and sister; Heart disease in his brother, father, mother, and sister; Hyperlipidemia in his brother, father, mother, and sister; Hypertension in his brother, father, mother, and sister; Stroke in his maternal grandfather.  Social History     Tobacco Use   • Smoking status: Former     Packs/day: 2.00     Years: 7.00     Pack years: 14.00     Types: Cigarettes     Start date: 1965     Quit date: 12/3/1973     Years since quittin.9   • Smokeless tobacco: Never   • Tobacco comments:     Haven’t smoked in almost 50 years   Substance Use Topics   • Alcohol use: Not Currently     Comment: Maybe 1 drink this year       Review of Systems   Musculoskeletal: Positive for back pain.        Right leg pain   All other systems reviewed and are negative.      Objective   Physical Exam  Vitals reviewed.   Constitutional:       General: He is not in acute  distress.     Appearance: He is obese.      Comments: Ambulating with walker   Musculoskeletal:      Lumbar back: Tenderness present. Decreased range of motion. Positive right straight leg raise test.      Comments: Lumbar loading positive, pain on extension of low back past 5 degrees.  TTP on the lumbar facets noted.  Positive Demetrio right, positive compression test right, positive [place]       /74   Pulse 61   Resp 16   SpO2 97%     PHQ 9 on chart  Opioid risk tool low risk      Assessment & Plan   Diagnoses and all orders for this visit:    1. Chronic pain syndrome (Primary)    2. Lumbosacral spondylosis without myelopathy    3. Lumbar radiculitis  -     Caudal or Epidural, Single Injection    4. Sacroiliitis (HCC)    Summary  Benoit Newberry Jr. is a 76 y.o. male with multiple comorbidity including obesity, DM, CAD S/p PCi on plavix/Coumadin,  chronic back pain here for follow-up.  Chronic pain from lumbar DDD spondylosis and radicular pain.  Chronic polyarthralgia.    Seen several years ago and had caudal ANA with good relief.    right SI injection last visit reports marginal relief.  Continued right-sided back/buttock pain radiating to the right lower extremity significantly impairing ADL and interfering with sleep.  Symptoms are more consistent with lumbar sacral radiculitis.  Schedule for caudal ANA.  Needs to be off Coumadin 5 days and off Plavix 7 days.  INR morning of procedure/patient to bring his own monitor.  Risks and benefits discussed    RTC for procedure

## 2022-11-16 ENCOUNTER — HOSPITAL ENCOUNTER (OUTPATIENT)
Dept: PAIN MEDICINE | Facility: HOSPITAL | Age: 77
Discharge: HOME | End: 2022-11-16

## 2022-11-16 VITALS
DIASTOLIC BLOOD PRESSURE: 72 MMHG | OXYGEN SATURATION: 94 % | SYSTOLIC BLOOD PRESSURE: 148 MMHG | TEMPERATURE: 98 F | WEIGHT: 283 LBS | BODY MASS INDEX: 38.33 KG/M2 | RESPIRATION RATE: 16 BRPM | HEART RATE: 69 BPM | HEIGHT: 72 IN

## 2022-11-16 DIAGNOSIS — M54.16 LUMBAR RADICULITIS: Primary | ICD-10-CM

## 2022-11-16 DIAGNOSIS — R52 PAIN: ICD-10-CM

## 2022-11-16 PROCEDURE — 25010000002 METHYLPREDNISOLONE PER 40 MG: Performed by: ANESTHESIOLOGY

## 2022-11-16 PROCEDURE — 62323 NJX INTERLAMINAR LMBR/SAC: CPT | Performed by: ANESTHESIOLOGY

## 2022-11-16 PROCEDURE — 77003 FLUOROGUIDE FOR SPINE INJECT: CPT

## 2022-11-16 PROCEDURE — 25010000002 IOPAMIDOL 61 % SOLUTION: Performed by: ANESTHESIOLOGY

## 2022-11-16 RX ORDER — BUPIVACAINE HYDROCHLORIDE 2.5 MG/ML
10 INJECTION, SOLUTION EPIDURAL; INFILTRATION; INTRACAUDAL ONCE
Status: COMPLETED | OUTPATIENT
Start: 2022-11-16 | End: 2022-11-16

## 2022-11-16 RX ORDER — METHYLPREDNISOLONE ACETATE 40 MG/ML
40 INJECTION, SUSPENSION INTRA-ARTICULAR; INTRALESIONAL; INTRAMUSCULAR; SOFT TISSUE ONCE
Status: COMPLETED | OUTPATIENT
Start: 2022-11-16 | End: 2022-11-16

## 2022-11-16 RX ADMIN — METHYLPREDNISOLONE ACETATE 40 MG: 40 INJECTION, SUSPENSION INTRA-ARTICULAR; INTRALESIONAL; INTRAMUSCULAR; INTRASYNOVIAL; SOFT TISSUE at 15:37

## 2022-11-16 RX ADMIN — IOPAMIDOL 3 ML: 612 INJECTION, SOLUTION INTRAVENOUS at 15:37

## 2022-11-16 RX ADMIN — BUPIVACAINE HYDROCHLORIDE 4 ML: 2.5 INJECTION, SOLUTION EPIDURAL; INFILTRATION; INTRACAUDAL; PERINEURAL at 15:37

## 2022-11-16 NOTE — DISCHARGE INSTRUCTIONS

## 2022-11-16 NOTE — PROCEDURES
"Subjective   CC back, right hip pain  Benoit Newberry Jr. is a 76 y.o. male with lumbosacral radiculitis here for caudal ANA.  Off Plavix and Coumadin, INR 1.0..     Pain Assessment   Location of Pain: Lower Back, R Hip, L Hip,   Description of Pain: Dull/Aching, Throbbing, Stabbing  Previous Pain Rating :7  Current Pain Ratin  Aggravating Factors: Activity  Alleviating Factors: Rest, Medication  Pain onset over 12 weeks  Pain interferes with ADL's    The following portions of the patient's history were reviewed and updated as appropriate: allergies, current medications, past family history, past medical history, past social history, past surgical history and problem list.      Review of Systems  As in HPI  Objective   Physical Exam  Constitutional:       General: He is not in acute distress.  Cardiovascular:      Rate and Rhythm: Normal rate.   Pulmonary:      Effort: Pulmonary effort is normal.   Musculoskeletal:      Lumbar back: Tenderness present. Decreased range of motion.   Neurological:      Mental Status: He is alert and oriented to person, place, and time.       /77 (BP Location: Right arm, Patient Position: Sitting)   Pulse 79   Temp 98 °F (36.7 °C) (Skin)   Resp 16   Ht 182.9 cm (72\")   Wt 128 kg (283 lb)   SpO2 94%   BMI 38.38 kg/m²     Assessment & Plan    underwent caudal ANA    RTC 4-6 weeks or as needed for repeat    DATE OF PROCEDURE: 2022    PREOPERATIVE DIAGNOSIS:  lumbosacral DDD and radiculitis    POSTOPERATIVE DIAGNOSIS: Same    PROCEDURE PERFORMED: Caudal Epidural Steroid Injection    The patient presents with a history of  lumbosacral degenerative disc disease with lumbosacral neuritis. The patient presents today for a caudal epidural steroid injection. The patient understands the risks and benefits of the procedure and wishes to proceed. The patient was seen in the preoperative area.  Patient's consent was obtained and updated.  Vitals were taken.  Patient was then " brought to the procedure suite and placed in a prone position. The appropriate anatomic area was widely prepped with  Chloraprep and draped in a sterile fashion. Noninvasive monitoring per routine anesthesia protocol was placed.  Under fluoroscopic guidance using a lateral view a 22 guage curved spinal needle was passed through skin anesthesized with 1% Lidocaine without epinephrine.  The needle was advanced through the sacral hiatus and into the sacral epidural space using fluoroscopic guidance. Needle tip placement in the epidural space was confirmed by loss of resistance and injection of  1.5  mL of  preservative free contrast. Following this 10 mL of a solution containing  1 mL of 40 mg Depo-Medrol,  1 mL of  0.25% bupivacaine and 8 mL of preservative-free saline was carefully administered in the epidural space.   A sterile dressing was placed over the puncture site.    The patient tolerated the procedure with  no complications. They were then brought to the post procedure area where they recovered nicely.     Discharge:  The patient will be discharged home in stable condition.   Patient understands to contact the Center with any post procedure questions or concerns.  Discharge instructions given by nursing staff.

## 2022-11-17 ENCOUNTER — TELEPHONE (OUTPATIENT)
Dept: PAIN MEDICINE | Facility: HOSPITAL | Age: 77
End: 2022-11-17

## 2022-11-17 ENCOUNTER — ANTICOAGULATION VISIT (OUTPATIENT)
Dept: CARDIOLOGY | Facility: CLINIC | Age: 77
End: 2022-11-17

## 2022-11-17 DIAGNOSIS — I48.0 PAROXYSMAL ATRIAL FIBRILLATION: Primary | ICD-10-CM

## 2022-11-17 DIAGNOSIS — Z79.01 LONG TERM (CURRENT) USE OF ANTICOAGULANTS: ICD-10-CM

## 2022-11-17 LAB — INR PPP: 1

## 2022-11-18 NOTE — PROGRESS NOTES
Pt returned call RE: low INR. He had held Warfarin and Plavix for an Epidural procedure on his back. He has resumed both meds and will recheck his INR next week. Boy

## 2022-11-25 LAB — INR PPP: 2.8

## 2022-11-28 ENCOUNTER — ANTICOAGULATION VISIT (OUTPATIENT)
Dept: CARDIOLOGY | Facility: CLINIC | Age: 77
End: 2022-11-28

## 2022-11-28 DIAGNOSIS — Z79.01 LONG TERM (CURRENT) USE OF ANTICOAGULANTS: ICD-10-CM

## 2022-11-28 DIAGNOSIS — I48.0 PAROXYSMAL ATRIAL FIBRILLATION: Primary | ICD-10-CM

## 2022-12-07 ENCOUNTER — ANTICOAGULATION VISIT (OUTPATIENT)
Dept: CARDIOLOGY | Facility: CLINIC | Age: 77
End: 2022-12-07

## 2022-12-07 DIAGNOSIS — I48.0 PAROXYSMAL ATRIAL FIBRILLATION: Primary | ICD-10-CM

## 2022-12-07 DIAGNOSIS — Z79.01 LONG TERM (CURRENT) USE OF ANTICOAGULANTS: ICD-10-CM

## 2022-12-07 LAB — INR PPP: 4.3

## 2022-12-07 NOTE — PROGRESS NOTES
LMOM to return call. INR 4.3 please hold warfarin today.  Spoke to patient, he had an injection in his back that has steroids. Advised him to hold today, take 2.5 mg tomorrow and resume 5 mg daily. Check INR next week.

## 2022-12-13 ENCOUNTER — LAB (OUTPATIENT)
Dept: LAB | Facility: HOSPITAL | Age: 77
End: 2022-12-13

## 2022-12-13 DIAGNOSIS — E11.65 TYPE 2 DIABETES MELLITUS WITH HYPERGLYCEMIA, WITH LONG-TERM CURRENT USE OF INSULIN: ICD-10-CM

## 2022-12-13 DIAGNOSIS — Z79.4 TYPE 2 DIABETES MELLITUS WITH HYPERGLYCEMIA, WITH LONG-TERM CURRENT USE OF INSULIN: ICD-10-CM

## 2022-12-13 LAB
ALBUMIN SERPL-MCNC: 4.4 G/DL (ref 3.5–5.2)
ALBUMIN UR-MCNC: 2 MG/DL
ALBUMIN/GLOB SERPL: 2.2 G/DL
ALP SERPL-CCNC: 59 U/L (ref 39–117)
ALT SERPL W P-5'-P-CCNC: 29 U/L (ref 1–41)
ANION GAP SERPL CALCULATED.3IONS-SCNC: 9 MMOL/L (ref 5–15)
AST SERPL-CCNC: 27 U/L (ref 1–40)
BILIRUB SERPL-MCNC: 0.3 MG/DL (ref 0–1.2)
BUN SERPL-MCNC: 33 MG/DL (ref 8–23)
BUN/CREAT SERPL: 29.5 (ref 7–25)
CALCIUM SPEC-SCNC: 9.4 MG/DL (ref 8.6–10.5)
CHLORIDE SERPL-SCNC: 103 MMOL/L (ref 98–107)
CHOLEST SERPL-MCNC: 143 MG/DL (ref 0–200)
CO2 SERPL-SCNC: 29 MMOL/L (ref 22–29)
CREAT SERPL-MCNC: 1.12 MG/DL (ref 0.76–1.27)
CREAT UR-MCNC: 106.3 MG/DL
EGFRCR SERPLBLD CKD-EPI 2021: 68.9 ML/MIN/1.73
GLOBULIN UR ELPH-MCNC: 2 GM/DL
GLUCOSE SERPL-MCNC: 135 MG/DL (ref 65–99)
HBA1C MFR BLD: 6.4 % (ref 3.5–5.6)
HDLC SERPL-MCNC: 40 MG/DL (ref 40–60)
LDLC SERPL CALC-MCNC: 84 MG/DL (ref 0–100)
LDLC/HDLC SERPL: 2.07 {RATIO}
MICROALBUMIN/CREAT UR: 18.8 MG/G
POTASSIUM SERPL-SCNC: 4.4 MMOL/L (ref 3.5–5.2)
PROT SERPL-MCNC: 6.4 G/DL (ref 6–8.5)
SODIUM SERPL-SCNC: 141 MMOL/L (ref 136–145)
TRIGL SERPL-MCNC: 101 MG/DL (ref 0–150)
VLDLC SERPL-MCNC: 19 MG/DL (ref 5–40)

## 2022-12-13 PROCEDURE — 36415 COLL VENOUS BLD VENIPUNCTURE: CPT

## 2022-12-13 PROCEDURE — 80061 LIPID PANEL: CPT

## 2022-12-13 PROCEDURE — 82043 UR ALBUMIN QUANTITATIVE: CPT

## 2022-12-13 PROCEDURE — 83036 HEMOGLOBIN GLYCOSYLATED A1C: CPT

## 2022-12-13 PROCEDURE — 82570 ASSAY OF URINE CREATININE: CPT

## 2022-12-13 PROCEDURE — 80053 COMPREHEN METABOLIC PANEL: CPT

## 2022-12-14 ENCOUNTER — ANTICOAGULATION VISIT (OUTPATIENT)
Dept: CARDIOLOGY | Facility: CLINIC | Age: 77
End: 2022-12-14

## 2022-12-14 DIAGNOSIS — Z79.01 LONG TERM (CURRENT) USE OF ANTICOAGULANTS: ICD-10-CM

## 2022-12-14 DIAGNOSIS — I48.0 PAROXYSMAL ATRIAL FIBRILLATION: Primary | ICD-10-CM

## 2022-12-14 LAB — INR PPP: 2

## 2022-12-21 ENCOUNTER — TELEPHONE (OUTPATIENT)
Dept: CARDIOLOGY | Facility: CLINIC | Age: 77
End: 2022-12-21

## 2022-12-21 ENCOUNTER — OFFICE VISIT (OUTPATIENT)
Dept: FAMILY MEDICINE CLINIC | Facility: CLINIC | Age: 77
End: 2022-12-21

## 2022-12-21 VITALS
HEART RATE: 69 BPM | DIASTOLIC BLOOD PRESSURE: 74 MMHG | TEMPERATURE: 98 F | WEIGHT: 284.2 LBS | OXYGEN SATURATION: 99 % | RESPIRATION RATE: 18 BRPM | SYSTOLIC BLOOD PRESSURE: 134 MMHG | BODY MASS INDEX: 38.49 KG/M2 | HEIGHT: 72 IN

## 2022-12-21 DIAGNOSIS — Z95.1 PRESENCE OF AORTOCORONARY BYPASS GRAFT: ICD-10-CM

## 2022-12-21 DIAGNOSIS — Z79.4 TYPE 2 DIABETES MELLITUS WITH HYPERGLYCEMIA, WITH LONG-TERM CURRENT USE OF INSULIN: ICD-10-CM

## 2022-12-21 DIAGNOSIS — E78.2 MIXED HYPERLIPIDEMIA: ICD-10-CM

## 2022-12-21 DIAGNOSIS — I48.0 PAROXYSMAL ATRIAL FIBRILLATION: ICD-10-CM

## 2022-12-21 DIAGNOSIS — E11.65 TYPE 2 DIABETES MELLITUS WITH HYPERGLYCEMIA, WITH LONG-TERM CURRENT USE OF INSULIN: ICD-10-CM

## 2022-12-21 DIAGNOSIS — Z98.61 STATUS POST PERCUTANEOUS TRANSLUMINAL CORONARY ANGIOPLASTY: ICD-10-CM

## 2022-12-21 DIAGNOSIS — I10 PRIMARY HYPERTENSION: Primary | ICD-10-CM

## 2022-12-21 PROCEDURE — 99214 OFFICE O/P EST MOD 30 MIN: CPT | Performed by: INTERNAL MEDICINE

## 2022-12-21 RX ORDER — INSULIN PUMP CONTROLLER
1 EACH MISCELLANEOUS CONTINUOUS
Qty: 30 EACH | Refills: 3 | Status: SHIPPED | OUTPATIENT
Start: 2022-12-21

## 2022-12-21 RX ORDER — CLOPIDOGREL BISULFATE 75 MG/1
75 TABLET ORAL DAILY
Qty: 90 TABLET | Refills: 3 | Status: SHIPPED | OUTPATIENT
Start: 2022-12-21

## 2022-12-21 RX ORDER — GABAPENTIN 300 MG/1
300 CAPSULE ORAL 2 TIMES DAILY
Qty: 60 CAPSULE | Refills: 1 | Status: SHIPPED | OUTPATIENT
Start: 2022-12-21 | End: 2023-01-18

## 2022-12-21 RX ORDER — METOPROLOL SUCCINATE 25 MG/1
25 TABLET, EXTENDED RELEASE ORAL 2 TIMES DAILY
Qty: 180 TABLET | Refills: 3 | Status: SHIPPED | OUTPATIENT
Start: 2022-12-21

## 2022-12-21 RX ORDER — ATORVASTATIN CALCIUM 40 MG/1
20 TABLET, FILM COATED ORAL 2 TIMES DAILY
Qty: 90 TABLET | Refills: 3 | Status: SHIPPED | OUTPATIENT
Start: 2022-12-21

## 2022-12-21 RX ORDER — FUROSEMIDE 40 MG/1
40 TABLET ORAL DAILY
Qty: 90 TABLET | Refills: 3 | Status: SHIPPED | OUTPATIENT
Start: 2022-12-21

## 2022-12-21 RX ORDER — LISINOPRIL 20 MG/1
20 TABLET ORAL 2 TIMES DAILY
Qty: 180 TABLET | Refills: 3 | Status: SHIPPED | OUTPATIENT
Start: 2022-12-21

## 2022-12-21 RX ORDER — INSULIN ASPART 100 [IU]/ML
INJECTION, SOLUTION INTRAVENOUS; SUBCUTANEOUS
Qty: 60 ML | Refills: 3 | Status: SHIPPED | OUTPATIENT
Start: 2022-12-21

## 2022-12-21 RX ORDER — AMLODIPINE BESYLATE 5 MG/1
5 TABLET ORAL DAILY
Qty: 90 TABLET | Refills: 3 | Status: SHIPPED | OUTPATIENT
Start: 2022-12-21

## 2022-12-21 RX ORDER — WARFARIN SODIUM 5 MG/1
TABLET ORAL
Qty: 90 TABLET | Refills: 3 | Status: SHIPPED | OUTPATIENT
Start: 2022-12-21

## 2022-12-21 NOTE — TELEPHONE ENCOUNTER
Patient would like to know if it is okay to take Gabapentin 300 mg for back pain.    Please advise

## 2022-12-21 NOTE — TELEPHONE ENCOUNTER
PATIENT HAS BEEN PUT ON GABAPENTIN 300 MG FOR BACK PAIN.  WANTS TO CHECK WITH DR. HUBBARD PRIOR TO STARTING MEDICATION.

## 2022-12-21 NOTE — PROGRESS NOTES
Rooming Tab(CC,VS,Pt Hx,Fall Screen)  Chief Complaint   Patient presents with   • Diabetes   • Hypertension       Subjective      @ name @ is a @ age @ @ sex @ who presents for follow-up.    Diabetes  The patient's blood glucose is doing well. He is scheduled to see Dr. Tolentino on 2022. He states he has a Dexcom glucose monitor.    Hypertension  The patient's blood pressure is elevated in the office today. He is currently taking lisinopril twice daily and amlodipine once daily. He checks his blood pressure at home and states that his blood pressure is typically around 140/65 to 75 mmHg.    Back pain  The patient states that he has sciatic pain. He states that he is currently uncomfortable in his right buttock. He states that he has had 2 epidural injections in the past. He states that he has done physical therapy in the past.    Health maintenance  The patient denies any chest pain. He states he has had the flu, but never had COVID-19. He states he has had his influenza vaccine.      I have reviewed and updated his medications, medical history and problem list during today's office visit.     Patient Care Team:  Yasmine Live MD as PCP - General  Karolina Saldaña MD as Consulting Physician (Cardiology)    Problem List Tab  Medications Tab  Synopsis Tab  Chart Review Tab  Care Everywhere Tab  Immunizations Tab  Patient History Tab    Social History     Tobacco Use   • Smoking status: Former     Packs/day: 2.00     Years: 7.00     Pack years: 14.00     Types: Cigarettes     Start date: 1965     Quit date: 12/3/1973     Years since quittin.0   • Smokeless tobacco: Never   • Tobacco comments:     Haven't smoked in almost 50 years   Substance Use Topics   • Alcohol use: Not Currently     Comment: Maybe 1 drink this year       Review of Systems  Answers for HPI/ROS submitted by the patient on 2022  What is the primary reason for your visit?: Back Pain  Chronicity: chronic  Onset: more than 1  "year ago  Frequency: constantly  Progression since onset: gradually worsening  Pain location: gluteal  Pain quality: aching  Radiates to: right thigh  Pain - numeric: 7/10  Pain is: worse during the day  Aggravated by: standing  Stiffness is present: all day  leg pain: Yes  Risk factors: obesity      Objective     Rooming Tab(CC,VS,Pt Hx,Fall Screen)  /74   Pulse 69   Temp 98 °F (36.7 °C)   Resp 18   Ht 182.9 cm (72\")   Wt 129 kg (284 lb 3.2 oz)   SpO2 99%   BMI 38.54 kg/m²     Body mass index is 38.54 kg/m².    Physical Exam  Vitals and nursing note reviewed.   Constitutional:       Appearance: Normal appearance. He is well-developed. He is obese.   HENT:      Head: Normocephalic and atraumatic.      Right Ear: Tympanic membrane normal.      Left Ear: Tympanic membrane normal.      Nose: No rhinorrhea.      Mouth/Throat:      Pharynx: No posterior oropharyngeal erythema.   Eyes:      Pupils: Pupils are equal, round, and reactive to light.   Cardiovascular:      Rate and Rhythm: Normal rate and regular rhythm.      Pulses: Normal pulses.      Heart sounds: Normal heart sounds. No murmur heard.  Pulmonary:      Effort: Pulmonary effort is normal.      Breath sounds: Normal breath sounds.   Abdominal:      General: Bowel sounds are normal. There is no distension.      Palpations: Abdomen is soft.   Musculoskeletal:         General: Tenderness present.      Cervical back: Normal range of motion and neck supple.   Skin:     Capillary Refill: Capillary refill takes less than 2 seconds.   Neurological:      Mental Status: He is alert and oriented to person, place, and time.      Motor: Weakness present.   Psychiatric:         Mood and Affect: Mood normal.         Behavior: Behavior normal.          Statin Choice Calculator  Data Reviewed:         The data below has been reviewed by Yasmine Live MD on 12/21/2022.      Lab Results   Component Value Date    BUN 33 (H) 12/13/2022    CREATININE 1.12 " 12/13/2022     12/13/2022    K 4.4 12/13/2022     12/13/2022    CALCIUM 9.4 12/13/2022    ALBUMIN 4.40 12/13/2022    BILITOT 0.3 12/13/2022    ALKPHOS 59 12/13/2022    AST 27 12/13/2022    ALT 29 12/13/2022    TRIG 101 12/13/2022    HDL 40 12/13/2022    VLDL 19 12/13/2022    LDL 84 12/13/2022    LDLHDL 2.07 12/13/2022    MICROALBUR 2.0 12/13/2022    INR 2.00 12/14/2022      Assessment & Plan   Order Review Tab  Health Maintenance Tab  Patient Plan/Order Tab  Diagnoses and all orders for this visit:    1. Primary hypertension (Primary)    2. Type 2 diabetes mellitus with hyperglycemia, with long-term current use of insulin (HCC)  -     Insulin Disposable Pump (Omnipod DASH Pods, Gen 4,) misc; 1 each Continuous. Pt to change pod every 3 days  Dispense: 30 each; Refill: 3    3. Mixed hyperlipidemia    4. Status post percutaneous transluminal coronary angioplasty    5. Presence of aortocoronary bypass graft    6. Paroxysmal atrial fibrillation (HCC)  -     warfarin (COUMADIN) 5 MG tablet; 1 tablet daily  Dispense: 90 tablet; Refill: 3    Other orders  -     metoprolol succinate XL (TOPROL-XL) 25 MG 24 hr tablet; Take 1 tablet by mouth 2 (Two) Times a Day.  Dispense: 180 tablet; Refill: 3  -     amLODIPine (NORVASC) 5 MG tablet; Take 1 tablet by mouth Daily.  Dispense: 90 tablet; Refill: 3  -     furosemide (LASIX) 40 MG tablet; Take 1 tablet by mouth Daily.  Dispense: 90 tablet; Refill: 3  -     lisinopril (PRINIVIL,ZESTRIL) 20 MG tablet; Take 1 tablet by mouth 2 (Two) Times a Day.  Dispense: 180 tablet; Refill: 3  -     atorvastatin (LIPITOR) 40 MG tablet; Take 0.5 tablets by mouth 2 (Two) Times a Day.  Dispense: 90 tablet; Refill: 3  -     NovoLOG 100 UNIT/ML injection; Insulin pump Pt to use as directed in insulin pump.  MDD: 65  Dispense: 60 mL; Refill: 3  -     gabapentin (NEURONTIN) 300 MG capsule; Take 1 capsule by mouth 2 (Two) Times a Day.  Dispense: 60 capsule; Refill: 1  -     clopidogrel (PLAVIX)  75 MG tablet; Take 1 tablet by mouth Daily.  Dispense: 90 tablet; Refill: 3      1. Hypertension  - His blood pressure is well controlled.  - He will continue his current medication regimen.    2. Diabetes  - He will continue his current regimen.    3. Sciatica  - He will start gabapentin twice daily.    Wrapup Tab  Return in about 6 months (around 6/21/2023), or if symptoms worsen or fail to improve, for Recheck.       They were informed of the diagnosis and treatment plan and directed to f/u for any further problems or concerns.              Answers for HPI/ROS submitted by the patient on 12/14/2022  What is the primary reason for your visit?: Back Pain  Chronicity: chronic  Onset: more than 1 year ago  Frequency: constantly  Progression since onset: gradually worsening  Pain location: gluteal  Pain quality: aching  Radiates to: right thigh  Pain - numeric: 7/10  Pain is: worse during the day  Aggravated by: standing  Stiffness is present: all day  leg pain: Yes  Risk factors: obesity    Transcribed from ambient dictation for Yasmine Live MD by Sarika Garay.  12/21/22   10:46 EST    Patient or patient representative verbalized consent to the visit recording.  I have personally performed the services described in this document as transcribed by the above individual, and it is both accurate and complete.  Yasmine Live MD  12/23/2022  09:42 EST

## 2022-12-22 ENCOUNTER — OFFICE VISIT (OUTPATIENT)
Dept: ENDOCRINOLOGY | Facility: CLINIC | Age: 77
End: 2022-12-22

## 2022-12-22 VITALS
OXYGEN SATURATION: 96 % | HEIGHT: 72 IN | BODY MASS INDEX: 38.47 KG/M2 | DIASTOLIC BLOOD PRESSURE: 78 MMHG | SYSTOLIC BLOOD PRESSURE: 145 MMHG | WEIGHT: 284 LBS | HEART RATE: 67 BPM

## 2022-12-22 DIAGNOSIS — E11.65 TYPE 2 DIABETES MELLITUS WITH HYPERGLYCEMIA, WITH LONG-TERM CURRENT USE OF INSULIN: Primary | ICD-10-CM

## 2022-12-22 DIAGNOSIS — I10 PRIMARY HYPERTENSION: ICD-10-CM

## 2022-12-22 DIAGNOSIS — Z79.4 TYPE 2 DIABETES MELLITUS WITH HYPERGLYCEMIA, WITH LONG-TERM CURRENT USE OF INSULIN: Primary | ICD-10-CM

## 2022-12-22 DIAGNOSIS — E78.2 MIXED HYPERLIPIDEMIA: ICD-10-CM

## 2022-12-22 PROCEDURE — 95251 CONT GLUC MNTR ANALYSIS I&R: CPT | Performed by: INTERNAL MEDICINE

## 2022-12-22 PROCEDURE — 99214 OFFICE O/P EST MOD 30 MIN: CPT | Performed by: INTERNAL MEDICINE

## 2022-12-22 NOTE — PROGRESS NOTES
Endocrine Progress Note Outpatient     Patient Care Team:  Yasmine Live MD as PCP - General  Karolina Saldaña MD as Consulting Physician (Cardiology)    Chief Complaint: Follow-up type 2 diabetes    HPI: This is a 76-year-old male history of type 2 diabetes, hypertension, hyperlipidemia, CAD and obesity is here for follow-up.    For type 2 diabetes: He is now on Omni pod insulin pump along with Dexcom monitoring system.  He has been using insulin pump since 2022.  He is using NovoLog insulin the pump.  His current pump settings are as follows basal rate midnight is 1.0 units/h.  At 8 PM is 0.90 units/h.  His insulin carb ratio is 1 unit for each 4 g of carbohydrate.  Correction scale is 1 unit for each 30 mg blood sugar with a target blood sugar of 140 and active insulin is 4 hours.      Hypertension: Fair control    Hyperlipidemia: Currently on atorvastatin.    Past Medical History:   Diagnosis Date   • Arthritis    • Asymptomatic stenosis of right carotid artery    • Atrial fibrillation (HCC)    • Bradycardia    • Cataract    • Coronary artery disease    • Deep vein thrombosis (HCC)    • Diabetes mellitus, type 2 (HCC)    • Erectile dysfunction    • Heart attack (HCC)     x2   with stent placement        • Hyperlipidemia    • Hypertension    • Low back pain    • Myocardial infarction (HCC)    • Obesity 45 years   • Pacemaker    • Scoliosis 20 years   • Visual impairment Last 15 years       Social History     Socioeconomic History   • Marital status:      Spouse name: Maude   • Number of children: 3   • Years of education: 14   Tobacco Use   • Smoking status: Former     Packs/day: 2.00     Years: 7.00     Pack years: 14.00     Types: Cigarettes     Start date: 1965     Quit date: 12/3/1973     Years since quittin.0   • Smokeless tobacco: Never   • Tobacco comments:     Haven't smoked in almost 50 years   Vaping Use   • Vaping Use: Never used   Substance and Sexual  Activity   • Alcohol use: Not Currently     Comment: Maybe 1 drink this year   • Drug use: No   • Sexual activity: Not Currently     Partners: Female     Comment: 74 years old past time       Family History   Problem Relation Age of Onset   • Heart disease Mother          of heart failure age of 84   • Hypertension Mother    • Hyperlipidemia Mother    • Heart disease Father          of heart attack age of 68   • Hypertension Father    • Hyperlipidemia Father    • Heart disease Sister         Heart problems   • Hypertension Sister    • Hyperlipidemia Sister    • Diabetes Sister    • Heart disease Brother         Heart problems two different times with heart problems   • Hypertension Brother    • Hyperlipidemia Brother    • Diabetes Maternal Grandmother    • Stroke Maternal Grandfather        Allergies   Allergen Reactions   • Vancomycin Rash       ROS:   Constitutional:  Denies fatigue, tiredness.    Eyes:  Denies change in visual acuity   HENT:  Denies nasal congestion or sore throat   Respiratory: denies cough, shortness of breath.   Cardiovascular:  denies chest pain, edema   GI:  Denies abdominal pain, nausea, vomiting.   Musculoskeletal:  Denies back pain or joint pain   Integument:  Denies dry skin and rash   Neurologic:  Denies headache, focal weakness or sensory changes   Endocrine:  Denies polyuria or polydipsia   Psychiatric:  Denies depression or anxiety      Vitals:    22 1112   BP: 145/78   Pulse:    SpO2:      Body mass index is 38.52 kg/m².     Physical Exam:  GEN: NAD, conversant  EYES: EOMI, PERRL, no conjunctival erythema  NECK: no thyromegaly, full ROM   CV: RRR, no murmurs/rubs/gallops, no peripheral edema  LUNG: CTAB, no wheezes/rales/ronchi  SKIN: no rashes, no acanthosis  MSK: no deformities, full ROM of all extremities  NEURO: no tremors, DTR normal  PSYCH: AOX3, appropriate mood, affect normal      Results Review:     I reviewed the patient's new clinical results.    Lab Results    Component Value Date    HGBA1C 6.4 (H) 12/13/2022    HGBA1C 6.7 (H) 05/09/2022    HGBA1C 6.7 (H) 05/26/2021      Lab Results   Component Value Date    GLUCOSE 135 (H) 12/13/2022    BUN 33 (H) 12/13/2022    CREATININE 1.12 12/13/2022    EGFRIFNONA 74 02/24/2022    BCR 29.5 (H) 12/13/2022    K 4.4 12/13/2022    CO2 29.0 12/13/2022    CALCIUM 9.4 12/13/2022    ALBUMIN 4.40 12/13/2022    LABIL2 1.6 10/15/2018    AST 27 12/13/2022    ALT 29 12/13/2022    CHOL 143 12/13/2022    TRIG 101 12/13/2022    LDL 84 12/13/2022    HDL 40 12/13/2022     Lab Results   Component Value Date    TSH 1.640 11/25/2020       Dexcom download interpretation: Dates covered was between December 9, 2022 to December 22, 2022.  Average blood sugar was 149.  GMI of 6.9%.  He is spending 75% in the range, 24% above range.  Glucose graph did not show significant fluctuations.  1% low blood sugars.    Medication Review: Reviewed.       Current Outpatient Medications:   •  Alcohol Swabs 70 % pads, USE WHEN TESTING BLOOD GLUCOSE FOUR TIMES DAILY, Disp: , Rfl:   •  amLODIPine (NORVASC) 5 MG tablet, Take 1 tablet by mouth Daily., Disp: 90 tablet, Rfl: 3  •  atorvastatin (LIPITOR) 40 MG tablet, Take 0.5 tablets by mouth 2 (Two) Times a Day., Disp: 90 tablet, Rfl: 3  •  cetirizine (zyrTEC) 10 MG tablet, Take 10 mg by mouth Daily As Needed for Allergies., Disp: , Rfl:   •  Cholecalciferol (VITAMIN D) 1000 units tablet, Take 500 Units by mouth Daily., Disp: , Rfl:   •  clopidogrel (PLAVIX) 75 MG tablet, Take 1 tablet by mouth Daily., Disp: 90 tablet, Rfl: 3  •  Coenzyme Q10 (CO Q 10 PO), Take 1 capsule by mouth Daily., Disp: , Rfl:   •  furosemide (LASIX) 40 MG tablet, Take 1 tablet by mouth Daily., Disp: 90 tablet, Rfl: 3  •  gabapentin (NEURONTIN) 300 MG capsule, Take 1 capsule by mouth 2 (Two) Times a Day., Disp: 60 capsule, Rfl: 1  •  Insulin Disposable Pump (Omnipod DASH Pods, Gen 4,) misc, 1 each Continuous. Pt to change pod every 3 days, Disp: 30  each, Rfl: 3  •  Krill Oil 1000 MG capsule, Take 1 capsule by mouth Daily., Disp: , Rfl:   •  lisinopril (PRINIVIL,ZESTRIL) 20 MG tablet, Take 1 tablet by mouth 2 (Two) Times a Day., Disp: 180 tablet, Rfl: 3  •  metFORMIN (GLUCOPHAGE) 1000 MG tablet, TAKE 1 TABLET TWICE DAILY  WITH MEALS, Disp: 180 tablet, Rfl: 3  •  metoprolol succinate XL (TOPROL-XL) 25 MG 24 hr tablet, Take 1 tablet by mouth 2 (Two) Times a Day., Disp: 180 tablet, Rfl: 3  •  Multiple Vitamins-Minerals (MULTI VITAMIN/MINERALS) tablet, Take 1 tablet by mouth Daily., Disp: , Rfl:   •  NovoLOG 100 UNIT/ML injection, Pt to use as directed in insulin pump.  MDD: 65, Disp: 60 mL, Rfl: 3  •  NovoLOG 100 UNIT/ML injection, Insulin pump Pt to use as directed in insulin pump.  MDD: 65, Disp: 60 mL, Rfl: 3  •  vitamin C (ASCORBIC ACID) 500 MG tablet, Take 500 mg by mouth Daily., Disp: , Rfl:   •  vitamin E 400 UNIT capsule, Take 400 Units by mouth Daily., Disp: , Rfl:   •  warfarin (COUMADIN) 5 MG tablet, 1 tablet daily, Disp: 90 tablet, Rfl: 3          Assessment and plan:  Diabetes mellitus type 2 with Hyperglycemia: Excellent control with A1c of 6.4%.  He is a gold star from your perspective.  He was asking for Gold Star which he definitely deserves.  At this time we will continue his current regimen with insulin pump.  We again talked about Jardiance but he is not interested in that at this time.  Advised to always keep glucose source in case of low blood sugar and recommend annual eye exam and flu vaccine.    Hypertension: Blood pressure mildly high, his primary care physician is adjusting the medications.  He will work with her.    Hyperlipidemia: Currently on atorvastatin 40 mg p.o. daily.    Assessment plan from May 17, 2022 reviewed and updated.           John Tolentino MD FACE.

## 2022-12-22 NOTE — PATIENT INSTRUCTIONS
Continue current management  Watch for low blood sugars and always keep glucose source in case of low blood sugars  Annual eye exam and flu vaccine  Labs before follow-up.

## 2022-12-27 ENCOUNTER — ANTICOAGULATION VISIT (OUTPATIENT)
Dept: CARDIOLOGY | Facility: CLINIC | Age: 77
End: 2022-12-27

## 2022-12-27 DIAGNOSIS — I48.0 PAROXYSMAL ATRIAL FIBRILLATION: Primary | ICD-10-CM

## 2022-12-27 DIAGNOSIS — Z79.01 LONG TERM (CURRENT) USE OF ANTICOAGULANTS: ICD-10-CM

## 2022-12-27 LAB — INR PPP: 3.4

## 2023-01-04 ENCOUNTER — ANTICOAGULATION VISIT (OUTPATIENT)
Dept: CARDIOLOGY | Facility: CLINIC | Age: 78
End: 2023-01-04
Payer: MEDICARE

## 2023-01-04 DIAGNOSIS — I48.0 PAROXYSMAL ATRIAL FIBRILLATION: Primary | ICD-10-CM

## 2023-01-04 DIAGNOSIS — Z79.01 LONG TERM (CURRENT) USE OF ANTICOAGULANTS: ICD-10-CM

## 2023-01-04 LAB — INR PPP: 2

## 2023-01-05 ENCOUNTER — TELEPHONE (OUTPATIENT)
Dept: ENDOCRINOLOGY | Facility: CLINIC | Age: 78
End: 2023-01-05
Payer: MEDICARE

## 2023-01-11 ENCOUNTER — ANTICOAGULATION VISIT (OUTPATIENT)
Dept: CARDIOLOGY | Facility: CLINIC | Age: 78
End: 2023-01-11
Payer: MEDICARE

## 2023-01-11 DIAGNOSIS — Z79.01 LONG TERM (CURRENT) USE OF ANTICOAGULANTS: ICD-10-CM

## 2023-01-11 DIAGNOSIS — I48.0 PAROXYSMAL ATRIAL FIBRILLATION: Primary | ICD-10-CM

## 2023-01-11 LAB — INR PPP: 1.6

## 2023-01-18 ENCOUNTER — ANTICOAGULATION VISIT (OUTPATIENT)
Dept: CARDIOLOGY | Facility: CLINIC | Age: 78
End: 2023-01-18
Payer: MEDICARE

## 2023-01-18 ENCOUNTER — OFFICE VISIT (OUTPATIENT)
Dept: PAIN MEDICINE | Facility: CLINIC | Age: 78
End: 2023-01-18
Payer: MEDICARE

## 2023-01-18 VITALS
SYSTOLIC BLOOD PRESSURE: 181 MMHG | HEART RATE: 63 BPM | RESPIRATION RATE: 16 BRPM | DIASTOLIC BLOOD PRESSURE: 76 MMHG | OXYGEN SATURATION: 96 %

## 2023-01-18 DIAGNOSIS — M54.16 LUMBAR RADICULITIS: ICD-10-CM

## 2023-01-18 DIAGNOSIS — Z79.01 LONG TERM (CURRENT) USE OF ANTICOAGULANTS: ICD-10-CM

## 2023-01-18 DIAGNOSIS — G89.4 CHRONIC PAIN SYNDROME: Primary | ICD-10-CM

## 2023-01-18 DIAGNOSIS — I48.0 PAROXYSMAL ATRIAL FIBRILLATION: Primary | ICD-10-CM

## 2023-01-18 DIAGNOSIS — M47.817 LUMBOSACRAL SPONDYLOSIS WITHOUT MYELOPATHY: ICD-10-CM

## 2023-01-18 DIAGNOSIS — M46.1 SACROILIITIS: ICD-10-CM

## 2023-01-18 LAB — INR PPP: 2.3

## 2023-01-18 PROCEDURE — 99214 OFFICE O/P EST MOD 30 MIN: CPT | Performed by: ANESTHESIOLOGY

## 2023-01-18 RX ORDER — GABAPENTIN 600 MG/1
600 TABLET ORAL 2 TIMES DAILY
Qty: 60 TABLET | Refills: 1 | Status: SHIPPED | OUTPATIENT
Start: 2023-01-18

## 2023-01-18 NOTE — PROGRESS NOTES
Subjective   CC back pain, right leg pain  Benoit Newberry Jr. is a 77 y.o. male with multiple comorbidity including obesity, DM, CAD S/p PCi on plavix/Coumadin,  chronic back pain here for follow-up.  Caudal ANA last visit reports 80% relief for 2 to 3 weeks and symptoms are returning gradually.  Today complains of continued right SI pain and right lower extremity radicular pain.  Interfering with ADL and sleep..    Chronic back pain mostly right-sided radiating to right buttock and lateral lower leg.  Pain is constant but worse with activity.  Denies new weakness, saddle anesthesia, bladder bowel continence.  Seen several years ago and had caudal ANA with good relief.  He had tried physical therapy, anti-inflammatory, muscle relaxers without relief.  Chronic pain impairing ADL interfering with sleep.      Imaging reviewed  L-spine CT  Dextroscoliosis of the lumbar spine. . Grade 1 anterior spondylolisthesis of L5 on S1 measuring 5 mm. This is secondary to chronic pars defects. There is also retrolisthesis of L2 on L3 measuring 5 mm.  No acute lumbar fracture. Degenerative changes. Varying degrees of canal stenosis and neural foraminal narrowing     Pain Assessment   Location of Pain: Lower Back, R Hip, L Hip, R Leg,   Description of Pain: Dull/Aching, Throbbing, Stabbing  Previous Pain Rating :5  Current Pain Ratin  Aggravating Factors: Activity  Alleviating Factors: Rest, Medication  Pain onset over 12 weeks  Interferes with ADL's.   [place] back pain disability scale on chart    PEG Assessment   What number best describes your pain on average in the past week?5  What number best describes how, during the past week, pain has interfered with your enjoyment of life?7  What number best describes how, during the past week, pain has interfered with your general activity?  10    The following portions of the patient's history were reviewed and updated as appropriate: allergies, current medications, past family  history, past medical history, past social history, past surgical history and problem list.     has a past medical history of Arthritis, Asymptomatic stenosis of right carotid artery, Atrial fibrillation (), Bradycardia, Buttock pain, Cataract, Coronary artery disease, Deep vein thrombosis (), Diabetes mellitus, type 2 (), Erectile dysfunction (), Heart attack (), Hyperlipidemia, Hypertension, Leg pain, Low back pain (), Myocardial infarction (), Obesity (45 years), Pacemaker, Scoliosis (20 years), and Visual impairment (Last 15 years).   has a past surgical history that includes Coronary artery bypass graft (1998); Coronary angioplasty (Left, 2015); Coronary angioplasty (Right, 2015); Hernia repair (); Appendectomy (); Replacement total knee bilateral (); Foot surgery (); Other surgical history (2019); Cardioversion (2018); Cardiac Ablation (2018); Pacemaker Implantation (2019); Skin biopsy; Cardiac catheterization (Left, 2022); Cardiac catheterization (2022); and Eye surgery.  family history includes Diabetes in his maternal grandmother and sister; Heart disease in his brother, father, mother, and sister; Hyperlipidemia in his brother, father, mother, and sister; Hypertension in his brother, father, mother, and sister; Stroke in his maternal grandfather.  Social History     Tobacco Use   • Smoking status: Former     Packs/day: 2.00     Years: 7.00     Pack years: 14.00     Types: Cigarettes     Start date: 1965     Quit date: 12/3/1973     Years since quittin.1   • Smokeless tobacco: Never   • Tobacco comments:     Haven't smoked in almost 50 years   Substance Use Topics   • Alcohol use: Not Currently     Comment: Maybe 1 drink this year       Review of Systems   Musculoskeletal: Positive for back pain.        Right leg pain   All other systems reviewed and are negative.      Objective   Physical Exam  Vitals reviewed.    Constitutional:       General: He is not in acute distress.     Appearance: He is obese.      Comments: Ambulating with walker   Musculoskeletal:      Lumbar back: Tenderness present. Decreased range of motion. Positive right straight leg raise test.      Comments: Lumbar loading positive, pain on extension of low back past 5 degrees.  TTP on the lumbar facets noted.  Positive Demetrio right, positive compression test right, positive [place]       BP (!) 181/76   Pulse 63   Resp 16   SpO2 96%     PHQ 9 on chart  Opioid risk tool low risk      Assessment & Plan   Diagnoses and all orders for this visit:    1. Chronic pain syndrome (Primary)  -     gabapentin (NEURONTIN) 600 MG tablet; Take 1 tablet by mouth 2 (Two) Times a Day.  Dispense: 60 tablet; Refill: 1    2. Lumbosacral spondylosis without myelopathy  -     Ambulatory Referral to Neurosurgery    3. Lumbar radiculitis  -     Ambulatory Referral to Neurosurgery  -     gabapentin (NEURONTIN) 600 MG tablet; Take 1 tablet by mouth 2 (Two) Times a Day.  Dispense: 60 tablet; Refill: 1    4. Sacroiliitis (HCC)  -     SI Joint Injection    Summary  Benoit Carrollshad Irwin is a 77 y.o. male with multiple comorbidity including obesity, DM, CAD S/p PCi on plavix/Coumadin,  chronic back pain here for follow-up.  Chronic pain from lumbar DDD spondylosis and radicular pain/spondylolisthesis.  Chronic polyarthralgia.      Caudal ANA last visit reports 80% relief for 2 to 3 weeks and symptoms are returning gradually.  Today complains of continued right SI pain and right lower extremity radicular pain.  Interfering with ADL and sleep.  Had 80% relief with right SI injection 5 months ago.  We will schedule for repeat right SI injection.  Risks and benefits discussed.  Patient on Plavix and Coumadin.    Referral to neurosurgery for evaluation.  He has failed physical therapy, injections are helping but not lasting long enough.    RTC for procedure

## 2023-01-23 NOTE — PROGRESS NOTES
Subjective   History of Present Illness: Benoit Newberry Jr. is a 77 y.o. male is being seen for consultation today at the request of Edita Deutsch MD for chronic right sided back pain radiating to right buttock and lateral lower calf.  He sometimes will get the pain along the bottom of his right foot as well.  He describes no numbness and tingling.  Pain is constant but worse with activity.  Has undergone multiple injections and initially got fairly good relief with epidurals and caudal injections but only receiving about 2 weeks now of relief with these injections.  He recently underwent an SI joint injection that gave him about 2 weeks relief.  He is scheduled to undergo a second SI injection in the near future.  He has undergone physical therapy 2 to 3 years ago and takes Tylenol for his symptoms.  He has tried aquatic therapy as well and felt like it was helping but it was causing issues with his diabetic monitoring system and pump.  He reports no bowel/bladder dysfunction or saddle anesthesia.    Back Pain  This is a chronic problem. The current episode started more than 1 year ago. The problem occurs constantly. The problem has been gradually worsening since onset. The pain is present in the gluteal and lumbar spine (right leg). The pain radiates to the right thigh, right knee and right foot. The pain is the same all the time. The symptoms are aggravated by twisting, standing, bending and position. Associated symptoms include leg pain and numbness. He has tried NSAIDs and heat (tylenol) for the symptoms. The treatment provided no relief.       The following portions of the patient's history were reviewed and updated as appropriate: allergies, current medications, past family history, past medical history, past social history, past surgical history and problem list.    Review of Systems   Constitutional: Positive for activity change.   HENT: Negative.    Eyes: Negative.    Respiratory: Negative.   "  Cardiovascular: Negative.    Gastrointestinal: Negative.    Musculoskeletal: Positive for back pain.        Right leg pain   Skin: Negative.    Allergic/Immunologic: Negative.    Neurological: Positive for numbness.   Hematological: Negative.    Psychiatric/Behavioral: Negative.    All other systems reviewed and are negative.      Objective     ./72   Pulse 72   Ht 182.9 cm (72\")   Wt 130 kg (287 lb 9.6 oz)   SpO2 94%   BMI 39.01 kg/m²    Body mass index is 39.01 kg/m².      Physical Exam  Neurologic Exam  Bilateral lower extremity motor strength 5/5  Positive right Demetrio  Positive SI compression test  Positive Gaenslen's  Tenderness palpation right SIJ sulcus  Significant forward flexed gait with obvious positive sagittal balance  Positive facet loading maneuvers      Assessment & Plan   Independent Review of Radiographic Studies:      I personally reviewed the images from the following studies.    CT lumbar spine 2022    Dextrocurvature of the lumbar spine with a grade 1 spondylolisthesis of L5-S1 due to pars defects    Right-sided foraminal stenosis at L4-L5  Bilateral neuroforaminal stenosis at L5-S1  DJD bilateral SI joints    Medical Decision Making:      Benoit Carrollshad Irwinis a 77 y.o. male with multiple comorbidities including DM with last A1c at 6.4 in December 2022, and significant cardiac history including CABG,  S/p PCI and pacemaker, atrial fibrillation on plavix/Coumadin, that is presenting to clinic today as a new patient for chronic history of back and right leg pain.  He has undergone numerous injections in the past that are becoming refractory.  I discussed with patient his presentation, imaging and recommendations at length.   He has a significant spinal deformity with positive sagittal malalignment that would entail a large surgical construct.  I told patient that with his significant cardiac comorbidities and his age that this would not be offered.  There are a couple levels on " his CT scan where he likely has significant foraminal stenosis on the right that could be responsible for his pain but I do believe his clinical presentation today was more concordant with sacroiliac joint pathology.  I recommend that he undergo his scheduled second SI injection to see how he does.  He also may benefit from a sacroiliac joint ablation.  We also will proceed with MRI imaging along with flexion-extension imaging to evaluate for any nerve root compression or instability responsible for symptoms.  He does have a pacemaker that is MRI compatible based on past cardiology notes.  If patient does have foraminal stenosis concordant with his symptoms with no instability issues then he may be considered for minimally invasive decompression.  He of course would need to obtain clearance from his cardiologist and he obtains clearance from cardiology to be off of his antiplatelet/anticoagulation therapies for at least a week prior to the surgery and 2 weeks after surgery.    I would like patient to also obtain DEXA scan imaging to evaluate for any osteopenia secondary to his age and possible osteopenic changes on CT scan.  After imaging we will follow-up with patient to discuss any further recommendations.  I encouraged him call the office with any questions or concerns.       Diagnoses and all orders for this visit:    1. Sacroiliac joint dysfunction (Primary)    2. Lumbar spondylosis  -     MRI Lumbar Spine Without Contrast; Future  -     XR Spine Lumbar Complete With Flex & Ext; Future    3. Osteopenia of lumbar spine  -     DEXA Bone Density Axial; Future      No follow-ups on file.    This patient was examined wearing appropriate personal protective equipment.     Benoit Newberry Jr.  reports that he quit smoking about 49 years ago. His smoking use included cigarettes. He started smoking about 57 years ago. He has a 14.00 pack-year smoking history. He has never used smokeless tobacco..  Body mass index is  39.01 kg/m².  Class 2 Severe Obesity (BMI >=35 and <=39.9). Obesity-related health conditions include the following: hypertension, coronary heart disease, diabetes mellitus, dyslipidemias, peripheral vascular disease and osteoarthritis. Obesity is unchanged. BMI is is above average; BMI management plan is completed.  Recommendations for portion control and increasing exercise.      Patient's blood pressure was reviewed.  Recommendations for  a low-salt diet and exercise to maintain/improve BP in addition to taking any presribed medications.    Advance Care Planning   ACP discussion was held with the patient during this visit. Patient has an advance directive in EMR which is still valid.          Deb Berry, JORGE  01/25/23  12:02 EST

## 2023-01-25 ENCOUNTER — OFFICE VISIT (OUTPATIENT)
Dept: NEUROSURGERY | Facility: CLINIC | Age: 78
End: 2023-01-25
Payer: MEDICARE

## 2023-01-25 ENCOUNTER — ANTICOAGULATION VISIT (OUTPATIENT)
Dept: CARDIOLOGY | Facility: CLINIC | Age: 78
End: 2023-01-25
Payer: MEDICARE

## 2023-01-25 VITALS
OXYGEN SATURATION: 94 % | SYSTOLIC BLOOD PRESSURE: 135 MMHG | HEART RATE: 72 BPM | BODY MASS INDEX: 38.95 KG/M2 | WEIGHT: 287.6 LBS | DIASTOLIC BLOOD PRESSURE: 72 MMHG | HEIGHT: 72 IN

## 2023-01-25 DIAGNOSIS — I48.0 PAROXYSMAL ATRIAL FIBRILLATION: Primary | ICD-10-CM

## 2023-01-25 DIAGNOSIS — M47.816 LUMBAR SPONDYLOSIS: ICD-10-CM

## 2023-01-25 DIAGNOSIS — Z79.01 LONG TERM (CURRENT) USE OF ANTICOAGULANTS: ICD-10-CM

## 2023-01-25 DIAGNOSIS — M53.3 SACROILIAC JOINT DYSFUNCTION: Primary | ICD-10-CM

## 2023-01-25 DIAGNOSIS — M85.88 OSTEOPENIA OF LUMBAR SPINE: ICD-10-CM

## 2023-01-25 LAB — INR PPP: 2.4

## 2023-01-25 PROCEDURE — 99214 OFFICE O/P EST MOD 30 MIN: CPT | Performed by: NURSE PRACTITIONER

## 2023-01-27 ENCOUNTER — PATIENT ROUNDING (BHMG ONLY) (OUTPATIENT)
Dept: NEUROSURGERY | Facility: CLINIC | Age: 78
End: 2023-01-27
Payer: MEDICARE

## 2023-02-02 ENCOUNTER — TELEPHONE (OUTPATIENT)
Dept: ENDOCRINOLOGY | Facility: CLINIC | Age: 78
End: 2023-02-02
Payer: MEDICARE

## 2023-02-03 ENCOUNTER — TELEPHONE (OUTPATIENT)
Dept: FAMILY MEDICINE CLINIC | Facility: CLINIC | Age: 78
End: 2023-02-03
Payer: MEDICARE

## 2023-02-03 ENCOUNTER — ANTICOAGULATION VISIT (OUTPATIENT)
Dept: CARDIOLOGY | Facility: CLINIC | Age: 78
End: 2023-02-03
Payer: MEDICARE

## 2023-02-03 DIAGNOSIS — Z79.01 LONG TERM (CURRENT) USE OF ANTICOAGULANTS: ICD-10-CM

## 2023-02-03 DIAGNOSIS — I48.0 PAROXYSMAL ATRIAL FIBRILLATION: Primary | ICD-10-CM

## 2023-02-03 LAB — INR PPP: 2.2

## 2023-02-03 NOTE — TELEPHONE ENCOUNTER
Please let patient know this is absolutely a scam and Dr. Live will never sign the orders that they send us.

## 2023-02-03 NOTE — TELEPHONE ENCOUNTER
Caller: Benoit Newberry Jr.    Relationship to patient: Self    Best call back number: [phone]    Patient is needing: PATIENT IS GETTING CALLS FOR LEG, BACK AND SHOULDER BRACE. THAT THEY ARE SAYING DR. DODGE ORDERED IT AND IT WOULD BE CHARGED THRU MEDICARE. PATIENT THOUGHT IT MAY NOT BE LEGIT AND WOULD LIKE TO SPEAK TO SOMEONE IN THE OFFICE. HE DOESN'T REMEMBER A BUSINESS NAME.PLEASE ADVISE.

## 2023-02-08 ENCOUNTER — HOSPITAL ENCOUNTER (OUTPATIENT)
Dept: PAIN MEDICINE | Facility: HOSPITAL | Age: 78
Discharge: HOME | End: 2023-02-08
Payer: MEDICARE

## 2023-02-08 VITALS
HEIGHT: 72 IN | TEMPERATURE: 97.7 F | SYSTOLIC BLOOD PRESSURE: 142 MMHG | RESPIRATION RATE: 16 BRPM | DIASTOLIC BLOOD PRESSURE: 64 MMHG | OXYGEN SATURATION: 95 % | WEIGHT: 287 LBS | BODY MASS INDEX: 38.87 KG/M2 | HEART RATE: 62 BPM

## 2023-02-08 DIAGNOSIS — M46.1 SACROILIITIS: ICD-10-CM

## 2023-02-08 DIAGNOSIS — R52 PAIN: ICD-10-CM

## 2023-02-08 PROCEDURE — 77003 FLUOROGUIDE FOR SPINE INJECT: CPT

## 2023-02-08 PROCEDURE — 0 IOPAMIDOL 41 % SOLUTION: Performed by: ANESTHESIOLOGY

## 2023-02-08 PROCEDURE — 25010000002 METHYLPREDNISOLONE PER 40 MG: Performed by: ANESTHESIOLOGY

## 2023-02-08 PROCEDURE — 27096 INJECT SACROILIAC JOINT: CPT | Performed by: ANESTHESIOLOGY

## 2023-02-08 RX ORDER — METHYLPREDNISOLONE ACETATE 40 MG/ML
40 INJECTION, SUSPENSION INTRA-ARTICULAR; INTRALESIONAL; INTRAMUSCULAR; SOFT TISSUE ONCE
Status: COMPLETED | OUTPATIENT
Start: 2023-02-08 | End: 2023-02-08

## 2023-02-08 RX ORDER — BUPIVACAINE HYDROCHLORIDE 2.5 MG/ML
10 INJECTION, SOLUTION EPIDURAL; INFILTRATION; INTRACAUDAL ONCE
Status: COMPLETED | OUTPATIENT
Start: 2023-02-08 | End: 2023-02-08

## 2023-02-08 RX ADMIN — IOPAMIDOL 3 ML: 408 INJECTION, SOLUTION INTRATHECAL at 15:16

## 2023-02-08 RX ADMIN — METHYLPREDNISOLONE ACETATE 40 MG: 40 INJECTION, SUSPENSION INTRA-ARTICULAR; INTRALESIONAL; INTRAMUSCULAR; INTRASYNOVIAL; SOFT TISSUE at 15:17

## 2023-02-08 RX ADMIN — BUPIVACAINE HYDROCHLORIDE 5 ML: 2.5 INJECTION, SOLUTION EPIDURAL; INFILTRATION; INTRACAUDAL; PERINEURAL at 15:17

## 2023-02-09 ENCOUNTER — TELEPHONE (OUTPATIENT)
Dept: PAIN MEDICINE | Facility: HOSPITAL | Age: 78
End: 2023-02-09
Payer: MEDICARE

## 2023-02-10 ENCOUNTER — ANTICOAGULATION VISIT (OUTPATIENT)
Dept: CARDIOLOGY | Facility: CLINIC | Age: 78
End: 2023-02-10
Payer: MEDICARE

## 2023-02-10 DIAGNOSIS — Z79.01 LONG TERM (CURRENT) USE OF ANTICOAGULANTS: ICD-10-CM

## 2023-02-10 DIAGNOSIS — I48.0 PAROXYSMAL ATRIAL FIBRILLATION: Primary | ICD-10-CM

## 2023-02-10 LAB — INR PPP: 2.4

## 2023-02-13 NOTE — PROCEDURES
"Subjective   CC back, right hip pain  Benoit Newberry Jr. is a 77 y.o. male with sacroiliitis here for repeat  right SI injection.  On Plavix and Coumadin, risks discussed..     Pain Assessment   Location of Pain: Lower Back, R Hip, L Hip,   Description of Pain: Dull/Aching, Throbbing, Stabbing  Previous Pain Rating :7  Current Pain Ratin  Aggravating Factors: Activity  Alleviating Factors: Rest, Medication  Pain onset over 12 weeks  Pain interferes with ADL's    The following portions of the patient's history were reviewed and updated as appropriate: allergies, current medications, past family history, past medical history, past social history, past surgical history and problem list.      Review of Systems  As in HPI  Objective   Physical Exam  Constitutional:       General: He is not in acute distress.  Cardiovascular:      Rate and Rhythm: Normal rate.   Pulmonary:      Effort: Pulmonary effort is normal.   Musculoskeletal:      Lumbar back: Tenderness present. Decreased range of motion.   Neurological:      Mental Status: He is alert and oriented to person, place, and time.       /64 (BP Location: Left arm, Patient Position: Sitting)   Pulse 62   Temp 97.7 °F (36.5 °C) (Skin)   Resp 16   Ht 182.9 cm (72\")   Wt 130 kg (287 lb)   SpO2 95%   BMI 38.92 kg/m²     Assessment & Plan    underwent repeat right SI injection    RTC 4-6 weeks or as needed for repeat      DATE OF PROCEDURE: 2023    PREOPERATIVE DIAGNOSIS: sacroiliitis    POSTOPERATIVE DIAGNOSIS: same    PROCEDURE PERFORMED: Right SACROILIAC JOINT INJECTION    The patient understands the risks and benefits of the procedure and wishes to proceed. The patient was seen in the preoperative area. Patient's consent was obtained and updated. Vitals were taken. Patient was then brought to the procedure suite and placed in prone position for  sacroiliac joint injection. The appropriate anatomic area was widely prepped with Chloraprep and draped " in a sterile fashion. Noninvasive monitoring per routine anesthesia protocol was placed. Under fluoroscopic guidance using an AP view, a 22 gauge curved tip spinal needle was passed through skin anesthetized with 1% Lidocaine without epinephrine. The needle tip was guided to the lower pole of the joint using fluoroscopy. 1 mL of  preservative free contrast was injected into the joint to confirm location. A clear outline was obtained and 5 mL of steroid solution containing 4 mL 0.25% bupivacaine, and 1mL 40mg Depomedrol was injected. The patient tolerated with no elpidio-procedural complications.  A sterile dressing was placed over the puncture sites.

## 2023-02-18 LAB — INR PPP: 2.4

## 2023-02-20 ENCOUNTER — ANTICOAGULATION VISIT (OUTPATIENT)
Dept: CARDIOLOGY | Facility: CLINIC | Age: 78
End: 2023-02-20
Payer: MEDICARE

## 2023-02-20 DIAGNOSIS — Z79.01 LONG TERM (CURRENT) USE OF ANTICOAGULANTS: ICD-10-CM

## 2023-02-20 DIAGNOSIS — I48.0 PAROXYSMAL ATRIAL FIBRILLATION: Primary | ICD-10-CM

## 2023-02-24 ENCOUNTER — ANTICOAGULATION VISIT (OUTPATIENT)
Dept: CARDIOLOGY | Facility: CLINIC | Age: 78
End: 2023-02-24
Payer: MEDICARE

## 2023-02-24 DIAGNOSIS — Z79.01 LONG TERM (CURRENT) USE OF ANTICOAGULANTS: ICD-10-CM

## 2023-02-24 DIAGNOSIS — I48.0 PAROXYSMAL ATRIAL FIBRILLATION: Primary | ICD-10-CM

## 2023-02-24 LAB — INR PPP: 3.7

## 2023-03-02 ENCOUNTER — HOSPITAL ENCOUNTER (OUTPATIENT)
Dept: MRI IMAGING | Facility: HOSPITAL | Age: 78
Discharge: HOME | End: 2023-03-02
Payer: MEDICARE

## 2023-03-02 ENCOUNTER — ANTICOAGULATION VISIT (OUTPATIENT)
Dept: CARDIOLOGY | Facility: CLINIC | Age: 78
End: 2023-03-02
Payer: MEDICARE

## 2023-03-02 ENCOUNTER — HOSPITAL ENCOUNTER (OUTPATIENT)
Dept: BONE DENSITY | Facility: HOSPITAL | Age: 78
Discharge: HOME | End: 2023-03-02
Payer: MEDICARE

## 2023-03-02 DIAGNOSIS — I48.0 PAROXYSMAL ATRIAL FIBRILLATION: Primary | ICD-10-CM

## 2023-03-02 DIAGNOSIS — M85.88 OSTEOPENIA OF LUMBAR SPINE: ICD-10-CM

## 2023-03-02 DIAGNOSIS — Z79.01 LONG TERM (CURRENT) USE OF ANTICOAGULANTS: ICD-10-CM

## 2023-03-02 DIAGNOSIS — M47.816 LUMBAR SPONDYLOSIS: ICD-10-CM

## 2023-03-02 LAB — INR PPP: 2.5

## 2023-03-02 PROCEDURE — 77080 DXA BONE DENSITY AXIAL: CPT

## 2023-03-02 PROCEDURE — 72148 MRI LUMBAR SPINE W/O DYE: CPT

## 2023-03-06 PROCEDURE — 93296 REM INTERROG EVL PM/IDS: CPT | Performed by: INTERNAL MEDICINE

## 2023-03-06 PROCEDURE — 93294 REM INTERROG EVL PM/LDLS PM: CPT | Performed by: INTERNAL MEDICINE

## 2023-03-06 NOTE — PROGRESS NOTES
Neurosurgical Consultation      Benoit Newberry Jr. is a 77 y.o. male is here today for follow-up for low back pain. In the office today patient reports continued low back pain traveling into his right buttock and right calf.    Chief Complaint   Patient presents with   • Back Pain     Follow up        Previous treatment: Gabapentin, SI joint injection,    HPI: This is a 77-year-old gentleman with atrial fibrillation, diabetes, history of DVT and clotting disorder, myocardial infarction and 2 coronary stents as well as pulmonary arterial hypertension who was last evaluated by my nurse practitioner Deb Berry on January 25, 2023.  The patient was referred to us by pain management.  He has predominant right sided buttock pain.  He does not have significant anterior posterior thigh pain however he describes a significant ache in the lateral aspect of his calf.  This ache does not extend into his foot.  His buttock pain is worse with sitting on his right side.  He has undergone 2 SI joint injections.  He informs me that he received approximately 50% buttock and somewhat leg pain relief for short-lived time.  He did undergo physical therapy remotely.    Past Medical History:   Diagnosis Date   • Anemia Runs in family   • Arthritis    • Asymptomatic stenosis of right carotid artery    • Atrial fibrillation (HCC)    • Bradycardia    • Brain concussion    • Buttock pain     rt cheek( lower)   • Cataract    • Clotting disorder (HCC) Take blood thinner   • Coronary artery disease    • Deep vein thrombosis (HCC)    • Diabetes mellitus, type 2 (HCC)    • Erectile dysfunction 2005   • Heart attack (HCC)     x2   with stent placement     2015/2018   • Hyperlipidemia    • Hypertension    • Leg pain     aches   • Low back pain 2014   • Myocardial infarction (HCC)    • Obesity 45 years   • Pacemaker    • Pulmonary arterial hypertension (HCC) 1967   • Scoliosis 20 years   • Visual impairment Last 15 years        Past Surgical  History:   Procedure Laterality Date   • APPENDECTOMY  1956   • CARDIAC ABLATION  12/2018    x 1    • CARDIAC CATHETERIZATION Left 02/25/2022    Procedure: Left Heart Cath and coronary angiogram;  Surgeon: Karolina Saldaña MD;  Location: [facility] CATH INVASIVE LOCATION;  Service: Cardiovascular;  Laterality: Left;   • CARDIAC CATHETERIZATION  02/25/2022    Procedure: Saphenous Vein Graft;  Surgeon: Karolina Saldaña MD;  Location: [facility] CATH INVASIVE LOCATION;  Service: Cardiovascular;;   • CARDIOVERSION  06/2018    Cardioversion 6/2018; x3  12/2018   • CORONARY ANGIOPLASTY Left 11/04/2015    Distal left main and in the mid circumflex artery    • CORONARY ANGIOPLASTY Right 11/02/2015    Right coronary artery    • CORONARY ARTERY BYPASS GRAFT  03/1998   • EYE SURGERY     • FOOT SURGERY  2010   • HERNIA REPAIR  2005   • OTHER SURGICAL HISTORY  05/2019    Stent    • PACEMAKER IMPLANTATION  01/2019    Dr. Saldaña    • REPLACEMENT TOTAL KNEE BILATERAL  2001   • SKIN BIOPSY          Current Outpatient Medications on File Prior to Visit   Medication Sig Dispense Refill   • Alcohol Swabs 70 % pads USE WHEN TESTING BLOOD GLUCOSE FOUR TIMES DAILY     • amLODIPine (NORVASC) 5 MG tablet Take 1 tablet by mouth Daily. 90 tablet 3   • atorvastatin (LIPITOR) 40 MG tablet Take 0.5 tablets by mouth 2 (Two) Times a Day. 90 tablet 3   • cetirizine (zyrTEC) 10 MG tablet Take 1 tablet by mouth Daily As Needed for Allergies.     • Cholecalciferol (VITAMIN D) 1000 units tablet Take 500 Units by mouth Daily.     • clopidogrel (PLAVIX) 75 MG tablet Take 1 tablet by mouth Daily. 90 tablet 3   • Coenzyme Q10 (CO Q 10 PO) Take 1 capsule by mouth Daily.     • furosemide (LASIX) 40 MG tablet Take 1 tablet by mouth Daily. 90 tablet 3   • gabapentin (NEURONTIN) 600 MG tablet Take 1 tablet by mouth 2 (Two) Times a Day. 60 tablet 1   • Insulin Disposable Pump (Omnipod DASH Pods, Gen 4,) misc 1 each Continuous. Pt to change pod every 3 days 30 each 3    • Krill Oil 1000 MG capsule Take 1 capsule by mouth Daily.     • lisinopril (PRINIVIL,ZESTRIL) 20 MG tablet Take 1 tablet by mouth 2 (Two) Times a Day. 180 tablet 3   • metFORMIN (GLUCOPHAGE) 1000 MG tablet TAKE 1 TABLET TWICE DAILY  WITH MEALS 180 tablet 3   • metoprolol succinate XL (TOPROL-XL) 25 MG 24 hr tablet Take 1 tablet by mouth 2 (Two) Times a Day. 180 tablet 3   • Multiple Vitamins-Minerals (MULTI VITAMIN/MINERALS) tablet Take 1 tablet by mouth Daily.     • NovoLOG 100 UNIT/ML injection Pt to use as directed in insulin pump.  MDD: 65 60 mL 3   • NovoLOG 100 UNIT/ML injection Insulin pump Pt to use as directed in insulin pump.  MDD: 65 60 mL 3   • vitamin C (ASCORBIC ACID) 500 MG tablet Take 1 tablet by mouth Daily.     • vitamin E 400 UNIT capsule Take 1 capsule by mouth Daily.     • warfarin (COUMADIN) 5 MG tablet 1 tablet daily 90 tablet 3     No current facility-administered medications on file prior to visit.        Allergies   Allergen Reactions   • Vancomycin Rash        Social History     Socioeconomic History   • Marital status:      Spouse name: Maude   • Number of children: 3   • Years of education: 14   Tobacco Use   • Smoking status: Former     Packs/day: 2.00     Years: 7.00     Pack years: 14.00     Types: Cigarettes     Start date: 1965     Quit date: 12/3/1973     Years since quittin.2   • Smokeless tobacco: Never   • Tobacco comments:     Haven’t smoked in almost 50 years   Vaping Use   • Vaping Use: Never used   Substance and Sexual Activity   • Alcohol use: Not Currently     Comment: Maybe 1 drink this year   • Drug use: No   • Sexual activity: Not Currently     Partners: Female     Comment: 74 years old past time          Review of Systems   Constitutional: Positive for activity change.   HENT: Negative.    Eyes: Negative.    Respiratory: Negative.    Cardiovascular: Negative.    Gastrointestinal: Negative.    Endocrine: Negative.    Genitourinary: Negative.   "  Musculoskeletal: Positive for arthralgias, back pain and myalgias.        Right buttock/calf   Skin: Negative.    Allergic/Immunologic: Negative.    Neurological: Negative for weakness and numbness.   Hematological: Negative.    Psychiatric/Behavioral: Positive for sleep disturbance.        Physical Examination:     Vitals:    03/07/23 0902   BP: 156/81   Pulse: 62   SpO2: 97%   Weight: 130 kg (287 lb)   Height: 182.9 cm (72\")   PainSc:   8        Physical Exam     Neurological Exam   Bilateral lower extremity motor strength 5/5  Positive right Demetrio  Positive SI compression test  Positive Gaenslen's  Tenderness palpation right SIJ sulcus  Significant forward flexed gait with obvious positive sagittal balance  Positive facet loading maneuvers    Result Review  The following data was reviewed by: Josh Stoll MD on 03/07/2023:    Data reviewed: Radiologic studies MRI of the lumbar spine from March 2, 2023 shows significant lumbar spinal degeneration.  There is focal lumbar scoliosis with associated lateral listhesis.  There is not profound central canal stenosis throughout the lumbar spine.  There is multilevel neuroforaminal stenosis.  This does include foraminal stenosis at the L4-L5 and L5-S1 levels in particular at the right.  DEXA scan does not show significant osteoporosis in the lumbar spine.    Assessment/plan:  This is a 77-year-old gentleman with significant health history who presents with right buttock pain as well as right leg pain.  He has undergone 2 right-sided SI joint injections with some short-lived relief.  He has undergone a caudal epidural steroid injection without profound improvement.  He does have significant foraminal stenosis at L4-L5 and L5-S1.  If his leg pain is originating from his lumbar spine this would most likely be the specific nerve roots.  I recommend a flexion-extension x-ray of the lumbar spine to evaluate for any dynamic motion.  I also recommend scoliosis x-rays for " better assessment of spinal pelvic parameters.  I am referring him to pain management for a transforaminal L4 and L5 right sided nerve root block.  He will return to see me upon completion of these images and interventions.  I will work to see if his pain generator is more likely the SI joint or the nerve roots in his lumbar spine.  I have encouraged him to call with any questions or concerns.    Diagnoses and all orders for this visit:    1. Lumbar radiculopathy, chronic (Primary)  -     XR Spine Scoliosis 2 or 3 Views; Future  -     Selective Nerve Root Injection; Future    2. Sacroiliac joint dysfunction of right side         Return in about 6 weeks (around 4/18/2023).            Josh Stoll MD

## 2023-03-07 ENCOUNTER — OFFICE VISIT (OUTPATIENT)
Dept: NEUROSURGERY | Facility: CLINIC | Age: 78
End: 2023-03-07
Payer: MEDICARE

## 2023-03-07 VITALS
BODY MASS INDEX: 38.87 KG/M2 | OXYGEN SATURATION: 97 % | WEIGHT: 287 LBS | DIASTOLIC BLOOD PRESSURE: 81 MMHG | HEART RATE: 62 BPM | HEIGHT: 72 IN | SYSTOLIC BLOOD PRESSURE: 156 MMHG

## 2023-03-07 DIAGNOSIS — M54.16 LUMBAR RADICULOPATHY, CHRONIC: Primary | ICD-10-CM

## 2023-03-07 DIAGNOSIS — M53.3 SACROILIAC JOINT DYSFUNCTION OF RIGHT SIDE: ICD-10-CM

## 2023-03-07 PROCEDURE — 99214 OFFICE O/P EST MOD 30 MIN: CPT | Performed by: NEUROLOGICAL SURGERY

## 2023-03-10 ENCOUNTER — ANTICOAGULATION VISIT (OUTPATIENT)
Dept: CARDIOLOGY | Facility: CLINIC | Age: 78
End: 2023-03-10
Payer: MEDICARE

## 2023-03-10 ENCOUNTER — HOSPITAL ENCOUNTER (OUTPATIENT)
Dept: GENERAL RADIOLOGY | Facility: HOSPITAL | Age: 78
Discharge: HOME | End: 2023-03-10
Admitting: NEUROLOGICAL SURGERY
Payer: MEDICARE

## 2023-03-10 DIAGNOSIS — M54.16 LUMBAR RADICULOPATHY, CHRONIC: ICD-10-CM

## 2023-03-10 DIAGNOSIS — Z79.01 LONG TERM (CURRENT) USE OF ANTICOAGULANTS: ICD-10-CM

## 2023-03-10 DIAGNOSIS — I48.0 PAROXYSMAL ATRIAL FIBRILLATION: Primary | ICD-10-CM

## 2023-03-10 LAB — INR PPP: 2.8

## 2023-03-10 PROCEDURE — 72082 X-RAY EXAM ENTIRE SPI 2/3 VW: CPT

## 2023-03-16 ENCOUNTER — ANTICOAGULATION VISIT (OUTPATIENT)
Dept: CARDIOLOGY | Facility: CLINIC | Age: 78
End: 2023-03-16
Payer: MEDICARE

## 2023-03-16 DIAGNOSIS — Z79.01 LONG TERM (CURRENT) USE OF ANTICOAGULANTS: ICD-10-CM

## 2023-03-16 DIAGNOSIS — I48.0 PAROXYSMAL ATRIAL FIBRILLATION: Primary | ICD-10-CM

## 2023-03-16 LAB — INR PPP: 2.4

## 2023-03-22 ENCOUNTER — OFFICE VISIT (OUTPATIENT)
Dept: PAIN MEDICINE | Facility: CLINIC | Age: 78
End: 2023-03-22
Payer: MEDICARE

## 2023-03-22 VITALS
OXYGEN SATURATION: 94 % | SYSTOLIC BLOOD PRESSURE: 153 MMHG | HEART RATE: 69 BPM | RESPIRATION RATE: 16 BRPM | DIASTOLIC BLOOD PRESSURE: 69 MMHG

## 2023-03-22 DIAGNOSIS — G89.4 CHRONIC PAIN SYNDROME: Primary | ICD-10-CM

## 2023-03-22 DIAGNOSIS — M54.16 LUMBAR RADICULITIS: ICD-10-CM

## 2023-03-22 DIAGNOSIS — M46.1 SACROILIITIS: ICD-10-CM

## 2023-03-22 DIAGNOSIS — M47.817 LUMBOSACRAL SPONDYLOSIS WITHOUT MYELOPATHY: ICD-10-CM

## 2023-03-22 PROCEDURE — 1160F RVW MEDS BY RX/DR IN RCRD: CPT | Performed by: ANESTHESIOLOGY

## 2023-03-22 PROCEDURE — 99214 OFFICE O/P EST MOD 30 MIN: CPT | Performed by: ANESTHESIOLOGY

## 2023-03-22 PROCEDURE — 3078F DIAST BP <80 MM HG: CPT | Performed by: ANESTHESIOLOGY

## 2023-03-22 PROCEDURE — 1125F AMNT PAIN NOTED PAIN PRSNT: CPT | Performed by: ANESTHESIOLOGY

## 2023-03-22 PROCEDURE — 3077F SYST BP >= 140 MM HG: CPT | Performed by: ANESTHESIOLOGY

## 2023-03-22 PROCEDURE — 1159F MED LIST DOCD IN RCRD: CPT | Performed by: ANESTHESIOLOGY

## 2023-03-22 NOTE — PROGRESS NOTES
Subjective   CC back pain, right leg pain  Benoit Newberry Jr. is a 77 y.o. male with multiple comorbidity including obesity, DM, CAD S/p PCi on plavix/Coumadin,  chronic back pain here for follow-up.  Right SI injection last visit with 50% relief lasted 2 to 3 weeks then symptoms returned.  Was seen by neurosurgery, evaluating for source of radiculopathy on the right.  Recommended right L4-L5 transforaminal ANA.  Chronic back pain mostly right-sided radiating to right buttock and lateral lower leg.  Pain is constant but worse with activity.  Denies new weakness, saddle anesthesia, bladder bowel continence.  Seen several years ago and had caudal ANA with good relief.  He had tried physical therapy, anti-inflammatory, muscle relaxers without relief.  Chronic pain impairing ADL interfering with sleep.      Imaging reviewed  L-spine CT  Dextroscoliosis of the lumbar spine. . Grade 1 anterior spondylolisthesis of L5 on S1 measuring 5 mm. This is secondary to chronic pars defects. There is also retrolisthesis of L2 on L3 measuring 5 mm.  No acute lumbar fracture. Degenerative changes. Varying degrees of canal stenosis and neural foraminal narrowing     Pain Assessment   Location of Pain: Lower Back, R Hip, L Hip, R Leg,   Description of Pain: Dull/Aching, Throbbing, Stabbing  Previous Pain Rating :9  Current Pain Ratin  Aggravating Factors: Activity  Alleviating Factors: Rest, Medication  Pain onset over 12 weeks  Interferes with ADL's.   [place] back pain disability scale on chart    PEG Assessment   What number best describes your pain on average in the past week?5  What number best describes how, during the past week, pain has interfered with your enjoyment of life?7  What number best describes how, during the past week, pain has interfered with your general activity?  10    The following portions of the patient's history were reviewed and updated as appropriate: allergies, current medications, past family  history, past medical history, past social history, past surgical history and problem list.     has a past medical history of Anemia (Runs in family), Arthritis, Asymptomatic stenosis of right carotid artery, Atrial fibrillation (HCC), Bradycardia, Brain concussion, Buttock pain, Cataract, Clotting disorder (HCC) (Take blood thinner), Coronary artery disease, Deep vein thrombosis (HCC), Diabetes mellitus, type 2 (HCC), Erectile dysfunction (), Heart attack (), Hyperlipidemia, Hypertension, Leg pain, Low back pain (), Myocardial infarction (), Obesity (45 years), Pacemaker, Pulmonary arterial hypertension (HCC) (), Scoliosis (20 years), and Visual impairment (Last 15 years).   has a past surgical history that includes Coronary artery bypass graft (1998); Coronary angioplasty (Left, 2015); Coronary angioplasty (Right, 2015); Hernia repair (); Appendectomy (); Replacement total knee bilateral (); Foot surgery (); Other surgical history (2019); Cardioversion (2018); Cardiac Ablation (2018); Pacemaker Implantation (2019); Skin biopsy; Cardiac catheterization (Left, 2022); Cardiac catheterization (2022); and Eye surgery.  family history includes Alcohol abuse in his brother and father; Arthritis in his mother; Diabetes in his brother, maternal grandmother, and sister; Heart disease in his brother, father, mother, and sister; Hyperlipidemia in his brother, father, mother, and sister; Hypertension in his brother, father, mother, and sister; Stroke in his maternal grandfather.  Social History     Tobacco Use   • Smoking status: Former     Packs/day: 2.00     Years: 7.00     Pack years: 14.00     Types: Cigarettes     Start date: 1965     Quit date: 12/3/1973     Years since quittin.3   • Smokeless tobacco: Never   • Tobacco comments:     Haven’t smoked in almost 50 years   Substance Use Topics   • Alcohol use: Not Currently     Comment: Maybe 1  drink this year       Review of Systems   Musculoskeletal: Positive for back pain.        Right leg pain   All other systems reviewed and are negative.      Objective   Physical Exam  Vitals reviewed.   Constitutional:       General: He is not in acute distress.     Appearance: He is obese.      Comments: Ambulating with walker   Musculoskeletal:      Lumbar back: Tenderness present. Decreased range of motion. Positive right straight leg raise test.      Comments: Lumbar loading positive, pain on extension of low back past 5 degrees.  TTP on the lumbar facets noted.  Positive Demetrio right, positive compression test right, positive Gaenslen       /69   Pulse 69   Resp 16   SpO2 94%     PHQ 9 on chart  Opioid risk tool low risk      Assessment & Plan   Diagnoses and all orders for this visit:    1. Chronic pain syndrome (Primary)    2. Lumbosacral spondylosis without myelopathy    3. Lumbar radiculitis  -     Nerve Root Block    4. Sacroiliitis (HCC)    Summary  Benoit Newberry Jr. is a 77 y.o. male with multiple comorbidity including obesity, DM, CAD S/p PCi on plavix/Coumadin,  chronic back pain here for follow-up.  Chronic pain from lumbar DDD spondylosis and radicular pain/spondylolisthesis.  Chronic polyarthralgia.      Right SI injection last visit with 50% relief lasted 2 to 3 weeks then symptoms returned.  Was seen by neurosurgery, evaluating for source of radiculopathy on the right.  Recommended right L4-L5 transforaminal ANA.  Considering surgery.  We will schedule for right L4-L5 transforaminal ANA.  Risk and benefits discussed.  Needs of Plavix and Coumadin.  Patient to bring INR monitor for INR check morning of procedure.    Follow-up with neurosurgery for injections as planned.    RTC for procedure

## 2023-03-24 ENCOUNTER — ANTICOAGULATION VISIT (OUTPATIENT)
Dept: CARDIOLOGY | Facility: CLINIC | Age: 78
End: 2023-03-24
Payer: MEDICARE

## 2023-03-24 DIAGNOSIS — I48.0 PAROXYSMAL ATRIAL FIBRILLATION: Primary | ICD-10-CM

## 2023-03-24 DIAGNOSIS — Z79.01 LONG TERM (CURRENT) USE OF ANTICOAGULANTS: ICD-10-CM

## 2023-03-24 LAB — INR PPP: 2.6

## 2023-03-27 ENCOUNTER — TELEPHONE (OUTPATIENT)
Dept: CARDIOLOGY | Facility: CLINIC | Age: 78
End: 2023-03-27
Payer: MEDICARE

## 2023-03-27 NOTE — TELEPHONE ENCOUNTER
"  Caller: Benoit Newberry Jr. \"Elias or Kemal\"    Relationship: Self    Best call back number: [phone]    What is the best time to reach you: ANY     Who are you requesting to speak with (clinical staff, provider,  specific staff member): [facility]    Do you know the name of the person who called:     What was the call regarding: PT WANTED TO EXPLAIN HIS INR TO [facility].     Do you require a callback: YES          "

## 2023-03-27 NOTE — TELEPHONE ENCOUNTER
Pt holding warfarin for pain mgmt injection. Pt has injection on Wednesday. He has to check his INR in office on Wednesday to have injection done. Advised resume warfarin as soon as possible and when okay with Dr. Deutsch. Recheck in 1 week apLPN

## 2023-03-29 ENCOUNTER — HOSPITAL ENCOUNTER (OUTPATIENT)
Dept: PAIN MEDICINE | Facility: HOSPITAL | Age: 78
Discharge: HOME | End: 2023-03-29
Payer: MEDICARE

## 2023-03-29 VITALS
HEIGHT: 72 IN | SYSTOLIC BLOOD PRESSURE: 126 MMHG | DIASTOLIC BLOOD PRESSURE: 68 MMHG | HEART RATE: 70 BPM | OXYGEN SATURATION: 96 % | RESPIRATION RATE: 16 BRPM | WEIGHT: 287 LBS | TEMPERATURE: 97.6 F | BODY MASS INDEX: 38.87 KG/M2

## 2023-03-29 DIAGNOSIS — R52 PAIN: ICD-10-CM

## 2023-03-29 DIAGNOSIS — M54.16 LUMBAR RADICULITIS: ICD-10-CM

## 2023-03-29 PROCEDURE — 25010000002 ROPIVACAINE PER 1 MG: Performed by: ANESTHESIOLOGY

## 2023-03-29 PROCEDURE — 77003 FLUOROGUIDE FOR SPINE INJECT: CPT

## 2023-03-29 PROCEDURE — 64484 NJX AA&/STRD TFRM EPI L/S EA: CPT | Performed by: ANESTHESIOLOGY

## 2023-03-29 PROCEDURE — 25510000001 IOPAMIDOL 61 % SOLUTION: Performed by: ANESTHESIOLOGY

## 2023-03-29 PROCEDURE — 64483 NJX AA&/STRD TFRM EPI L/S 1: CPT | Performed by: ANESTHESIOLOGY

## 2023-03-29 PROCEDURE — 25010000002 DEXAMETHASONE SODIUM PHOSPHATE 10 MG/ML SOLUTION: Performed by: ANESTHESIOLOGY

## 2023-03-29 RX ORDER — DEXAMETHASONE SODIUM PHOSPHATE 10 MG/ML
10 INJECTION, SOLUTION INTRAMUSCULAR; INTRAVENOUS ONCE
Status: COMPLETED | OUTPATIENT
Start: 2023-03-29 | End: 2023-03-29

## 2023-03-29 RX ORDER — ROPIVACAINE HYDROCHLORIDE 2 MG/ML
10 INJECTION, SOLUTION EPIDURAL; INFILTRATION; PERINEURAL ONCE
Status: COMPLETED | OUTPATIENT
Start: 2023-03-29 | End: 2023-03-29

## 2023-03-29 RX ADMIN — DEXAMETHASONE SODIUM PHOSPHATE 10 MG: 10 INJECTION, SOLUTION INTRAMUSCULAR; INTRAVENOUS at 13:45

## 2023-03-29 RX ADMIN — ROPIVACAINE HYDROCHLORIDE 20 MG: 2 INJECTION EPIDURAL; INFILTRATION; PERINEURAL at 13:45

## 2023-03-29 RX ADMIN — IOPAMIDOL 2 ML: 612 INJECTION, SOLUTION INTRAVENOUS at 13:44

## 2023-03-29 NOTE — PROCEDURES
"Subjective   CC back, right hip pain  Benoit Newberry Jr. is a 77 y.o. male with lumbar radiculitis here for right L4-L5 transforaminal ANA.  Off Coumadin 5 days INR 0.9, off Plavix 7 days    Pain Assessment   Location of Pain: Lower Back, R Hip, L Hip,   Description of Pain: Dull/Aching, Throbbing, Stabbing  Previous Pain Rating :7  Current Pain Ratin  Aggravating Factors: Activity  Alleviating Factors: Rest, Medication  Pain onset over 12 weeks  Pain interferes with ADL's    The following portions of the patient's history were reviewed and updated as appropriate: allergies, current medications, past family history, past medical history, past social history, past surgical history and problem list.      Review of Systems  As in HPI  Objective   Physical Exam  Constitutional:       General: He is not in acute distress.  Cardiovascular:      Rate and Rhythm: Normal rate.   Pulmonary:      Effort: Pulmonary effort is normal.   Musculoskeletal:      Lumbar back: Tenderness present. Decreased range of motion.   Neurological:      Mental Status: He is alert and oriented to person, place, and time.       /68 (BP Location: Left arm, Patient Position: Sitting)   Pulse 70   Temp 97.6 °F (36.4 °C) (Oral)   Resp 16   Ht 182.9 cm (72\")   Wt 130 kg (287 lb)   SpO2 96%   BMI 38.92 kg/m²     Assessment & Plan    underwent right L4-L5 transforaminal ANA.    RTC 4-6 weeks or as needed for repeat      DATE OF PROCEDURE:  3/29/2023    PREOPERATIVE DIAGNOSIS:   Lumbar radiculitis    POSTOPERATIVE DIAGNOSIS: Same    PROCEDURE PERFORMED: Right L4, L5  Transforaminal Epidural     The patient presents with a history of  lumbar degenerative disc disease with lumbosacral neuritis in the right leg at level [ L4,  L5].  The patient presents today for a transforaminal epidural. The patient understands the risks and benefits of the procedure and wishes to proceed. The patient was seen in the preoperative area.  Patient's " consent was obtained and updated.  Vitals were taken.  Patient was then brought to the procedure suite and placed in a prone position. Noninvasive monitoring per routine anesthesia protocol was placed.  The appropriate anatomic area was widely prepped with Chloroprep and draped in a sterile fashion.  Under fluoroscopic guidance using an AP and lateral view, a 22 guage curved tip spinal needle  was passed through skin anesthetized with 1% Lidocaine without epinephrine. The needle tip was advanced to the inferior medial aspect of the transverse process and carefully walked into the neuroforamin using an AP lateral view.  At no time were parathesias elicited.  At this point 2 mL of a solution containing  1 mL of 0.25% bupivacaine and 1 mL of 10 mg Decadron were injected.  Clear epidural spread using 0.25 mL of  preservative free contrast was obtained.  A sterile dressing was placed over the puncture site.    The patient tolerated the procedure with no complications . They were then brought to the post procedure area where they recovered nicely.    Discharge:  The patient will be discharged home in stable condition.   Patient understands to contact the Center with any post procedure questions or concerns.  Discharge instructions given by nursing staff.

## 2023-03-30 ENCOUNTER — TELEPHONE (OUTPATIENT)
Dept: PAIN MEDICINE | Facility: HOSPITAL | Age: 78
End: 2023-03-30
Payer: MEDICARE

## 2023-04-13 ENCOUNTER — ANTICOAGULATION VISIT (OUTPATIENT)
Dept: CARDIOLOGY | Facility: CLINIC | Age: 78
End: 2023-04-13
Payer: MEDICARE

## 2023-04-13 DIAGNOSIS — Z79.01 LONG TERM (CURRENT) USE OF ANTICOAGULANTS: ICD-10-CM

## 2023-04-13 DIAGNOSIS — I48.0 PAROXYSMAL ATRIAL FIBRILLATION: Primary | ICD-10-CM

## 2023-04-13 LAB — INR PPP: 1.4

## 2023-04-13 NOTE — PROGRESS NOTES
4/13/23  Called patient and he said he missed his Coumadin on Tues. 4/11/23. Instructed patient to take an extra 1/2  Pill (2.5mg) today and then CPM and recheck as contracted. Patient verbalizes understanding.

## 2023-04-17 ENCOUNTER — TELEPHONE (OUTPATIENT)
Dept: NEUROSURGERY | Facility: CLINIC | Age: 78
End: 2023-04-17
Payer: MEDICARE

## 2023-04-17 ENCOUNTER — HOSPITAL ENCOUNTER (OUTPATIENT)
Dept: GENERAL RADIOLOGY | Facility: HOSPITAL | Age: 78
Discharge: HOME | End: 2023-04-17
Admitting: NURSE PRACTITIONER
Payer: MEDICARE

## 2023-04-17 DIAGNOSIS — M47.816 LUMBAR SPONDYLOSIS: ICD-10-CM

## 2023-04-17 PROCEDURE — 72114 X-RAY EXAM L-S SPINE BENDING: CPT

## 2023-04-17 NOTE — TELEPHONE ENCOUNTER
Called patient to tell him to please go to [facility] and get his Flex/Ext X-rays that had been ordered for him on 1/25/23. He stated that he would get those done before his appointment tomorrow morning.

## 2023-04-17 NOTE — PROGRESS NOTES
Neurosurgical Consultation      Benoit Newberry Jr. is a 77 y.o. male is here today for follow-up for back pain. Today patient reports increased low back pain into the right buttocks area and right calf.    Chief Complaint   Patient presents with   • Back Pain     Follow up         Previous treatment: Injection,    HPI: This is a 77-year-old gentleman with atrial fibrillation, diabetes, history of DVT and clotting disorder, myocardial infarction and 2 coronary stents as well as pulmonary arterial hypertension who was last evaluated by me on March 7, 2023.  At that evaluation I felt as though he predominantly had right sided buttock pain.  He did not have significant anterior posterior thigh pain however did describe constant significant pain in the lateral aspect of his calf.  The ache did extend into his foot rarely.  He had undergone 2 sacroiliac joint injections receiving approximately 50% relief for a short amount of time.  He had undergone physical therapy but not in the recent past.  On my evaluation on March 7 I felt as though he had right sided foraminal stenosis at L4-L5 and L5-S1.  He has severe spinal degeneration and focal lumbar scoliosis with lateral listhesis.  He did not have any severe osteoporosis on his DEXA scan.  He had previously undergone a epidural steroid injection without significant improvement.  I sent him for scoliosis x-rays as well as transforaminal injections at L4 and L5.  I also sent him for flexion-extension x-rays.  He completed the imaging as well as the injections.  He feels as though he received essentially 100% pain relief with the transforaminal injections however it only lasted a few days.    Past Medical History:   Diagnosis Date   • Anemia Runs in family   • Arthritis    • Asymptomatic stenosis of right carotid artery    • Atrial fibrillation    • Bradycardia    • Brain concussion    • Buttock pain     rt cheek( lower)   • Cataract    • Clotting disorder Take blood  thinner   • Coronary artery disease    • Deep vein thrombosis    • Diabetes mellitus, type 2    • Erectile dysfunction 2005   • Heart attack     x2   with stent placement     2015/2018   • Hyperlipidemia    • Hypertension    • Leg pain     aches   • Low back pain 2014   • Myocardial infarction    • Obesity 45 years   • Pacemaker    • Pulmonary arterial hypertension 1967   • Scoliosis 20 years   • Visual impairment Last 15 years        Past Surgical History:   Procedure Laterality Date   • APPENDECTOMY  1956   • CARDIAC ABLATION  12/2018    x 1    • CARDIAC CATHETERIZATION Left 02/25/2022    Procedure: Left Heart Cath and coronary angiogram;  Surgeon: Karolina Saldaña MD;  Location: [facility] CATH INVASIVE LOCATION;  Service: Cardiovascular;  Laterality: Left;   • CARDIAC CATHETERIZATION  02/25/2022    Procedure: Saphenous Vein Graft;  Surgeon: Karolina Saldaña MD;  Location: [facility] CATH INVASIVE LOCATION;  Service: Cardiovascular;;   • CARDIOVERSION  06/2018    Cardioversion 6/2018; x3  12/2018   • CORONARY ANGIOPLASTY Left 11/04/2015    Distal left main and in the mid circumflex artery    • CORONARY ANGIOPLASTY Right 11/02/2015    Right coronary artery    • CORONARY ARTERY BYPASS GRAFT  03/1998   • EYE SURGERY     • FOOT SURGERY  2010   • HERNIA REPAIR  2005   • OTHER SURGICAL HISTORY  05/2019    Stent    • PACEMAKER IMPLANTATION  01/2019    Dr. Saldaña    • REPLACEMENT TOTAL KNEE BILATERAL  2001   • SKIN BIOPSY          Current Outpatient Medications on File Prior to Visit   Medication Sig Dispense Refill   • Alcohol Swabs 70 % pads USE WHEN TESTING BLOOD GLUCOSE FOUR TIMES DAILY     • amLODIPine (NORVASC) 5 MG tablet Take 1 tablet by mouth Daily. 90 tablet 3   • atorvastatin (LIPITOR) 40 MG tablet Take 0.5 tablets by mouth 2 (Two) Times a Day. 90 tablet 3   • cetirizine (zyrTEC) 10 MG tablet Take 1 tablet by mouth Daily As Needed for Allergies.     • Cholecalciferol (VITAMIN D) 1000 units tablet Take 500 Units by  mouth Daily.     • clopidogrel (PLAVIX) 75 MG tablet Take 1 tablet by mouth Daily. 90 tablet 3   • Coenzyme Q10 (CO Q 10 PO) Take 1 capsule by mouth Daily.     • furosemide (LASIX) 40 MG tablet Take 1 tablet by mouth Daily. 90 tablet 3   • Insulin Disposable Pump (Omnipod DASH Pods, Gen 4,) misc 1 each Continuous. Pt to change pod every 3 days 30 each 3   • Krill Oil 1000 MG capsule Take 1 capsule by mouth Daily.     • lisinopril (PRINIVIL,ZESTRIL) 20 MG tablet Take 1 tablet by mouth 2 (Two) Times a Day. 180 tablet 3   • metFORMIN (GLUCOPHAGE) 1000 MG tablet TAKE 1 TABLET TWICE DAILY  WITH MEALS 180 tablet 3   • metoprolol succinate XL (TOPROL-XL) 25 MG 24 hr tablet Take 1 tablet by mouth 2 (Two) Times a Day. 180 tablet 3   • Multiple Vitamins-Minerals (MULTI VITAMIN/MINERALS) tablet Take 1 tablet by mouth Daily.     • NovoLOG 100 UNIT/ML injection Pt to use as directed in insulin pump.  MDD: 65 60 mL 3   • NovoLOG 100 UNIT/ML injection Insulin pump Pt to use as directed in insulin pump.  MDD: 65 60 mL 3   • vitamin C (ASCORBIC ACID) 500 MG tablet Take 1 tablet by mouth Daily.     • vitamin E 400 UNIT capsule Take 1 capsule by mouth Daily.     • warfarin (COUMADIN) 5 MG tablet 1 tablet daily 90 tablet 3   • [DISCONTINUED] gabapentin (NEURONTIN) 600 MG tablet Take 1 tablet by mouth 2 (Two) Times a Day. 60 tablet 1     No current facility-administered medications on file prior to visit.        Allergies   Allergen Reactions   • Vancomycin Rash        Social History     Socioeconomic History   • Marital status:      Spouse name: Maude   • Number of children: 3   • Years of education: 14   Tobacco Use   • Smoking status: Former     Packs/day: 2.00     Years: 7.00     Pack years: 14.00     Types: Cigarettes     Start date: 1965     Quit date: 12/3/1973     Years since quittin.4   • Smokeless tobacco: Never   • Tobacco comments:     Haven’t smoked in almost 50 years   Vaping Use   • Vaping Use: Never used  "  Substance and Sexual Activity   • Alcohol use: Not Currently     Comment: Maybe 1 drink this year   • Drug use: No   • Sexual activity: Not Currently     Partners: Female     Comment: 74 years old past time          Review of Systems   Constitutional: Positive for activity change.   HENT: Negative.    Eyes: Negative.    Respiratory: Negative.    Cardiovascular: Negative.    Gastrointestinal: Negative.    Endocrine: Negative.    Genitourinary: Negative.    Musculoskeletal: Positive for arthralgias, back pain and myalgias.   Skin: Negative.    Allergic/Immunologic: Negative.    Neurological: Positive for weakness (right leg).   Hematological: Negative.    Psychiatric/Behavioral: Positive for sleep disturbance.        Physical Examination:     Vitals:    04/18/23 0845   BP: 162/80   Pulse: 61   SpO2: 98%   Weight: 128 kg (281 lb 9.6 oz)   Height: 182.9 cm (72\")   PainSc:   6        Physical Exam     Neurological Exam   Neurological examination is stable compared to my last evaluation without any new red flag signs.    Result Review  The following data was reviewed by: Josh Stoll MD on 04/18/2023:    Data reviewed: Radiologic studies Scoliosis x-rays confirm the suspected severe positive sagittal balance.  Flexion-extension x-rays do not appear to show dynamic spondylolisthesis.     Assessment/plan:  This is a 77-year-old gentleman who I believe potentially has 2 main diagnoses.  He does have severe sagittal imbalance and does appear to have right sided sacroiliac joint dysfunction.  He has not completed physical therapy for this.  He has undergone SI joint injections with greater than 50% pain relief.  I do believe that his SI pathology may be secondary to his sagittal imbalance.  He does also appear to have L4 and L5 radiculopathy.  This is evidenced by the location of his pain, the stenosis on his MRI as well as the relief that he received with the transforaminal injection.  At this juncture I do recommend a " course of physical therapy focusing on both his lumbar spine as well as the sacroiliac joint.  I also would like him to undergo a repeat transforaminal injection at the L4 and L5 nerve root levels.  He will return to see me in approximately 2 to 3 months for reevaluation.  Between now and then I do encourage him to engage with his other physicians to see if he may be a candidate for surgical decompression.  I have encouraged him to call with any questions or concerns.    Diagnoses and all orders for this visit:    1. Lumbar radiculopathy, chronic (Primary)  -     Ambulatory Referral to Physical Therapy Evaluate and treat    2. Sacroiliac joint dysfunction of right side  -     Ambulatory Referral to Physical Therapy Evaluate and treat         Return in about 3 months (around 7/18/2023).            Josh Stoll MD

## 2023-04-18 ENCOUNTER — OFFICE VISIT (OUTPATIENT)
Dept: NEUROSURGERY | Facility: CLINIC | Age: 78
End: 2023-04-18
Payer: MEDICARE

## 2023-04-18 VITALS
OXYGEN SATURATION: 98 % | BODY MASS INDEX: 38.14 KG/M2 | WEIGHT: 281.6 LBS | DIASTOLIC BLOOD PRESSURE: 80 MMHG | SYSTOLIC BLOOD PRESSURE: 162 MMHG | HEIGHT: 72 IN | HEART RATE: 61 BPM

## 2023-04-18 DIAGNOSIS — M54.16 LUMBAR RADICULOPATHY, CHRONIC: Primary | ICD-10-CM

## 2023-04-18 DIAGNOSIS — M53.3 SACROILIAC JOINT DYSFUNCTION OF RIGHT SIDE: ICD-10-CM

## 2023-04-20 ENCOUNTER — ANTICOAGULATION VISIT (OUTPATIENT)
Dept: CARDIOLOGY | Facility: CLINIC | Age: 78
End: 2023-04-20
Payer: MEDICARE

## 2023-04-20 DIAGNOSIS — Z79.01 LONG TERM (CURRENT) USE OF ANTICOAGULANTS: ICD-10-CM

## 2023-04-20 DIAGNOSIS — I48.0 PAROXYSMAL ATRIAL FIBRILLATION: Primary | ICD-10-CM

## 2023-04-20 LAB — INR PPP: 1.9

## 2023-04-24 ENCOUNTER — TREATMENT (OUTPATIENT)
Dept: PHYSICAL THERAPY | Facility: CLINIC | Age: 78
End: 2023-04-24
Payer: MEDICARE

## 2023-04-24 DIAGNOSIS — M53.3 SACROILIAC JOINT DYSFUNCTION OF RIGHT SIDE: ICD-10-CM

## 2023-04-24 DIAGNOSIS — M54.16 RADICULOPATHY, LUMBAR REGION: Primary | ICD-10-CM

## 2023-04-24 PROCEDURE — 97110 THERAPEUTIC EXERCISES: CPT | Performed by: PHYSICAL THERAPIST

## 2023-04-24 PROCEDURE — 97161 PT EVAL LOW COMPLEX 20 MIN: CPT | Performed by: PHYSICAL THERAPIST

## 2023-04-24 NOTE — PROGRESS NOTES
Encounter Date:04/25/2023    Last seen 10/11/2022      Patient ID: Benoit Newberry Jr. is a 77 y.o. male.    Chief Complaint:  Status post CABG  Status post stent  Anticoagulation management.  History of atrial fibrillation  Status post pacemaker     History of Present Illness  Since I have last seen, the patient has been without any chest discomfort ,shortness of breath, palpitations, dizziness or syncope.  Denies having any headache ,abdominal pain ,nausea, vomiting , diarrhea constipation, loss of weight or loss of appetite.  Denies having any excessive bruising ,hematuria or blood in the stool.    Review of all systems negative except as indicated.    Reviewed ROS.    Assessment and Plan         [[[]]]]]]]]]]]]]]]]]]]]  Impression  ===================     -status post permanent dual-chamber pacemaker implantation ([employer]  MRI compatible) 12/04/2018      - atrial fibrillation.   status post Ablation 10/16/2018  Patient has converted to sinus  sinus bradycardia.   Patient had a recurrence of atrial fibrillation.  Status post cardioversion 06/13/2018 and 08/01/2018 09/05/2018.  Patient has converted but did not stay in sinus rhythm.  Patient was on Tikosyn but off now due to maintaining sinus rhythm.     -anticoagulation Patient is competent.      -status post CABG  3/98      -Acute inferior myocardial infarction.  Status post stent placement to SVG to RCA for total occlusion .  11/02/2015   - Status post stent placement to totally occluded SVG to RCA11/02/2015 and stent placement to left main and mid circumflex coronary artery 11/04/2015.  Status post stent to PDA distal to SVG and native LAD beyond [place].  05/25/2018     Cardiac catheterization February 25, 2022 revealed  Left ventricle angiogram was not performed due to difficulty in crossing the aortic valve.  Left main coronary artery is normal.  Left anterior descending artery is totally occluded in the proximal segment and LIMA is filling the  LAD.  Circumflex coronary artery has proximal 20 to 30% disease.  Ramus intermedius is totally occluded.  Filling from SVG.  Right coronary artery is totally occluded in the proximal segment.  LIMA to LAD is patent.  SVG to diagonal branch is patent.  SVG to ramus intermedius is patent.  SVG to PDA is patent      - diabetes dyslipidemia and hypertension   - status post impending inferior myocardial infarction prior to surgery    -family history of coronary artery disease   - History of asymptomatic right carotid bruit.  ===   Plan  ===  Status post CABG.  Patient is not having any angina pectoris or congestive heart failure.     Anticoagulation status reviewed.  Continue Coumadin.   PT/INR on a monthly basis.    Elevated blood pressure.  Increase metoprolol to 25 mg twice daily.    History of atrial fibrillation-patient is maintaining sinus rhythm.  Patient is off Tikosyn.  Patient did not have any recurrence of atrial fibrillation.  EKG- atrial sensed ventricular paced rhythm-4/25/2023  Continue to hold Tikosyn since patient has been maintaining sinus rhythm without Tikosyn at this time.      Status post pacemaker.  Interrogation of the pacemaker revealed excellent pacing parameters.  Battery status is  4 years.  Pacemaker site looks normal.     Follow-up in the office in 6 months with pacemaker interrogation.     Patient is considering possible epidural injections.  Okay with holding Coumadin for 3 to 4 days and Plavix for 5 to 7 days (patient had last stent in 2018).     Further plan will depend on patient's progress  ]]]]]]]]]]]]]]]                   Diagnosis Plan   1. Hx of CABG        2. Paroxysmal atrial fibrillation        3. Essential hypertension        4. Long term (current) use of anticoagulants        5. Presence of cardiac pacemaker        6. Tachycardia-bradycardia        7. Longstanding persistent atrial fibrillation        8. Status post coronary artery stent placement        9. Chronic  anticoagulation        10. Mixed hyperlipidemia        11. Chronic coronary artery disease        12. Atrial fibrillation, unspecified type        LAB RESULTS (LAST 7 DAYS)    CBC        BMP        CMP         BNP        TROPONIN        CoAg  Results from last 7 days   Lab Units 04/20/23  0000   INR  1.90       Creatinine Clearance  CrCl cannot be calculated (Patient's most recent lab result is older than the maximum 30 days allowed.).    ABG        Radiology  No radiology results for the last day                The following portions of the patient's history were reviewed and updated as appropriate: allergies, current medications, past family history, past medical history, past social history, past surgical history and problem list.    Review of Systems   Constitutional: Negative for malaise/fatigue.   Cardiovascular: Negative for chest pain, leg swelling, palpitations and syncope.   Respiratory: Negative for shortness of breath.    Skin: Negative for rash.   Gastrointestinal: Negative for nausea and vomiting.   Neurological: Negative for dizziness, light-headedness and numbness.   All other systems reviewed and are negative.        Current Outpatient Medications:   •  Alcohol Swabs 70 % pads, USE WHEN TESTING BLOOD GLUCOSE FOUR TIMES DAILY, Disp: , Rfl:   •  amLODIPine (NORVASC) 5 MG tablet, Take 1 tablet by mouth Daily., Disp: 90 tablet, Rfl: 3  •  atorvastatin (LIPITOR) 40 MG tablet, Take 0.5 tablets by mouth 2 (Two) Times a Day., Disp: 90 tablet, Rfl: 3  •  cetirizine (zyrTEC) 10 MG tablet, Take 1 tablet by mouth Daily As Needed for Allergies., Disp: , Rfl:   •  Cholecalciferol (VITAMIN D) 1000 units tablet, Take 500 Units by mouth Daily., Disp: , Rfl:   •  clopidogrel (PLAVIX) 75 MG tablet, Take 1 tablet by mouth Daily., Disp: 90 tablet, Rfl: 3  •  Coenzyme Q10 (CO Q 10 PO), Take 1 capsule by mouth Daily., Disp: , Rfl:   •  furosemide (LASIX) 40 MG tablet, Take 1 tablet by mouth Daily., Disp: 90 tablet, Rfl:  3  •  Insulin Disposable Pump (Omnipod DASH Pods, Gen 4,) misc, 1 each Continuous. Pt to change pod every 3 days, Disp: 30 each, Rfl: 3  •  Krill Oil 1000 MG capsule, Take 1 capsule by mouth Daily., Disp: , Rfl:   •  lisinopril (PRINIVIL,ZESTRIL) 20 MG tablet, Take 1 tablet by mouth 2 (Two) Times a Day., Disp: 180 tablet, Rfl: 3  •  metFORMIN (GLUCOPHAGE) 1000 MG tablet, TAKE 1 TABLET TWICE DAILY  WITH MEALS, Disp: 180 tablet, Rfl: 3  •  metoprolol succinate XL (TOPROL-XL) 25 MG 24 hr tablet, Take 1 tablet by mouth 2 (Two) Times a Day., Disp: 180 tablet, Rfl: 3  •  Multiple Vitamins-Minerals (MULTI VITAMIN/MINERALS) tablet, Take 1 tablet by mouth Daily., Disp: , Rfl:   •  NovoLOG 100 UNIT/ML injection, Pt to use as directed in insulin pump.  MDD: 65, Disp: 60 mL, Rfl: 3  •  NovoLOG 100 UNIT/ML injection, Insulin pump Pt to use as directed in insulin pump.  MDD: 65, Disp: 60 mL, Rfl: 3  •  vitamin C (ASCORBIC ACID) 500 MG tablet, Take 1 tablet by mouth Daily., Disp: , Rfl:   •  vitamin E 400 UNIT capsule, Take 1 capsule by mouth Daily., Disp: , Rfl:   •  warfarin (COUMADIN) 5 MG tablet, 1 tablet daily, Disp: 90 tablet, Rfl: 3    Allergies   Allergen Reactions   • Vancomycin Rash       Family History   Problem Relation Age of Onset   • Heart disease Mother          of heart failure age of 84   • Hypertension Mother    • Hyperlipidemia Mother    • Arthritis Mother    • Anemia Mother    • Heart disease Father          of heart attack age of 68   • Hypertension Father    • Hyperlipidemia Father    • Alcohol abuse Father    • Anemia Father    • Heart disease Sister         Heart problems   • Hypertension Sister    • Hyperlipidemia Sister    • Diabetes Sister    • Anemia Sister    • Heart disease Brother         Heart problems two different times with heart problems   • Hypertension Brother    • Hyperlipidemia Brother    • Alcohol abuse Brother    • Diabetes Brother    • Anemia Brother    • Diabetes Maternal  Grandmother    • Stroke Maternal Grandfather        Past Surgical History:   Procedure Laterality Date   • ABLATION OF DYSRHYTHMIC FOCUS     • APPENDECTOMY  1956   • CARDIAC ABLATION  12/2018    x 1    • CARDIAC CATHETERIZATION Left 02/25/2022    Procedure: Left Heart Cath and coronary angiogram;  Surgeon: Karolina Saldaña MD;  Location: [facility] CATH INVASIVE LOCATION;  Service: Cardiovascular;  Laterality: Left;   • CARDIAC CATHETERIZATION  02/25/2022    Procedure: Saphenous Vein Graft;  Surgeon: Karolina Saldaña MD;  Location: [facility] CATH INVASIVE LOCATION;  Service: Cardiovascular;;   • CARDIOVERSION  06/2018    Cardioversion 6/2018; x3  12/2018   • CORONARY ANGIOPLASTY Left 11/04/2015    Distal left main and in the mid circumflex artery    • CORONARY ANGIOPLASTY Right 11/02/2015    Right coronary artery    • CORONARY ARTERY BYPASS GRAFT  03/1998   • CORONARY STENT PLACEMENT     • EYE SURGERY     • FOOT SURGERY  2010   • HERNIA REPAIR  2005   • INSERT / REPLACE / REMOVE PACEMAKER     • OTHER SURGICAL HISTORY  05/2019    Stent    • PACEMAKER IMPLANTATION  01/2019    Dr. Saldaña    • REPLACEMENT TOTAL KNEE BILATERAL  2001   • SKIN BIOPSY         Past Medical History:   Diagnosis Date   • Anemia Runs in family   • Arthritis    • Asymptomatic stenosis of right carotid artery    • Atrial fibrillation    • Bradycardia    • Brain concussion    • Buttock pain     rt cheek( lower)   • Cataract    • Clotting disorder Take blood thinner   • Congenital heart disease    • Coronary artery disease    • Deep vein thrombosis    • Diabetes mellitus, type 2    • Erectile dysfunction 2005   • Heart attack     x2   with stent placement     2015/2018   • Hyperlipidemia    • Hypertension    • Leg pain     aches   • Low back pain 2014   • Myocardial infarction    • Obesity 45 years   • Pacemaker    • Pulmonary arterial hypertension 1967   • Scoliosis 20 years   • Sleep apnea    • Visual impairment Last 15 years       Family History    Problem Relation Age of Onset   • Heart disease Mother          of heart failure age of 84   • Hypertension Mother    • Hyperlipidemia Mother    • Arthritis Mother    • Anemia Mother    • Heart disease Father          of heart attack age of 68   • Hypertension Father    • Hyperlipidemia Father    • Alcohol abuse Father    • Anemia Father    • Heart disease Sister         Heart problems   • Hypertension Sister    • Hyperlipidemia Sister    • Diabetes Sister    • Anemia Sister    • Heart disease Brother         Heart problems two different times with heart problems   • Hypertension Brother    • Hyperlipidemia Brother    • Alcohol abuse Brother    • Diabetes Brother    • Anemia Brother    • Diabetes Maternal Grandmother    • Stroke Maternal Grandfather        Social History     Socioeconomic History   • Marital status:      Spouse name: Maude   • Number of children: 3   • Years of education: 14   Tobacco Use   • Smoking status: Former     Packs/day: 2.00     Years: 7.00     Pack years: 14.00     Types: Cigarettes     Start date: 1965     Quit date: 12/3/1973     Years since quittin.4   • Smokeless tobacco: Never   • Tobacco comments:     Haven’t smoked in almost 50 years   Vaping Use   • Vaping Use: Never used   Substance and Sexual Activity   • Alcohol use: Not Currently     Comment: Maybe 1 drink this year   • Drug use: No   • Sexual activity: Not Currently     Partners: Female     Comment: 74 years old past time           ECG 12 Lead    Date/Time: 2023 12:49 PM  Performed by: Karolina Saldaña MD  Authorized by: Karolina Saldaña MD   Comparison: compared with previous ECG   Similar to previous ECG  Comparison to previous ECG: Atrial sensed ventricular paced rhythm 66/min nonspecific ST-T wave changes no ectopy no significant change from 10/11/2022                Objective:       Physical Exam    /61 (BP Location: Left arm, Patient Position: Sitting, Cuff Size: Large Adult)    "Pulse 66   Ht 182.9 cm (72\")   Wt 129 kg (285 lb)   SpO2 95% Comment: RA  BMI 38.65 kg/m²   The patient is alert, oriented and in no distress.    Vital signs as noted above.  Exogenous obesity.    Head and neck revealed no carotid bruits or jugular venous distension.  No thyromegaly or lymphadenopathy is present.    Lungs clear.  No wheezing.  Breath sounds are normal bilaterally.    Heart normal first and second heart sounds.  No murmur..  No pericardial rub is present.  No gallop is present.    Abdomen soft and nontender.  No organomegaly is present.    Extremities revealed good peripheral pulses without any pedal edema.    Skin warm and dry.  Pacemaker site looks normal.    Musculoskeletal system is grossly normal.    CNS grossly normal.    Reviewed and updated.        "

## 2023-04-25 ENCOUNTER — OFFICE VISIT (OUTPATIENT)
Dept: CARDIOLOGY | Facility: CLINIC | Age: 78
End: 2023-04-25
Payer: MEDICARE

## 2023-04-25 ENCOUNTER — CLINICAL SUPPORT NO REQUIREMENTS (OUTPATIENT)
Dept: CARDIOLOGY | Facility: CLINIC | Age: 78
End: 2023-04-25
Payer: MEDICARE

## 2023-04-25 VITALS
DIASTOLIC BLOOD PRESSURE: 61 MMHG | WEIGHT: 285 LBS | HEART RATE: 66 BPM | HEIGHT: 72 IN | SYSTOLIC BLOOD PRESSURE: 152 MMHG | BODY MASS INDEX: 38.6 KG/M2 | OXYGEN SATURATION: 95 %

## 2023-04-25 DIAGNOSIS — I25.10 CHRONIC CORONARY ARTERY DISEASE: ICD-10-CM

## 2023-04-25 DIAGNOSIS — Z95.1 HX OF CABG: Primary | ICD-10-CM

## 2023-04-25 DIAGNOSIS — E78.2 MIXED HYPERLIPIDEMIA: ICD-10-CM

## 2023-04-25 DIAGNOSIS — I49.5 TACHYCARDIA-BRADYCARDIA: ICD-10-CM

## 2023-04-25 DIAGNOSIS — I48.91 ATRIAL FIBRILLATION, UNSPECIFIED TYPE: ICD-10-CM

## 2023-04-25 DIAGNOSIS — Z95.0 PRESENCE OF CARDIAC PACEMAKER: Primary | ICD-10-CM

## 2023-04-25 DIAGNOSIS — I48.11 LONGSTANDING PERSISTENT ATRIAL FIBRILLATION: ICD-10-CM

## 2023-04-25 DIAGNOSIS — Z95.0 PRESENCE OF CARDIAC PACEMAKER: ICD-10-CM

## 2023-04-25 DIAGNOSIS — Z95.5 STATUS POST CORONARY ARTERY STENT PLACEMENT: ICD-10-CM

## 2023-04-25 DIAGNOSIS — Z79.01 CHRONIC ANTICOAGULATION: ICD-10-CM

## 2023-04-25 DIAGNOSIS — I10 ESSENTIAL HYPERTENSION: ICD-10-CM

## 2023-04-25 DIAGNOSIS — I48.0 PAROXYSMAL ATRIAL FIBRILLATION: ICD-10-CM

## 2023-04-25 DIAGNOSIS — Z79.01 LONG TERM (CURRENT) USE OF ANTICOAGULANTS: ICD-10-CM

## 2023-04-27 ENCOUNTER — ANTICOAGULATION VISIT (OUTPATIENT)
Dept: CARDIOLOGY | Facility: CLINIC | Age: 78
End: 2023-04-27
Payer: MEDICARE

## 2023-04-27 DIAGNOSIS — I48.0 PAROXYSMAL ATRIAL FIBRILLATION: Primary | ICD-10-CM

## 2023-04-27 DIAGNOSIS — Z79.01 LONG TERM (CURRENT) USE OF ANTICOAGULANTS: ICD-10-CM

## 2023-04-27 LAB — INR PPP: 1.8

## 2023-04-28 ENCOUNTER — TREATMENT (OUTPATIENT)
Dept: PHYSICAL THERAPY | Facility: CLINIC | Age: 78
End: 2023-04-28
Payer: MEDICARE

## 2023-04-28 DIAGNOSIS — M54.16 RADICULOPATHY, LUMBAR REGION: Primary | ICD-10-CM

## 2023-04-28 DIAGNOSIS — M53.3 SACROILIAC JOINT DYSFUNCTION OF RIGHT SIDE: ICD-10-CM

## 2023-04-28 PROCEDURE — 97112 NEUROMUSCULAR REEDUCATION: CPT | Performed by: PHYSICAL THERAPIST

## 2023-04-28 PROCEDURE — 97110 THERAPEUTIC EXERCISES: CPT | Performed by: PHYSICAL THERAPIST

## 2023-04-28 NOTE — PROGRESS NOTES
Physical Therapy Daily Treatment Note    [address] [place] [address]    VISIT#: 2    Subjective   Benoit Newberry reports that he is doing well today and has been working on his HEP at home and started riding his bike again. Kemal states that he was able to ride for 5 min the first day and had made some gradual progressions since.   Pain Rating (0/10): 8    Objective     See Exercise, Manual, and Modality Logs for complete treatment.     Patient Education: Progress of therapy and POC    Assessment/Plan  Pt with pain while sitting at the start of the session with pt performing some exercises in standing and returning to sitting. Pt's posture continues to be impaired but is able to demonstrate improvements throughout the session. Pt's rollator walker was lowered for correct height     Plan  Progress per Plan of Care and Progress strengthening /stabilization /functional activity            Timed:         Manual Therapy:         mins  20328;     Therapeutic Exercise:    10     mins  70207;     Neuromuscular Juan:    10    mins  05687;    Therapeutic Activity:     10     mins  73038;     Gait Training:           mins  03980;     Ultrasound:          mins  99760;    Ionto                                   mins   67024  Self Care                            mins   48199    Un-Timed:  Electrical Stimulation:         mins  27342 ( );  Dry Needling          mins self-pay  Traction          mins 41921  Low Eval          Mins  04003  Mod Eval          Mins  00430  High Eval                            Mins  78358  Re-Eval                               mins  51289    Timed Treatment:   30   mins   Total Treatment:     30   mins    Rain Juarez PT, DPT  Physical Therapist

## 2023-05-01 ENCOUNTER — TREATMENT (OUTPATIENT)
Dept: PHYSICAL THERAPY | Facility: CLINIC | Age: 78
End: 2023-05-01
Payer: MEDICARE

## 2023-05-01 DIAGNOSIS — M53.3 SACROILIAC JOINT DYSFUNCTION OF RIGHT SIDE: ICD-10-CM

## 2023-05-01 DIAGNOSIS — M54.16 RADICULOPATHY, LUMBAR REGION: Primary | ICD-10-CM

## 2023-05-01 PROCEDURE — 97112 NEUROMUSCULAR REEDUCATION: CPT | Performed by: PHYSICAL THERAPIST

## 2023-05-01 PROCEDURE — 97110 THERAPEUTIC EXERCISES: CPT | Performed by: PHYSICAL THERAPIST

## 2023-05-01 NOTE — PROGRESS NOTES
Physical Therapy Daily Treatment Note    [address] [place] [address]    VISIT#: 3    Subjective   Benoit Newberry reports that he has been working on his exercises at home, but did have an incident over the weekend where he had to walk a prolonged distance without an AD, with fatigue and SOA noted.   Pain Rating (0/10): 3    Objective     See Exercise, Manual, and Modality Logs for complete treatment.     Patient Education: Progress of therapy and POC    Assessment/Plan  Pt continues to progress posture, BLE strengthening, and activity tolerance with pt on the NUSTEP today reporting better tolerance than his recumbent bike at home, and performed lateral shift corrections at the wall with slight improvements following. Pt billed for one-on-one time.     Plan  Progress per Plan of Care and Progress strengthening /stabilization /functional activity            Timed:         Manual Therapy:         mins  72566;     Therapeutic Exercise:    10     mins  22341;     Neuromuscular Juan:    10    mins  14391;    Therapeutic Activity:     10     mins  15627;     Gait Training:           mins  53456;     Ultrasound:          mins  17893;    Ionto                                   mins   32955  Self Care                            mins   95048    Un-Timed:  Electrical Stimulation:         mins  69607 ( );  Dry Needling          mins self-pay  Traction          mins 28593  Low Eval          Mins  17884  Mod Eval          Mins  14252  High Eval                            Mins  05091  Re-Eval                               mins  86909    Timed Treatment:   30   mins   Total Treatment:     30   mins    Rain Juarez PT, DPT  Physical Therapist

## 2023-05-02 ENCOUNTER — OFFICE VISIT (OUTPATIENT)
Dept: PAIN MEDICINE | Facility: CLINIC | Age: 78
End: 2023-05-02
Payer: MEDICARE

## 2023-05-02 ENCOUNTER — TELEPHONE (OUTPATIENT)
Dept: CARDIOLOGY | Facility: CLINIC | Age: 78
End: 2023-05-02
Payer: MEDICARE

## 2023-05-02 VITALS
RESPIRATION RATE: 16 BRPM | HEART RATE: 61 BPM | DIASTOLIC BLOOD PRESSURE: 68 MMHG | SYSTOLIC BLOOD PRESSURE: 170 MMHG | OXYGEN SATURATION: 95 %

## 2023-05-02 DIAGNOSIS — G89.4 CHRONIC PAIN SYNDROME: Primary | ICD-10-CM

## 2023-05-02 DIAGNOSIS — M47.817 LUMBOSACRAL SPONDYLOSIS WITHOUT MYELOPATHY: ICD-10-CM

## 2023-05-02 DIAGNOSIS — M54.16 LUMBAR RADICULITIS: ICD-10-CM

## 2023-05-02 NOTE — Clinical Note
Right L4, L5 nerve root block. Needs  Off coumadin 5 days and off Plavix 7 days. INR on day of procedure patient to bring monitor

## 2023-05-03 NOTE — TELEPHONE ENCOUNTER
Faxed and placed to be scanned. Pt cleared to hold blood thinners 3 days prior to injection. Warfarin is managed by PCP.

## 2023-05-03 NOTE — PROGRESS NOTES
Subjective   CC back pain, right leg pain  Benoit Newberry Jr. is a 77 y.o. male with multiple comorbidity including obesity, DM, CAD S/p PCi on plavix/Coumadin,  chronic back pain here for follow-up.  Reports 80 to 90% relief from right L4-L5 transforaminal ANA last visit with significant functional benefits.  Denies new complaints today.  Had follow-up with neurosurgery, surgery was discussed and recommended to continue with injection and physical therapy for now.   Chronic back pain mostly right-sided radiating to right buttock and lateral lower leg.  Pain is constant but worse with activity.  Denies new weakness, saddle anesthesia, bladder bowel continence.  Seen several years ago and had caudal ANA with good relief.  He had tried physical therapy, anti-inflammatory, muscle relaxers without relief.  Chronic pain impairing ADL interfering with sleep.      Imaging reviewed  L-spine CT  Dextroscoliosis of the lumbar spine. . Grade 1 anterior spondylolisthesis of L5 on S1 measuring 5 mm. This is secondary to chronic pars defects. There is also retrolisthesis of L2 on L3 measuring 5 mm.  No acute lumbar fracture. Degenerative changes. Varying degrees of canal stenosis and neural foraminal narrowing     Pain Assessment   Location of Pain: Lower Back, R Hip, L Hip, R Leg,   Description of Pain: Dull/Aching, Throbbing, Stabbing  Previous Pain Rating :9  Current Pain Ratin  Aggravating Factors: Activity  Alleviating Factors: Rest, Medication  Pain onset over 12 weeks  Interferes with ADL's.   [place] back pain disability scale on chart    PEG Assessment   What number best describes your pain on average in the past week?5  What number best describes how, during the past week, pain has interfered with your enjoyment of life?8  What number best describes how, during the past week, pain has interfered with your general activity?  10    The following portions of the patient's history were reviewed and updated as  appropriate: allergies, current medications, past family history, past medical history, past social history, past surgical history and problem list.     has a past medical history of Anemia (Runs in family), Arthritis, Asymptomatic stenosis of right carotid artery, Atrial fibrillation, Bradycardia, Brain concussion, Buttock pain, Cataract, Clotting disorder (Take blood thinner), Congenital heart disease, Coronary artery disease, Deep vein thrombosis, Diabetes mellitus, type 2, Erectile dysfunction (2005), Heart attack, Hyperlipidemia, Hypertension, Leg pain, Low back pain (2014), Myocardial infarction, Obesity (45 years), Pacemaker, Pulmonary arterial hypertension (1967), Scoliosis (20 years), Sleep apnea, and Visual impairment (Last 15 years).   has a past surgical history that includes Coronary artery bypass graft (03/1998); Coronary angioplasty (Left, 11/04/2015); Coronary angioplasty (Right, 11/02/2015); Hernia repair (2005); Appendectomy (1956); Replacement total knee bilateral (2001); Foot surgery (2010); Other surgical history (05/2019); Cardioversion (06/2018); Cardiac Ablation (12/2018); Pacemaker Implantation (01/2019); Skin biopsy; Cardiac catheterization (Left, 02/25/2022); Cardiac catheterization (02/25/2022); Eye surgery; Ablation of dysrhythmic focus; Coronary stent placement; and Insert / replace / remove pacemaker.  family history includes Alcohol abuse in his brother and father; Anemia in his brother, father, mother, and sister; Arthritis in his mother; Diabetes in his brother, maternal grandmother, and sister; Heart disease in his brother, father, mother, and sister; Hyperlipidemia in his brother, father, mother, and sister; Hypertension in his brother, father, mother, and sister; Stroke in his maternal grandfather.  Social History     Tobacco Use   • Smoking status: Former     Packs/day: 2.00     Years: 7.00     Pack years: 14.00     Types: Cigarettes     Start date: 8/1/1965     Quit date:  12/3/1973     Years since quittin.4   • Smokeless tobacco: Never   • Tobacco comments:     Haven’t smoked in almost 50 years   Substance Use Topics   • Alcohol use: Not Currently     Comment: Maybe 1 drink this year       Review of Systems   Musculoskeletal: Positive for back pain.        Right leg pain   All other systems reviewed and are negative.      Objective   Physical Exam  Vitals reviewed.   Constitutional:       General: He is not in acute distress.     Appearance: He is obese.      Comments: Ambulating with walker   Musculoskeletal:      Lumbar back: Tenderness present. Decreased range of motion. Positive right straight leg raise test.      Comments: Lumbar loading positive, pain on extension of low back past 5 degrees.  TTP on the lumbar facets noted.  Positive Demetrio right, positive compression test right, positive Gaenslen       /68   Pulse 61   Resp 16   SpO2 95%     PHQ 9 on chart  Opioid risk tool low risk      Assessment & Plan   Diagnoses and all orders for this visit:    1. Chronic pain syndrome (Primary)    2. Lumbosacral spondylosis without myelopathy    3. Lumbar radiculitis  -     Nerve Root Block    Summary  Benoit Newberry Jr. is a 77 y.o. male with multiple comorbidity including obesity, DM, CAD S/p PCi on plavix/Coumadin,  chronic back pain here for follow-up.  Chronic pain from lumbar DDD spondylosis and radicular pain/spondylolisthesis.  Chronic polyarthralgia.      Reports 80 to 90% relief from right L4-L5 transforaminal ANA last visit with significant functional benefits.  Denies new complaints today.  Had follow-up with neurosurgery, surgery was discussed and recommended to continue with injection and physical therapy for now.   We will schedule for repeat right L4-L5 transforaminal ANA.  Risk and benefits discussed.  Needs of Plavix and Coumadin.  Patient to bring INR monitor for INR check morning of procedure.    RTC for procedure

## 2023-05-04 ENCOUNTER — ANTICOAGULATION VISIT (OUTPATIENT)
Dept: CARDIOLOGY | Facility: CLINIC | Age: 78
End: 2023-05-04
Payer: MEDICARE

## 2023-05-04 DIAGNOSIS — I48.0 PAROXYSMAL ATRIAL FIBRILLATION: Primary | ICD-10-CM

## 2023-05-04 DIAGNOSIS — Z79.01 LONG TERM (CURRENT) USE OF ANTICOAGULANTS: ICD-10-CM

## 2023-05-04 LAB — INR PPP: 1.8

## 2023-05-05 ENCOUNTER — TREATMENT (OUTPATIENT)
Dept: PHYSICAL THERAPY | Facility: CLINIC | Age: 78
End: 2023-05-05
Payer: MEDICARE

## 2023-05-05 DIAGNOSIS — M54.16 RADICULOPATHY, LUMBAR REGION: Primary | ICD-10-CM

## 2023-05-05 DIAGNOSIS — M53.3 SACROILIAC JOINT DYSFUNCTION OF RIGHT SIDE: ICD-10-CM

## 2023-05-05 PROCEDURE — 97110 THERAPEUTIC EXERCISES: CPT | Performed by: PHYSICAL THERAPIST

## 2023-05-05 PROCEDURE — 97112 NEUROMUSCULAR REEDUCATION: CPT | Performed by: PHYSICAL THERAPIST

## 2023-05-05 NOTE — PROGRESS NOTES
Physical Therapy Daily Treatment Note    [address] [place] [address]    VISIT#: 4    Subjective   Benoit Newberry reports that he is doing well today and is continuing to work on his HEP at home.   Pain Rating (0/10): 3    Objective     See Exercise, Manual, and Modality Logs for complete treatment.     Patient Education: Progress of therapy and POC    Assessment/Plan  Pt continues to demonstrate poor posture with increased time spent with visual cues in front of the mirror with pt working in seated and standing on upright posture. Pt with noted R SB of cervical spine with hypertonicity in R UT with STM performed and low level stretching performed. Pt billed for one-on-one itme.     Plan  Progress per Plan of Care and Progress strengthening /stabilization /functional activity            Timed:         Manual Therapy:    10     mins  74834;     Therapeutic Exercise:    15     mins  63216;     Neuromuscular Juan:    10    mins  69556;    Therapeutic Activity:          mins  19235;     Gait Training:           mins  65043;     Ultrasound:          mins  37794;    Ionto                                   mins   86460  Self Care                            mins   49475    Un-Timed:  Electrical Stimulation:         mins  08460 ( );  Dry Needling          mins self-pay  Traction          mins 77391  Low Eval          Mins  91610  Mod Eval          Mins  74300  High Eval                            Mins  92448  Re-Eval                               mins  47899    Timed Treatment:   35   mins   Total Treatment:     35   mins    Rain Juarez PT, DPT  Physical Therapist

## 2023-05-08 ENCOUNTER — TREATMENT (OUTPATIENT)
Dept: PHYSICAL THERAPY | Facility: CLINIC | Age: 78
End: 2023-05-08
Payer: MEDICARE

## 2023-05-08 DIAGNOSIS — M54.16 RADICULOPATHY, LUMBAR REGION: Primary | ICD-10-CM

## 2023-05-08 DIAGNOSIS — M53.3 SACROILIAC JOINT DYSFUNCTION OF RIGHT SIDE: ICD-10-CM

## 2023-05-08 PROCEDURE — 97110 THERAPEUTIC EXERCISES: CPT | Performed by: PHYSICAL THERAPIST

## 2023-05-08 PROCEDURE — 97112 NEUROMUSCULAR REEDUCATION: CPT | Performed by: PHYSICAL THERAPIST

## 2023-05-08 NOTE — PROGRESS NOTES
Physical Therapy Daily Treatment Note    [address] [place] [address]    VISIT#: 5    Subjective   Benoit Newberry reports that he has been working on his posture and balance at home and applied Icyhot on his R UT with some improvements noted.   Pain Rating (0/10): 3    Objective     See Exercise, Manual, and Modality Logs for complete treatment.     Patient Education: Progress of therapy and POC    Assessment/Plan  Pt continues to correct posture and progress BLE strengthening and stability with emphasis on core activation while performing all exercises. STM was performed on RUT today with improvements in muscle extensibility today as compared to his previous session with MHP applied at the end of the session for pain management. Pt was billed for one-on-one time.     Plan  Progress per Plan of Care and Progress strengthening /stabilization /functional activity            Timed:         Manual Therapy:    10     mins  73407;     Therapeutic Exercise:    15     mins  06223;     Neuromuscular Juan:    10    mins  41317;    Therapeutic Activity:          mins  44781;     Gait Training:           mins  43652;     Ultrasound:          mins  85853;    Ionto                                   mins   28911  Self Care                            mins   08784    Un-Timed:  Electrical Stimulation:         mins  19987 ( );  Dry Needling          mins self-pay  Traction          mins 45760  Low Eval          Mins  03281  Mod Eval          Mins  76695  High Eval                            Mins  02609  Re-Eval                               mins  05403    Timed Treatment:   35   mins   Total Treatment:     35   mins    Rain Juarez PT, DPT  Physical Therapist

## 2023-05-12 ENCOUNTER — ANTICOAGULATION VISIT (OUTPATIENT)
Dept: CARDIOLOGY | Facility: CLINIC | Age: 78
End: 2023-05-12
Payer: MEDICARE

## 2023-05-12 ENCOUNTER — TREATMENT (OUTPATIENT)
Dept: PHYSICAL THERAPY | Facility: CLINIC | Age: 78
End: 2023-05-12
Payer: MEDICARE

## 2023-05-12 DIAGNOSIS — I48.0 PAROXYSMAL ATRIAL FIBRILLATION: Primary | ICD-10-CM

## 2023-05-12 DIAGNOSIS — M54.16 RADICULOPATHY, LUMBAR REGION: Primary | ICD-10-CM

## 2023-05-12 DIAGNOSIS — Z79.01 LONG TERM (CURRENT) USE OF ANTICOAGULANTS: ICD-10-CM

## 2023-05-12 DIAGNOSIS — M53.3 SACROILIAC JOINT DYSFUNCTION OF RIGHT SIDE: ICD-10-CM

## 2023-05-12 LAB — INR PPP: 2.3

## 2023-05-12 PROCEDURE — 97140 MANUAL THERAPY 1/> REGIONS: CPT | Performed by: PHYSICAL THERAPIST

## 2023-05-12 PROCEDURE — 97110 THERAPEUTIC EXERCISES: CPT | Performed by: PHYSICAL THERAPIST

## 2023-05-12 PROCEDURE — 97112 NEUROMUSCULAR REEDUCATION: CPT | Performed by: PHYSICAL THERAPIST

## 2023-05-12 NOTE — PROGRESS NOTES
Physical Therapy Daily Treatment Note    [address] [place] [address]    VISIT#: 6    Subjective   Benoit Newberry reports that he has been running around an increased amount this week leading to overall increase in fatigue, but is doing well.   Pain Rating (0/10): 3    Objective     See Exercise, Manual, and Modality Logs for complete treatment.     Patient Education: Progress of therapy and POC    Assessment/Plan  Pt ambulated into the clinic with increased fluidity and improvements noted in posture with noted improvements in lumbar and cervical mobility as compared to previous sessions. Improvements also noted in muscle extensibility of R UT    Plan  Progress per Plan of Care and Progress strengthening /stabilization /functional activity            Timed:         Manual Therapy:    10     mins  44919;     Therapeutic Exercise:    15     mins  22770;     Neuromuscular Juan:    15    mins  91735;    Therapeutic Activity:          mins  48603;     Gait Training:           mins  17174;     Ultrasound:          mins  68297;    Ionto                                   mins   56046  Self Care                            mins   25100    Un-Timed:  Electrical Stimulation:         mins  29257 ( );  Dry Needling          mins self-pay  Traction          mins 79724  Low Eval          Mins  09815  Mod Eval          Mins  56301  High Eval                            Mins  43897  Re-Eval                               mins  68506    Timed Treatment:   40   mins   Total Treatment:     40   mins    Rain Juarez PT, DPT  Physical Therapist

## 2023-05-15 ENCOUNTER — TREATMENT (OUTPATIENT)
Dept: PHYSICAL THERAPY | Facility: CLINIC | Age: 78
End: 2023-05-15
Payer: MEDICARE

## 2023-05-15 DIAGNOSIS — M53.3 SACROILIAC JOINT DYSFUNCTION OF RIGHT SIDE: ICD-10-CM

## 2023-05-15 DIAGNOSIS — M54.16 RADICULOPATHY, LUMBAR REGION: Primary | ICD-10-CM

## 2023-05-15 PROCEDURE — 97110 THERAPEUTIC EXERCISES: CPT | Performed by: PHYSICAL THERAPIST

## 2023-05-15 PROCEDURE — 97112 NEUROMUSCULAR REEDUCATION: CPT | Performed by: PHYSICAL THERAPIST

## 2023-05-15 PROCEDURE — 97140 MANUAL THERAPY 1/> REGIONS: CPT | Performed by: PHYSICAL THERAPIST

## 2023-05-15 NOTE — PROGRESS NOTES
Physical Therapy Daily Treatment Note    [address] [place] [address]    VISIT#: 7    Subjective   Benoit Newberry reports that he is doing well and had a restful weekend with slight decrease in pain noted today  Pain Rating (0/10): 2    Objective     See Exercise, Manual, and Modality Logs for complete treatment.     Patient Education: Progress of therapy and POC    Assessment/Plan  Pt continues to demonstrate improvements in posture, balance, and activity tolerance with most notable improvements in muscle extensibility today with notable improvements in cervical ROM    Plan  Progress per Plan of Care and Progress strengthening /stabilization /functional activity            Timed:         Manual Therapy:    10     mins  44500;     Therapeutic Exercise:    15     mins  28824;     Neuromuscular Juan:    15    mins  43878;    Therapeutic Activity:          mins  77577;     Gait Training:           mins  30419;     Ultrasound:          mins  80341;    Ionto                                   mins   98959  Self Care                            mins   47424    Un-Timed:  Electrical Stimulation:         mins  45892 ( );  Dry Needling          mins self-pay  Traction          mins 51377  Low Eval          Mins  39570  Mod Eval          Mins  02681  High Eval                            Mins  79225  Re-Eval                               mins  65313    Timed Treatment:   40   mins   Total Treatment:     40   mins    Rain Juarez PT, DPT  Physical Therapist

## 2023-05-18 ENCOUNTER — ANTICOAGULATION VISIT (OUTPATIENT)
Dept: CARDIOLOGY | Facility: CLINIC | Age: 78
End: 2023-05-18
Payer: MEDICARE

## 2023-05-18 DIAGNOSIS — I48.0 PAROXYSMAL ATRIAL FIBRILLATION: Primary | ICD-10-CM

## 2023-05-18 DIAGNOSIS — Z79.01 LONG TERM (CURRENT) USE OF ANTICOAGULANTS: ICD-10-CM

## 2023-05-18 LAB — INR PPP: 2.1

## 2023-05-19 ENCOUNTER — TREATMENT (OUTPATIENT)
Dept: PHYSICAL THERAPY | Facility: CLINIC | Age: 78
End: 2023-05-19
Payer: MEDICARE

## 2023-05-19 DIAGNOSIS — M54.16 RADICULOPATHY, LUMBAR REGION: Primary | ICD-10-CM

## 2023-05-19 DIAGNOSIS — M53.3 SACROILIAC JOINT DYSFUNCTION OF RIGHT SIDE: ICD-10-CM

## 2023-05-19 PROCEDURE — 97110 THERAPEUTIC EXERCISES: CPT | Performed by: PHYSICAL THERAPIST

## 2023-05-19 PROCEDURE — 97112 NEUROMUSCULAR REEDUCATION: CPT | Performed by: PHYSICAL THERAPIST

## 2023-05-19 NOTE — PROGRESS NOTES
Physical Therapy Daily Treatment Note    [address] [place] [address]    VISIT#: 8    Subjective   Benoit Newberry reports that he has noted improvements overall with intermittent pain that radiates down his RLE but has decreased occurrence. Pt reports that his back and neck overall feel much better and feels that his posture has greatly improved.   Pain Rating (0/10): 2    Objective     See Exercise, Manual, and Modality Logs for complete treatment.     Patient Education: Progress of therapy and POC    Assessment/Plan  Pt able to achieve near normal posture with visual cues and was able to maintain proper positioning and balance for prolonged periods of time as compared to previously. MHP was applied for pain management at the end of the session.     Plan  Progress per Plan of Care and Progress strengthening /stabilization /functional activity            Timed:         Manual Therapy:    5     mins  70695;     Therapeutic Exercise:    10     mins  20192;     Neuromuscular Juan:    10    mins  33739;    Therapeutic Activity:     10     mins  68132;     Gait Training:           mins  11176;     Ultrasound:          mins  57366;    Ionto                                   mins   08688  Self Care                            mins   86962    Un-Timed:  Electrical Stimulation:         mins  73713 ( );  Dry Needling          mins self-pay  Traction          mins 38616  Low Eval          Mins  80227  Mod Eval          Mins  84895  High Eval                            Mins  75362  Re-Eval                               mins  40310    Timed Treatment:   35   mins   Total Treatment:     35   mins    Rain Juarez PT, DPT  Physical Therapist

## 2023-05-22 ENCOUNTER — TREATMENT (OUTPATIENT)
Dept: PHYSICAL THERAPY | Facility: CLINIC | Age: 78
End: 2023-05-22
Payer: MEDICARE

## 2023-05-22 DIAGNOSIS — M53.3 SACROILIAC JOINT DYSFUNCTION OF RIGHT SIDE: ICD-10-CM

## 2023-05-22 DIAGNOSIS — M54.16 RADICULOPATHY, LUMBAR REGION: Primary | ICD-10-CM

## 2023-05-22 PROCEDURE — 97112 NEUROMUSCULAR REEDUCATION: CPT | Performed by: PHYSICAL THERAPIST

## 2023-05-22 PROCEDURE — 97110 THERAPEUTIC EXERCISES: CPT | Performed by: PHYSICAL THERAPIST

## 2023-05-22 PROCEDURE — 97140 MANUAL THERAPY 1/> REGIONS: CPT | Performed by: PHYSICAL THERAPIST

## 2023-05-22 NOTE — PROGRESS NOTES
Physical Therapy Daily Treatment Note    [address] [place] [address]    VISIT#: 9    Subjective   Benoit Newberry reports that he is doing okay today with increased LB and RLE leg pain but her neck and shoulder is doing much better.   Pain Rating (0/10): 3    Objective     See Exercise, Manual, and Modality Logs for complete treatment.     Patient Education: Progress of therapy and POC    Assessment/Plan  Pt continues to progress posture training, functional core strengthening, balance, and standing tolerance with rest breaks required throughout the session today due to being fatigued prior to coming into therapy.     Plan  Progress per Plan of Care and Progress strengthening /stabilization /functional activity            Timed:         Manual Therapy:    10     mins  06260;     Therapeutic Exercise:    15     mins  88255;     Neuromuscular Juan:    15    mins  90943;    Therapeutic Activity:          mins  22994;     Gait Training:           mins  59904;     Ultrasound:          mins  18841;    Ionto                                   mins   73820  Self Care                            mins   03513    Un-Timed:  Electrical Stimulation:         mins  95742 ( );  Dry Needling          mins self-pay  Traction          mins 22454  Low Eval          Mins  35391  Mod Eval          Mins  22293  High Eval                            Mins  14399  Re-Eval                               mins  75530    Timed Treatment:   40   mins   Total Treatment:     40   mins    Rain Juarez PT, DPT  Physical Therapist

## 2023-05-26 ENCOUNTER — TREATMENT (OUTPATIENT)
Dept: PHYSICAL THERAPY | Facility: CLINIC | Age: 78
End: 2023-05-26

## 2023-05-26 DIAGNOSIS — M53.3 SACROILIAC JOINT DYSFUNCTION OF RIGHT SIDE: ICD-10-CM

## 2023-05-26 DIAGNOSIS — M54.16 RADICULOPATHY, LUMBAR REGION: Primary | ICD-10-CM

## 2023-05-26 PROCEDURE — 97110 THERAPEUTIC EXERCISES: CPT | Performed by: PHYSICAL THERAPIST

## 2023-05-26 PROCEDURE — 97112 NEUROMUSCULAR REEDUCATION: CPT | Performed by: PHYSICAL THERAPIST

## 2023-05-31 ENCOUNTER — TREATMENT (OUTPATIENT)
Dept: PHYSICAL THERAPY | Facility: CLINIC | Age: 78
End: 2023-05-31

## 2023-05-31 DIAGNOSIS — M53.3 SACROILIAC JOINT DYSFUNCTION OF RIGHT SIDE: ICD-10-CM

## 2023-05-31 DIAGNOSIS — M54.16 RADICULOPATHY, LUMBAR REGION: Primary | ICD-10-CM

## 2023-05-31 PROCEDURE — 97110 THERAPEUTIC EXERCISES: CPT | Performed by: PHYSICAL THERAPIST

## 2023-05-31 PROCEDURE — 97112 NEUROMUSCULAR REEDUCATION: CPT | Performed by: PHYSICAL THERAPIST

## 2023-05-31 PROCEDURE — 97530 THERAPEUTIC ACTIVITIES: CPT | Performed by: PHYSICAL THERAPIST

## 2023-05-31 NOTE — PROGRESS NOTES
Physical Therapy Daily Treatment Note    [address] [place] [address]    VISIT#: 11    Subjective   Benoit Newberry reports that he is doing okay today but has noted the when his balance feels most impaired he starts to go backwards and feels as though he is unable to stop.   Pain Rating (0/10): 3    Objective     See Exercise, Manual, and Modality Logs for complete treatment.     Patient Education: Progress of therapy and POC    Assessment/Plan  Pt continues to progress functional core strengthening, posture and activity tolerance with standing tolerance continuing to be impaired and visual cues required to maintain proper posture    Plan  Progress per Plan of Care and Progress strengthening /stabilization /functional activity            Timed:         Manual Therapy:    5     mins  96583;     Therapeutic Exercise:    10     mins  73686;     Neuromuscular Juan:    15    mins  89777;    Therapeutic Activity:     10     mins  79653;     Gait Training:           mins  78069;     Ultrasound:          mins  03215;    Ionto                                   mins   11360  Self Care                            mins   09102    Un-Timed:  Electrical Stimulation:         mins  01797 ( );  Dry Needling          mins self-pay  Traction          mins 46284  Low Eval          Mins  43367  Mod Eval          Mins  11475  High Eval                            Mins  81609  Re-Eval                               mins  18829    Timed Treatment:   40   mins   Total Treatment:     40   mins    Rain Juarez PT, DPT  Physical Therapist

## 2023-06-01 ENCOUNTER — ANTICOAGULATION VISIT (OUTPATIENT)
Dept: CARDIOLOGY | Facility: CLINIC | Age: 78
End: 2023-06-01

## 2023-06-01 DIAGNOSIS — I48.0 PAROXYSMAL ATRIAL FIBRILLATION: Primary | ICD-10-CM

## 2023-06-01 DIAGNOSIS — Z79.01 LONG TERM (CURRENT) USE OF ANTICOAGULANTS: ICD-10-CM

## 2023-06-01 LAB — INR PPP: 2.5

## 2023-06-02 ENCOUNTER — TREATMENT (OUTPATIENT)
Dept: PHYSICAL THERAPY | Facility: CLINIC | Age: 78
End: 2023-06-02

## 2023-06-02 DIAGNOSIS — M53.3 SACROILIAC JOINT DYSFUNCTION OF RIGHT SIDE: ICD-10-CM

## 2023-06-02 DIAGNOSIS — M54.16 RADICULOPATHY, LUMBAR REGION: Primary | ICD-10-CM

## 2023-06-02 PROCEDURE — 97112 NEUROMUSCULAR REEDUCATION: CPT | Performed by: PHYSICAL THERAPIST

## 2023-06-02 PROCEDURE — 97530 THERAPEUTIC ACTIVITIES: CPT | Performed by: PHYSICAL THERAPIST

## 2023-06-02 PROCEDURE — 97110 THERAPEUTIC EXERCISES: CPT | Performed by: PHYSICAL THERAPIST

## 2023-06-02 NOTE — PROGRESS NOTES
Physical Therapy Daily Treatment Note    [address] [place] [address]    VISIT#: 12    Subjective   Benoit Newberry reports that he is doing well  Today with no new complaints.   Pain Rating (0/10): 2    Objective     See Exercise, Manual, and Modality Logs for complete treatment.     Patient Education: Progress of therapy and POC    Assessment/Plan  Pt continues to progress posture, functional core strengthening, and activity tolerance with rest breaks taken throughout the session to minimize fatigue.     Plan  Progress per Plan of Care and Progress strengthening /stabilization /functional activity            Timed:         Manual Therapy:         mins  28720;     Therapeutic Exercise:    10     mins  03750;     Neuromuscular Juan:    15    mins  76351;    Therapeutic Activity:     15     mins  64182;     Gait Training:           mins  60807;     Ultrasound:          mins  43667;    Ionto                                   mins   77872  Self Care                            mins   40384    Un-Timed:  Electrical Stimulation:         mins  81314 ( );  Dry Needling          mins self-pay  Traction          mins 35175  Low Eval          Mins  47355  Mod Eval          Mins  57494  High Eval                            Mins  76316  Re-Eval                               mins  10189    Timed Treatment:   40   mins   Total Treatment:     40   mins    Rain Juarez PT, DPT  Physical Therapist

## 2023-06-05 ENCOUNTER — TREATMENT (OUTPATIENT)
Dept: PHYSICAL THERAPY | Facility: CLINIC | Age: 78
End: 2023-06-05
Payer: MEDICARE

## 2023-06-05 DIAGNOSIS — M54.16 RADICULOPATHY, LUMBAR REGION: Primary | ICD-10-CM

## 2023-06-05 DIAGNOSIS — M53.3 SACROILIAC JOINT DYSFUNCTION OF RIGHT SIDE: ICD-10-CM

## 2023-06-05 PROCEDURE — 97112 NEUROMUSCULAR REEDUCATION: CPT | Performed by: PHYSICAL THERAPIST

## 2023-06-05 PROCEDURE — 97110 THERAPEUTIC EXERCISES: CPT | Performed by: PHYSICAL THERAPIST

## 2023-06-05 NOTE — PROGRESS NOTES
Physical Therapy Daily Treatment Note    [address] [place] [address]    VISIT#: 13    Subjective   Benoit Newberry reports that he is doing well today with some days being better than others.   Pain Rating (0/10): 3    Objective     See Exercise, Manual, and Modality Logs for complete treatment.     Patient Education: Progress of therapy and POC    Assessment/Plan  Pt continues to progress functional core strengthening, static balance, and activity tolerance with rest breaks still required throughout the session to minimize fatigue.     Plan  Progress per Plan of Care and Progress strengthening /stabilization /functional activity            Timed:         Manual Therapy:         mins  98674;     Therapeutic Exercise:    10     mins  00254;     Neuromuscular Juan:    15    mins  09590;    Therapeutic Activity:     10     mins  39016;     Gait Training:           mins  69921;     Ultrasound:          mins  01545;    Ionto                                   mins   53233  Self Care                            mins   41556    Un-Timed:  Electrical Stimulation:         mins  75067 ( );  Dry Needling          mins self-pay  Traction          mins 49993  Low Eval          Mins  61657  Mod Eval          Mins  33299  High Eval                            Mins  58060  Re-Eval                               mins  79218    Timed Treatment:   35   mins   Total Treatment:     35   mins    Rain Juarez PT, DPT  Physical Therapist

## 2023-06-06 ENCOUNTER — LAB (OUTPATIENT)
Dept: LAB | Facility: HOSPITAL | Age: 78
End: 2023-06-06
Payer: MEDICARE

## 2023-06-06 DIAGNOSIS — Z79.4 TYPE 2 DIABETES MELLITUS WITH HYPERGLYCEMIA, WITH LONG-TERM CURRENT USE OF INSULIN: ICD-10-CM

## 2023-06-06 DIAGNOSIS — E11.65 TYPE 2 DIABETES MELLITUS WITH HYPERGLYCEMIA, WITH LONG-TERM CURRENT USE OF INSULIN: ICD-10-CM

## 2023-06-06 LAB
ALBUMIN SERPL-MCNC: 4.5 G/DL (ref 3.5–5.2)
ALBUMIN UR-MCNC: 4.5 MG/DL
ALBUMIN/GLOB SERPL: 2 G/DL
ALP SERPL-CCNC: 60 U/L (ref 39–117)
ALT SERPL W P-5'-P-CCNC: 25 U/L (ref 1–41)
ANION GAP SERPL CALCULATED.3IONS-SCNC: 11 MMOL/L (ref 5–15)
AST SERPL-CCNC: 25 U/L (ref 1–40)
BILIRUB SERPL-MCNC: 0.5 MG/DL (ref 0–1.2)
BUN SERPL-MCNC: 28 MG/DL (ref 8–23)
BUN/CREAT SERPL: 26.2 (ref 7–25)
CALCIUM SPEC-SCNC: 9.4 MG/DL (ref 8.6–10.5)
CHLORIDE SERPL-SCNC: 102 MMOL/L (ref 98–107)
CHOLEST SERPL-MCNC: 136 MG/DL (ref 0–200)
CO2 SERPL-SCNC: 25 MMOL/L (ref 22–29)
CREAT SERPL-MCNC: 1.07 MG/DL (ref 0.76–1.27)
CREAT UR-MCNC: 90 MG/DL
EGFRCR SERPLBLD CKD-EPI 2021: 72.4 ML/MIN/1.73
GLOBULIN UR ELPH-MCNC: 2.2 GM/DL
GLUCOSE SERPL-MCNC: 206 MG/DL (ref 65–99)
HBA1C MFR BLD: 6.9 % (ref 4.8–5.6)
HDLC SERPL-MCNC: 34 MG/DL (ref 40–60)
LDLC SERPL CALC-MCNC: 77 MG/DL (ref 0–100)
LDLC/HDLC SERPL: 2.19 {RATIO}
MICROALBUMIN/CREAT UR: 50 MG/G
POTASSIUM SERPL-SCNC: 5.4 MMOL/L (ref 3.5–5.2)
PROT SERPL-MCNC: 6.7 G/DL (ref 6–8.5)
SODIUM SERPL-SCNC: 138 MMOL/L (ref 136–145)
TRIGL SERPL-MCNC: 138 MG/DL (ref 0–150)
VLDLC SERPL-MCNC: 25 MG/DL (ref 5–40)

## 2023-06-06 PROCEDURE — 80053 COMPREHEN METABOLIC PANEL: CPT

## 2023-06-06 PROCEDURE — 82570 ASSAY OF URINE CREATININE: CPT

## 2023-06-06 PROCEDURE — 80061 LIPID PANEL: CPT

## 2023-06-06 PROCEDURE — 36415 COLL VENOUS BLD VENIPUNCTURE: CPT

## 2023-06-06 PROCEDURE — 83036 HEMOGLOBIN GLYCOSYLATED A1C: CPT | Performed by: INTERNAL MEDICINE

## 2023-06-06 PROCEDURE — 82043 UR ALBUMIN QUANTITATIVE: CPT

## 2023-06-09 ENCOUNTER — ANTICOAGULATION VISIT (OUTPATIENT)
Dept: CARDIOLOGY | Facility: CLINIC | Age: 78
End: 2023-06-09
Payer: MEDICARE

## 2023-06-09 ENCOUNTER — TREATMENT (OUTPATIENT)
Dept: PHYSICAL THERAPY | Facility: CLINIC | Age: 78
End: 2023-06-09
Payer: MEDICARE

## 2023-06-09 DIAGNOSIS — M53.3 SACROILIAC JOINT DYSFUNCTION OF RIGHT SIDE: ICD-10-CM

## 2023-06-09 DIAGNOSIS — I48.0 PAROXYSMAL ATRIAL FIBRILLATION: Primary | ICD-10-CM

## 2023-06-09 DIAGNOSIS — M54.16 RADICULOPATHY, LUMBAR REGION: Primary | ICD-10-CM

## 2023-06-09 DIAGNOSIS — Z79.01 LONG TERM (CURRENT) USE OF ANTICOAGULANTS: ICD-10-CM

## 2023-06-09 LAB — INR PPP: 3.2

## 2023-06-09 PROCEDURE — 97112 NEUROMUSCULAR REEDUCATION: CPT | Performed by: PHYSICAL THERAPIST

## 2023-06-09 PROCEDURE — 97110 THERAPEUTIC EXERCISES: CPT | Performed by: PHYSICAL THERAPIST

## 2023-06-09 NOTE — PROGRESS NOTES
Physical Therapy Daily Treatment Note    [address] [place] [address]    VISIT#: 14    Subjective   Benoit Newberry reports that he is doing okay today with his blood sugar was low this morning and he drank some orange juice prior to leaving his house. Pt reports that his daughter has noted how he appears to be moving better.   Pain Rating (0/10): 3    Objective     See Exercise, Manual, and Modality Logs for complete treatment.     Patient Education: Progress of therapy and POC    Assessment/Plan  Pt performed Nustep then was performing posture correction and his Dexcom alerted the pt that his blood sugar was low at 54. Pt was given a granola bar and then sat for a prolonged period of time with his blood sugar reassessed periodically. Pt's Dexcom then would no longer read his blood sugar with pt reporting that he felt fine and exercises performed with BUE support and close supervision    Plan  Progress per Plan of Care and Progress strengthening /stabilization /functional activity            Timed:         Manual Therapy:         mins  48179;     Therapeutic Exercise:    10     mins  68471;     Neuromuscular Juan:    10    mins  40841;    Therapeutic Activity:     10     mins  68179;     Gait Training:           mins  59961;     Ultrasound:          mins  35177;    Ionto                                  mins   20451  Self Care                            mins   72319    Un-Timed:  Electrical Stimulation:         mins  00072 ( );  Dry Needling          mins self-pay  Traction         mins 55379  Low Eval          Mins  51506  Mod Eval          Mins  05311  High Eval                            Mins  67861  Re-Eval                               mins  72127    Timed Treatment:   30   mins   Total Treatment:     30   mins    Rain Juarez PT, DPT  Physical Therapist

## 2023-06-12 ENCOUNTER — TELEPHONE (OUTPATIENT)
Dept: CARDIOLOGY | Facility: CLINIC | Age: 78
End: 2023-06-12
Payer: MEDICARE

## 2023-06-12 ENCOUNTER — TREATMENT (OUTPATIENT)
Dept: PHYSICAL THERAPY | Facility: CLINIC | Age: 78
End: 2023-06-12
Payer: MEDICARE

## 2023-06-12 DIAGNOSIS — M53.3 SACROILIAC JOINT DYSFUNCTION OF RIGHT SIDE: ICD-10-CM

## 2023-06-12 DIAGNOSIS — M54.16 RADICULOPATHY, LUMBAR REGION: Primary | ICD-10-CM

## 2023-06-12 PROCEDURE — 97112 NEUROMUSCULAR REEDUCATION: CPT | Performed by: PHYSICAL THERAPIST

## 2023-06-12 PROCEDURE — 97110 THERAPEUTIC EXERCISES: CPT | Performed by: PHYSICAL THERAPIST

## 2023-06-12 RX ORDER — METOPROLOL SUCCINATE 25 MG/1
TABLET, EXTENDED RELEASE ORAL
Qty: 270 TABLET | Refills: 3 | Status: SHIPPED | OUTPATIENT
Start: 2023-06-12

## 2023-06-12 NOTE — TELEPHONE ENCOUNTER
METOPROLOL WAS INCREASED TO 2 A DAY LAST TIME HE WAS IN OFFICE. PATIENT ASKING IF DR. HUBBARD WANTS HIM TO CONTINUE TAKING IT LIKE THAT?   HE IS GOING TO START USING [facility] AS HIS PHARMACY. HE WAS LOOKING FOR INFORMATION ON THAT. SHOULD HAVE IT WHEN WE CALL HIM BACK.

## 2023-06-12 NOTE — PROGRESS NOTES
Physical Therapy Daily Treatment Note    [address] [place] [address]    VISIT#: 15    Subjective   Benoit Newberry reports that he is doing well but during a party over the weekend noted that he had poor posture while taking  photos.   Pain Rating (0/10): 2    Objective     See Exercise, Manual, and Modality Logs for complete treatment.     Patient Education: Progress of therapy and POC    Assessment/Plan  Increased focus on posture and balance today with pt stepping backward when he stands up straight and pt catching his feet on the rug and the floor in the clinic with LOB and self recovery. Emphasis placed on foot clearance today.     Plan  Progress per Plan of Care and Progress strengthening /stabilization /functional activity            Timed:         Manual Therapy:         mins  34140;     Therapeutic Exercise:    15     mins  26301;     Neuromuscular Juan:    10    mins  64646;    Therapeutic Activity:     10     mins  87883;     Gait Training:           mins  84609;     Ultrasound:          mins  47193;    Ionto                                   mins   52038  Self Care                            mins   00778    Un-Timed:  Electrical Stimulation:         mins  39298 ( );  Dry Needling          mins self-pay  Traction          mins 08221  Low Eval          Mins  57539  Mod Eval          Mins  28358  High Eval                            Mins  48799  Re-Eval                               mins  85892    Timed Treatment:   35   mins   Total Treatment:     35   mins    Rain Juarez PT, DPT  Physical Therapist

## 2023-06-12 NOTE — TELEPHONE ENCOUNTER
Called and spoke with patient - advised ok with 75mg QD   Pt would like to keep his 25mg tablet (2 tabs am and 1 tab pm) for now.  Would like to send it to [facility] until he straightens out [facility] pharmacy.  Pt undertsood       Rx Refill Note  Requested Prescriptions     Pending Prescriptions Disp Refills    metoprolol succinate XL (TOPROL-XL) 25 MG 24 hr tablet 270 tablet 3     Sig: Take 2 tablets by mouth every morning and take 1 tablet by mouth every evening      Last office visit with prescribing clinician: 4/25/2023   Last telemedicine visit with prescribing clinician: Visit date not found   Next office visit with prescribing clinician: 11/7/2023                         Would you like a call back once the refill request has been completed: [] Yes [] No    If the office needs to give you a call back, can they leave a voicemail: [] Yes [] No    Deisy Gutiérrez MA  06/12/23, 14:22 EDT

## 2023-06-12 NOTE — TELEPHONE ENCOUNTER
142/42  64 HR      Pt states he has already been taking his metoprolol 25mg BID - last time he was in office he states he was told to increase it. So he ha been taking 3 tablets daily for a total of 75mg daily.   Asking if you're ok with this dosage.       Advised patient we will send to whichever pharmacy he would like.   Understood

## 2023-06-13 ENCOUNTER — OFFICE VISIT (OUTPATIENT)
Dept: ENDOCRINOLOGY | Facility: CLINIC | Age: 78
End: 2023-06-13
Payer: MEDICARE

## 2023-06-13 VITALS
SYSTOLIC BLOOD PRESSURE: 125 MMHG | BODY MASS INDEX: 37.79 KG/M2 | OXYGEN SATURATION: 99 % | WEIGHT: 279 LBS | HEART RATE: 66 BPM | DIASTOLIC BLOOD PRESSURE: 80 MMHG | HEIGHT: 72 IN

## 2023-06-13 DIAGNOSIS — Z79.4 TYPE 2 DIABETES MELLITUS WITH HYPERGLYCEMIA, WITH LONG-TERM CURRENT USE OF INSULIN: Primary | ICD-10-CM

## 2023-06-13 DIAGNOSIS — E78.2 MIXED HYPERLIPIDEMIA: ICD-10-CM

## 2023-06-13 DIAGNOSIS — E11.65 TYPE 2 DIABETES MELLITUS WITH HYPERGLYCEMIA, WITH LONG-TERM CURRENT USE OF INSULIN: Primary | ICD-10-CM

## 2023-06-13 DIAGNOSIS — I10 PRIMARY HYPERTENSION: ICD-10-CM

## 2023-06-13 PROCEDURE — 1160F RVW MEDS BY RX/DR IN RCRD: CPT | Performed by: INTERNAL MEDICINE

## 2023-06-13 PROCEDURE — 3074F SYST BP LT 130 MM HG: CPT | Performed by: INTERNAL MEDICINE

## 2023-06-13 PROCEDURE — 95251 CONT GLUC MNTR ANALYSIS I&R: CPT | Performed by: INTERNAL MEDICINE

## 2023-06-13 PROCEDURE — 1159F MED LIST DOCD IN RCRD: CPT | Performed by: INTERNAL MEDICINE

## 2023-06-13 PROCEDURE — 99214 OFFICE O/P EST MOD 30 MIN: CPT | Performed by: INTERNAL MEDICINE

## 2023-06-13 PROCEDURE — 3044F HG A1C LEVEL LT 7.0%: CPT | Performed by: INTERNAL MEDICINE

## 2023-06-13 PROCEDURE — 3079F DIAST BP 80-89 MM HG: CPT | Performed by: INTERNAL MEDICINE

## 2023-06-13 NOTE — PROGRESS NOTES
Endocrine Progress Note Outpatient     Patient Care Team:  Yasmine Live MD as PCP - Karolina Parish MD as Consulting Physician (Cardiology)    Chief Complaint: Follow-up type 2 diabetes    HPI: This is a 77-year-old male history of type 2 diabetes, hypertension, hyperlipidemia, CAD and obesity is here for follow-up.    For type 2 diabetes: He is now on Omni pod insulin pump along with Dexcom monitoring system.  He has been using insulin pump since February 2022.  He is using NovoLog insulin the pump.  His current pump settings are as follows basal rate midnight is 1.0 units/h.  At 8 PM is 0.90 units/h.  His insulin carb ratio is 1 unit for each 4 g of carbohydrate.  Correction scale is 1 unit for each 30 mg blood sugar with a target blood sugar of 140 and active insulin is 4 hours.  Also on metformin 1000 mg twice a day.    Hypertension: Fair control    Hyperlipidemia: Currently on atorvastatin.    Past Medical History:   Diagnosis Date    Anemia Runs in family    Arthritis     Asymptomatic stenosis of right carotid artery     Atrial fibrillation     Bradycardia     Brain concussion     Buttock pain     rt cheek( lower)    Cataract     Clotting disorder Take blood thinner    Congenital heart disease     Coronary artery disease     Deep vein thrombosis     Diabetes mellitus, type 2     Erectile dysfunction 2005    Heart attack     x2   with stent placement     2015/2018    Hyperlipidemia     Hypertension     Hypoglycemia     Leg pain     aches    Low back pain 2014    Myocardial infarction     Obesity 45 years    Pacemaker     Pulmonary arterial hypertension 1967    Scoliosis 20 years    Sleep apnea     Visual impairment Last 15 years       Social History     Socioeconomic History    Marital status:      Spouse name: Maude    Number of children: 3    Years of education: 14   Tobacco Use    Smoking status: Former     Packs/day: 2.00     Years: 7.00     Pack years: 14.00     Types: Cigarettes      Start date: 1965     Quit date: 12/3/1973     Years since quittin.5    Smokeless tobacco: Never    Tobacco comments:     Haven’t smoked in almost 50 years   Vaping Use    Vaping Use: Never used   Substance and Sexual Activity    Alcohol use: Not Currently     Comment: Maybe 1 drink this year    Drug use: No    Sexual activity: Not Currently     Partners: Female     Comment: 72 years old past time       Family History   Problem Relation Age of Onset    Heart disease Mother          of heart failure age of 84    Hypertension Mother     Hyperlipidemia Mother     Arthritis Mother     Anemia Mother     Obesity Mother     Heart disease Father          of heart attack age of 68    Hypertension Father     Hyperlipidemia Father     Alcohol abuse Father     Anemia Father     Heart disease Sister         Heart problems    Hypertension Sister     Hyperlipidemia Sister     Diabetes Sister     Anemia Sister     Obesity Sister     Heart disease Brother         Heart problems two different times with heart problems    Hypertension Brother     Hyperlipidemia Brother     Alcohol abuse Brother     Diabetes Brother     Anemia Brother     Obesity Brother     Diabetes Maternal Grandmother     Obesity Maternal Grandmother     Stroke Maternal Grandfather     Obesity Maternal Grandfather        Allergies   Allergen Reactions    Vancomycin Rash       ROS:   Constitutional:  Denies fatigue, tiredness.    Eyes:  Denies change in visual acuity   HENT:  Denies nasal congestion or sore throat   Respiratory: denies cough, shortness of breath.   Cardiovascular:  denies chest pain, edema   GI:  Denies abdominal pain, nausea, vomiting.   Musculoskeletal:  Denies back pain or joint pain   Integument:  Denies dry skin and rash   Neurologic:  Denies headache, focal weakness or sensory changes   Endocrine:  Denies polyuria or polydipsia   Psychiatric:  Denies depression or anxiety      Vitals:    23 1327   BP: 125/80   Pulse:  66   SpO2: 99%     Body mass index is 37.84 kg/m².     Physical Exam:  GEN: NAD, conversant  EYES: EOMI, PERRL, no conjunctival erythema  NECK: no thyromegaly, full ROM   CV: RRR, no murmurs/rubs/gallops, no peripheral edema  LUNG: CTAB, no wheezes/rales/ronchi  SKIN: no rashes, no acanthosis  MSK: no deformities, full ROM of all extremities  NEURO: no tremors, DTR normal  PSYCH: AOX3, appropriate mood, affect normal      Results Review:     I reviewed the patient's new clinical results.    Lab Results   Component Value Date    HGBA1C 6.90 (H) 06/06/2023    HGBA1C 6.4 (H) 12/13/2022    HGBA1C 6.7 (H) 05/09/2022      Lab Results   Component Value Date    GLUCOSE 206 (H) 06/06/2023    BUN 28 (H) 06/06/2023    CREATININE 1.07 06/06/2023    EGFRIFNONA 74 02/24/2022    BCR 26.2 (H) 06/06/2023    K 5.4 (H) 06/06/2023    CO2 25.0 06/06/2023    CALCIUM 9.4 06/06/2023    ALBUMIN 4.5 06/06/2023    LABIL2 1.6 10/15/2018    AST 25 06/06/2023    ALT 25 06/06/2023    CHOL 136 06/06/2023    TRIG 138 06/06/2023    LDL 77 06/06/2023    HDL 34 (L) 06/06/2023     Lab Results   Component Value Date    TSH 1.640 11/25/2020     Dexcom download interpretation: Dates covered was between May 31, 2023 to June 13, 2023.  Average blood sugar was 169.  He is spending 62% in the range and 35% above range and 3% below range.  His glucose graph does show some fluctuations and may be some low blood sugars between 3 PM to 10 PM.      Medication Review: Reviewed.       Current Outpatient Medications:     Alcohol Swabs 70 % pads, USE WHEN TESTING BLOOD GLUCOSE FOUR TIMES DAILY, Disp: , Rfl:     amLODIPine (NORVASC) 5 MG tablet, Take 1 tablet by mouth Daily., Disp: 90 tablet, Rfl: 3    atorvastatin (LIPITOR) 40 MG tablet, Take 0.5 tablets by mouth 2 (Two) Times a Day., Disp: 90 tablet, Rfl: 3    cetirizine (zyrTEC) 10 MG tablet, Take 1 tablet by mouth Daily As Needed for Allergies., Disp: , Rfl:     Cholecalciferol (VITAMIN D) 1000 units tablet, Take  500 Units by mouth Daily., Disp: , Rfl:     clopidogrel (PLAVIX) 75 MG tablet, Take 1 tablet by mouth Daily., Disp: 90 tablet, Rfl: 3    Coenzyme Q10 (CO Q 10 PO), Take 1 capsule by mouth Daily., Disp: , Rfl:     furosemide (LASIX) 40 MG tablet, Take 1 tablet by mouth Daily., Disp: 90 tablet, Rfl: 3    Insulin Disposable Pump (Omnipod DASH Pods, Gen 4,) misc, 1 each Continuous. Pt to change pod every 3 days, Disp: 30 each, Rfl: 3    Krill Oil 1000 MG capsule, Take 1 capsule by mouth Daily., Disp: , Rfl:     lisinopril (PRINIVIL,ZESTRIL) 20 MG tablet, Take 1 tablet by mouth 2 (Two) Times a Day., Disp: 180 tablet, Rfl: 3    metFORMIN (GLUCOPHAGE) 1000 MG tablet, TAKE 1 TABLET TWICE DAILY  WITH MEALS, Disp: 180 tablet, Rfl: 3    metoprolol succinate XL (TOPROL-XL) 25 MG 24 hr tablet, Take 2 tablets by mouth every morning and take 1 tablet by mouth every evening, Disp: 270 tablet, Rfl: 3    Multiple Vitamins-Minerals (MULTI VITAMIN/MINERALS) tablet, Take 1 tablet by mouth Daily., Disp: , Rfl:     NovoLOG 100 UNIT/ML injection, Pt to use as directed in insulin pump.  MDD: 65, Disp: 60 mL, Rfl: 3    NovoLOG 100 UNIT/ML injection, Insulin pump Pt to use as directed in insulin pump.  MDD: 65, Disp: 60 mL, Rfl: 3    vitamin C (ASCORBIC ACID) 500 MG tablet, Take 1 tablet by mouth Daily., Disp: , Rfl:     vitamin E 400 UNIT capsule, Take 1 capsule by mouth Daily., Disp: , Rfl:     warfarin (COUMADIN) 5 MG tablet, 1 tablet daily, Disp: 90 tablet, Rfl: 3          Assessment and plan:  Diabetes mellitus type 2 with Hyperglycemia: Excellent control with A1c of 6.9%.  I have reduced his basal rate from 3 PM to midnight at 0.90 units/h.  Rest of the insulin pump settings will stay as it is.  Recommend to continue to watch for low blood sugars and always keep glucose source in case of low blood sugars.  We will continue metformin.    Hypertension: Well-controlled, continue current medications.    Hyperlipidemia: Currently on  atorvastatin 40 mg p.o. daily.    Assessment and plan from December 22, 2022 reviewed and updated.           John Tolentino MD FACE.

## 2023-06-13 NOTE — PATIENT INSTRUCTIONS
Watch for low blood sugars and always keep glucose source in case of low blood sugars.  Labs before follow-up.

## 2023-06-21 PROBLEM — Z12.11 SCREENING FOR COLON CANCER: Status: ACTIVE | Noted: 2023-06-21

## 2023-07-25 ENCOUNTER — OFFICE VISIT (OUTPATIENT)
Dept: NEUROSURGERY | Facility: CLINIC | Age: 78
End: 2023-07-25
Payer: MEDICARE

## 2023-07-25 VITALS
SYSTOLIC BLOOD PRESSURE: 164 MMHG | DIASTOLIC BLOOD PRESSURE: 82 MMHG | WEIGHT: 284 LBS | HEART RATE: 60 BPM | RESPIRATION RATE: 18 BRPM | BODY MASS INDEX: 38.47 KG/M2 | HEIGHT: 72 IN

## 2023-07-25 DIAGNOSIS — M54.16 LUMBAR RADICULOPATHY, CHRONIC: Primary | ICD-10-CM

## 2023-07-25 DIAGNOSIS — M53.3 SACROILIAC JOINT DYSFUNCTION OF RIGHT SIDE: ICD-10-CM

## 2023-07-25 PROCEDURE — 3077F SYST BP >= 140 MM HG: CPT | Performed by: NEUROLOGICAL SURGERY

## 2023-07-25 PROCEDURE — 1160F RVW MEDS BY RX/DR IN RCRD: CPT | Performed by: NEUROLOGICAL SURGERY

## 2023-07-25 PROCEDURE — 3079F DIAST BP 80-89 MM HG: CPT | Performed by: NEUROLOGICAL SURGERY

## 2023-07-25 PROCEDURE — 99214 OFFICE O/P EST MOD 30 MIN: CPT | Performed by: NEUROLOGICAL SURGERY

## 2023-07-25 PROCEDURE — 1159F MED LIST DOCD IN RCRD: CPT | Performed by: NEUROLOGICAL SURGERY

## 2023-07-25 NOTE — PROGRESS NOTES
Neurosurgical Consultation      Benoit Newberry Jr. is a 77 y.o. male is here today for follow-up lumbar radiculopathy. In the office today patient reports of back pain that radiates into the right gluteal and down the right leg with weakness.     Chief Complaint   Patient presents with    Back Pain     Follow up        Previous treatment: Injection 6/30    HPI: This is a 77-year-old gentleman with atrial fibrillation, diabetes, history of deep venous thrombi and a clotting disorder as well as myocardial infarction and 2 prior coronary stents as well as pulmonary artery hypertension who was last evaluated by me on June 20, 2023.  He does have a history of significant sagittal imbalance and I believe associated SI joint dysfunction and pain secondary to this.  He did receive some relief with an SI injection in the past however at my last evaluation I felt as though he had a right-sided L4 and L5 nerve root distribution radiculopathy.  He does have significant foraminal stenosis at L4-L5 and L5-S1.  He had undergone a right-sided L4 and L5 transforaminal injection and had essentially 100% pain relief for a few days.  He has aggressively engage with physical therapy and has maximized the benefit he can receive from this.  He underwent a repeat L4-L5 selective nerve root block approximately 3 weeks ago.  For this evaluation he feels as though he received approximately 50% pain relief for approximately 2 days.    Past Medical History:   Diagnosis Date    Anemia Runs in family    Arthritis     Asymptomatic stenosis of right carotid artery     Atrial fibrillation     Bradycardia     Brain concussion     Buttock pain     rt cheek( lower)    Cataract     Clotting disorder Take blood thinner    Congenital heart disease     Coronary artery disease     Deep vein thrombosis     Diabetes mellitus, type 2     Erectile dysfunction 2005    Heart attack     x2   with stent placement     2015/2018    Hyperlipidemia     Hypertension      Hypoglycemia     Leg pain     aches    Low back pain 2014    Myocardial infarction     Obesity 45 years    Pacemaker     Pulmonary arterial hypertension 1967    Scoliosis 20 years    Sleep apnea     Visual impairment Last 15 years        Past Surgical History:   Procedure Laterality Date    ABLATION OF DYSRHYTHMIC FOCUS      APPENDECTOMY  1956    CARDIAC ABLATION  12/2018    x 1     CARDIAC CATHETERIZATION Left 02/25/2022    Procedure: Left Heart Cath and coronary angiogram;  Surgeon: Karolina Saldaña MD;  Location: [facility] CATH INVASIVE LOCATION;  Service: Cardiovascular;  Laterality: Left;    CARDIAC CATHETERIZATION  02/25/2022    Procedure: Saphenous Vein Graft;  Surgeon: Karolina Saldaña MD;  Location: [facility] CATH INVASIVE LOCATION;  Service: Cardiovascular;;    CARDIOVERSION  06/2018    Cardioversion 6/2018; x3  12/2018    CORONARY ANGIOPLASTY Left 11/04/2015    Distal left main and in the mid circumflex artery     CORONARY ANGIOPLASTY Right 11/02/2015    Right coronary artery     CORONARY ARTERY BYPASS GRAFT  03/1998    CORONARY STENT PLACEMENT      EYE SURGERY      FOOT SURGERY  2010    HERNIA REPAIR  2005    INSERT / REPLACE / REMOVE PACEMAKER      OTHER SURGICAL HISTORY  05/2019    Stent     PACEMAKER IMPLANTATION  01/2019    Dr. Saldaña     REPLACEMENT TOTAL KNEE BILATERAL  2001    SKIN BIOPSY          Current Outpatient Medications on File Prior to Visit   Medication Sig Dispense Refill    Alcohol Swabs 70 % pads USE WHEN TESTING BLOOD GLUCOSE FOUR TIMES DAILY      amLODIPine (NORVASC) 5 MG tablet Take 1 tablet by mouth Daily. 90 tablet 3    atorvastatin (LIPITOR) 40 MG tablet Take 0.5 tablets by mouth 2 (Two) Times a Day. 90 tablet 3    cetirizine (zyrTEC) 10 MG tablet Take 1 tablet by mouth Daily As Needed for Allergies.      Cholecalciferol (VITAMIN D) 1000 units tablet Take 500 Units by mouth Daily.      clopidogrel (PLAVIX) 75 MG tablet Take 1 tablet by mouth Daily. 90 tablet 3    Coenzyme Q10 (CO  Q 10 PO) Take 1 capsule by mouth Daily.      furosemide (LASIX) 40 MG tablet Take 1 tablet by mouth Daily. 90 tablet 3    Insulin Disposable Pump (Omnipod DASH Pods, Gen 4,) misc 1 each Continuous. Pt to change pod every 3 days 30 each 3    Krill Oil 1000 MG capsule Take 1 capsule by mouth Daily.      lisinopril (PRINIVIL,ZESTRIL) 20 MG tablet Take 1 tablet by mouth 2 (Two) Times a Day. 180 tablet 3    metFORMIN (GLUCOPHAGE) 1000 MG tablet Take 1 tablet by mouth 2 (Two) Times a Day With Meals. 180 tablet 3    metoprolol succinate XL (TOPROL-XL) 25 MG 24 hr tablet Take 2 tablets by mouth every morning and take 1 tablet by mouth every evening 270 tablet 3    Multiple Vitamins-Minerals (MULTI VITAMIN/MINERALS) tablet Take 1 tablet by mouth Daily.      NovoLOG 100 UNIT/ML injection Pt to use as directed in insulin pump.  MDD: 65 60 mL 3    NovoLOG 100 UNIT/ML injection Insulin pump Pt to use as directed in insulin pump.  MDD: 65 60 mL 3    vitamin C (ASCORBIC ACID) 500 MG tablet Take 1 tablet by mouth Daily.      vitamin E 400 UNIT capsule Take 1 capsule by mouth Daily.      warfarin (COUMADIN) 5 MG tablet 1 tablet daily 90 tablet 3     No current facility-administered medications on file prior to visit.        Allergies   Allergen Reactions    Vancomycin Rash        Social History     Socioeconomic History    Marital status:      Spouse name: Maude    Number of children: 3    Years of education: 14   Tobacco Use    Smoking status: Former     Packs/day: 2.00     Years: 7.00     Pack years: 14.00     Types: Cigarettes     Start date: 1965     Quit date: 12/3/1973     Years since quittin.6    Smokeless tobacco: Never    Tobacco comments:     Haven’t smoked in almost 50 years   Vaping Use    Vaping Use: Never used   Substance and Sexual Activity    Alcohol use: Not Currently     Comment: Maybe 1 drink this year    Drug use: No    Sexual activity: Not Currently     Partners: Female     Comment: 74 years old  "past time          Review of Systems   Constitutional:  Positive for activity change.   HENT: Negative.     Eyes: Negative.    Respiratory: Negative.     Cardiovascular: Negative.    Gastrointestinal: Negative.    Endocrine: Negative.    Genitourinary: Negative.    Musculoskeletal:  Positive for arthralgias, back pain and myalgias.   Skin: Negative.    Allergic/Immunologic: Negative.    Neurological:  Positive for weakness. Negative for numbness.   Psychiatric/Behavioral:  Negative for sleep disturbance.       Physical Examination:     Vitals:    07/25/23 0814   BP: 164/82   BP Location: Right arm   Patient Position: Sitting   Cuff Size: Adult   Pulse: 60   Resp: 18   Weight: 129 kg (284 lb)   Height: 182.9 cm (72\")   PainSc:   6   PainLoc: Back        Physical Exam        Vitals:    07/25/23 0814   PainSc:   6   PainLoc: Back            Neurological Exam   Neurological examination is stable compared to my last evaluation without any new red flag signs.    Result Review  The following data was reviewed by: Josh Stoll MD on 07/25/2023:    Data reviewed : Radiologic studies no new imaging reviewed today.      Assessment/plan:  This is a 77-year-old gentleman with significant health issues.  He appears to have a right-sided L4 and L5 radiculopathy as well as some component of sacroiliac joint dysfunction secondary to sagittal imbalance.  He has undergone SI joint injections as well as 2 right-sided L4 and L5 nerve root injections.  He does get significant relief with the L4 and L5 nerve root injections.  He is interested in attempting to make this relief permanent with a surgical decompression.  We will attempt to obtain surgical clearance and if he is cleared we will work towards a L4-L5 and L5-S1 right-sided laminotomy and foraminotomies.  I have encouraged him to call with any questions or concerns.    Diagnoses and all orders for this visit:    1. Lumbar radiculopathy, chronic (Primary)    2. Sacroiliac joint " dysfunction of right side         No follow-ups on file.            Josh Stoll MD

## 2023-08-04 ENCOUNTER — ANTICOAGULATION VISIT (OUTPATIENT)
Dept: CARDIOLOGY | Facility: CLINIC | Age: 78
End: 2023-08-04
Payer: MEDICARE

## 2023-08-04 DIAGNOSIS — I48.0 PAROXYSMAL ATRIAL FIBRILLATION: Primary | ICD-10-CM

## 2023-08-04 DIAGNOSIS — Z79.01 LONG TERM (CURRENT) USE OF ANTICOAGULANTS: ICD-10-CM

## 2023-08-04 LAB — INR PPP: 2.1

## 2023-08-12 LAB — INR PPP: 3.1

## 2023-08-14 ENCOUNTER — ANTICOAGULATION VISIT (OUTPATIENT)
Dept: CARDIOLOGY | Facility: CLINIC | Age: 78
End: 2023-08-14
Payer: MEDICARE

## 2023-08-14 DIAGNOSIS — I48.0 PAROXYSMAL ATRIAL FIBRILLATION: Primary | ICD-10-CM

## 2023-08-14 DIAGNOSIS — Z79.01 LONG TERM (CURRENT) USE OF ANTICOAGULANTS: ICD-10-CM

## 2023-08-21 ENCOUNTER — ANTICOAGULATION VISIT (OUTPATIENT)
Dept: CARDIOLOGY | Facility: CLINIC | Age: 78
End: 2023-08-21
Payer: MEDICARE

## 2023-08-21 DIAGNOSIS — Z79.01 LONG TERM (CURRENT) USE OF ANTICOAGULANTS: ICD-10-CM

## 2023-08-21 DIAGNOSIS — I48.0 PAROXYSMAL ATRIAL FIBRILLATION: Primary | ICD-10-CM

## 2023-08-21 LAB — INR PPP: 2.9

## 2023-08-25 ENCOUNTER — ANTICOAGULATION VISIT (OUTPATIENT)
Dept: CARDIOLOGY | Facility: CLINIC | Age: 78
End: 2023-08-25
Payer: MEDICARE

## 2023-08-25 DIAGNOSIS — Z79.01 LONG TERM (CURRENT) USE OF ANTICOAGULANTS: ICD-10-CM

## 2023-08-25 DIAGNOSIS — I48.0 PAROXYSMAL ATRIAL FIBRILLATION: Primary | ICD-10-CM

## 2023-08-25 LAB — INR PPP: 1.8

## 2023-09-04 PROCEDURE — 93294 REM INTERROG EVL PM/LDLS PM: CPT | Performed by: INTERNAL MEDICINE

## 2023-09-04 PROCEDURE — 93296 REM INTERROG EVL PM/IDS: CPT | Performed by: INTERNAL MEDICINE

## 2023-09-05 ENCOUNTER — ANTICOAGULATION VISIT (OUTPATIENT)
Dept: CARDIOLOGY | Facility: CLINIC | Age: 78
End: 2023-09-05
Payer: MEDICARE

## 2023-09-05 DIAGNOSIS — Z79.01 LONG TERM (CURRENT) USE OF ANTICOAGULANTS: ICD-10-CM

## 2023-09-05 DIAGNOSIS — I48.0 PAROXYSMAL ATRIAL FIBRILLATION: Primary | ICD-10-CM

## 2023-09-05 LAB — INR PPP: 2 (ref 2–3)

## 2023-09-08 ENCOUNTER — ANTICOAGULATION VISIT (OUTPATIENT)
Dept: CARDIOLOGY | Facility: CLINIC | Age: 78
End: 2023-09-08
Payer: MEDICARE

## 2023-09-08 DIAGNOSIS — I48.0 PAROXYSMAL ATRIAL FIBRILLATION: Primary | ICD-10-CM

## 2023-09-08 DIAGNOSIS — Z79.01 LONG TERM (CURRENT) USE OF ANTICOAGULANTS: ICD-10-CM

## 2023-09-08 LAB — INR PPP: 1.9

## 2023-09-11 ENCOUNTER — LAB (OUTPATIENT)
Dept: LAB | Facility: HOSPITAL | Age: 78
End: 2023-09-11
Payer: MEDICARE

## 2023-09-11 DIAGNOSIS — E11.65 TYPE 2 DIABETES MELLITUS WITH HYPERGLYCEMIA, WITH LONG-TERM CURRENT USE OF INSULIN: ICD-10-CM

## 2023-09-11 DIAGNOSIS — Z79.4 TYPE 2 DIABETES MELLITUS WITH HYPERGLYCEMIA, WITH LONG-TERM CURRENT USE OF INSULIN: ICD-10-CM

## 2023-09-11 LAB
ALBUMIN SERPL-MCNC: 4.3 G/DL (ref 3.5–5.2)
ALBUMIN UR-MCNC: 5.2 MG/DL
ALBUMIN/GLOB SERPL: 1.8 G/DL
ALP SERPL-CCNC: 56 U/L (ref 39–117)
ALT SERPL W P-5'-P-CCNC: 18 U/L (ref 1–41)
ANION GAP SERPL CALCULATED.3IONS-SCNC: 11.1 MMOL/L (ref 5–15)
AST SERPL-CCNC: 23 U/L (ref 1–40)
BILIRUB SERPL-MCNC: 0.4 MG/DL (ref 0–1.2)
BUN SERPL-MCNC: 20 MG/DL (ref 8–23)
BUN/CREAT SERPL: 19 (ref 7–25)
CALCIUM SPEC-SCNC: 9.6 MG/DL (ref 8.6–10.5)
CHLORIDE SERPL-SCNC: 103 MMOL/L (ref 98–107)
CHOLEST SERPL-MCNC: 134 MG/DL (ref 0–200)
CO2 SERPL-SCNC: 26.9 MMOL/L (ref 22–29)
CREAT SERPL-MCNC: 1.05 MG/DL (ref 0.76–1.27)
CREAT UR-MCNC: 57.4 MG/DL
EGFRCR SERPLBLD CKD-EPI 2021: 74 ML/MIN/1.73
GLOBULIN UR ELPH-MCNC: 2.4 GM/DL
GLUCOSE SERPL-MCNC: 114 MG/DL (ref 65–99)
HBA1C MFR BLD: 6.6 % (ref 4.8–5.6)
HDLC SERPL-MCNC: 38 MG/DL (ref 40–60)
LDLC SERPL CALC-MCNC: 80 MG/DL (ref 0–100)
LDLC/HDLC SERPL: 2.08 {RATIO}
MICROALBUMIN/CREAT UR: 90.6 MG/G
POTASSIUM SERPL-SCNC: 4.7 MMOL/L (ref 3.5–5.2)
PROT SERPL-MCNC: 6.7 G/DL (ref 6–8.5)
SODIUM SERPL-SCNC: 141 MMOL/L (ref 136–145)
TRIGL SERPL-MCNC: 84 MG/DL (ref 0–150)
VLDLC SERPL-MCNC: 16 MG/DL (ref 5–40)

## 2023-09-11 PROCEDURE — 82570 ASSAY OF URINE CREATININE: CPT

## 2023-09-11 PROCEDURE — 80053 COMPREHEN METABOLIC PANEL: CPT

## 2023-09-11 PROCEDURE — 82043 UR ALBUMIN QUANTITATIVE: CPT

## 2023-09-11 PROCEDURE — 80061 LIPID PANEL: CPT

## 2023-09-11 PROCEDURE — 36415 COLL VENOUS BLD VENIPUNCTURE: CPT

## 2023-09-11 PROCEDURE — 83036 HEMOGLOBIN GLYCOSYLATED A1C: CPT

## 2023-09-18 ENCOUNTER — OFFICE VISIT (OUTPATIENT)
Dept: ENDOCRINOLOGY | Facility: CLINIC | Age: 78
End: 2023-09-18
Payer: MEDICARE

## 2023-09-18 VITALS
OXYGEN SATURATION: 98 % | HEART RATE: 60 BPM | BODY MASS INDEX: 38.19 KG/M2 | HEIGHT: 72 IN | WEIGHT: 282 LBS | SYSTOLIC BLOOD PRESSURE: 160 MMHG | DIASTOLIC BLOOD PRESSURE: 85 MMHG

## 2023-09-18 DIAGNOSIS — E78.2 MIXED HYPERLIPIDEMIA: ICD-10-CM

## 2023-09-18 DIAGNOSIS — E11.65 TYPE 2 DIABETES MELLITUS WITH HYPERGLYCEMIA, WITH LONG-TERM CURRENT USE OF INSULIN: Primary | ICD-10-CM

## 2023-09-18 DIAGNOSIS — Z79.4 TYPE 2 DIABETES MELLITUS WITH HYPERGLYCEMIA, WITH LONG-TERM CURRENT USE OF INSULIN: Primary | ICD-10-CM

## 2023-09-18 DIAGNOSIS — I10 PRIMARY HYPERTENSION: ICD-10-CM

## 2023-09-18 PROCEDURE — 3077F SYST BP >= 140 MM HG: CPT | Performed by: INTERNAL MEDICINE

## 2023-09-18 PROCEDURE — 95251 CONT GLUC MNTR ANALYSIS I&R: CPT | Performed by: INTERNAL MEDICINE

## 2023-09-18 PROCEDURE — 1159F MED LIST DOCD IN RCRD: CPT | Performed by: INTERNAL MEDICINE

## 2023-09-18 PROCEDURE — 1160F RVW MEDS BY RX/DR IN RCRD: CPT | Performed by: INTERNAL MEDICINE

## 2023-09-18 PROCEDURE — 3079F DIAST BP 80-89 MM HG: CPT | Performed by: INTERNAL MEDICINE

## 2023-09-18 PROCEDURE — 3044F HG A1C LEVEL LT 7.0%: CPT | Performed by: INTERNAL MEDICINE

## 2023-09-18 PROCEDURE — 99214 OFFICE O/P EST MOD 30 MIN: CPT | Performed by: INTERNAL MEDICINE

## 2023-09-18 RX ORDER — INSULIN PMP CART,AUT,G6/7,CNTR
1 EACH SUBCUTANEOUS DAILY
Qty: 30 EACH | Refills: 6 | Status: SHIPPED | OUTPATIENT
Start: 2023-09-18

## 2023-09-18 RX ORDER — INSULIN PMP CART,AUT,G6/7,CNTR
1 EACH SUBCUTANEOUS DAILY
Qty: 1 KIT | Refills: 0 | Status: SHIPPED | OUTPATIENT
Start: 2023-09-18

## 2023-09-18 RX ORDER — LISINOPRIL 40 MG/1
TABLET ORAL
Qty: 90 TABLET | Refills: 3 | Status: SHIPPED | OUTPATIENT
Start: 2023-09-18

## 2023-09-18 NOTE — PATIENT INSTRUCTIONS
Change lisinopril to 40 mg p.o. daily  I did send a prescription for OmniPod 5 system to replace your OmniPod Dash, if you received OmniPod 5 then change that insulin pump to OmniPod 5  Continue to use Dexcom monitoring system  Always keep glucose source in case of low blood sugar  Continue to work on your diet and activity  Labs before follow-up.

## 2023-09-18 NOTE — PROGRESS NOTES
Endocrine Progress Note Outpatient     Patient Care Team:  Yasmine Live MD as PCP - General (Internal Medicine)  Karolina Saldaña MD as Consulting Physician (Cardiology)    Chief Complaint: Follow-up type 2 diabetes    HPI: This is a 77-year-old male history of type 2 diabetes, hypertension, hyperlipidemia, CAD and obesity is here for follow-up.    For type 2 diabetes: He is now on Omni pod Dash insulin pump along with Dexcom monitoring system.  He has been using insulin pump since February 2022.  He is using NovoLog insulin the pump.  His current pump settings are as follows basal rate midnight is 1.0 units/h.  At 3 PM is 0.90 units/h.  His insulin carb ratio is 1 unit for each 4 g of carbohydrate.  Correction scale is 1 unit for each 30 mg blood sugar with a target blood sugar of 140 and active insulin is 4 hours.  Also on metformin 1000 mg twice a day.    Hypertension: Fair control    Hyperlipidemia: Currently on atorvastatin.    Past Medical History:   Diagnosis Date    Anemia Runs in family    Arthritis     Asymptomatic stenosis of right carotid artery     Atrial fibrillation     Bradycardia     Brain concussion     Buttock pain     rt cheek( lower)    Cataract     Clotting disorder Take blood thinner    Congenital heart disease     Coronary artery disease     Deep vein thrombosis     Diabetes mellitus, type 2     Erectile dysfunction 2005    Heart attack     x2   with stent placement     2015/2018    Hyperlipidemia     Hypertension     Hypoglycemia     Leg pain     aches    Low back pain 2014    Myocardial infarction     Obesity 45 years    Pacemaker     Pulmonary arterial hypertension 1967    Scoliosis 20 years    Sleep apnea     Visual impairment Last 15 years       Social History     Socioeconomic History    Marital status:      Spouse name: Maude    Number of children: 3    Years of education: 14   Tobacco Use    Smoking status: Former     Packs/day: 2.00     Years: 7.00     Pack years:  14.00     Types: Cigarettes     Start date: 1965     Quit date: 12/3/1973     Years since quittin.8    Smokeless tobacco: Never    Tobacco comments:     Haven't smoked in almost 50 years   Vaping Use    Vaping Use: Never used   Substance and Sexual Activity    Alcohol use: Not Currently     Comment: Maybe 1 drink this year    Drug use: No    Sexual activity: Not Currently     Partners: Female     Comment: 72 years old past time       Family History   Problem Relation Age of Onset    Heart disease Mother          of heart failure age of 84    Hypertension Mother     Hyperlipidemia Mother     Arthritis Mother     Anemia Mother     Obesity Mother     Heart disease Father          of heart attack age of 68    Hypertension Father     Hyperlipidemia Father     Alcohol abuse Father     Anemia Father     Cancer Sister     Heart disease Sister         Heart problems    Hypertension Sister     Hyperlipidemia Sister     Diabetes Sister     Anemia Sister     Obesity Sister     Heart disease Brother         Heart problems two different times with heart problems    Hypertension Brother     Hyperlipidemia Brother     Alcohol abuse Brother     Diabetes Brother     Anemia Brother     Obesity Brother     Diabetes Maternal Grandmother     Obesity Maternal Grandmother     Stroke Maternal Grandfather     Obesity Maternal Grandfather        Allergies   Allergen Reactions    Vancomycin Rash       ROS:   Constitutional:  Denies fatigue, tiredness.    Eyes:  Denies change in visual acuity   HENT:  Denies nasal congestion or sore throat   Respiratory: denies cough, shortness of breath.   Cardiovascular:  denies chest pain, edema   GI:  Denies abdominal pain, nausea, vomiting.   Musculoskeletal:  Denies back pain or joint pain   Integument:  Denies dry skin and rash   Neurologic:  Denies headache, focal weakness or sensory changes   Endocrine:  Denies polyuria or polydipsia   Psychiatric:  Denies depression or  anxiety      Vitals:    09/18/23 0918   BP: 160/85   Pulse: 60   SpO2: 98%     Body mass index is 38.25 kg/m².     Physical Exam:  GEN: NAD, conversant  EYES: EOMI,   NECK: no thyromegaly  CV: RRR,  LUNG: CTA  PSYCH: AOX3, appropriate mood, affect normal      Results Review:     I reviewed the patient's new clinical results.    Lab Results   Component Value Date    HGBA1C 6.60 (H) 09/11/2023    HGBA1C 6.90 (H) 06/06/2023    HGBA1C 6.4 (H) 12/13/2022      Lab Results   Component Value Date    GLUCOSE 114 (H) 09/11/2023    BUN 20 09/11/2023    CREATININE 1.05 09/11/2023    EGFRIFNONA 74 02/24/2022    BCR 19.0 09/11/2023    K 4.7 09/11/2023    CO2 26.9 09/11/2023    CALCIUM 9.6 09/11/2023    ALBUMIN 4.3 09/11/2023    LABIL2 1.6 10/15/2018    AST 23 09/11/2023    ALT 18 09/11/2023    CHOL 134 09/11/2023    TRIG 84 09/11/2023    LDL 80 09/11/2023    HDL 38 (L) 09/11/2023     Lab Results   Component Value Date    TSH 1.640 11/25/2020     Dexcom download interpretation: Dates covered was between September 5, 2023 to September 18, 2020.  Average blood sugar is 134.  He is spending 92% in the range, 7% above range and 1% below range blood glucose graph does not show significant fluctuations.      Medication Review: Reviewed.       Current Outpatient Medications:     Alcohol Swabs 70 % pads, USE WHEN TESTING BLOOD GLUCOSE FOUR TIMES DAILY, Disp: , Rfl:     amLODIPine (NORVASC) 5 MG tablet, Take 1 tablet by mouth Daily., Disp: 90 tablet, Rfl: 3    atorvastatin (LIPITOR) 40 MG tablet, Take 0.5 tablets by mouth 2 (Two) Times a Day., Disp: 90 tablet, Rfl: 3    cetirizine (zyrTEC) 10 MG tablet, Take 1 tablet by mouth Daily As Needed for Allergies., Disp: , Rfl:     Cholecalciferol (VITAMIN D) 1000 units tablet, Take 0.5 tablets by mouth Daily., Disp: , Rfl:     clopidogrel (PLAVIX) 75 MG tablet, Take 1 tablet by mouth Daily., Disp: 90 tablet, Rfl: 3    Coenzyme Q10 (CO Q 10 PO), Take 1 capsule by mouth Daily., Disp: , Rfl:      furosemide (LASIX) 40 MG tablet, Take 1 tablet by mouth Daily., Disp: 90 tablet, Rfl: 3    Insulin Disposable Pump (Omnipod DASH Pods, Gen 4,) misc, 1 each Continuous. Pt to change pod every 3 days, Disp: 30 each, Rfl: 3    Krill Oil 1000 MG capsule, Take 1 capsule by mouth Daily., Disp: , Rfl:     lisinopril (PRINIVIL,ZESTRIL) 20 MG tablet, Take 1 tablet by mouth 2 (Two) Times a Day., Disp: 180 tablet, Rfl: 3    metFORMIN (GLUCOPHAGE) 1000 MG tablet, Take 1 tablet by mouth 2 (Two) Times a Day With Meals., Disp: 180 tablet, Rfl: 3    metoprolol succinate XL (TOPROL-XL) 25 MG 24 hr tablet, Take 2 tablets by mouth every morning and take 1 tablet by mouth every evening, Disp: 270 tablet, Rfl: 3    Multiple Vitamins-Minerals (MULTI VITAMIN/MINERALS) tablet, Take 1 tablet by mouth Daily., Disp: , Rfl:     NovoLOG 100 UNIT/ML injection, Pt to use as directed in insulin pump.  MDD: 65, Disp: 60 mL, Rfl: 3    NovoLOG 100 UNIT/ML injection, Insulin pump Pt to use as directed in insulin pump.  MDD: 65, Disp: 60 mL, Rfl: 3    vitamin C (ASCORBIC ACID) 500 MG tablet, Take 1 tablet by mouth Daily., Disp: , Rfl:     vitamin E 400 UNIT capsule, Take 1 capsule by mouth Daily., Disp: , Rfl:     warfarin (COUMADIN) 5 MG tablet, 1 tablet daily, Disp: 90 tablet, Rfl: 3          Assessment and plan:  Diabetes mellitus type 2 with Hyperglycemia: Overall doing well with A1c at 6.6%.  No significant issues with low blood sugars.  We will continue current insulin pump settings however he will definitely benefit from OmniPod Dash system.  Recommend to contact Om[employer] and see if he is eligible to upgrade.  For now he will continue his current insulin pump settings with Dexcom monitoring system.  Advised to always keep glucose source in case of low blood sugars.    Hypertension: Uncontrolled, will change lisinopril to 40 mg once a day and follow blood pressure.    Hyperlipidemia: Currently on atorvastatin 40 mg p.o. daily.    Assessment and  plan from June 13, 2023 reviewed and updated.           John Tolentino MD FACE.

## 2023-09-28 ENCOUNTER — ANTICOAGULATION VISIT (OUTPATIENT)
Dept: CARDIOLOGY | Facility: CLINIC | Age: 78
End: 2023-09-28
Payer: MEDICARE

## 2023-09-28 DIAGNOSIS — I48.0 PAROXYSMAL ATRIAL FIBRILLATION: Primary | ICD-10-CM

## 2023-09-28 DIAGNOSIS — Z79.01 LONG TERM (CURRENT) USE OF ANTICOAGULANTS: ICD-10-CM

## 2023-09-28 LAB — INR PPP: 2.9

## 2023-10-16 ENCOUNTER — ANTICOAGULATION VISIT (OUTPATIENT)
Dept: CARDIOLOGY | Facility: CLINIC | Age: 78
End: 2023-10-16
Payer: MEDICARE

## 2023-10-16 ENCOUNTER — TELEPHONE (OUTPATIENT)
Dept: CARDIOLOGY | Facility: CLINIC | Age: 78
End: 2023-10-16
Payer: MEDICARE

## 2023-10-16 ENCOUNTER — TELEPHONE (OUTPATIENT)
Dept: NEUROSURGERY | Facility: OTHER | Age: 78
End: 2023-10-16
Payer: MEDICARE

## 2023-10-16 DIAGNOSIS — I48.0 PAROXYSMAL ATRIAL FIBRILLATION: Primary | ICD-10-CM

## 2023-10-16 DIAGNOSIS — Z79.01 LONG TERM (CURRENT) USE OF ANTICOAGULANTS: ICD-10-CM

## 2023-10-16 LAB — INR PPP: 5.1

## 2023-10-16 NOTE — TELEPHONE ENCOUNTER
"  Caller: Benoit Newberry Jr. \"Elias or Kemal\"    Relationship: Self    Best call back number: [phone]    What is the best time to reach you: ANYTIME IS OKAY- OKAY TO [facility].    What was the call regarding: PATIENT CALLED IN AND STATED THAT HE WAS SUPPOSED TO GET A CALL AROUND 7-8 WEEKS TO GET SURGERY SCHEDULED- AND PATIENT STATED IT HAS BEEN 12 WEEKS SINCE HE HAS SEEN DR ZENG ( 7-25-23) - PATIENT INQUIRING FOR AN UPDATE- PLEASE ADVISE- THANK YOU.     "

## 2023-10-16 NOTE — TELEPHONE ENCOUNTER
"  Caller: Benoit Newberry Jr. \"Elias or Kemal\"    Relationship: Self    Best call back number:[phone]    What is the best time to reach you: ANY    Who are you requesting to speak with (clinical staff, provider,  specific staff member): MOON / MELVIN     What was the call regarding: WANTS TO DISCUSS INR     "

## 2023-10-16 NOTE — TELEPHONE ENCOUNTER
Called patient back and let him know that Dr. Stoll said that we can see him in the office due to his pain. He said that it seems to be getting better and I told him if he wanted we could wait a couple days to see if his pain gets better and if it does not improve then he can call the office to get scheduled to see Dr. Stoll. Patient is agreeable to this plan and verbalized understanding.

## 2023-10-16 NOTE — TELEPHONE ENCOUNTER
Patient called the office stating he has been waiting to hear back about his surgery. He also stated that he went on vacation to [place] and that on the 14-15 hour car ride his back started hurting and during vacation it hurt so bad that they flew home early. He is not having any numbness, but occasional weakness in his legs like he is going to fall. He has not had any falls. He used his electric wheel chair during vacation but was still in pain. He is having trouble moving around. I informed the patient that we are working on scheduling his surgery and that I will send a message over to Dr. Stoll to discuss his pain. He verbalized understanding.

## 2023-10-16 NOTE — TELEPHONE ENCOUNTER
PT/INR result addressed under anticoagulation encounter.       See Anticoagulation encounter for plan of care.

## 2023-10-17 ENCOUNTER — TELEPHONE (OUTPATIENT)
Dept: CARDIOLOGY | Facility: CLINIC | Age: 78
End: 2023-10-17
Payer: MEDICARE

## 2023-10-17 NOTE — TELEPHONE ENCOUNTER
FACILITY: [facility]  DR: Josh Stoll  PHONE: [phone]  FAX: [phone]  PROCEDURE: Spine surgery  SCHEDULED: tbd  MEDS TO HOLD: plavix and warfarin    Left paperwork on Dr Saldaña's desk

## 2023-10-18 PROCEDURE — 87070 CULTURE OTHR SPECIMN AEROBIC: CPT | Performed by: NURSE PRACTITIONER

## 2023-10-18 PROCEDURE — 87205 SMEAR GRAM STAIN: CPT | Performed by: NURSE PRACTITIONER

## 2023-10-18 NOTE — TELEPHONE ENCOUNTER
Clearance approved to stop plavix and warfarin 4-5 days prior to surgery    Faxed and placed to scan in the chart.

## 2023-10-19 ENCOUNTER — TELEPHONE (OUTPATIENT)
Dept: CARDIOLOGY | Facility: CLINIC | Age: 78
End: 2023-10-19
Payer: MEDICARE

## 2023-10-19 NOTE — TELEPHONE ENCOUNTER
Called spoke with pt he went to urgent care for hematoma vs abscess of his lower ext. Pt had area lanced in Urgent care and is now on Keflex. Pt will follow with PCP tomorrow. Advised Check INR tomorrow morning and call to report instead of waiting until Wednesday [place]

## 2023-10-20 ENCOUNTER — ANTICOAGULATION VISIT (OUTPATIENT)
Dept: CARDIOLOGY | Facility: CLINIC | Age: 78
End: 2023-10-20
Payer: MEDICARE

## 2023-10-20 DIAGNOSIS — I48.0 PAROXYSMAL ATRIAL FIBRILLATION: Primary | ICD-10-CM

## 2023-10-20 DIAGNOSIS — M54.16 LUMBAR RADICULOPATHY, CHRONIC: Primary | ICD-10-CM

## 2023-10-20 DIAGNOSIS — Z79.01 LONG TERM (CURRENT) USE OF ANTICOAGULANTS: ICD-10-CM

## 2023-10-20 LAB — INR PPP: 2

## 2023-10-22 NOTE — PROGRESS NOTES
Encounter Date:11/07/2023    Last seen 4/25/2023      Patient ID: Benoit Newberry Jr. is a 77 y.o. male.    Chief Complaint:  Status post CABG  Status post stent  Anticoagulation management.  History of atrial fibrillation  Status post pacemaker     History of Present Illness  Since I have last seen, the patient has been without any chest discomfort ,shortness of breath, palpitations, dizziness or syncope.  Denies having any headache ,abdominal pain ,nausea, vomiting , diarrhea constipation, loss of weight or loss of appetite.  Denies having any excessive bruising ,hematuria or blood in the stool.    Review of all systems negative except as indicated.    Reviewed ROS.    Assessment and Plan         [[[]]]]]]]]]]]]]]]]]]]]  History  ===================     -status post permanent dual-chamber pacemaker implantation (Jangl SMS  MRI compatible) 12/04/2018      - atrial fibrillation.   status post Ablation 10/16/2018  Patient has converted to sinus  sinus bradycardia.   Patient had a recurrence of atrial fibrillation.  Status post cardioversion 06/13/2018 and 08/01/2018 09/05/2018.  Patient has converted but did not stay in sinus rhythm.  Patient was on Tikosyn but off now due to maintaining sinus rhythm.     -anticoagulation Patient is competent.      -status post CABG  3/98      -Acute inferior myocardial infarction.  Status post stent placement to SVG to RCA for total occlusion .  11/02/2015   - Status post stent placement to totally occluded SVG to RCA11/02/2015 and stent placement to left main and mid circumflex coronary artery 11/04/2015.  Status post stent to PDA distal to SVG and native LAD beyond LIMA.  05/25/2018     Cardiac catheterization February 25, 2022 revealed  Left ventricle angiogram was not performed due to difficulty in crossing the aortic valve.  Left main coronary artery is normal.  Left anterior descending artery is totally occluded in the proximal segment and LIMA is filling the  LAD.  Circumflex coronary artery has proximal 20 to 30% disease.  Ramus intermedius is totally occluded.  Filling from SVG.  Right coronary artery is totally occluded in the proximal segment.  LIMA to LAD is patent.  SVG to diagonal branch is patent.  SVG to ramus intermedius is patent.  SVG to PDA is patent      - diabetes dyslipidemia and hypertension   - status post impending inferior myocardial infarction prior to surgery    -family history of coronary artery disease   - History of asymptomatic right carotid bruit.  ===   Plan  ===  Status post CABG.  Patient is not having any angina pectoris or congestive heart failure.     Anticoagulation status reviewed.  Continue Coumadin.   PT/INR on a monthly basis.     Elevated blood pressure.  165/76  Increase metoprolol to 2 tablets of 25 mg twice daily.     History of atrial fibrillation-patient is maintaining sinus rhythm.  Patient is off Tikosyn.  Patient did not have any recurrence of atrial fibrillation.  EKG- atrial sensed ventricular paced rhythm-4/25/2023  Continue to hold Tikosyn since patient has been maintaining sinus rhythm without Tikosyn at this time.      Status post pacemaker.  Interrogation of the pacemaker revealed excellent pacing parameters.  Battery status is 3.5 years.  Pacemaker site looks normal.     Follow-up in the office in 6 months with pacemaker interrogation.     Patient is contemplating on having back surgery in December.     Further plan will depend on patient's progress    Reviewed and updated-11/7/2023.  ]]]]]]]]]]]]]]]                              Diagnosis Plan   1. Hx of CABG        2. Paroxysmal atrial fibrillation        3. Tachycardia-bradycardia        4. Long term (current) use of anticoagulants        5. Presence of cardiac pacemaker        6. Essential hypertension        7. Chronic anticoagulation        8. Status post coronary artery stent placement        9. Longstanding persistent atrial fibrillation        10. Mixed  hyperlipidemia        11. Chronic coronary artery disease        12. Atrial fibrillation, unspecified type        LAB RESULTS (LAST 7 DAYS)    CBC        BMP        CMP         BNP        TROPONIN        CoAg          Creatinine Clearance  CrCl cannot be calculated (Patient's most recent lab result is older than the maximum 30 days allowed.).    ABG        Radiology  No radiology results for the last day                The following portions of the patient's history were reviewed and updated as appropriate: allergies, current medications, past family history, past medical history, past social history, past surgical history, and problem list.    Review of Systems   Constitutional: Negative for malaise/fatigue.   Cardiovascular:  Negative for chest pain, leg swelling, palpitations and syncope.   Respiratory:  Negative for shortness of breath.    Skin:  Negative for rash.   Gastrointestinal:  Negative for nausea and vomiting.   Neurological:  Negative for dizziness, light-headedness and numbness.   All other systems reviewed and are negative.        Current Outpatient Medications:     Alcohol Swabs 70 % pads, USE WHEN TESTING BLOOD GLUCOSE FOUR TIMES DAILY, Disp: , Rfl:     amLODIPine (NORVASC) 5 MG tablet, Take 1 tablet by mouth Daily., Disp: 90 tablet, Rfl: 3    atorvastatin (LIPITOR) 40 MG tablet, Take 0.5 tablets by mouth 2 (Two) Times a Day., Disp: 90 tablet, Rfl: 3    cetirizine (zyrTEC) 10 MG tablet, Take 1 tablet by mouth Daily As Needed for Allergies., Disp: , Rfl:     Cholecalciferol (VITAMIN D) 1000 units tablet, Take 0.5 tablets by mouth Daily., Disp: , Rfl:     clopidogrel (PLAVIX) 75 MG tablet, Take 1 tablet by mouth Daily., Disp: 90 tablet, Rfl: 3    Coenzyme Q10 (CO Q 10 PO), Take 1 capsule by mouth Daily., Disp: , Rfl:     furosemide (LASIX) 40 MG tablet, Take 1 tablet by mouth Daily., Disp: 90 tablet, Rfl: 3    Insulin Disposable Pump (Omnipod 5 G6 Intro, Gen 5,) kit, Use 1 package Daily., Disp: 1  kit, Rfl: 0    Insulin Disposable Pump (Omnipod 5 G6 Pod, Gen 5,) misc, Use 1 package Daily., Disp: 30 each, Rfl: 6    Krill Oil 1000 MG capsule, Take 1 capsule by mouth Daily., Disp: , Rfl:     lisinopril (PRINIVIL,ZESTRIL) 40 MG tablet, 1 tablet p.o. daily, Disp: 90 tablet, Rfl: 3    metFORMIN (GLUCOPHAGE) 1000 MG tablet, Take 1 tablet by mouth 2 (Two) Times a Day With Meals., Disp: 180 tablet, Rfl: 3    metoprolol succinate XL (TOPROL-XL) 25 MG 24 hr tablet, Take 2 tablets by mouth every morning and take 1 tablet by mouth every evening, Disp: 270 tablet, Rfl: 3    Multiple Vitamins-Minerals (MULTI VITAMIN/MINERALS) tablet, Take 1 tablet by mouth Daily., Disp: , Rfl:     NovoLOG 100 UNIT/ML injection, Pt to use as directed in insulin pump.  MDD: 65, Disp: 60 mL, Rfl: 3    NovoLOG 100 UNIT/ML injection, Insulin pump Pt to use as directed in insulin pump.  MDD: 65, Disp: 60 mL, Rfl: 3    vitamin C (ASCORBIC ACID) 500 MG tablet, Take 1 tablet by mouth Daily., Disp: , Rfl:     vitamin E 400 UNIT capsule, Take 1 capsule by mouth Daily., Disp: , Rfl:     warfarin (COUMADIN) 5 MG tablet, 1 tablet daily, Disp: 90 tablet, Rfl: 3    Allergies   Allergen Reactions    Vancomycin Rash       Family History   Problem Relation Age of Onset    Heart disease Mother          of heart failure age of 84    Hypertension Mother     Hyperlipidemia Mother     Arthritis Mother     Anemia Mother     Obesity Mother     Heart disease Father          of heart attack age of 68    Hypertension Father     Hyperlipidemia Father     Alcohol abuse Father     Anemia Father     Cancer Sister     Heart disease Sister         Heart problems    Hypertension Sister     Hyperlipidemia Sister     Diabetes Sister     Anemia Sister     Obesity Sister     Heart disease Brother         Heart problems two different times with heart problems    Hypertension Brother     Hyperlipidemia Brother     Alcohol abuse Brother     Diabetes Brother     Anemia  Brother     Obesity Brother     Diabetes Maternal Grandmother     Obesity Maternal Grandmother     Stroke Maternal Grandfather     Obesity Maternal Grandfather        Past Surgical History:   Procedure Laterality Date    ABLATION OF DYSRHYTHMIC FOCUS      APPENDECTOMY  1956    CARDIAC ABLATION  12/2018    x 1     CARDIAC CATHETERIZATION Left 02/25/2022    Procedure: Left Heart Cath and coronary angiogram;  Surgeon: Karolina Saldaña MD;  Location: [facility] CATH INVASIVE LOCATION;  Service: Cardiovascular;  Laterality: Left;    CARDIAC CATHETERIZATION  02/25/2022    Procedure: Saphenous Vein Graft;  Surgeon: Karolina Saldaña MD;  Location: [facility] CATH INVASIVE LOCATION;  Service: Cardiovascular;;    CARDIOVERSION  06/2018    Cardioversion 6/2018; x3  12/2018    CORONARY ANGIOPLASTY Left 11/04/2015    Distal left main and in the mid circumflex artery     CORONARY ANGIOPLASTY Right 11/02/2015    Right coronary artery     CORONARY ARTERY BYPASS GRAFT  03/1998    CORONARY STENT PLACEMENT      EYE SURGERY      FOOT SURGERY  2010    HERNIA REPAIR  2005    INSERT / REPLACE / REMOVE PACEMAKER      OTHER SURGICAL HISTORY  05/2019    Stent     PACEMAKER IMPLANTATION  01/2019    Dr. Saldaña     REPLACEMENT TOTAL KNEE BILATERAL  2001    SKIN BIOPSY         Past Medical History:   Diagnosis Date    Anemia Runs in family    Arthritis     Asymptomatic stenosis of right carotid artery     Atrial fibrillation     Bradycardia     Brain concussion     Buttock pain     rt cheek( lower)    Cataract     Clotting disorder Take blood thinner    Congenital heart disease     Coronary artery disease     Deep vein thrombosis     Diabetes mellitus 08/25/2011    Diabetes mellitus, type 2     Erectile dysfunction 2005    Heart attack     x2   with stent placement     2015/2018    Hyperlipidemia     Hypertension     Hypoglycemia     Leg pain     aches    Low back pain 2014    Lumbar radiculopathy, chronic 10/20/2023    Myocardial infarction      Nonintractable headache 2022    Obesity 45 years    Other cervical disc degeneration at C6-C7 level 2017    Pacemaker     Pulmonary arterial hypertension 1967    Scoliosis 20 years    Sleep apnea     Visual impairment Last 15 years       Family History   Problem Relation Age of Onset    Heart disease Mother          of heart failure age of 84    Hypertension Mother     Hyperlipidemia Mother     Arthritis Mother     Anemia Mother     Obesity Mother     Heart disease Father          of heart attack age of 68    Hypertension Father     Hyperlipidemia Father     Alcohol abuse Father     Anemia Father     Cancer Sister     Heart disease Sister         Heart problems    Hypertension Sister     Hyperlipidemia Sister     Diabetes Sister     Anemia Sister     Obesity Sister     Heart disease Brother         Heart problems two different times with heart problems    Hypertension Brother     Hyperlipidemia Brother     Alcohol abuse Brother     Diabetes Brother     Anemia Brother     Obesity Brother     Diabetes Maternal Grandmother     Obesity Maternal Grandmother     Stroke Maternal Grandfather     Obesity Maternal Grandfather        Social History     Socioeconomic History    Marital status:      Spouse name: Maude    Number of children: 3    Years of education: 14   Tobacco Use    Smoking status: Former     Packs/day: 2.00     Years: 7.00     Additional pack years: 0.00     Total pack years: 14.00     Types: Cigarettes     Start date: 1965     Quit date: 12/3/1973     Years since quittin.9     Passive exposure: Past    Smokeless tobacco: Never    Tobacco comments:     Haven't smoked in almost 50 years   Vaping Use    Vaping Use: Never used   Substance and Sexual Activity    Alcohol use: Not Currently     Comment: Maybe 1 drink this year    Drug use: No    Sexual activity: Not Currently     Partners: Female     Comment: 72 years old past time         Procedures      Objective:  "      Physical Exam    /76 (BP Location: Right arm, Patient Position: Sitting, Cuff Size: Adult) Comment (BP Location): LOWER ARM  Pulse 61   Ht 177.8 cm (70\")   Wt 125 kg (275 lb)   SpO2 95%   BMI 39.46 kg/m²   The patient is alert, oriented and in no distress.    Vital signs as noted above.  Exogenous obesity (BMI 40)    Head and neck revealed no carotid bruits or jugular venous distension.  No thyromegaly or lymphadenopathy is present.    Lungs clear.  No wheezing.  Breath sounds are normal bilaterally.    Heart normal first and second heart sounds.  No murmur..  No pericardial rub is present.  No gallop is present.    Abdomen soft and nontender.  No organomegaly is present.    Extremities revealed good peripheral pulses without any pedal edema.    Skin warm and dry.  Pacemaker site looks normal.    Musculoskeletal system is grossly normal.    CNS grossly normal.    Reviewed and updated.        "

## 2023-10-23 PROBLEM — M54.16 LUMBAR RADICULOPATHY, CHRONIC: Status: ACTIVE | Noted: 2023-10-20

## 2023-10-26 LAB — INR PPP: 2.2

## 2023-10-27 ENCOUNTER — TELEPHONE (OUTPATIENT)
Dept: ENDOCRINOLOGY | Facility: CLINIC | Age: 78
End: 2023-10-27

## 2023-10-27 ENCOUNTER — ANTICOAGULATION VISIT (OUTPATIENT)
Dept: CARDIOLOGY | Facility: CLINIC | Age: 78
End: 2023-10-27
Payer: MEDICARE

## 2023-10-27 DIAGNOSIS — I48.0 PAROXYSMAL ATRIAL FIBRILLATION: Primary | ICD-10-CM

## 2023-10-27 DIAGNOSIS — Z79.01 LONG TERM (CURRENT) USE OF ANTICOAGULANTS: ICD-10-CM

## 2023-10-27 NOTE — TELEPHONE ENCOUNTER
Hub staff attempted to follow warm transfer process and was unsuccessful     Caller: NILDA CLEMENTS     Relationship to patient: SELF    Best call back number: [phone]    Patient is needing: TO CANCEL LAB APPOINTMENT PLEASE CALL PATIENT THANK YOU

## 2023-11-03 ENCOUNTER — TELEPHONE (OUTPATIENT)
Dept: ENDOCRINOLOGY | Facility: CLINIC | Age: 78
End: 2023-11-03
Payer: MEDICARE

## 2023-11-07 ENCOUNTER — CLINICAL SUPPORT NO REQUIREMENTS (OUTPATIENT)
Dept: CARDIOLOGY | Facility: CLINIC | Age: 78
End: 2023-11-07
Payer: MEDICARE

## 2023-11-07 ENCOUNTER — ANTICOAGULATION VISIT (OUTPATIENT)
Dept: CARDIOLOGY | Facility: CLINIC | Age: 78
End: 2023-11-07

## 2023-11-07 ENCOUNTER — OFFICE VISIT (OUTPATIENT)
Dept: CARDIOLOGY | Facility: CLINIC | Age: 78
End: 2023-11-07
Payer: MEDICARE

## 2023-11-07 VITALS
HEIGHT: 70 IN | HEART RATE: 61 BPM | SYSTOLIC BLOOD PRESSURE: 165 MMHG | OXYGEN SATURATION: 95 % | DIASTOLIC BLOOD PRESSURE: 76 MMHG | BODY MASS INDEX: 39.37 KG/M2 | WEIGHT: 275 LBS

## 2023-11-07 DIAGNOSIS — I48.0 PAROXYSMAL ATRIAL FIBRILLATION: ICD-10-CM

## 2023-11-07 DIAGNOSIS — I10 ESSENTIAL HYPERTENSION: ICD-10-CM

## 2023-11-07 DIAGNOSIS — Z95.1 HX OF CABG: Primary | ICD-10-CM

## 2023-11-07 DIAGNOSIS — E78.2 MIXED HYPERLIPIDEMIA: ICD-10-CM

## 2023-11-07 DIAGNOSIS — Z95.0 PRESENCE OF CARDIAC PACEMAKER: ICD-10-CM

## 2023-11-07 DIAGNOSIS — Z79.01 LONG TERM (CURRENT) USE OF ANTICOAGULANTS: ICD-10-CM

## 2023-11-07 DIAGNOSIS — I49.5 TACHYCARDIA-BRADYCARDIA: ICD-10-CM

## 2023-11-07 DIAGNOSIS — Z95.5 STATUS POST CORONARY ARTERY STENT PLACEMENT: ICD-10-CM

## 2023-11-07 DIAGNOSIS — I48.11 LONGSTANDING PERSISTENT ATRIAL FIBRILLATION: ICD-10-CM

## 2023-11-07 DIAGNOSIS — I48.91 ATRIAL FIBRILLATION, UNSPECIFIED TYPE: ICD-10-CM

## 2023-11-07 DIAGNOSIS — I25.10 CHRONIC CORONARY ARTERY DISEASE: ICD-10-CM

## 2023-11-07 DIAGNOSIS — Z95.0 PRESENCE OF CARDIAC PACEMAKER: Primary | ICD-10-CM

## 2023-11-07 DIAGNOSIS — Z79.01 CHRONIC ANTICOAGULATION: ICD-10-CM

## 2023-11-07 DIAGNOSIS — I48.0 PAROXYSMAL ATRIAL FIBRILLATION: Primary | ICD-10-CM

## 2023-11-07 LAB — INR PPP: 1.8

## 2023-11-07 PROCEDURE — 93280 PM DEVICE PROGR EVAL DUAL: CPT | Performed by: INTERNAL MEDICINE

## 2023-11-10 ENCOUNTER — TELEPHONE (OUTPATIENT)
Dept: ENDOCRINOLOGY | Facility: CLINIC | Age: 78
End: 2023-11-10
Payer: MEDICARE

## 2023-11-10 NOTE — TELEPHONE ENCOUNTER
Faxed PO for Dexcom to [employer].       Regina Garvey RD, LD, [facility]   Nutrition and Diabetes Education     Diabetes Center   [facility]  [address] [place] [id number]

## 2023-11-15 ENCOUNTER — DOCUMENTATION (OUTPATIENT)
Dept: PHYSICAL THERAPY | Facility: CLINIC | Age: 78
End: 2023-11-15
Payer: MEDICARE

## 2023-11-15 ENCOUNTER — ANTICOAGULATION VISIT (OUTPATIENT)
Dept: CARDIOLOGY | Facility: CLINIC | Age: 78
End: 2023-11-15
Payer: MEDICARE

## 2023-11-15 DIAGNOSIS — Z79.01 LONG TERM (CURRENT) USE OF ANTICOAGULANTS: ICD-10-CM

## 2023-11-15 DIAGNOSIS — I48.0 PAROXYSMAL ATRIAL FIBRILLATION: Primary | ICD-10-CM

## 2023-11-15 LAB — INR PPP: 2.8

## 2023-11-15 NOTE — PROGRESS NOTES
Discharge Summary  Discharge Summary from Physical Therapy Report      Date of Initial PT visit: 4/24/23  Number of Visits Seen: 20     Discharge Status of Patient:   SHORT TERM GOALS: Time for Goal Achievement: 4 weeks    1.  Patient to be compliant and progression of HEP   - MET (5/26/23)                          2.  Pain level < 7/10 at worst with mentioned activities to improve function - MET (5/26/23)           3.  Increased thoracic, lumbar and SIJ mobility to allow for increased lumbar AROM with less pain. - MET (5/26/23)           4.  Increased lumbar AROM to by 25% in all planes to allow for increased ease with sit-stand transfers and functional activities - MET (5/26/23)             LONG TERM GOALS: Time for Goal Achievement: 2 months  1.  Pt. to score < 30 % on Back Index - NOT MET (6/23/23)     2.  Pain level < 4/10 with all listed activities to return to normal. - MET (5/26/23)           3.  Lumbar AROM to WFL to allow for return to household & recreational activities w/o increased symptoms - NOT MET (6/23/23)        4.  (B) LE and lower abdominal strength to 5/5 to allow for pushing, pulling and activities to occur without pain (driving, sitting, household  & Job requirements) - MET (5/26/23)          Goals: Partially Met    Discharge Plan: Continue with current home exercise program as instructed        Date of Discharge 11/15/23        Rain Juarez, PT, DPT  Physical Therapist

## 2023-11-22 ENCOUNTER — LAB (OUTPATIENT)
Dept: LAB | Facility: HOSPITAL | Age: 78
End: 2023-11-22
Payer: MEDICARE

## 2023-11-22 ENCOUNTER — ANTICOAGULATION VISIT (OUTPATIENT)
Dept: CARDIOLOGY | Facility: CLINIC | Age: 78
End: 2023-11-22
Payer: MEDICARE

## 2023-11-22 DIAGNOSIS — Z79.01 LONG TERM (CURRENT) USE OF ANTICOAGULANTS: ICD-10-CM

## 2023-11-22 DIAGNOSIS — I48.0 PAROXYSMAL ATRIAL FIBRILLATION: Primary | ICD-10-CM

## 2023-11-22 LAB
ABO GROUP BLD: NORMAL
ANION GAP SERPL CALCULATED.3IONS-SCNC: 12.3 MMOL/L (ref 5–15)
APTT PPP: 38.2 SECONDS (ref 24–31)
BLD GP AB SCN SERPL QL: NEGATIVE
BUN SERPL-MCNC: 25 MG/DL (ref 8–23)
BUN/CREAT SERPL: 22.9 (ref 7–25)
CALCIUM SPEC-SCNC: 9.6 MG/DL (ref 8.6–10.5)
CHLORIDE SERPL-SCNC: 101 MMOL/L (ref 98–107)
CO2 SERPL-SCNC: 26.7 MMOL/L (ref 22–29)
CREAT SERPL-MCNC: 1.09 MG/DL (ref 0.76–1.27)
DEPRECATED RDW RBC AUTO: 41.1 FL (ref 37–54)
EGFRCR SERPLBLD CKD-EPI 2021: 70.8 ML/MIN/1.73
ERYTHROCYTE [DISTWIDTH] IN BLOOD BY AUTOMATED COUNT: 12.1 % (ref 12.3–15.4)
GLUCOSE SERPL-MCNC: 137 MG/DL (ref 65–99)
HCT VFR BLD AUTO: 39.3 % (ref 37.5–51)
HGB BLD-MCNC: 12.9 G/DL (ref 13–17.7)
INR PPP: 2.76 (ref 2–3)
MCH RBC QN AUTO: 30.8 PG (ref 26.6–33)
MCHC RBC AUTO-ENTMCNC: 32.8 G/DL (ref 31.5–35.7)
MCV RBC AUTO: 93.8 FL (ref 79–97)
MRSA DNA SPEC QL NAA+PROBE: NORMAL
PLATELET # BLD AUTO: 220 10*3/MM3 (ref 140–450)
PMV BLD AUTO: 9.5 FL (ref 6–12)
POTASSIUM SERPL-SCNC: 4.2 MMOL/L (ref 3.5–5.2)
PROTHROMBIN TIME: 27.9 SECONDS (ref 19.4–28.5)
RBC # BLD AUTO: 4.19 10*6/MM3 (ref 4.14–5.8)
RH BLD: POSITIVE
SODIUM SERPL-SCNC: 140 MMOL/L (ref 136–145)
T&S EXPIRATION DATE: NORMAL
WBC NRBC COR # BLD AUTO: 7.76 10*3/MM3 (ref 3.4–10.8)

## 2023-11-22 PROCEDURE — 85027 COMPLETE CBC AUTOMATED: CPT

## 2023-11-22 PROCEDURE — 86850 RBC ANTIBODY SCREEN: CPT

## 2023-11-22 PROCEDURE — 86901 BLOOD TYPING SEROLOGIC RH(D): CPT

## 2023-11-22 PROCEDURE — 85730 THROMBOPLASTIN TIME PARTIAL: CPT | Performed by: NEUROLOGICAL SURGERY

## 2023-11-22 PROCEDURE — 85610 PROTHROMBIN TIME: CPT | Performed by: NEUROLOGICAL SURGERY

## 2023-11-22 PROCEDURE — 87641 MR-STAPH DNA AMP PROBE: CPT

## 2023-11-22 PROCEDURE — 80048 BASIC METABOLIC PNL TOTAL CA: CPT

## 2023-11-22 PROCEDURE — 86900 BLOOD TYPING SEROLOGIC ABO: CPT

## 2023-11-27 ENCOUNTER — ANTICOAGULATION VISIT (OUTPATIENT)
Dept: CARDIOLOGY | Facility: CLINIC | Age: 78
End: 2023-11-27
Payer: MEDICARE

## 2023-11-27 DIAGNOSIS — I48.0 PAROXYSMAL ATRIAL FIBRILLATION: Primary | ICD-10-CM

## 2023-11-27 DIAGNOSIS — Z79.01 LONG TERM (CURRENT) USE OF ANTICOAGULANTS: ICD-10-CM

## 2023-11-27 LAB — INR PPP: 3.5

## 2023-12-03 ENCOUNTER — ANESTHESIA EVENT (OUTPATIENT)
Dept: PERIOP | Facility: HOSPITAL | Age: 78
End: 2023-12-03
Payer: MEDICARE

## 2023-12-04 ENCOUNTER — HOSPITAL ENCOUNTER (OUTPATIENT)
Dept: GENERAL RADIOLOGY | Facility: HOSPITAL | Age: 78
Discharge: HOME | End: 2023-12-04
Payer: MEDICARE

## 2023-12-04 ENCOUNTER — ANESTHESIA (OUTPATIENT)
Dept: PERIOP | Facility: HOSPITAL | Age: 78
End: 2023-12-04
Payer: MEDICARE

## 2023-12-04 ENCOUNTER — HOSPITAL ENCOUNTER (OUTPATIENT)
Facility: HOSPITAL | Age: 78
Setting detail: HOSPITAL OUTPATIENT SURGERY
Discharge: HOME | End: 2023-12-04
Attending: NEUROLOGICAL SURGERY | Admitting: NEUROLOGICAL SURGERY
Payer: MEDICARE

## 2023-12-04 VITALS
SYSTOLIC BLOOD PRESSURE: 136 MMHG | RESPIRATION RATE: 16 BRPM | WEIGHT: 265 LBS | OXYGEN SATURATION: 99 % | BODY MASS INDEX: 39.25 KG/M2 | HEART RATE: 14 BPM | DIASTOLIC BLOOD PRESSURE: 75 MMHG | HEIGHT: 69 IN | TEMPERATURE: 97.8 F

## 2023-12-04 DIAGNOSIS — I48.0 PAROXYSMAL ATRIAL FIBRILLATION: ICD-10-CM

## 2023-12-04 DIAGNOSIS — M54.16 LUMBAR RADICULOPATHY, CHRONIC: ICD-10-CM

## 2023-12-04 DIAGNOSIS — R52 PAIN: ICD-10-CM

## 2023-12-04 DIAGNOSIS — Z98.890 S/P DISCECTOMY: Primary | ICD-10-CM

## 2023-12-04 LAB
APTT PPP: 24.3 SECONDS (ref 24–31)
GLUCOSE BLDC GLUCOMTR-MCNC: 169 MG/DL (ref 70–105)
GLUCOSE BLDC GLUCOMTR-MCNC: 174 MG/DL (ref 70–105)
INR PPP: 1.06 (ref 2–3)
PROTHROMBIN TIME: 11.5 SECONDS (ref 19.4–28.5)

## 2023-12-04 PROCEDURE — 25010000002 FENTANYL CITRATE (PF) 100 MCG/2ML SOLUTION: Performed by: ANESTHESIOLOGIST ASSISTANT

## 2023-12-04 PROCEDURE — 25010000002 SUGAMMADEX 200 MG/2ML SOLUTION: Performed by: ANESTHESIOLOGIST ASSISTANT

## 2023-12-04 PROCEDURE — 76000 FLUOROSCOPY <1 HR PHYS/QHP: CPT

## 2023-12-04 PROCEDURE — 25010000002 METHYLPREDNISOLONE PER 80 MG: Performed by: NEUROLOGICAL SURGERY

## 2023-12-04 PROCEDURE — 25010000002 HYDROMORPHONE 1 MG/ML SOLUTION: Performed by: ANESTHESIOLOGIST ASSISTANT

## 2023-12-04 PROCEDURE — 82948 REAGENT STRIP/BLOOD GLUCOSE: CPT

## 2023-12-04 PROCEDURE — 25010000002 PROPOFOL 200 MG/20ML EMULSION: Performed by: ANESTHESIOLOGIST ASSISTANT

## 2023-12-04 PROCEDURE — 25810000003 LACTATED RINGERS PER 1000 ML: Performed by: NEUROLOGICAL SURGERY

## 2023-12-04 PROCEDURE — 72100 X-RAY EXAM L-S SPINE 2/3 VWS: CPT

## 2023-12-04 PROCEDURE — 25010000002 FENTANYL CITRATE (PF) 50 MCG/ML SOLUTION: Performed by: ANESTHESIOLOGIST ASSISTANT

## 2023-12-04 PROCEDURE — 25010000002 CEFAZOLIN 3 G RECONSTITUTED SOLUTION: Performed by: ANESTHESIOLOGIST ASSISTANT

## 2023-12-04 PROCEDURE — 25010000002 ONDANSETRON PER 1 MG: Performed by: ANESTHESIOLOGIST ASSISTANT

## 2023-12-04 PROCEDURE — 85610 PROTHROMBIN TIME: CPT | Performed by: NEUROLOGICAL SURGERY

## 2023-12-04 PROCEDURE — 85730 THROMBOPLASTIN TIME PARTIAL: CPT | Performed by: NEUROLOGICAL SURGERY

## 2023-12-04 DEVICE — FLOSEAL WITH RECOTHROM - 10ML.
Type: IMPLANTABLE DEVICE | Site: SPINE LUMBAR | Status: FUNCTIONAL
Brand: FLOSEAL HEMOSTATIC MATRIX

## 2023-12-04 DEVICE — DEV CONTRL TISS STRATAFIX SPIRAL MNCRYL UD 3/0 PLS 30CM: Type: IMPLANTABLE DEVICE | Site: SPINE LUMBAR | Status: FUNCTIONAL

## 2023-12-04 RX ORDER — LIDOCAINE HYDROCHLORIDE 10 MG/ML
0.5 INJECTION, SOLUTION INFILTRATION; PERINEURAL ONCE AS NEEDED
Status: DISCONTINUED | OUTPATIENT
Start: 2023-12-04 | End: 2023-12-04 | Stop reason: HOSPADM

## 2023-12-04 RX ORDER — DIPHENHYDRAMINE HCL 25 MG
25 CAPSULE ORAL
Status: DISCONTINUED | OUTPATIENT
Start: 2023-12-04 | End: 2023-12-04 | Stop reason: HOSPADM

## 2023-12-04 RX ORDER — NALOXONE HYDROCHLORIDE 4 MG/.1ML
SPRAY NASAL
Qty: 2 EACH | Refills: 0 | Status: SHIPPED | OUTPATIENT
Start: 2023-12-04

## 2023-12-04 RX ORDER — NALOXONE HCL 0.4 MG/ML
0.4 VIAL (ML) INJECTION AS NEEDED
Status: DISCONTINUED | OUTPATIENT
Start: 2023-12-04 | End: 2023-12-04 | Stop reason: HOSPADM

## 2023-12-04 RX ORDER — HYDROCODONE BITARTRATE AND ACETAMINOPHEN 10; 325 MG/1; MG/1
1 TABLET ORAL EVERY 4 HOURS PRN
Qty: 36 TABLET | Refills: 0 | Status: SHIPPED | OUTPATIENT
Start: 2023-12-04 | End: 2023-12-10

## 2023-12-04 RX ORDER — FLUMAZENIL 0.1 MG/ML
0.5 INJECTION INTRAVENOUS AS NEEDED
Status: DISCONTINUED | OUTPATIENT
Start: 2023-12-04 | End: 2023-12-04 | Stop reason: HOSPADM

## 2023-12-04 RX ORDER — LIDOCAINE HYDROCHLORIDE AND EPINEPHRINE 10; 10 MG/ML; UG/ML
INJECTION, SOLUTION INFILTRATION; PERINEURAL AS NEEDED
Status: DISCONTINUED | OUTPATIENT
Start: 2023-12-04 | End: 2023-12-04 | Stop reason: HOSPADM

## 2023-12-04 RX ORDER — ROCURONIUM BROMIDE 10 MG/ML
INJECTION, SOLUTION INTRAVENOUS AS NEEDED
Status: DISCONTINUED | OUTPATIENT
Start: 2023-12-04 | End: 2023-12-04 | Stop reason: SURG

## 2023-12-04 RX ORDER — MIDAZOLAM HYDROCHLORIDE 1 MG/ML
1 INJECTION INTRAMUSCULAR; INTRAVENOUS
Status: DISCONTINUED | OUTPATIENT
Start: 2023-12-04 | End: 2023-12-04 | Stop reason: HOSPADM

## 2023-12-04 RX ORDER — OXYCODONE HYDROCHLORIDE 5 MG/1
10 TABLET ORAL EVERY 4 HOURS PRN
Status: DISCONTINUED | OUTPATIENT
Start: 2023-12-04 | End: 2023-12-04 | Stop reason: HOSPADM

## 2023-12-04 RX ORDER — ONDANSETRON 2 MG/ML
4 INJECTION INTRAMUSCULAR; INTRAVENOUS ONCE AS NEEDED
Status: DISCONTINUED | OUTPATIENT
Start: 2023-12-04 | End: 2023-12-04 | Stop reason: HOSPADM

## 2023-12-04 RX ORDER — ACETAMINOPHEN 500 MG
1000 TABLET ORAL ONCE
Status: COMPLETED | OUTPATIENT
Start: 2023-12-04 | End: 2023-12-04

## 2023-12-04 RX ORDER — FENTANYL CITRATE 50 UG/ML
100 INJECTION, SOLUTION INTRAMUSCULAR; INTRAVENOUS
Status: DISCONTINUED | OUTPATIENT
Start: 2023-12-04 | End: 2023-12-04 | Stop reason: HOSPADM

## 2023-12-04 RX ORDER — METHOCARBAMOL 750 MG/1
750 TABLET, FILM COATED ORAL 3 TIMES DAILY PRN
Qty: 60 TABLET | Refills: 0 | Status: SHIPPED | OUTPATIENT
Start: 2023-12-04

## 2023-12-04 RX ORDER — WARFARIN SODIUM 5 MG/1
TABLET ORAL
Start: 2023-12-09

## 2023-12-04 RX ORDER — SODIUM CHLORIDE, SODIUM LACTATE, POTASSIUM CHLORIDE, CALCIUM CHLORIDE 600; 310; 30; 20 MG/100ML; MG/100ML; MG/100ML; MG/100ML
1000 INJECTION, SOLUTION INTRAVENOUS CONTINUOUS
Status: DISCONTINUED | OUTPATIENT
Start: 2023-12-04 | End: 2023-12-04 | Stop reason: HOSPADM

## 2023-12-04 RX ORDER — REMIFENTANIL HYDROCHLORIDE 1 MG/ML
INJECTION, POWDER, LYOPHILIZED, FOR SOLUTION INTRAVENOUS CONTINUOUS PRN
Status: DISCONTINUED | OUTPATIENT
Start: 2023-12-04 | End: 2023-12-04 | Stop reason: SURG

## 2023-12-04 RX ORDER — FENTANYL CITRATE 50 UG/ML
INJECTION, SOLUTION INTRAMUSCULAR; INTRAVENOUS AS NEEDED
Status: DISCONTINUED | OUTPATIENT
Start: 2023-12-04 | End: 2023-12-04 | Stop reason: SURG

## 2023-12-04 RX ORDER — CLOPIDOGREL BISULFATE 75 MG/1
75 TABLET ORAL DAILY
Start: 2023-12-11

## 2023-12-04 RX ORDER — CELECOXIB 200 MG/1
200 CAPSULE ORAL ONCE
Status: COMPLETED | OUTPATIENT
Start: 2023-12-04 | End: 2023-12-04

## 2023-12-04 RX ORDER — LIDOCAINE HYDROCHLORIDE 10 MG/ML
INJECTION, SOLUTION EPIDURAL; INFILTRATION; INTRACAUDAL; PERINEURAL AS NEEDED
Status: DISCONTINUED | OUTPATIENT
Start: 2023-12-04 | End: 2023-12-04 | Stop reason: SURG

## 2023-12-04 RX ORDER — SODIUM CHLORIDE 0.9 % (FLUSH) 0.9 %
10 SYRINGE (ML) INJECTION AS NEEDED
Status: DISCONTINUED | OUTPATIENT
Start: 2023-12-04 | End: 2023-12-04 | Stop reason: HOSPADM

## 2023-12-04 RX ORDER — DIPHENHYDRAMINE HYDROCHLORIDE 50 MG/ML
12.5 INJECTION INTRAMUSCULAR; INTRAVENOUS
Status: DISCONTINUED | OUTPATIENT
Start: 2023-12-04 | End: 2023-12-04 | Stop reason: HOSPADM

## 2023-12-04 RX ORDER — EPHEDRINE SULFATE 5 MG/ML
5 INJECTION INTRAVENOUS ONCE AS NEEDED
Status: DISCONTINUED | OUTPATIENT
Start: 2023-12-04 | End: 2023-12-04 | Stop reason: HOSPADM

## 2023-12-04 RX ORDER — DIPHENHYDRAMINE HYDROCHLORIDE 50 MG/ML
12.5 INJECTION INTRAMUSCULAR; INTRAVENOUS ONCE AS NEEDED
Status: DISCONTINUED | OUTPATIENT
Start: 2023-12-04 | End: 2023-12-04 | Stop reason: HOSPADM

## 2023-12-04 RX ORDER — ONDANSETRON 2 MG/ML
INJECTION INTRAMUSCULAR; INTRAVENOUS AS NEEDED
Status: DISCONTINUED | OUTPATIENT
Start: 2023-12-04 | End: 2023-12-04 | Stop reason: SURG

## 2023-12-04 RX ORDER — IPRATROPIUM BROMIDE AND ALBUTEROL SULFATE 2.5; .5 MG/3ML; MG/3ML
3 SOLUTION RESPIRATORY (INHALATION) ONCE AS NEEDED
Status: DISCONTINUED | OUTPATIENT
Start: 2023-12-04 | End: 2023-12-04 | Stop reason: HOSPADM

## 2023-12-04 RX ORDER — METHYLPREDNISOLONE ACETATE 80 MG/ML
INJECTION, SUSPENSION INTRA-ARTICULAR; INTRALESIONAL; INTRAMUSCULAR; SOFT TISSUE AS NEEDED
Status: DISCONTINUED | OUTPATIENT
Start: 2023-12-04 | End: 2023-12-04 | Stop reason: HOSPADM

## 2023-12-04 RX ORDER — PROMETHAZINE HYDROCHLORIDE 25 MG/1
25 SUPPOSITORY RECTAL ONCE AS NEEDED
Status: DISCONTINUED | OUTPATIENT
Start: 2023-12-04 | End: 2023-12-04 | Stop reason: HOSPADM

## 2023-12-04 RX ORDER — PHENYLEPHRINE HCL IN 0.9% NACL 1 MG/10 ML
SYRINGE (ML) INTRAVENOUS AS NEEDED
Status: DISCONTINUED | OUTPATIENT
Start: 2023-12-04 | End: 2023-12-04 | Stop reason: SURG

## 2023-12-04 RX ORDER — LABETALOL HYDROCHLORIDE 5 MG/ML
5 INJECTION, SOLUTION INTRAVENOUS
Status: DISCONTINUED | OUTPATIENT
Start: 2023-12-04 | End: 2023-12-04 | Stop reason: HOSPADM

## 2023-12-04 RX ORDER — LORAZEPAM 2 MG/ML
1 INJECTION INTRAMUSCULAR
Status: DISCONTINUED | OUTPATIENT
Start: 2023-12-04 | End: 2023-12-04 | Stop reason: HOSPADM

## 2023-12-04 RX ORDER — OXYCODONE HYDROCHLORIDE 5 MG/1
5 TABLET ORAL ONCE AS NEEDED
Status: COMPLETED | OUTPATIENT
Start: 2023-12-04 | End: 2023-12-04

## 2023-12-04 RX ORDER — MEPERIDINE HYDROCHLORIDE 25 MG/ML
12.5 INJECTION INTRAMUSCULAR; INTRAVENOUS; SUBCUTANEOUS
Status: DISCONTINUED | OUTPATIENT
Start: 2023-12-04 | End: 2023-12-04 | Stop reason: HOSPADM

## 2023-12-04 RX ORDER — ACETAMINOPHEN 325 MG/1
650 TABLET ORAL ONCE AS NEEDED
Status: DISCONTINUED | OUTPATIENT
Start: 2023-12-04 | End: 2023-12-04 | Stop reason: HOSPADM

## 2023-12-04 RX ORDER — PROPOFOL 10 MG/ML
INJECTION, EMULSION INTRAVENOUS AS NEEDED
Status: DISCONTINUED | OUTPATIENT
Start: 2023-12-04 | End: 2023-12-04 | Stop reason: SURG

## 2023-12-04 RX ORDER — PROMETHAZINE HYDROCHLORIDE 25 MG/1
25 TABLET ORAL ONCE AS NEEDED
Status: DISCONTINUED | OUTPATIENT
Start: 2023-12-04 | End: 2023-12-04 | Stop reason: HOSPADM

## 2023-12-04 RX ORDER — FENTANYL CITRATE 50 UG/ML
50 INJECTION, SOLUTION INTRAMUSCULAR; INTRAVENOUS
Status: DISCONTINUED | OUTPATIENT
Start: 2023-12-04 | End: 2023-12-04 | Stop reason: HOSPADM

## 2023-12-04 RX ORDER — HYDRALAZINE HYDROCHLORIDE 20 MG/ML
5 INJECTION INTRAMUSCULAR; INTRAVENOUS
Status: DISCONTINUED | OUTPATIENT
Start: 2023-12-04 | End: 2023-12-04 | Stop reason: HOSPADM

## 2023-12-04 RX ORDER — ACETAMINOPHEN 650 MG/1
650 SUPPOSITORY RECTAL EVERY 4 HOURS PRN
Status: DISCONTINUED | OUTPATIENT
Start: 2023-12-04 | End: 2023-12-04 | Stop reason: HOSPADM

## 2023-12-04 RX ORDER — DOCUSATE SODIUM 100 MG/1
100 CAPSULE, LIQUID FILLED ORAL 2 TIMES DAILY
Qty: 10 CAPSULE | Refills: 0 | Status: SHIPPED | OUTPATIENT
Start: 2023-12-04

## 2023-12-04 RX ADMIN — Medication 150 MCG: at 09:28

## 2023-12-04 RX ADMIN — Medication 150 MCG: at 08:55

## 2023-12-04 RX ADMIN — PROPOFOL 120 MCG/KG/MIN: 10 INJECTION, EMULSION INTRAVENOUS at 08:15

## 2023-12-04 RX ADMIN — SODIUM CHLORIDE, POTASSIUM CHLORIDE, SODIUM LACTATE AND CALCIUM CHLORIDE: 600; 310; 30; 20 INJECTION, SOLUTION INTRAVENOUS at 10:31

## 2023-12-04 RX ADMIN — SODIUM CHLORIDE, POTASSIUM CHLORIDE, SODIUM LACTATE AND CALCIUM CHLORIDE 1000 ML: 600; 310; 30; 20 INJECTION, SOLUTION INTRAVENOUS at 06:43

## 2023-12-04 RX ADMIN — SUGAMMADEX 200 MG: 100 INJECTION, SOLUTION INTRAVENOUS at 08:34

## 2023-12-04 RX ADMIN — FENTANYL CITRATE 50 MCG: 50 INJECTION, SOLUTION INTRAMUSCULAR; INTRAVENOUS at 12:23

## 2023-12-04 RX ADMIN — CEFAZOLIN 3 G: 3 INJECTION, POWDER, FOR SOLUTION INTRAVENOUS at 08:09

## 2023-12-04 RX ADMIN — Medication 150 MCG: at 08:36

## 2023-12-04 RX ADMIN — Medication 150 MCG: at 09:05

## 2023-12-04 RX ADMIN — PROPOFOL 40 MG: 10 INJECTION, EMULSION INTRAVENOUS at 12:08

## 2023-12-04 RX ADMIN — PROPOFOL 120 MCG/KG/MIN: 10 INJECTION, EMULSION INTRAVENOUS at 09:11

## 2023-12-04 RX ADMIN — ACETAMINOPHEN 1000 MG: 500 TABLET, FILM COATED ORAL at 07:42

## 2023-12-04 RX ADMIN — PROPOFOL 40 MG: 10 INJECTION, EMULSION INTRAVENOUS at 11:55

## 2023-12-04 RX ADMIN — PROPOFOL 120 MCG/KG/MIN: 10 INJECTION, EMULSION INTRAVENOUS at 10:18

## 2023-12-04 RX ADMIN — ROCURONIUM BROMIDE 30 MG: 10 INJECTION, SOLUTION INTRAVENOUS at 08:11

## 2023-12-04 RX ADMIN — LIDOCAINE HYDROCHLORIDE 50 MG: 10 INJECTION, SOLUTION EPIDURAL; INFILTRATION; INTRACAUDAL; PERINEURAL at 08:11

## 2023-12-04 RX ADMIN — PROPOFOL 140 MG: 10 INJECTION, EMULSION INTRAVENOUS at 08:11

## 2023-12-04 RX ADMIN — PROPOFOL 30 MG: 10 INJECTION, EMULSION INTRAVENOUS at 11:57

## 2023-12-04 RX ADMIN — Medication 150 MCG: at 09:13

## 2023-12-04 RX ADMIN — ONDANSETRON 4 MG: 2 INJECTION INTRAMUSCULAR; INTRAVENOUS at 11:45

## 2023-12-04 RX ADMIN — CELECOXIB 200 MG: 200 CAPSULE ORAL at 07:42

## 2023-12-04 RX ADMIN — REMIFENTANIL HYDROCHLORIDE 0.1 MCG/KG/MIN: 5 INJECTION, POWDER, LYOPHILIZED, FOR SOLUTION INTRAVENOUS at 08:15

## 2023-12-04 RX ADMIN — FENTANYL CITRATE 100 MCG: 50 INJECTION, SOLUTION INTRAMUSCULAR; INTRAVENOUS at 08:11

## 2023-12-04 RX ADMIN — Medication 150 MCG: at 09:48

## 2023-12-04 RX ADMIN — Medication 150 MCG: at 10:33

## 2023-12-04 RX ADMIN — HYDROMORPHONE HYDROCHLORIDE 0.5 MG: 1 INJECTION, SOLUTION INTRAMUSCULAR; INTRAVENOUS; SUBCUTANEOUS at 13:23

## 2023-12-04 RX ADMIN — OXYCODONE 5 MG: 5 TABLET ORAL at 13:23

## 2023-12-04 RX ADMIN — PROPOFOL 120 MCG/KG/MIN: 10 INJECTION, EMULSION INTRAVENOUS at 11:12

## 2023-12-04 RX ADMIN — Medication 100 MCG: at 10:04

## 2023-12-04 NOTE — H&P
Benoit Nebwerry  is a 77 y.o. male is here today for follow-up lumbar radiculopathy. In the office today patient reports of back pain that radiates into the right gluteal and down the right leg with weakness.           Chief Complaint   Patient presents with    Back Pain       Follow up         Previous treatment: Injection 6/30     HPI: This is a 77-year-old gentleman with atrial fibrillation, diabetes, history of deep venous thrombi and a clotting disorder as well as myocardial infarction and 2 prior coronary stents as well as pulmonary artery hypertension who was last evaluated by me on June 20, 2023.  He does have a history of significant sagittal imbalance and I believe associated SI joint dysfunction and pain secondary to this.  He did receive some relief with an SI injection in the past however at my last evaluation I felt as though he had a right-sided L4 and L5 nerve root distribution radiculopathy.  He does have significant foraminal stenosis at L4-L5 and L5-S1.  He had undergone a right-sided L4 and L5 transforaminal injection and had essentially 100% pain relief for a few days.  He has aggressively engage with physical therapy and has maximized the benefit he can receive from this.  He underwent a repeat L4-L5 selective nerve root block approximately 3 weeks ago.  For this evaluation he feels as though he received approximately 50% pain relief for approximately 2 days.     Medical History        Past Medical History:   Diagnosis Date    Anemia Runs in family    Arthritis      Asymptomatic stenosis of right carotid artery      Atrial fibrillation      Bradycardia      Brain concussion      Buttock pain       rt cheek( lower)    Cataract      Clotting disorder Take blood thinner    Congenital heart disease      Coronary artery disease      Deep vein thrombosis      Diabetes mellitus, type 2      Erectile dysfunction 2005    Heart attack       x2   with stent placement     2015/2018    Hyperlipidemia       Hypertension      Hypoglycemia      Leg pain       aches    Low back pain 2014    Myocardial infarction      Obesity 45 years    Pacemaker      Pulmonary arterial hypertension 1967    Scoliosis 20 years    Sleep apnea      Visual impairment Last 15 years            Surgical History         Past Surgical History:   Procedure Laterality Date    ABLATION OF DYSRHYTHMIC FOCUS        APPENDECTOMY   1956    CARDIAC ABLATION   12/2018     x 1     CARDIAC CATHETERIZATION Left 02/25/2022     Procedure: Left Heart Cath and coronary angiogram;  Surgeon: Karolina Saldaña MD;  Location: [facility] CATH INVASIVE LOCATION;  Service: Cardiovascular;  Laterality: Left;    CARDIAC CATHETERIZATION   02/25/2022     Procedure: Saphenous Vein Graft;  Surgeon: Karolina Saldaña MD;  Location: [facility] CATH INVASIVE LOCATION;  Service: Cardiovascular;;    CARDIOVERSION   06/2018     Cardioversion 6/2018; x3  12/2018    CORONARY ANGIOPLASTY Left 11/04/2015     Distal left main and in the mid circumflex artery     CORONARY ANGIOPLASTY Right 11/02/2015     Right coronary artery     CORONARY ARTERY BYPASS GRAFT   03/1998    CORONARY STENT PLACEMENT        EYE SURGERY        FOOT SURGERY   2010    HERNIA REPAIR   2005    INSERT / REPLACE / REMOVE PACEMAKER        OTHER SURGICAL HISTORY   05/2019     Stent     PACEMAKER IMPLANTATION   01/2019     Dr. Saldaña     REPLACEMENT TOTAL KNEE BILATERAL   2001    SKIN BIOPSY                       Current Outpatient Medications on File Prior to Visit   Medication Sig Dispense Refill    Alcohol Swabs 70 % pads USE WHEN TESTING BLOOD GLUCOSE FOUR TIMES DAILY        amLODIPine (NORVASC) 5 MG tablet Take 1 tablet by mouth Daily. 90 tablet 3    atorvastatin (LIPITOR) 40 MG tablet Take 0.5 tablets by mouth 2 (Two) Times a Day. 90 tablet 3    cetirizine (zyrTEC) 10 MG tablet Take 1 tablet by mouth Daily As Needed for Allergies.        Cholecalciferol (VITAMIN D) 1000 units tablet Take 500 Units by mouth Daily.         clopidogrel (PLAVIX) 75 MG tablet Take 1 tablet by mouth Daily. 90 tablet 3    Coenzyme Q10 (CO Q 10 PO) Take 1 capsule by mouth Daily.        furosemide (LASIX) 40 MG tablet Take 1 tablet by mouth Daily. 90 tablet 3    Insulin Disposable Pump (Omnipod DASH Pods, Gen 4,) misc 1 each Continuous. Pt to change pod every 3 days 30 each 3    Krill Oil 1000 MG capsule Take 1 capsule by mouth Daily.        lisinopril (PRINIVIL,ZESTRIL) 20 MG tablet Take 1 tablet by mouth 2 (Two) Times a Day. 180 tablet 3    metFORMIN (GLUCOPHAGE) 1000 MG tablet Take 1 tablet by mouth 2 (Two) Times a Day With Meals. 180 tablet 3    metoprolol succinate XL (TOPROL-XL) 25 MG 24 hr tablet Take 2 tablets by mouth every morning and take 1 tablet by mouth every evening 270 tablet 3    Multiple Vitamins-Minerals (MULTI VITAMIN/MINERALS) tablet Take 1 tablet by mouth Daily.        NovoLOG 100 UNIT/ML injection Pt to use as directed in insulin pump.  MDD: 65 60 mL 3    NovoLOG 100 UNIT/ML injection Insulin pump Pt to use as directed in insulin pump.  MDD: 65 60 mL 3    vitamin C (ASCORBIC ACID) 500 MG tablet Take 1 tablet by mouth Daily.        vitamin E 400 UNIT capsule Take 1 capsule by mouth Daily.        warfarin (COUMADIN) 5 MG tablet 1 tablet daily 90 tablet 3      No current facility-administered medications on file prior to visit.              Allergies   Allergen Reactions    Vancomycin Rash         Social History   Social History            Socioeconomic History    Marital status:        Spouse name: Maude    Number of children: 3    Years of education: 14   Tobacco Use    Smoking status: Former       Packs/day: 2.00       Years: 7.00       Pack years: 14.00       Types: Cigarettes       Start date: 1965       Quit date: 12/3/1973       Years since quittin.6    Smokeless tobacco: Never    Tobacco comments:       Haven’t smoked in almost 50 years   Vaping Use    Vaping Use: Never used   Substance and Sexual Activity     "Alcohol use: Not Currently       Comment: Maybe 1 drink this year    Drug use: No    Sexual activity: Not Currently       Partners: Female       Comment: 74 years old past time               Review of Systems   Constitutional:  Positive for activity change.   HENT: Negative.     Eyes: Negative.    Respiratory: Negative.     Cardiovascular: Negative.    Gastrointestinal: Negative.    Endocrine: Negative.    Genitourinary: Negative.    Musculoskeletal:  Positive for arthralgias, back pain and myalgias.   Skin: Negative.    Allergic/Immunologic: Negative.    Neurological:  Positive for weakness. Negative for numbness.   Psychiatric/Behavioral:  Negative for sleep disturbance.        Physical Examination:                Vitals       Vitals:     07/25/23 0814   BP: 164/82   BP Location: Right arm   Patient Position: Sitting   Cuff Size: Adult   Pulse: 60   Resp: 18   Weight: 129 kg (284 lb)   Height: 182.9 cm (72\")   PainSc:   6   PainLoc: Back            Physical Exam                    Vitals       Vitals:     07/25/23 0814   PainSc:   6   PainLoc: Back               Neurological Exam   Neurological examination is stable compared to my last evaluation without any new red flag signs.     Result Review  The following data was reviewed by: Josh Stoll MD on 07/25/2023:     Data reviewed : Radiologic studies no new imaging reviewed today.       Assessment/plan:  This is a 77-year-old gentleman with significant health issues.  He appears to have a right-sided L4 and L5 radiculopathy as well as some component of sacroiliac joint dysfunction secondary to sagittal imbalance.  He has undergone SI joint injections as well as 2 right-sided L4 and L5 nerve root injections.  He does get significant relief with the L4 and L5 nerve root injections.  He is interested in attempting to make this relief permanent with a surgical decompression.  We will attempt to obtain surgical clearance and if he is cleared we will work towards a " L4-L5 and L5-S1 right-sided laminotomy and foraminotomies.  I have encouraged him to call with any questions or concerns.

## 2023-12-04 NOTE — ANESTHESIA POSTPROCEDURE EVALUATION
Patient: Benoit Newberry Jr.    Procedure Summary       Date: 12/04/23 Room / Location: [facility] OR 12 / [facility] MAIN OR    Anesthesia Start: 0806 Anesthesia Stop: 1217    Procedure: LUMBAR FOUR TO SACRAL ONE RIGHT SIDED LAMINOTOMY, MEDIAL  FACETECTOMY, FORAMINOTOMIES (Right: Spine Lumbar) Diagnosis:       Lumbar radiculopathy, chronic      (Lumbar radiculopathy, chronic [M54.16])    Surgeons: Josh Stoll MD Provider: Pietro Mayes MD    Anesthesia Type: general, ERAS Protocol ASA Status: 3            Anesthesia Type: general, ERAS Protocol    Vitals  Vitals Value Taken Time   /60 12/04/23 1334   Temp 97.9 °F (36.6 °C) 12/04/23 1331   Pulse 62 12/04/23 1337   Resp 22 12/04/23 1331   SpO2 90 % 12/04/23 1337   Vitals shown include unfiled device data.        Post Anesthesia Care and Evaluation    Patient location during evaluation: PACU  Patient participation: complete - patient participated  Level of consciousness: awake  Pain scale: See nurse's notes for pain score.  Pain management: adequate    Airway patency: patent  Anesthetic complications: No anesthetic complications  PONV Status: none  Cardiovascular status: acceptable  Respiratory status: acceptable and spontaneous ventilation  Hydration status: acceptable    Comments: Patient seen and examined postoperatively; vital signs stable; SpO2 greater than or equal to 90%; cardiopulmonary status stable; nausea/vomiting adequately controlled; pain adequately controlled; no apparent anesthesia complications; patient discharged from anesthesia care when discharge criteria were met

## 2023-12-04 NOTE — ANESTHESIA PROCEDURE NOTES
Airway  Urgency: elective    Date/Time: 12/4/2023 8:13 AM  End Time:12/4/2023 8:13 AM  Airway not difficult    General Information and Staff    Patient location during procedure: OR  Anesthesiologist: Pietro Mayes MD  CRNA/CAA: Mary Loco CAA    Indications and Patient Condition  Indications for airway management: airway protection    Preoxygenated: yes  Mask difficulty assessment: 1 - vent by mask    Final Airway Details  Final airway type: endotracheal airway      Successful airway: ETT  Cuffed: yes   Successful intubation technique: video laryngoscopy  Facilitating devices/methods: intubating stylet  Endotracheal tube insertion site: oral  Blade: Saldaña  Blade size: 4  ETT size (mm): 7.5  Cormack-Lehane Classification: grade I - full view of glottis  Placement verified by: chest auscultation, capnometry and palpation of cuff   Measured from: lips  ETT/EBT  to lips (cm): 23  Number of attempts at approach: 1  Assessment: lips, teeth, and gum same as pre-op and atraumatic intubation

## 2023-12-04 NOTE — ANESTHESIA PREPROCEDURE EVALUATION
Anesthesia Evaluation     Patient summary reviewed and Nursing notes reviewed   NPO Solid Status: > 8 hours  NPO Liquid Status: > 8 hours           Airway   Mallampati: II  Dental      Pulmonary    Cardiovascular     ECG reviewed    (+) hypertension, past MI  >12 months, CAD, cardiac stents more than 12 months ago , dysrhythmias Atrial Fib, DVT, hyperlipidemia,  carotid artery disease      Neuro/Psych  GI/Hepatic/Renal/Endo    (+) obesity, diabetes mellitus type 2 using insulin    Musculoskeletal     (+) myalgias, neck pain  Abdominal    Substance History      OB/GYN          Other   arthritis,                   Anesthesia Plan    ASA 3     general and ERAS Protocol   total IV anesthesia  intravenous induction     Anesthetic plan, risks, benefits, and alternatives have been provided, discussed and informed consent has been obtained with: patient.    Plan discussed with CRNA and CAA.    CODE STATUS:

## 2023-12-06 NOTE — OP NOTE
Neurosurgical operative plan:    Patient name: Benoit Newberry  Medical record number: [id number]  Date of procedure: December 4, 2023    Preoperative diagnosis: Right-sided L4, L5 and S1 radiculopathy    Postoperative diagnosis: Right sided L4, L5 and S1 radiculopathy    Procedure performed: Right sided L4 laminotomy, L4-L5 medial facetectomy, L5 hemilaminectomy, L5-S1 medial facetectomy, S1 laminotomy, L4-L5 foraminotomy, L5-S1 foraminotomy, utilization of intraoperative neuromonitoring, utilization of intraoperative microscope    Surgeon: Dr. Josh Stoll  Assistant: Victor Manuel (?)  Anesthesia: General endotracheal anesthetic  Specimens: None  Blood loss: 75 cc  Drains: None  Complications: None immediate    Indications: This is a 77-year-old gentleman with severe multiple comorbidities including superior sagittal imbalance and likely sacroiliac joint dysfunction related to the sagittal imbalance.  However he also describes significant right leg pain and an approximate L4-L5 and L5-S1 distribution.  He did undergo a right-sided L4 and L5 selective nerve root block and received essentially 100% pain relief for a couple of days.  He failed physical therapy.  His imaging did show severe spinal pathology however this did include right-sided lateral recess and neuroforaminal stenosis at L4-L5 and L5-S1.  I discussed with him the possibility of improving his leg pain with this surgical decompression.  I explained to him that this was not guaranteed and that much of his other back pain would persist and could even become worse with the surgical intervention.  After explanation of the risks and benefits he elected to proceed with an operative intervention.    Description of procedure: The patient was identified in the preoperative holding area via name and medical record number.  His history, physical exam, consent were all reviewed and found to be correct and appropriate for proceeding with surgery.  He was marked over the  intended surgical site.  He was brought back to the operating room and handed over to anesthesia for induction of general endotracheal anesthetic.  He was transferred from the hospital bed to the operating room table in a prone position.  Baseline motor and sensory neuromonitoring status was established.  His back was cleaned with chlorhexidine and alcohol.  Incision was marked out based on anatomic and radiographic guidance.  Local anesthetic was instilled.  At this time the surgeon scrubbed and the area was prepped and draped in usual sterile fashion.  Timeout was performed and all categories were found to be correct and appropriate for proceeding with surgery.  Incision was made with 15 blade and carried down with Bovie electrocautery to be underlying fascia.  Self-retaining retractor was brought into right adequate visualization.  Fascia was cleared off with a Steele instrument.  The fascia was opened up over the spinous processes of L4, L5, S1.  Right-sided subperiosteal dissection along the spinous process and lamina at L4, L5 and S1 was accomplished.  Radiographic imaging confirmed proper levels.  Self-retaining retractor was brought in to provide adequate visualization.  Microscope was brought on the field.  L4 laminotomy, L4-L5 medial facetectomy, L5 hemilaminectomy, L5-S1 medial facetectomy, S1 laminotomy were accomplished with power drill bit and Kerrison bone punch.  Curette was utilized to create a plane between the bone and the ligament.  The ligament was eventually elevated and the thecal sac was identified.  The L4-L5 neuroforamen was opened up with Kerrison bone punch to complete the L4-L5 foraminotomy.  The L4 nerve root was deemed to be adequately decompressed.  The L5 nerve root was decompressed after the accomplishment of the L5-S1 foraminotomy.  The S1 nerve root shoulder was confirmed be decompressed.  The wound was copiously irrigated and hemostasis was obtained.  The disc space was  interrogated and no significant anterior pathology was deemed to be compressive to the thecal sac or nerve roots.  Radiographic imaging confirmed adequate decompression.  The wound was copiously irrigated and hemostasis was obtained.  The self-retaining retractor was removed.  Depo-Medrol was placed on the thecal sac and nerve roots.  The fascia was closed with interrupted 0 Vicryl stitches.  The skin was brought underwent redo 0 Vicryl stitches.  The skin was brought together with a running V-Loc Monocryl and this was covered with skin glue and a dressing.  The microscope was taken out of the field.  The drapes were taken down.  The patient was transferred back to the hospital bed in the supine position.  He was extubated intraoperatively and he was transported to the postanesthesia care unit.  At the time of him being dropped off at that location no complications were evident.  At the end of the case all counts were found to be correct.  I was present and scrubbed for all key portions of the procedure and immediately available at all times.  There were no changes in motor or sensory neuromonitoring status throughout the procedure.  The assistance of the surgical first assist was essential for successful completion of surgical intervention including assistance with opening, closing, suction, retraction.  End of dictation.  Thank you very much.

## 2023-12-12 ENCOUNTER — OFFICE VISIT (OUTPATIENT)
Dept: ENDOCRINOLOGY | Facility: CLINIC | Age: 78
End: 2023-12-12
Payer: MEDICARE

## 2023-12-12 VITALS
OXYGEN SATURATION: 97 % | DIASTOLIC BLOOD PRESSURE: 72 MMHG | WEIGHT: 266 LBS | SYSTOLIC BLOOD PRESSURE: 135 MMHG | HEIGHT: 69 IN | BODY MASS INDEX: 39.4 KG/M2 | HEART RATE: 71 BPM

## 2023-12-12 DIAGNOSIS — E11.65 TYPE 2 DIABETES MELLITUS WITH HYPERGLYCEMIA, WITH LONG-TERM CURRENT USE OF INSULIN: ICD-10-CM

## 2023-12-12 DIAGNOSIS — Z79.4 TYPE 2 DIABETES MELLITUS WITH HYPERGLYCEMIA, WITH LONG-TERM CURRENT USE OF INSULIN: ICD-10-CM

## 2023-12-12 DIAGNOSIS — I10 PRIMARY HYPERTENSION: ICD-10-CM

## 2023-12-12 DIAGNOSIS — E78.2 MIXED HYPERLIPIDEMIA: Primary | ICD-10-CM

## 2023-12-12 PROCEDURE — 1159F MED LIST DOCD IN RCRD: CPT | Performed by: INTERNAL MEDICINE

## 2023-12-12 PROCEDURE — 3078F DIAST BP <80 MM HG: CPT | Performed by: INTERNAL MEDICINE

## 2023-12-12 PROCEDURE — 3044F HG A1C LEVEL LT 7.0%: CPT | Performed by: INTERNAL MEDICINE

## 2023-12-12 PROCEDURE — 99214 OFFICE O/P EST MOD 30 MIN: CPT | Performed by: INTERNAL MEDICINE

## 2023-12-12 PROCEDURE — 1160F RVW MEDS BY RX/DR IN RCRD: CPT | Performed by: INTERNAL MEDICINE

## 2023-12-12 PROCEDURE — 95251 CONT GLUC MNTR ANALYSIS I&R: CPT | Performed by: INTERNAL MEDICINE

## 2023-12-12 PROCEDURE — 3075F SYST BP GE 130 - 139MM HG: CPT | Performed by: INTERNAL MEDICINE

## 2023-12-12 RX ORDER — AMLODIPINE BESYLATE 5 MG/1
5 TABLET ORAL DAILY
Qty: 90 TABLET | Refills: 3 | Status: SHIPPED | OUTPATIENT
Start: 2023-12-12

## 2023-12-12 RX ORDER — ATORVASTATIN CALCIUM 40 MG/1
TABLET, FILM COATED ORAL
Qty: 90 TABLET | Refills: 3 | Status: SHIPPED | OUTPATIENT
Start: 2023-12-12

## 2023-12-12 RX ORDER — INSULIN ASPART 100 [IU]/ML
INJECTION, SOLUTION INTRAVENOUS; SUBCUTANEOUS
Qty: 30 ML | Refills: 3 | Status: SHIPPED | OUTPATIENT
Start: 2023-12-12

## 2023-12-12 RX ORDER — LISINOPRIL 40 MG/1
TABLET ORAL
Qty: 90 TABLET | Refills: 3 | Status: SHIPPED | OUTPATIENT
Start: 2023-12-12

## 2023-12-12 NOTE — PATIENT INSTRUCTIONS
Continue current medications  Always keep glucose source in case of low blood sugars  Labs before follow-up  Sorry about

## 2023-12-12 NOTE — PROGRESS NOTES
Endocrine Progress Note Outpatient     Patient Care Team:  Clark Gallagher MD as PCP - General (Internal Medicine)  Karolina Saldaña MD as Consulting Physician (Cardiology)    Chief Complaint: Follow-up type 2 diabetes    HPI: This is a 77-year-old male history of type 2 diabetes, hypertension, hyperlipidemia, CAD and obesity is here for follow-up.    For type 2 diabetes: He is now on Omni pod Dash insulin pump along with Dexcom monitoring system.  He has been using insulin pump since February 2022.  He is using NovoLog insulin the pump.  His current pump settings are as follows basal rate midnight is 1.0 units/h.  At 3 PM is 0.90 units/h.  His insulin carb ratio is 1 unit for each 4 g of carbohydrate.  Correction scale is 1 unit for each 30 mg blood sugar with a target blood sugar of 140 and active insulin is 4 hours.  Also on metformin 1000 mg twice a day.    Hypertension: Fair control    Hyperlipidemia: Currently on atorvastatin.    Past Medical History:   Diagnosis Date    Anemia Runs in family    Arthritis     Asymptomatic stenosis of right carotid artery     Atrial fibrillation     Bradycardia     Brain concussion     Buttock pain     rt cheek( lower)    Cataract     Clotting disorder Take blood thinner    Congenital heart disease     Coronary artery disease     Deep vein thrombosis     Diabetes mellitus 08/25/2011    Diabetes mellitus, type 2     Erectile dysfunction 2005    Heart attack     x2   with stent placement     2015/2018    Hyperlipidemia     Hypertension     Hypoglycemia     Leg pain     aches    Low back pain 2014    Lumbar radiculopathy, chronic 10/20/2023    Myocardial infarction     Nonintractable headache 02/24/2022    Obesity 45 years    Other cervical disc degeneration at C6-C7 level 08/29/2017    Pacemaker     Pulmonary arterial hypertension 1967    Scoliosis 20 years    Sleep apnea     Visual impairment Last 15 years       Social History     Socioeconomic History    Marital status:       Spouse name: Maude    Number of children: 3    Years of education: 14   Tobacco Use    Smoking status: Former     Packs/day: 2.00     Years: 7.00     Additional pack years: 0.00     Total pack years: 14.00     Types: Cigarettes     Start date: 1965     Quit date: 12/3/1973     Years since quittin.0     Passive exposure: Past    Smokeless tobacco: Never    Tobacco comments:     Haven't smoked in almost 50 years   Vaping Use    Vaping Use: Never used   Substance and Sexual Activity    Alcohol use: Not Currently     Comment: Maybe 1 drink this year    Drug use: No    Sexual activity: Not Currently     Partners: Female     Comment: 72 years old past time       Family History   Problem Relation Age of Onset    Heart disease Mother          of heart failure age of 84    Hypertension Mother     Hyperlipidemia Mother     Arthritis Mother     Anemia Mother     Obesity Mother     Heart disease Father          of heart attack age of 68    Hypertension Father     Hyperlipidemia Father     Alcohol abuse Father     Anemia Father     Cancer Sister     Heart disease Sister         Heart problems    Hypertension Sister     Hyperlipidemia Sister     Diabetes Sister     Anemia Sister     Obesity Sister     Heart disease Brother         Heart problems two different times with heart problems    Hypertension Brother     Hyperlipidemia Brother     Alcohol abuse Brother     Diabetes Brother     Anemia Brother     Obesity Brother     Diabetes Maternal Grandmother     Obesity Maternal Grandmother     Stroke Maternal Grandfather     Obesity Maternal Grandfather        Allergies   Allergen Reactions    Vancomycin Rash       ROS:   Constitutional:  Denies fatigue, tiredness.    Eyes:  Denies change in visual acuity   HENT:  Denies nasal congestion or sore throat   Respiratory: denies cough, shortness of breath.   Cardiovascular:  denies chest pain, edema   GI:  Denies abdominal pain, nausea, vomiting.    Musculoskeletal:  Denies back pain or joint pain   Integument:  Denies dry skin and rash   Neurologic:  Denies headache, focal weakness or sensory changes   Endocrine:  Denies polyuria or polydipsia   Psychiatric:  Denies depression or anxiety      Vitals:    12/12/23 0940   BP: 135/72   Pulse: 71   SpO2: 97%     Body mass index is 39.28 kg/m².     Physical Exam:  GEN: NAD, conversant  EYES: EOMI,   NECK: no thyromegaly  CV: RRR,  LUNG: CTA  PSYCH: AOX3, appropriate mood, affect normal      Results Review:     I reviewed the patient's new clinical results.    Lab Results   Component Value Date    HGBA1C 6.60 (H) 09/11/2023    HGBA1C 6.90 (H) 06/06/2023    HGBA1C 6.4 (H) 12/13/2022      Lab Results   Component Value Date    GLUCOSE 137 (H) 11/22/2023    BUN 25 (H) 11/22/2023    CREATININE 1.09 11/22/2023    EGFRIFNONA 74 02/24/2022    BCR 22.9 11/22/2023    K 4.2 11/22/2023    CO2 26.7 11/22/2023    CALCIUM 9.6 11/22/2023    ALBUMIN 4.3 09/11/2023    LABIL2 1.6 10/15/2018    AST 23 09/11/2023    ALT 18 09/11/2023    CHOL 134 09/11/2023    TRIG 84 09/11/2023    LDL 80 09/11/2023    HDL 38 (L) 09/11/2023     Lab Results   Component Value Date    TSH 1.640 11/25/2020     Dexcom download interpretation: Dates covered was between November 29, 2023 to December 12, 2023.  Average blood sugar is 146.  He is spending 81% in the range, 18% above range and less than 1% below range.  Glucose graph did not show significant fluctuations.        Medication Review: Reviewed.       Current Outpatient Medications:     Alcohol Swabs 70 % pads, USE WHEN TESTING BLOOD GLUCOSE FOUR TIMES DAILY, Disp: , Rfl:     amLODIPine (NORVASC) 5 MG tablet, Take 1 tablet by mouth Daily., Disp: 90 tablet, Rfl: 3    atorvastatin (LIPITOR) 40 MG tablet, Take 0.5 tablets by mouth 2 (Two) Times a Day., Disp: 90 tablet, Rfl: 3    cetirizine (zyrTEC) 10 MG tablet, Take 1 tablet by mouth Daily As Needed for Allergies., Disp: , Rfl:     Cholecalciferol  (VITAMIN D) 1000 units tablet, Take 0.5 tablets by mouth Daily., Disp: , Rfl:     clopidogrel (PLAVIX) 75 MG tablet, Take 1 tablet by mouth Daily. Can restart 7 days post surgery., Disp: , Rfl:     Coenzyme Q10 (CO Q 10 PO), Take 1 capsule by mouth Daily., Disp: , Rfl:     docusate sodium (Colace) 100 MG capsule, Take 1 capsule by mouth 2 (Two) Times a Day., Disp: 10 capsule, Rfl: 0    furosemide (LASIX) 40 MG tablet, Take 1 tablet by mouth Daily., Disp: 90 tablet, Rfl: 3    Insulin Disposable Pump (Omnipod 5 G6 Intro, Gen 5,) kit, Use 1 package Daily., Disp: 1 kit, Rfl: 0    Insulin Disposable Pump (Omnipod 5 G6 Pod, Gen 5,) misc, Use 1 package Daily., Disp: 30 each, Rfl: 6    Krill Oil 1000 MG capsule, Take 1 capsule by mouth Daily., Disp: , Rfl:     lisinopril (PRINIVIL,ZESTRIL) 40 MG tablet, 1 tablet p.o. daily, Disp: 90 tablet, Rfl: 3    metFORMIN (GLUCOPHAGE) 1000 MG tablet, Take 1 tablet by mouth 2 (Two) Times a Day With Meals., Disp: 180 tablet, Rfl: 3    methocarbamol (ROBAXIN) 750 MG tablet, Take 1 tablet by mouth 3 (Three) Times a Day As Needed for Muscle Spasms., Disp: 60 tablet, Rfl: 0    metoprolol succinate XL (TOPROL-XL) 25 MG 24 hr tablet, Take 2 tablets by mouth every morning and take 1 tablet by mouth every evening (Patient taking differently: Take 4 tablets by mouth Daily. Take 2 tablets by mouth every morning and take 1 tablet by mouth every evening), Disp: 270 tablet, Rfl: 3    Multiple Vitamins-Minerals (MULTI VITAMIN/MINERALS) tablet, Take 1 tablet by mouth Daily., Disp: , Rfl:     naloxone (NARCAN) 4 MG/0.1ML nasal spray, Call 911. Don't prime. [place] in 1 nostril for overdose. Repeat in 2-3 minutes in other nostril if no or minimal breathing/responsiveness., Disp: 2 each, Rfl: 0    NovoLOG 100 UNIT/ML injection, Pt to use as directed in insulin pump.  MDD: 65, Disp: 60 mL, Rfl: 3    vitamin C (ASCORBIC ACID) 500 MG tablet, Take 1 tablet by mouth Daily., Disp: , Rfl:     vitamin E 400 UNIT  capsule, Take 1 capsule by mouth Daily., Disp: , Rfl:     warfarin (COUMADIN) 5 MG tablet, 1 tablet daily, Disp: , Rfl:     NovoLOG 100 UNIT/ML injection, Insulin pump Pt to use as directed in insulin pump.  MDD: 65 (Patient not taking: Reported on 12/12/2023), Disp: 60 mL, Rfl: 3          Assessment and plan:  Diabetes mellitus type 2 with Hyperglycemia: Overall doing well, will continue current regimen of insulin pump with metformin.  Will follow blood sugars and A1c.  Advised to always keep glucose source in case of low blood sugars.    Hypertension: Well-controlled, continue current medications.    Hyperlipidemia: Currently on atorvastatin 40 mg p.o. daily.      Assessment and plan from my September 18, 2023 reviewed and updated.        John Tolentino MD FACE.

## 2023-12-16 LAB — INR PPP: 1.8

## 2023-12-18 ENCOUNTER — ANTICOAGULATION VISIT (OUTPATIENT)
Dept: CARDIOLOGY | Facility: CLINIC | Age: 78
End: 2023-12-18
Payer: MEDICARE

## 2023-12-18 DIAGNOSIS — Z79.01 LONG TERM (CURRENT) USE OF ANTICOAGULANTS: ICD-10-CM

## 2023-12-18 DIAGNOSIS — I48.0 PAROXYSMAL ATRIAL FIBRILLATION: Primary | ICD-10-CM

## 2023-12-18 NOTE — PROGRESS NOTES
Pts INR is borderline normal at 1.8, at this time continue current dosage and recheck in a week. cjRN

## 2023-12-18 NOTE — PROGRESS NOTES
Neurosurgical Consultation      Benoit Newberry Jr. is a 77 y.o. male is here today for post-op follow-up. Today patient reports some remaining pain in his right leg and buttock area.    Chief Complaint   Patient presents with    Post-op     Follow up         Previous treatment: Right sided L4 laminotomy, L4-L5 medial facetectomy, L5 hemilaminectomy, L5-S1 medial facetectomy, S1 laminotomy, L4-L5 foraminotomy, L5-S1 foraminotomy, utilization of intraoperative neuromonitoring done on 12/4/2023.    HPI: This is a 77-year-old gentleman who underwent a right-sided L4-S1 right-sided laminotomy, medial facetectomy, foraminotomies for decompression of multiple nerve roots for which I believed he had chronic radiculopathy.  This occurred approximately 2 weeks ago.  He states that he is doing well postoperatively.  He has had decrease in his right leg pain relatively significantly.  He has had increased mobility.  He is not requiring significant pain medicine.  His incision is well-healing without any signs of breakdown or infection.    Past Medical History:   Diagnosis Date    Anemia Runs in family    Arthritis     Asymptomatic stenosis of right carotid artery     Atrial fibrillation     Bradycardia     Brain concussion     Buttock pain     rt cheek( lower)    Cataract     Clotting disorder Take blood thinner    Congenital heart disease     Coronary artery disease     Deep vein thrombosis     Diabetes mellitus 08/25/2011    Diabetes mellitus, type 2     Erectile dysfunction 2005    Heart attack     x2   with stent placement     2015/2018    Hyperlipidemia     Hypertension     Hypoglycemia     Leg pain     aches    Low back pain 2014    Lumbar radiculopathy, chronic 10/20/2023    Myocardial infarction     Nonintractable headache 02/24/2022    Obesity 45 years    Other cervical disc degeneration at C6-C7 level 08/29/2017    Pacemaker     Pulmonary arterial hypertension 1967    Scoliosis 20 years    Sleep apnea     Visual  impairment Last 15 years        Past Surgical History:   Procedure Laterality Date    ABLATION OF DYSRHYTHMIC FOCUS      APPENDECTOMY  1956    BACK SURGERY  12/04/2023    CARDIAC ABLATION  12/2018    x 1     CARDIAC CATHETERIZATION Left 02/25/2022    Procedure: Left Heart Cath and coronary angiogram;  Surgeon: Karolina Saldaña MD;  Location: [facility] CATH INVASIVE LOCATION;  Service: Cardiovascular;  Laterality: Left;    CARDIAC CATHETERIZATION  02/25/2022    Procedure: Saphenous Vein Graft;  Surgeon: Karolina Saldaña MD;  Location: [facility] CATH INVASIVE LOCATION;  Service: Cardiovascular;;    CARDIOVERSION  06/2018    Cardioversion 6/2018; x3  12/2018    CORONARY ANGIOPLASTY Left 11/04/2015    Distal left main and in the mid circumflex artery     CORONARY ANGIOPLASTY Right 11/02/2015    Right coronary artery     CORONARY ARTERY BYPASS GRAFT  03/1998    CORONARY STENT PLACEMENT      EYE SURGERY      FOOT SURGERY  2010    HERNIA REPAIR  2005    INSERT / REPLACE / REMOVE PACEMAKER      LUMBAR LAMINECTOMY Right 12/04/2023    Procedure: LUMBAR FOUR TO SACRAL ONE RIGHT SIDED LAMINOTOMY, MEDIAL  FACETECTOMY, FORAMINOTOMIES;  Surgeon: Josh Stoll MD;  Location: [facility] MAIN OR;  Service: Neurosurgery;  Laterality: Right;    OTHER SURGICAL HISTORY  05/2019    Stent     PACEMAKER IMPLANTATION  01/2019    Dr. Saldaña     REPLACEMENT TOTAL KNEE BILATERAL  2001    SKIN BIOPSY          Current Outpatient Medications on File Prior to Visit   Medication Sig Dispense Refill    Alcohol Swabs 70 % pads USE WHEN TESTING BLOOD GLUCOSE FOUR TIMES DAILY      amLODIPine (NORVASC) 5 MG tablet Take 1 tablet by mouth Daily. 90 tablet 3    atorvastatin (LIPITOR) 40 MG tablet 1 tablet p.o. daily. 90 tablet 3    cetirizine (zyrTEC) 10 MG tablet Take 1 tablet by mouth Daily As Needed for Allergies.      Cholecalciferol (VITAMIN D) 1000 units tablet Take 0.5 tablets by mouth Daily.      clopidogrel (PLAVIX) 75 MG tablet Take 1 tablet by mouth  Daily. Can restart 7 days post surgery.      Coenzyme Q10 (CO Q 10 PO) Take 1 capsule by mouth Daily.      docusate sodium (Colace) 100 MG capsule Take 1 capsule by mouth 2 (Two) Times a Day. 10 capsule 0    furosemide (LASIX) 40 MG tablet Take 1 tablet by mouth Daily. 90 tablet 3    Insulin Aspart (novoLOG) 100 UNIT/ML injection For using insulin pump.  Max daily dose is 100 units/day. 30 mL 3    Insulin Disposable Pump (Omnipod 5 G6 Intro, Gen 5,) kit Use 1 package Daily. 1 kit 0    Insulin Disposable Pump (Omnipod 5 G6 Pod, Gen 5,) misc Use 1 package Daily. 30 each 6    Krill Oil 1000 MG capsule Take 1 capsule by mouth Daily.      lisinopril (PRINIVIL,ZESTRIL) 40 MG tablet 1 tablet p.o. daily 90 tablet 3    metFORMIN (GLUCOPHAGE) 1000 MG tablet Take 1 tablet by mouth 2 (Two) Times a Day With Meals. 180 tablet 3    methocarbamol (ROBAXIN) 750 MG tablet Take 1 tablet by mouth 3 (Three) Times a Day As Needed for Muscle Spasms. 60 tablet 0    metoprolol succinate XL (TOPROL-XL) 25 MG 24 hr tablet Take 2 tablets by mouth every morning and take 1 tablet by mouth every evening (Patient taking differently: Take 4 tablets by mouth Daily. Take 2 tablets by mouth every morning and take 1 tablet by mouth every evening) 270 tablet 3    Multiple Vitamins-Minerals (MULTI VITAMIN/MINERALS) tablet Take 1 tablet by mouth Daily.      naloxone (NARCAN) 4 MG/0.1ML nasal spray Call 911. Don't prime. [place] in 1 nostril for overdose. Repeat in 2-3 minutes in other nostril if no or minimal breathing/responsiveness. 2 each 0    vitamin C (ASCORBIC ACID) 500 MG tablet Take 1 tablet by mouth Daily.      vitamin E 400 UNIT capsule Take 1 capsule by mouth Daily.      warfarin (COUMADIN) 5 MG tablet 1 tablet daily       No current facility-administered medications on file prior to visit.        Allergies   Allergen Reactions    Vancomycin Rash        Social History     Socioeconomic History    Marital status:      Spouse name: Maude     "Number of children: 3    Years of education: 14   Tobacco Use    Smoking status: Former     Packs/day: 2.00     Years: 7.00     Additional pack years: 0.00     Total pack years: 14.00     Types: Cigarettes     Start date: 1965     Quit date: 12/3/1973     Years since quittin.0     Passive exposure: Past    Smokeless tobacco: Never    Tobacco comments:     Haven’t smoked in almost 50 years   Vaping Use    Vaping Use: Never used   Substance and Sexual Activity    Alcohol use: Not Currently     Comment: Maybe 1 drink this year    Drug use: No    Sexual activity: Not Currently     Partners: Female     Comment: 74 years old past time          Review of Systems   Constitutional:  Positive for activity change.   HENT: Negative.     Eyes: Negative.    Respiratory: Negative.     Cardiovascular: Negative.    Gastrointestinal: Negative.    Endocrine: Negative.    Genitourinary: Negative.    Musculoskeletal:         Right leg/buttock pain   Skin: Negative.    Allergic/Immunologic: Negative.    Neurological:  Positive for weakness (right leg).   Hematological: Negative.    Psychiatric/Behavioral: Negative.          Physical Examination:     Vitals:    23 0853   BP: 144/76   Pulse: 60   Weight: 120 kg (265 lb)   Height: 175.3 cm (69\")   PainSc:   3        Physical Exam     Neurological Exam   Neurological examination is stable compared to my preoperative evaluation.  He has had significant improvement in the pain into his right leg.  His incision is well-healing without any signs of breakdown or infection.    Result Review  The following data was reviewed by: Josh Stoll MD on 2023:    Data reviewed : Radiologic studies no new imaging reviewed today.      Assessment/plan:  This is a 77-year-old gentleman who is approximately 2 weeks removed from a right sided L4-S1 decompression for chronic radiculopathy.  He has had significant improvement in his radicular pain.  His incision is well-healing.  I " recommend engaging with physical therapy to maximize his mobility status.  I recommend that he follow-up with me in 4 weeks for clinical reevaluation.  I have encouraged him to call with any questions or concerns.    Diagnoses and all orders for this visit:    1. Lumbar radiculopathy, chronic (Primary)  -     Ambulatory Referral to Physical Therapy POST OP         Return in about 4 weeks (around 1/16/2024).            Josh Stoll MD

## 2023-12-19 ENCOUNTER — OFFICE VISIT (OUTPATIENT)
Dept: NEUROSURGERY | Facility: CLINIC | Age: 78
End: 2023-12-19
Payer: MEDICARE

## 2023-12-19 VITALS
HEART RATE: 60 BPM | BODY MASS INDEX: 39.25 KG/M2 | DIASTOLIC BLOOD PRESSURE: 76 MMHG | WEIGHT: 265 LBS | SYSTOLIC BLOOD PRESSURE: 144 MMHG | HEIGHT: 69 IN

## 2023-12-19 DIAGNOSIS — M54.16 LUMBAR RADICULOPATHY, CHRONIC: Primary | ICD-10-CM

## 2023-12-19 PROCEDURE — 1160F RVW MEDS BY RX/DR IN RCRD: CPT | Performed by: NEUROLOGICAL SURGERY

## 2023-12-19 PROCEDURE — 3078F DIAST BP <80 MM HG: CPT | Performed by: NEUROLOGICAL SURGERY

## 2023-12-19 PROCEDURE — 3077F SYST BP >= 140 MM HG: CPT | Performed by: NEUROLOGICAL SURGERY

## 2023-12-19 PROCEDURE — 99024 POSTOP FOLLOW-UP VISIT: CPT | Performed by: NEUROLOGICAL SURGERY

## 2023-12-19 PROCEDURE — 1159F MED LIST DOCD IN RCRD: CPT | Performed by: NEUROLOGICAL SURGERY

## 2023-12-20 ENCOUNTER — TREATMENT (OUTPATIENT)
Dept: PHYSICAL THERAPY | Facility: CLINIC | Age: 78
End: 2023-12-20
Payer: MEDICARE

## 2023-12-20 DIAGNOSIS — R26.2 DIFFICULTY WALKING: ICD-10-CM

## 2023-12-20 DIAGNOSIS — M54.16 LUMBAR RADICULOPATHY, CHRONIC: Primary | ICD-10-CM

## 2023-12-20 PROCEDURE — 97112 NEUROMUSCULAR REEDUCATION: CPT | Performed by: PHYSICAL THERAPIST

## 2023-12-20 PROCEDURE — 97162 PT EVAL MOD COMPLEX 30 MIN: CPT | Performed by: PHYSICAL THERAPIST

## 2023-12-20 NOTE — PROGRESS NOTES
Physical Therapy Initial Evaluation and Plan of Care  Office: [address] [facility] [address], [place] [address]  P: [phone]  F: [phone]    Patient: Benoit Newberry Jr.   : 1945  Diagnosis/ICD-10 Code:  Lumbar radiculopathy, chronic [M54.16]  Referring practitioner: Josh Stoll MD  Date of Initial Visit: 2023  Today's Date: 2023  Patient seen for 1 sessions           Subjective Questionnaire: Oswestry: 50% impairment       Subjective Evaluation    History of Present Illness  Mechanism of injury: Pt reports that he had been having pain in R buttocks that goes down the leg into the R calf. He has been having pain off and on for 20 years. Tried PT and then he decided to have surgery on 2023. Pain has been better in the R leg but is still there. He has been using walker everywhere mostly unless he's just walking a really short distance inside and knows he is going to sit down again once inside. Pt states that he has been able to bend forward to get things off the floor while sitting and surgeon told him that was fine, just avoiding bending when standing. Able to walk in home with RW and has even done a little without. Not having to take pain medication for a while. 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 Doesn't feel like legs are weak but does have trouble with balance. Sleeping well at night, pain not waking him up. Usually taking a couple PM tylenol each night and he's able to sleep the whole night. Pt is sleeping mostly in recliner because can't lie flat due to pain in the lower back.     Pain  Current pain ratin  At best pain ratin  At worst pain ratin  Quality: dull ache  Aggravating factors: movement, standing, stairs, lifting and ambulation    Treatments  Previous treatment: injection treatment and physical therapy  Patient Goals  Patient goals for therapy: decreased pain, improved balance, increased motion, return to sport/leisure activities,  independence with ADLs/IADLs and increased strength  Patient goal: be able to walk around block in neighborhood and then hopefully more later on, get back to playing golf, be able to sleep in bed rather than recliner       Past Medical History:   Diagnosis Date   • Anemia Runs in family   • Arthritis    • Asymptomatic stenosis of right carotid artery    • Atrial fibrillation    • Bradycardia    • Brain concussion    • Buttock pain     rt cheek( lower)   • Cataract    • Clotting disorder Take blood thinner   • Congenital heart disease    • Coronary artery disease    • Deep vein thrombosis    • Diabetes mellitus 08/25/2011   • Diabetes mellitus, type 2    • Erectile dysfunction 2005   • Heart attack     x2   with stent placement     2015/2018   • Hyperlipidemia    • Hypertension    • Hypoglycemia    • Leg pain     aches   • Low back pain 2014   • Lumbar radiculopathy, chronic 10/20/2023   • Myocardial infarction    • Nonintractable headache 02/24/2022   • Obesity 45 years   • Other cervical disc degeneration at C6-C7 level 08/29/2017   • Pacemaker    • Pulmonary arterial hypertension 1967   • Scoliosis 20 years   • Sleep apnea    • Visual impairment Last 15 years        Past Surgical History:   Procedure Laterality Date   • ABLATION OF DYSRHYTHMIC FOCUS     • APPENDECTOMY  1956   • BACK SURGERY  12/04/2023   • CARDIAC ABLATION  12/2018    x 1    • CARDIAC CATHETERIZATION Left 02/25/2022    Procedure: Left Heart Cath and coronary angiogram;  Surgeon: Karolina Saldaña MD;  Location: [facility] CATH INVASIVE LOCATION;  Service: Cardiovascular;  Laterality: Left;   • CARDIAC CATHETERIZATION  02/25/2022    Procedure: Saphenous Vein Graft;  Surgeon: Karolina Saldaña MD;  Location: [facility] CATH INVASIVE LOCATION;  Service: Cardiovascular;;   • CARDIOVERSION  06/2018    Cardioversion 6/2018; x3  12/2018   • CORONARY ANGIOPLASTY Left 11/04/2015    Distal left main and in the mid circumflex artery    • CORONARY ANGIOPLASTY Right  11/02/2015    Right coronary artery    • CORONARY ARTERY BYPASS GRAFT  03/1998   • CORONARY STENT PLACEMENT     • EYE SURGERY     • FOOT SURGERY  2010   • HERNIA REPAIR  2005   • INSERT / REPLACE / REMOVE PACEMAKER     • LUMBAR LAMINECTOMY Right 12/04/2023    Procedure: LUMBAR FOUR TO SACRAL ONE RIGHT SIDED LAMINOTOMY, MEDIAL  FACETECTOMY, FORAMINOTOMIES;  Surgeon: Josh Stoll MD;  Location: [facility] OR;  Service: Neurosurgery;  Laterality: Right;   • OTHER SURGICAL HISTORY  05/2019    Stent    • PACEMAKER IMPLANTATION  01/2019    Dr. Saldaña    • REPLACEMENT TOTAL KNEE BILATERAL  2001   • SKIN BIOPSY          Objective          Postural Observations  Seated posture: fair  Standing posture: fair    Additional Postural Observation Details  Increased fwd trunk lean as well as lean to the L    Incision is healing well with no signs of infection at this time     Tenderness     Additional Tenderness Details  Min tenderness around incision site     Neurological Testing     Sensation     Lumbar   Left   Intact: light touch  Diminished: light touch    Right   Intact: light touch  Diminished: light touch    Comments   Left light touch: Decreased in feet due to DM per pt report  Right light touch: Decreased in feet due to DM per pt report    Active Range of Motion     Lumbar   Flexion: WFL  Left lateral flexion: WFL  Right lateral flexion: WFL    Additional Active Range of Motion Details  Min AROM assessment performed in sitting     Strength/Myotome Testing     Left Hip   Planes of Motion   Flexion: 4  Extension: 4-  Abduction: 4-    Right Hip   Planes of Motion   Flexion: 4-  Extension: 4-  Abduction: 4-    Left Knee   Flexion: 4  Extension: 4    Right Knee   Flexion: 4  Extension: 4    Left Ankle/Foot   Dorsiflexion: 4  Plantar flexion: 4    Right Ankle/Foot   Dorsiflexion: 4  Plantar flexion: 4    Muscle Activation     Additional Muscle Activation Details  Poor activation of deep core stabilizers     Ambulation    Weight-Bearing Status   Assistive device used: none    Additional Weight-Bearing Status Details  Pt reports using RW as needed in community     Ambulation: Level Surfaces   Ambulation with assistive device: independent  Ambulation without assistive device: independent    Observational Gait   Gait: antalgic   Decreased walking speed.     Quality of Movement During Gait   Trunk  Forward lean.         Assessment & Plan       Assessment  Impairments: abnormal coordination, abnormal gait, abnormal muscle firing, abnormal muscle tone, abnormal or restricted ROM, activity intolerance, impaired balance, impaired physical strength, lacks appropriate home exercise program and pain with function   Functional limitations: carrying objects, lifting, sleeping, walking, uncomfortable because of pain, moving in bed, sitting, standing and stooping   Assessment details: Pt is a 77 year old s/p L4-S1 R Laminotomy, medial facetectomy, foraminotomies on 12/4/2023. Pt demonstrates limited AROM of the lumbar spine as well as decreased activation of deep core stabilizers. Pt displays lateral lean to the L and forward trunk lean with ambulation. Decreased ability to amb or stand for longer periods of time. Decreased overall endurance and LE strength. Functional limitations are listed above. Pt will benefit from PT in order to address impairments and improve overall function.     Prognosis: good    Goals  Plan Goals: STG (3 weeks):  Pt will demonstrate increased activation of core stabilizers during activities/exercises to decrease load on spine.   Pt will display improved ROM of lumbar spine to WFL with min to no pain to assist with functional tasks.     LTG (6 weeks):  Pt will be independent with home exercises to assist with improved strength and continue to improve function.   Pt will demonstrate decreased score on the Modified Oswestry to 24% to demonstrate decreased overall impairment.   Pt will be able to amb without AD in community  for >15 mins with little to no pain in lumbar spine or R LE.   Pt will demonstrate improved hip and core strength to 4+/5 overall to assist with function.        Plan  Therapy options: will be seen for skilled therapy services  Planned modality interventions: TENS, cryotherapy and thermotherapy (hydrocollator packs)  Planned therapy interventions: abdominal trunk stabilization, ADL retraining, balance/weight-bearing training, body mechanics training, flexibility, functional ROM exercises, gait training, home exercise program, joint mobilization, manual therapy, motor coordination training, neuromuscular re-education, postural training, soft tissue mobilization, spinal/joint mobilization, strengthening, stretching and therapeutic activities  Frequency: 2x week  Duration in weeks: 12  Treatment plan discussed with: patient    HEP:   Access Code: [email or web address]  URL: [email or web address]  Date: 12/20/2023  Prepared by: Yusra Maurer    Exercises  - Seated Transversus Abdominis Bracing  - 2 x daily - 10 reps - 5 sec hold  - Seated Isometric Hip Abduction with Band  - 2 x daily - 10 reps - 5 sec hold  - Seated Isometric Hip Adduction with Ball  - 2 x daily - 10 reps - 5 sec hold  - Seated Long Arc Quad  - 2 x daily - 15 reps  - Seated Diaphragmatic Breathing  - 2 x daily - 10 reps - 5 sec hold    History # of Personal Factors and/or Comorbidities: MODERATE (1-2)  Examination of Body System(s): # of elements: MODERATE (3)  Clinical Presentation: EVOLVING  Clinical Decision Making: MODERATE      Eval:  Low Eval          Mins  99152  Mod Eval     30     Mins  11813  High Eval                            Mins  82502    Timed:         Manual Therapy:         mins  49729;     Therapeutic Exercise:         mins  24807;     Neuromuscular Juan:    15    mins  23052;    Therapeutic Activity:          mins  27231;     Gait Training:           mins  89919;       Un-Timed:  Electrical Stimulation:         mins  74303 (  );  Traction          mins 81924      Timed Treatment:   15   mins   Total Treatment:     45   mins    PT SIGNATURE: Yusra Maurer, PT, DPT, OCS           [place] License # [id number]              DATE TREATMENT INITIATED: 12/20/2023    Initial Certification  Certification Period: 3/18/2024  I certify that the therapy services are furnished while this patient is under my care.  The services outlined above are required by this patient, and will be reviewed every 90 days.     PHYSICIAN: Josh Stoll MD      DATE:     Please sign and return via fax to [phone].. Thank you, [facility].

## 2023-12-22 ENCOUNTER — TREATMENT (OUTPATIENT)
Dept: PHYSICAL THERAPY | Facility: CLINIC | Age: 78
End: 2023-12-22
Payer: MEDICARE

## 2023-12-22 DIAGNOSIS — M54.16 LUMBAR RADICULOPATHY, CHRONIC: Primary | ICD-10-CM

## 2023-12-22 PROCEDURE — 97112 NEUROMUSCULAR REEDUCATION: CPT | Performed by: PHYSICAL THERAPIST

## 2023-12-22 PROCEDURE — 97110 THERAPEUTIC EXERCISES: CPT | Performed by: PHYSICAL THERAPIST

## 2023-12-22 NOTE — PROGRESS NOTES
Physical Therapy Daily Note  Office: [address] [facility] [address], [place] [address]  P: [phone]  F: [phone]    Patient: Benoit Newberry Jr.   : 1945  Diagnosis/ICD-10 Code:  Lumbar radiculopathy, chronic [M54.16]  Referring practitioner: Josh Stoll MD  Today's Date: 2023  Patient seen for 2 sessions                                                                                                                                                                                                                                                                                                                               VISIT#: 2/10 sessions authorized per insurance (PN due: 2024/Recert due 3/18/2024)     Precautions/Restrictions: s/p L4-S1 R Laminotomy, medial facetectomy, foraminotomies on 2023     Subjective  Benoit Newberry reports that he is doing well today. Felt okay after last session. R leg does get a little sore after doing exercises.       Objective  Amb with antalgic gait pattern, fwd trunk lean and lateral shift     See Exercise, Manual, and Modality Logs for complete treatment.     Home Exercises:  [id number]     Assessment/Plan   Pt demonstrates improved ability to perform core stabilization in shayna-reclined position today. Progressed to some standing exercises which pt was able to perform well with no c/o increased pain, just fatigue noted. Ice at the end of session to prevent increased soreness after exercises.       Progress per Plan of Care and Progress strengthening /stabilization /functional activity            Timed:         Manual Therapy:         mins  70575;     Therapeutic Exercise:    15     mins  71689;     Neuromuscular Juan:    23    mins  71724;    Therapeutic Activity:          mins  35280;     Gait Training:           mins  54273;         Un-Timed:  Ice pack:    10     mins  12998 ( );      Timed Treatment:   38   mins   Total Treatment:     48    mins    Yusra Maurer, PT, DPT, OCS     [place] License # [id number]

## 2023-12-28 ENCOUNTER — TREATMENT (OUTPATIENT)
Dept: PHYSICAL THERAPY | Facility: CLINIC | Age: 78
End: 2023-12-28
Payer: MEDICARE

## 2023-12-28 DIAGNOSIS — R26.2 DIFFICULTY WALKING: ICD-10-CM

## 2023-12-28 DIAGNOSIS — M54.16 LUMBAR RADICULOPATHY, CHRONIC: Primary | ICD-10-CM

## 2023-12-28 PROCEDURE — 97112 NEUROMUSCULAR REEDUCATION: CPT | Performed by: PHYSICAL THERAPIST

## 2023-12-28 PROCEDURE — 97110 THERAPEUTIC EXERCISES: CPT | Performed by: PHYSICAL THERAPIST

## 2023-12-28 NOTE — PROGRESS NOTES
Physical Therapy Daily Note  Office: [address] [facility] [address], [place] [address]  P: [phone]  F: [phone]    Patient: Benoit Newberry Jr.   : 1945  Diagnosis/ICD-10 Code:  Lumbar radiculopathy, chronic [M54.16]  Referring practitioner: Josh Stoll MD  Today's Date: 2023  Patient seen for 3 sessions                                                                                                                                                                                                                                                                                                                               VISIT#: 3/10 sessions authorized per insurance (PN due: 2024/Recert due 3/18/2024)     Precautions/Restrictions: s/p L4-S1 R Laminotomy, medial facetectomy, foraminotomies on 2023     Subjective  Benoit Newberry reports that he has been doing okay lately. Has been more active since last visit because of [place] and his birthday and only had one day that his leg was more sore.       Objective  Amb with antalgic gait pattern, fwd trunk lean and lateral shift     See Exercise, Manual, and Modality Logs for complete treatment.     Home Exercises:  [id number]     Assessment/Plan   Pt demonstrates good progress with standing exercises this date. Mod fatigue with standing exercises, however less pain noted in the lumbar spine as well as R LE. Pt is progressing well with core stabilization during exercises/activities with min-mod cuing required.       Progress per Plan of Care and Progress strengthening /stabilization /functional activity            Timed:         Manual Therapy:         mins  85893;     Therapeutic Exercise:    15     mins  31120;     Neuromuscular Juan:    23    mins  87159;    Therapeutic Activity:          mins  46775;     Gait Training:           mins  18059;         Un-Timed:  Ice pack:    10     mins  84513 ( );      Timed Treatment:   38   mins    Total Treatment:     48   mins    Yusra Maurer, PT, DPT, OCS     [place] License # [id number]

## 2023-12-29 ENCOUNTER — ANTICOAGULATION VISIT (OUTPATIENT)
Dept: CARDIOLOGY | Facility: CLINIC | Age: 78
End: 2023-12-29
Payer: MEDICARE

## 2023-12-29 DIAGNOSIS — Z79.01 LONG TERM (CURRENT) USE OF ANTICOAGULANTS: Primary | ICD-10-CM

## 2023-12-29 LAB — INR PPP: 2

## 2024-01-02 ENCOUNTER — TREATMENT (OUTPATIENT)
Dept: PHYSICAL THERAPY | Facility: CLINIC | Age: 79
End: 2024-01-02
Payer: MEDICARE

## 2024-01-02 DIAGNOSIS — M54.16 LUMBAR RADICULOPATHY, CHRONIC: Primary | ICD-10-CM

## 2024-01-02 DIAGNOSIS — R26.2 DIFFICULTY WALKING: ICD-10-CM

## 2024-01-02 PROCEDURE — 97110 THERAPEUTIC EXERCISES: CPT | Performed by: PHYSICAL THERAPIST

## 2024-01-02 PROCEDURE — 97112 NEUROMUSCULAR REEDUCATION: CPT | Performed by: PHYSICAL THERAPIST

## 2024-01-02 NOTE — PROGRESS NOTES
Physical Therapy Daily Note  Office: [address] [facility] [address], [place] [address]  P: [phone]  F: [phone]    Patient: Benoit Newberry Jr.   : 1945  Diagnosis/ICD-10 Code:  Lumbar radiculopathy, chronic [M54.16]  Referring practitioner: Josh Stoll MD  Today's Date: 2024  Patient seen for 4 sessions                                                                                                                                                                                                                                                                                                                               VISIT#: 4/10 sessions authorized per insurance (PN due: 2024/Recert due 3/18/2024)     Precautions/Restrictions: s/p L4-S1 R Laminotomy, medial facetectomy, foraminotomies on 2023     Subjective  Benoit Newberry reports that he is doing okay today. Back is getting better. Still hurts with standing for very long.       Objective  Amb with antalgic gait pattern, fwd trunk lean and lateral shift     See Exercise, Manual, and Modality Logs for complete treatment.     Home Exercises:  [id number]     Assessment/Plan   Pt demonstrates good progress with standing exercises and activation of TrA with min cuing, however fatigues quickly. Able to perform more exercise sin standing overall, but does require rest breaks. Pt's strength and endurance is improving. Min c/o pain in lumbar spine with standing, however improves with seated rest breaks.       Progress per Plan of Care and Progress strengthening /stabilization /functional activity            Timed:         Manual Therapy:         mins  81392;     Therapeutic Exercise:    15     mins  62271;     Neuromuscular Juan:    15    mins  90890;    Therapeutic Activity:          mins  83811;     Gait Training:           mins  90214;         Un-Timed:  Ice pack:    10     mins  63432 ( );      Timed Treatment:   30   mins   Total  Treatment:     40   mins    Yusra Maurer, PT, DPT, OCS     [place] License # [id number]       Mustarde Flap Text: The defect edges were debeveled with a #15 scalpel blade.  Given the size, depth and location of the defect and the proximity to free margins a Mustarde flap was deemed most appropriate.  Using a sterile surgical marker, an appropriate flap was drawn incorporating the defect. The area thus outlined was incised with a #15 scalpel blade.  The skin margins were undermined to an appropriate distance in all directions utilizing iris scissors.

## 2024-01-05 ENCOUNTER — TREATMENT (OUTPATIENT)
Dept: PHYSICAL THERAPY | Facility: CLINIC | Age: 79
End: 2024-01-05
Payer: MEDICARE

## 2024-01-05 ENCOUNTER — ANTICOAGULATION VISIT (OUTPATIENT)
Dept: CARDIOLOGY | Facility: CLINIC | Age: 79
End: 2024-01-05
Payer: MEDICARE

## 2024-01-05 DIAGNOSIS — Z79.01 LONG TERM (CURRENT) USE OF ANTICOAGULANTS: Primary | ICD-10-CM

## 2024-01-05 DIAGNOSIS — M54.16 LUMBAR RADICULOPATHY, CHRONIC: Primary | ICD-10-CM

## 2024-01-05 DIAGNOSIS — R26.2 DIFFICULTY WALKING: ICD-10-CM

## 2024-01-05 LAB — INR PPP: 3.6

## 2024-01-05 PROCEDURE — 97112 NEUROMUSCULAR REEDUCATION: CPT | Performed by: PHYSICAL THERAPIST

## 2024-01-05 PROCEDURE — 97110 THERAPEUTIC EXERCISES: CPT | Performed by: PHYSICAL THERAPIST

## 2024-01-05 PROCEDURE — 97530 THERAPEUTIC ACTIVITIES: CPT | Performed by: PHYSICAL THERAPIST

## 2024-01-05 NOTE — PROGRESS NOTES
Physical Therapy Daily Note  Office: [address] [facility] [address], [place] [address]  P: [phone]  F: [phone]    Patient: Benoit Newberry Jr.   : 1945  Diagnosis/ICD-10 Code:  Lumbar radiculopathy, chronic [M54.16]  Referring practitioner: Josh Stoll MD  Today's Date: 2024  Patient seen for 5 sessions                                                                                                                                                                                                                                                                                                                               VISIT#: 5/10 sessions authorized per insurance (PN due: 2024/Recert due 3/18/2024)     Precautions/Restrictions: s/p L4-S1 R Laminotomy, medial facetectomy, foraminotomies on 2023     Subjective  Benoit Newberry reports that he is doing well today. Back has been feeling okay as well as R leg.       Objective  Amb with antalgic gait pattern, fwd trunk lean and lateral shift     See Exercise, Manual, and Modality Logs for complete treatment.     Home Exercises:  [id number]     Assessment/Plan   Pt demonstrates improved ability to perform exercises. Is progressing well with ability to perform more standing exercises with fewer rest breaks. Does get fatigued while standing though and sits for short time to recover. Pt continues to have difficulty standing upright, however is improving.       Progress per Plan of Care and Progress strengthening /stabilization /functional activity            Timed:         Manual Therapy:         mins  51630;     Therapeutic Exercise:    15     mins  22463;     Neuromuscular Juan:    15    mins  22317;    Therapeutic Activity:    8      mins  92669;     Gait Training:           mins  50419;         Un-Timed:  Ice pack:    10     mins  96862 ( );      Timed Treatment:   38   mins   Total Treatment:     48   mins    Yusra Maurer, PT,  YEIMY, MADAY     [place] License # [id number]

## 2024-01-05 NOTE — PROGRESS NOTES
Spoke to patient, had [place], Birthday and New Years, have not been eating regular. Advised him to hold warfarin today, than resume schedule. Check INR 2 weeks.

## 2024-01-09 ENCOUNTER — TREATMENT (OUTPATIENT)
Dept: PHYSICAL THERAPY | Facility: CLINIC | Age: 79
End: 2024-01-09
Payer: MEDICARE

## 2024-01-09 DIAGNOSIS — M54.16 LUMBAR RADICULOPATHY, CHRONIC: Primary | ICD-10-CM

## 2024-01-09 DIAGNOSIS — R26.2 DIFFICULTY WALKING: ICD-10-CM

## 2024-01-09 PROCEDURE — 97112 NEUROMUSCULAR REEDUCATION: CPT | Performed by: PHYSICAL THERAPIST

## 2024-01-09 PROCEDURE — 97110 THERAPEUTIC EXERCISES: CPT | Performed by: PHYSICAL THERAPIST

## 2024-01-09 PROCEDURE — 97530 THERAPEUTIC ACTIVITIES: CPT | Performed by: PHYSICAL THERAPIST

## 2024-01-09 NOTE — PROGRESS NOTES
Physical Therapy Daily Note  Office: [address] [facility] [address], [place] [address]  P: [phone]  F: [phone]    Patient: Benoit Newberry Jr.   : 1945  Diagnosis/ICD-10 Code:  Lumbar radiculopathy, chronic [M54.16]  Referring practitioner: Josh Stoll MD  Today's Date: 2024  Patient seen for 6 sessions                                                                                                                                                                                                                                                                                                                               VISIT#: 6/10 sessions authorized per insurance (PN due: 2024/Recert due 3/18/2024)     Precautions/Restrictions: s/p L4-S1 R Laminotomy, medial facetectomy, foraminotomies on 2023     Subjective  Benoit Newberry reports that he is doing well. Not having much pain in the lumbar spine or the R leg, however feels like his endurance is still really low.       Objective  Amb with antalgic gait pattern, fwd trunk lean and lateral shift     See Exercise, Manual, and Modality Logs for complete treatment.     Home Exercises:  [id number]     Assessment/Plan   Pt demonstrates some improvement in upright posture with less forward trunk lean when ambulating. Encouraged pt to continue working on TrA bracing while standing to assist with full ext to neutral. Pt displays mod fatigue with exercises and does require rest breaks throughout session due to decreased endurance, however is progressing.       Progress per Plan of Care and Progress strengthening /stabilization /functional activity            Timed:         Manual Therapy:         mins  46823;     Therapeutic Exercise:    15     mins  30656;     Neuromuscular Juan:    15    mins  74687;    Therapeutic Activity:    8      mins  85105;     Gait Training:           mins  66050;         Un-Timed:  Ice pack:    10     mins  57027 (  );      Timed Treatment:   38   mins   Total Treatment:     48   mins    Yusra Maurer, PT, DPT, OCS     [place] License # [id number]

## 2024-01-12 ENCOUNTER — TREATMENT (OUTPATIENT)
Dept: PHYSICAL THERAPY | Facility: CLINIC | Age: 79
End: 2024-01-12
Payer: MEDICARE

## 2024-01-12 DIAGNOSIS — R26.2 DIFFICULTY WALKING: ICD-10-CM

## 2024-01-12 DIAGNOSIS — M54.16 LUMBAR RADICULOPATHY, CHRONIC: Primary | ICD-10-CM

## 2024-01-12 PROCEDURE — 97530 THERAPEUTIC ACTIVITIES: CPT | Performed by: PHYSICAL THERAPIST

## 2024-01-12 PROCEDURE — 97110 THERAPEUTIC EXERCISES: CPT | Performed by: PHYSICAL THERAPIST

## 2024-01-12 PROCEDURE — 97112 NEUROMUSCULAR REEDUCATION: CPT | Performed by: PHYSICAL THERAPIST

## 2024-01-12 NOTE — PROGRESS NOTES
Physical Therapy Daily Note  Office: [address] [facility] [address], [place] [address]  P: [phone]  F: [phone]    Patient: Benoit Newberry Jr.   : 1945  Diagnosis/ICD-10 Code:  Lumbar radiculopathy, chronic [M54.16]  Referring practitioner: Josh Stoll MD  Today's Date: 2024  Patient seen for 7 sessions                                                                                                                                                                                                                                                                                                                               VISIT#: 7/10 sessions authorized per insurance (PN due: 2024/Recert due 3/18/2024)     Precautions/Restrictions: s/p L4-S1 R Laminotomy, medial facetectomy, foraminotomies on 2023     Subjective  Benoit Newberry reports that he is worn out today. They put away all of their [place] decorations away and wore him out.       Objective  Amb with antalgic gait pattern, fwd trunk lean and lateral shift     See Exercise, Manual, and Modality Logs for complete treatment.     Home Exercises:  [id number]     Assessment/Plan   Pt demonstrates good progress with standing exercises. Did have more fatigue with exercises this date due to fatigue prior to starting session. More seated rest breaks between each exercise. No c/o increased pain, just fatigue.        Progress per Plan of Care and Progress strengthening /stabilization /functional activity            Timed:         Manual Therapy:         mins  70644;     Therapeutic Exercise:    15     mins  66912;     Neuromuscular Juan:    15    mins  42484;    Therapeutic Activity:    8      mins  78212;     Gait Training:           mins  18319;         Un-Timed:  Ice pack:    10     mins  29168 ( );      Timed Treatment:   38   mins   Total Treatment:     48   mins    Yusra Maurer, PT, DPT, OCS     [place] License # [id number]

## 2024-01-16 ENCOUNTER — TREATMENT (OUTPATIENT)
Dept: PHYSICAL THERAPY | Facility: CLINIC | Age: 79
End: 2024-01-16
Payer: MEDICARE

## 2024-01-16 DIAGNOSIS — R26.2 DIFFICULTY WALKING: ICD-10-CM

## 2024-01-16 DIAGNOSIS — M54.16 LUMBAR RADICULOPATHY, CHRONIC: Primary | ICD-10-CM

## 2024-01-16 PROCEDURE — 97530 THERAPEUTIC ACTIVITIES: CPT | Performed by: PHYSICAL THERAPIST

## 2024-01-16 PROCEDURE — 97110 THERAPEUTIC EXERCISES: CPT | Performed by: PHYSICAL THERAPIST

## 2024-01-16 PROCEDURE — 97112 NEUROMUSCULAR REEDUCATION: CPT | Performed by: PHYSICAL THERAPIST

## 2024-01-16 NOTE — PROGRESS NOTES
Physical Therapy Daily Note  Office: [address] [facility] [address], [place] [address]  P: [phone]  F: [phone]    Patient: Benoit Newberry Jr.   : 1945  Diagnosis/ICD-10 Code:  Lumbar radiculopathy, chronic [M54.16]  Referring practitioner: Josh Stoll MD  Today's Date: 2024  Patient seen for 8 sessions                                                                                                                                                                                                                                                                                                                               VISIT#: 8/10 sessions authorized per insurance (PN due: 2024/Recert due 3/18/2024)     Precautions/Restrictions: s/p L4-S1 R Laminotomy, medial facetectomy, foraminotomies on 2023     Subjective  Benoit Newberry reports that he is doing well today. Not tired like he was last session.       Objective  Amb with antalgic gait pattern, fwd trunk lean and lateral shift     See Exercise, Manual, and Modality Logs for complete treatment.     Home Exercises:  [id number]     Assessment/Plan   Pt demonstrates mod fatigue overall by end of session. Less rest breaks throughout session again. He was able to perform balance exercises well with min-mod use of counter for support. Pt continues to progress well. No c/o pain during session this date.     Progress per Plan of Care and Progress strengthening /stabilization /functional activity            Timed:         Manual Therapy:         mins  49695;     Therapeutic Exercise:    15     mins  69486;     Neuromuscular Juan:    15    mins  44932;    Therapeutic Activity:    8      mins  98275;     Gait Training:           mins  30457;         Un-Timed:  Ice pack:    10     mins  74347 ( );      Timed Treatment:   38   mins   Total Treatment:     48   mins    Yusra Maurer, PT, DPT, OCS     [place] License # [id number]

## 2024-01-17 NOTE — PROGRESS NOTES
Neurosurgical Consultation      Benoit Newberry Jr. is a 78 y.o. male is here today for a post op follow up from surgery on 12/4/23. Today patient reports his back pain is very mild and he has increased his activity level.    Chief Complaint   Patient presents with    Post-op     FOLLOW UP        Previous treatment: LUMBAR FOUR TO SACRAL ONE RIGHT SIDED LAMINOTOMY, MEDIAL  FACETECTOMY, FORAMINOTOMIES.    Robaxin,Physical Therapy    HPI: This is a 78-year-old gentleman who underwent a right sided L4-S1 laminotomy, medial facetectomy, foraminotomies for decompression of multiple nerve roots which were resulting in chronic radiculopathy.  He is approximately 6 weeks removed from surgical intervention.  He continues to do quite well.  He had significant improvement in his right leg radiculopathy.  He has been working with physical therapy and has been making improvements in functional status including walking further without his walker.  His incision is well-healing.  He does not have any significant pains or complaints.    Past Medical History:   Diagnosis Date    Anemia Runs in family    Arthritis     Asymptomatic stenosis of right carotid artery     Atrial fibrillation     Bradycardia     Brain concussion     Buttock pain     rt cheek( lower)    Cataract     Clotting disorder Take blood thinner    Congenital heart disease     Coronary artery disease     Deep vein thrombosis     Diabetes mellitus 08/25/2011    Diabetes mellitus, type 2     Erectile dysfunction 2005    Heart attack     x2   with stent placement     2015/2018    Hyperlipidemia     Hypertension     Hypoglycemia     Leg pain     aches    Low back pain 2014    Lumbar radiculopathy, chronic 10/20/2023    Myocardial infarction     Nonintractable headache 02/24/2022    Obesity 45 years    Other cervical disc degeneration at C6-C7 level 08/29/2017    Pacemaker     Pulmonary arterial hypertension 1967    Scoliosis 20 years    Sleep apnea     Visual impairment  Last 15 years        Past Surgical History:   Procedure Laterality Date    ABLATION OF DYSRHYTHMIC FOCUS      APPENDECTOMY  1956    BACK SURGERY  12/04/2023    CARDIAC ABLATION  12/2018    x 1     CARDIAC CATHETERIZATION Left 02/25/2022    Procedure: Left Heart Cath and coronary angiogram;  Surgeon: Karolina Saldaña MD;  Location: [facility] CATH INVASIVE LOCATION;  Service: Cardiovascular;  Laterality: Left;    CARDIAC CATHETERIZATION  02/25/2022    Procedure: Saphenous Vein Graft;  Surgeon: Karolina Saldaña MD;  Location: [facility] CATH INVASIVE LOCATION;  Service: Cardiovascular;;    CARDIOVERSION  06/2018    Cardioversion 6/2018; x3  12/2018    CORONARY ANGIOPLASTY Left 11/04/2015    Distal left main and in the mid circumflex artery     CORONARY ANGIOPLASTY Right 11/02/2015    Right coronary artery     CORONARY ARTERY BYPASS GRAFT  03/1998    CORONARY STENT PLACEMENT      EYE SURGERY      FOOT SURGERY  2010    HERNIA REPAIR  2005    INSERT / REPLACE / REMOVE PACEMAKER      LUMBAR LAMINECTOMY Right 12/04/2023    Procedure: LUMBAR FOUR TO SACRAL ONE RIGHT SIDED LAMINOTOMY, MEDIAL  FACETECTOMY, FORAMINOTOMIES;  Surgeon: Josh Stoll MD;  Location: [facility] MAIN OR;  Service: Neurosurgery;  Laterality: Right;    OTHER SURGICAL HISTORY  05/2019    Stent     PACEMAKER IMPLANTATION  01/2019    Dr. Saldaña     REPLACEMENT TOTAL KNEE BILATERAL  2001    SKIN BIOPSY          Current Outpatient Medications on File Prior to Visit   Medication Sig Dispense Refill    Alcohol Swabs 70 % pads USE WHEN TESTING BLOOD GLUCOSE FOUR TIMES DAILY      amLODIPine (NORVASC) 5 MG tablet Take 1 tablet by mouth Daily. 90 tablet 3    atorvastatin (LIPITOR) 40 MG tablet 1 tablet p.o. daily. 90 tablet 3    cetirizine (zyrTEC) 10 MG tablet Take 1 tablet by mouth Daily As Needed for Allergies.      Cholecalciferol (VITAMIN D) 1000 units tablet Take 0.5 tablets by mouth Daily.      clopidogrel (PLAVIX) 75 MG tablet Take 1 tablet by mouth Daily. Can  restart 7 days post surgery.      Coenzyme Q10 (CO Q 10 PO) Take 1 capsule by mouth Daily.      docusate sodium (Colace) 100 MG capsule Take 1 capsule by mouth 2 (Two) Times a Day. 10 capsule 0    furosemide (LASIX) 40 MG tablet Take 1 tablet by mouth Daily. 90 tablet 3    Insulin Aspart (novoLOG) 100 UNIT/ML injection For using insulin pump.  Max daily dose is 100 units/day. 30 mL 3    Insulin Disposable Pump (Omnipod 5 G6 Intro, Gen 5,) kit Use 1 package Daily. 1 kit 0    Insulin Disposable Pump (Omnipod 5 G6 Pod, Gen 5,) misc Use 1 package Daily. 30 each 6    Krill Oil 1000 MG capsule Take 1 capsule by mouth Daily.      lisinopril (PRINIVIL,ZESTRIL) 40 MG tablet 1 tablet p.o. daily 90 tablet 3    metFORMIN (GLUCOPHAGE) 1000 MG tablet Take 1 tablet by mouth 2 (Two) Times a Day With Meals. 180 tablet 3    methocarbamol (ROBAXIN) 750 MG tablet Take 1 tablet by mouth 3 (Three) Times a Day As Needed for Muscle Spasms. 60 tablet 0    metoprolol succinate XL (TOPROL-XL) 25 MG 24 hr tablet Take 2 tablets by mouth every morning and take 1 tablet by mouth every evening (Patient taking differently: Take 4 tablets by mouth Daily. Take 2 tablets by mouth every morning and take 1 tablet by mouth every evening) 270 tablet 3    Multiple Vitamins-Minerals (MULTI VITAMIN/MINERALS) tablet Take 1 tablet by mouth Daily.      mupirocin (BACTROBAN) 2 % ointment APPLY TO WOUND ON LEFT LEG EVERY DAY      naloxone (NARCAN) 4 MG/0.1ML nasal spray Call 911. Don't prime. [place] in 1 nostril for overdose. Repeat in 2-3 minutes in other nostril if no or minimal breathing/responsiveness. 2 each 0    triamcinolone (KENALOG) 0.025 % cream APPLY TO LEFT LEG EVERY DAY      vitamin C (ASCORBIC ACID) 500 MG tablet Take 1 tablet by mouth Daily.      vitamin E 400 UNIT capsule Take 1 capsule by mouth Daily.      warfarin (COUMADIN) 5 MG tablet 1 tablet daily       No current facility-administered medications on file prior to visit.        Allergies  "  Allergen Reactions    Vancomycin Rash        Social History     Socioeconomic History    Marital status:      Spouse name: Maude    Number of children: 3    Years of education: 14   Tobacco Use    Smoking status: Former     Packs/day: 2.00     Years: 7.00     Additional pack years: 0.00     Total pack years: 14.00     Types: Cigarettes     Start date: 1965     Quit date: 12/3/1973     Years since quittin.1     Passive exposure: Past    Smokeless tobacco: Never    Tobacco comments:     Haven’t smoked in almost 50 years   Vaping Use    Vaping Use: Never used   Substance and Sexual Activity    Alcohol use: Not Currently     Comment: Maybe 1 drink this year    Drug use: No    Sexual activity: Not Currently     Partners: Female     Comment: 74 years old past time          Review of Systems   Constitutional:  Positive for activity change.   HENT: Negative.     Eyes: Negative.    Respiratory: Negative.     Cardiovascular: Negative.    Gastrointestinal: Negative.    Endocrine: Negative.    Genitourinary: Negative.    Musculoskeletal:  Positive for arthralgias, back pain and myalgias.   Skin: Negative.    Allergic/Immunologic: Negative.    Neurological:  Positive for numbness (TINGLING/FEET).   Hematological: Negative.    Psychiatric/Behavioral: Negative.          Physical Examination:     Vitals:    24 0838   BP: 115/73   Pulse: 70   Height: 175.3 cm (69\")   PainSc:   4        Physical Exam     Neurological Exam   Neurological examination is stable compared to my last evaluation.  There are no new red flag signs.  Incision is well-healing.    Result Review  The following data was reviewed by: Josh Stoll MD on 2024:    Data reviewed : Radiologic studies no new imaging reviewed today.      Assessment/plan:  This is a 78-year-old gentleman with a chronic right-sided lower extremity radiculopathy status post L4-S1 laminotomy foraminotomies.  He is approximately 6 weeks removed from surgical " intervention.  His leg pain has significantly improved.  He is working with physical therapy and making good progress.  I encouraged him to continue working with physical therapy including utilizing the home exercise program.  He should follow-up with me in 6 weeks with x-rays of the lumbar spine at that juncture.  I have encouraged him to call with any questions or concerns.    Diagnoses and all orders for this visit:    1. Lumbar radiculopathy, chronic (Primary)  -     XR Spine Lumbar Complete With Flex & Ext; Future         Return in about 6 weeks (around 3/5/2024).            Josh Stoll MD

## 2024-01-19 ENCOUNTER — TELEPHONE (OUTPATIENT)
Dept: PHYSICAL THERAPY | Facility: OTHER | Age: 79
End: 2024-01-19
Payer: MEDICARE

## 2024-01-19 ENCOUNTER — ANTICOAGULATION VISIT (OUTPATIENT)
Dept: CARDIOLOGY | Facility: CLINIC | Age: 79
End: 2024-01-19
Payer: MEDICARE

## 2024-01-19 DIAGNOSIS — Z79.01 LONG TERM (CURRENT) USE OF ANTICOAGULANTS: ICD-10-CM

## 2024-01-19 DIAGNOSIS — I48.0 PAROXYSMAL ATRIAL FIBRILLATION: Primary | ICD-10-CM

## 2024-01-19 LAB — INR PPP: 1.6

## 2024-01-19 NOTE — TELEPHONE ENCOUNTER
"  Caller: Benoit Newberry Jr. \"Elias or Kemal\"    Relationship: Self    What was the call regarding: PATIENT CANCELLED TODAYS APPT BECAUSE OF THE WEATHER      "

## 2024-01-22 ENCOUNTER — TREATMENT (OUTPATIENT)
Dept: PHYSICAL THERAPY | Facility: CLINIC | Age: 79
End: 2024-01-22
Payer: MEDICARE

## 2024-01-22 DIAGNOSIS — M54.16 LUMBAR RADICULOPATHY, CHRONIC: Primary | ICD-10-CM

## 2024-01-22 DIAGNOSIS — R26.2 DIFFICULTY WALKING: ICD-10-CM

## 2024-01-22 PROCEDURE — 97112 NEUROMUSCULAR REEDUCATION: CPT | Performed by: PHYSICAL THERAPIST

## 2024-01-22 PROCEDURE — 97110 THERAPEUTIC EXERCISES: CPT | Performed by: PHYSICAL THERAPIST

## 2024-01-22 PROCEDURE — 97530 THERAPEUTIC ACTIVITIES: CPT | Performed by: PHYSICAL THERAPIST

## 2024-01-22 NOTE — PROGRESS NOTES
Re-Assessment/Progress Note  Office: [address] [facility] [address], [place] [address]  P: [phone]   F: [phone]        Patient: Benoit Newberry Jr.   : 1945  Diagnosis/ICD-10 Code:  Lumbar radiculopathy, chronic [M54.16]  Referring practitioner: Josh Stoll MD  Date of Initial Visit: Type: THERAPY  Noted: 2023  Today's Date: 2024  Patient seen for 9 sessions      Subjective:   Benoit Newberry reports that he is doing better. Not having as much pain in his lower back or the R leg, however it is not completely gone. Has been able to mostly walk without his walker now. Still feels off balance but is getting better.     Pain: Current: 4/10, Best: 2/10, Worst: 8/10    Subjective Questionnaire: Oswestry: 32% impairment   Clinical Progress: improved  Home Program Compliance: Yes  Treatment has included: therapeutic exercise, neuromuscular re-education, manual therapy, therapeutic activity, and gait training    Objective          Tenderness     Additional Tenderness Details  No tenderness around lumbar spine     Active Range of Motion     Lumbar   Flexion: WFL  Left lateral flexion: WFL  Right lateral flexion: WFL  Left rotation: WFL  Right rotation: WFL    Additional Active Range of Motion Details  Pt still lacking full extension to neutral     Strength/Myotome Testing     Left Hip   Planes of Motion   Flexion: 4+  Extension: 4+  Abduction: 4+    Right Hip   Planes of Motion   Flexion: 4  Extension: 4  Abduction: 4    Left Knee   Flexion: 5  Extension: 5    Right Knee   Flexion: 4+  Extension: 4+    Left Ankle/Foot   Dorsiflexion: 5  Plantar flexion: 5    Right Ankle/Foot   Dorsiflexion: 4  Plantar flexion: 4+    Ambulation   Weight-Bearing Status   Assistive device used: none    Ambulation: Level Surfaces   Ambulation without assistive device: independent    Observational Gait   Decreased walking speed and stride length.     Quality of Movement During Gait   Trunk  Forward lean.        Assessment & Plan       Assessment  Impairments: abnormal coordination, abnormal gait, abnormal muscle firing, abnormal muscle tone, abnormal or restricted ROM, activity intolerance, impaired balance, impaired physical strength and pain with function   Functional limitations: carrying objects, lifting, sleeping, walking, uncomfortable because of pain, moving in bed, sitting, standing and stooping   Assessment details: Pt demonstrates good progress with overall function. Able to amb in clinic without AD and pt is ambulating mostly without AD in the community as well. Still some weakness in B LE's as well as decreased overall balance. Less forward trunk lean with amb and activities. Improved activation of core stabilization as well, however does require some cuing during exercises.     Pt will continue to benefit from PT in order to further address impairments and improve overall function.     Prognosis: good    Goals  Plan Goals:  STG (3 weeks):  Pt will demonstrate increased activation of core stabilizers during activities/exercises to decrease load on spine. - mostly met  Pt will display improved ROM of lumbar spine to WFL with min to no pain to assist with functional tasks. - mostly met    LTG (6 weeks):  Pt will be independent with home exercises to assist with improved strength and continue to improve function. - met for current program   Pt will demonstrate decreased score on the Modified Oswestry to 24% to demonstrate decreased overall impairment. - partially met  Pt will be able to amb without AD in community for >15 mins with little to no pain in lumbar spine or R LE. - progressing  Pt will demonstrate improved hip and core strength to 4+/5 overall to assist with function. - progressing     Plan  Therapy options: will be seen for skilled therapy services  Planned modality interventions: TENS, cryotherapy and thermotherapy (hydrocollator packs)  Planned therapy interventions: abdominal trunk stabilization,  ADL retraining, balance/weight-bearing training, body mechanics training, flexibility, functional ROM exercises, gait training, home exercise program, joint mobilization, manual therapy, motor coordination training, neuromuscular re-education, postural training, soft tissue mobilization, spinal/joint mobilization, strengthening, stretching and therapeutic activities  Frequency: 2x week  Duration in weeks: 12  Treatment plan discussed with: patient      Progress toward previous goals: Partially Met        Recommendations: Continue as planned  Timeframe: 1 month  Prognosis to achieve goals: good      Timed:         Manual Therapy:         mins  82949;     Therapeutic Exercise:    15     mins  68468;     Neuromuscular Juan:    15    mins  12834;    Therapeutic Activity:     8     mins  90121;       Un-Timed:  Ice pack:    10     mins        Timed Treatment:   38   mins   Total Treatment:     48   mins      PT Signature: Yusra Maurer PT, DPT, OCS     [place] License # [id number]

## 2024-01-22 NOTE — LETTER
Re-Assessment/Progress Note  Office: [address] [facility] [address], [place] [address]  P: [phone]           F: [phone]           Patient: Benoit Newberry Jr.   : 1945  Diagnosis/ICD-10 Code:  Lumbar radiculopathy, chronic [M54.16]  Referring practitioner: Josh Stoll MD  Date of Initial Visit: Type: THERAPY  Noted: 2023  Today's Date: 2024  Patient seen for 9 sessions        Subjective:   Benoit Newberry reports that he is doing better. Not having as much pain in his lower back or the R leg, however it is not completely gone. Has been able to mostly walk without his walker now. Still feels off balance but is getting better.      Pain: Current: 4/10, Best: 2/10, Worst: 8/10     Subjective Questionnaire: Oswestry: 32% impairment   Clinical Progress: improved  Home Program Compliance: Yes  Treatment has included: therapeutic exercise, neuromuscular re-education, manual therapy, therapeutic activity, and gait training     Objective            Tenderness      Additional Tenderness Details  No tenderness around lumbar spine      Active Range of Motion      Lumbar   Flexion: WFL  Left lateral flexion: WFL  Right lateral flexion: WFL  Left rotation: WFL  Right rotation: WFL     Additional Active Range of Motion Details  Pt still lacking full extension to neutral      Strength/Myotome Testing      Left Hip   Planes of Motion   Flexion: 4+  Extension: 4+  Abduction: 4+     Right Hip   Planes of Motion   Flexion: 4  Extension: 4  Abduction: 4     Left Knee   Flexion: 5  Extension: 5     Right Knee   Flexion: 4+  Extension: 4+     Left Ankle/Foot   Dorsiflexion: 5  Plantar flexion: 5     Right Ankle/Foot   Dorsiflexion: 4  Plantar flexion: 4+     Ambulation   Weight-Bearing Status   Assistive device used: none     Ambulation: Level Surfaces   Ambulation without assistive device: independent     Observational Gait   Decreased walking speed and stride length.      Quality of Movement During Gait    Trunk  Forward lean.       Assessment & Plan         Assessment  Impairments: abnormal coordination, abnormal gait, abnormal muscle firing, abnormal muscle tone, abnormal or restricted ROM, activity intolerance, impaired balance, impaired physical strength and pain with function   Functional limitations: carrying objects, lifting, sleeping, walking, uncomfortable because of pain, moving in bed, sitting, standing and stooping   Assessment details: Pt demonstrates good progress with overall function. Able to amb in clinic without AD and pt is ambulating mostly without AD in the community as well. Still some weakness in B LE's as well as decreased overall balance. Less forward trunk lean with amb and activities. Improved activation of core stabilization as well, however does require some cuing during exercises.      Pt will continue to benefit from PT in order to further address impairments and improve overall function.      Prognosis: good     Goals  Plan Goals:  STG (3 weeks):  Pt will demonstrate increased activation of core stabilizers during activities/exercises to decrease load on spine. - mostly met  Pt will display improved ROM of lumbar spine to WFL with min to no pain to assist with functional tasks. - mostly met     LTG (6 weeks):  Pt will be independent with home exercises to assist with improved strength and continue to improve function. - met for current program   Pt will demonstrate decreased score on the Modified Oswestry to 24% to demonstrate decreased overall impairment. - partially met  Pt will be able to amb without AD in community for >15 mins with little to no pain in lumbar spine or R LE. - progressing  Pt will demonstrate improved hip and core strength to 4+/5 overall to assist with function. - progressing      Plan  Therapy options: will be seen for skilled therapy services  Planned modality interventions: TENS, cryotherapy and thermotherapy (hydrocollator packs)  Planned therapy  interventions: abdominal trunk stabilization, ADL retraining, balance/weight-bearing training, body mechanics training, flexibility, functional ROM exercises, gait training, home exercise program, joint mobilization, manual therapy, motor coordination training, neuromuscular re-education, postural training, soft tissue mobilization, spinal/joint mobilization, strengthening, stretching and therapeutic activities  Frequency: 2x week  Duration in weeks: 12  Treatment plan discussed with: patient        Progress toward previous goals: Partially Met                   Recommendations: Continue as planned  Timeframe: 1 month  Prognosis to achieve goals: good       PT Signature: Yusra Maurer PT, DPT, OCS                           [place] License # [id number]

## 2024-01-23 ENCOUNTER — OFFICE VISIT (OUTPATIENT)
Dept: NEUROSURGERY | Facility: CLINIC | Age: 79
End: 2024-01-23
Payer: MEDICARE

## 2024-01-23 VITALS
HEIGHT: 69 IN | SYSTOLIC BLOOD PRESSURE: 115 MMHG | HEART RATE: 70 BPM | BODY MASS INDEX: 39.13 KG/M2 | DIASTOLIC BLOOD PRESSURE: 73 MMHG

## 2024-01-23 DIAGNOSIS — M54.16 LUMBAR RADICULOPATHY, CHRONIC: Primary | ICD-10-CM

## 2024-01-23 PROCEDURE — 3074F SYST BP LT 130 MM HG: CPT | Performed by: NEUROLOGICAL SURGERY

## 2024-01-23 PROCEDURE — 99024 POSTOP FOLLOW-UP VISIT: CPT | Performed by: NEUROLOGICAL SURGERY

## 2024-01-23 PROCEDURE — 1160F RVW MEDS BY RX/DR IN RCRD: CPT | Performed by: NEUROLOGICAL SURGERY

## 2024-01-23 PROCEDURE — 3078F DIAST BP <80 MM HG: CPT | Performed by: NEUROLOGICAL SURGERY

## 2024-01-23 PROCEDURE — 1159F MED LIST DOCD IN RCRD: CPT | Performed by: NEUROLOGICAL SURGERY

## 2024-01-23 RX ORDER — TRIAMCINOLONE ACETONIDE 0.25 MG/G
CREAM TOPICAL
COMMUNITY
Start: 2024-01-16

## 2024-01-26 ENCOUNTER — TELEPHONE (OUTPATIENT)
Dept: PHYSICAL THERAPY | Facility: OTHER | Age: 79
End: 2024-01-26
Payer: MEDICARE

## 2024-01-26 ENCOUNTER — TELEPHONE (OUTPATIENT)
Dept: CARDIOLOGY | Facility: CLINIC | Age: 79
End: 2024-01-26
Payer: MEDICARE

## 2024-01-26 ENCOUNTER — LAB (OUTPATIENT)
Dept: LAB | Facility: HOSPITAL | Age: 79
End: 2024-01-26
Payer: MEDICARE

## 2024-01-26 ENCOUNTER — ANTICOAGULATION VISIT (OUTPATIENT)
Dept: CARDIOLOGY | Facility: CLINIC | Age: 79
End: 2024-01-26
Payer: MEDICARE

## 2024-01-26 DIAGNOSIS — Z92.29 HISTORY OF COUMADIN THERAPY: ICD-10-CM

## 2024-01-26 DIAGNOSIS — I48.0 PAROXYSMAL ATRIAL FIBRILLATION: Primary | ICD-10-CM

## 2024-01-26 DIAGNOSIS — D50.0 IRON DEFICIENCY ANEMIA DUE TO CHRONIC BLOOD LOSS: Primary | ICD-10-CM

## 2024-01-26 DIAGNOSIS — R06.09 DYSPNEA ON EXERTION: ICD-10-CM

## 2024-01-26 DIAGNOSIS — Z79.01 LONG TERM (CURRENT) USE OF ANTICOAGULANTS: ICD-10-CM

## 2024-01-26 DIAGNOSIS — D50.0 IRON DEFICIENCY ANEMIA DUE TO CHRONIC BLOOD LOSS: ICD-10-CM

## 2024-01-26 LAB
ALBUMIN SERPL-MCNC: 4.5 G/DL (ref 3.5–5.2)
ALBUMIN/GLOB SERPL: 1.9 G/DL
ALP SERPL-CCNC: 64 U/L (ref 39–117)
ALT SERPL W P-5'-P-CCNC: 27 U/L (ref 1–41)
ANION GAP SERPL CALCULATED.3IONS-SCNC: 13 MMOL/L (ref 5–15)
AST SERPL-CCNC: 63 U/L (ref 1–40)
BASOPHILS # BLD AUTO: 0.07 10*3/MM3 (ref 0–0.2)
BASOPHILS NFR BLD AUTO: 0.9 % (ref 0–1.5)
BILIRUB SERPL-MCNC: 0.5 MG/DL (ref 0–1.2)
BUN SERPL-MCNC: 35 MG/DL (ref 8–23)
BUN/CREAT SERPL: 27.6 (ref 7–25)
CALCIUM SPEC-SCNC: 9.6 MG/DL (ref 8.6–10.5)
CHLORIDE SERPL-SCNC: 101 MMOL/L (ref 98–107)
CO2 SERPL-SCNC: 28 MMOL/L (ref 22–29)
CREAT SERPL-MCNC: 1.27 MG/DL (ref 0.76–1.27)
D DIMER PPP FEU-MCNC: 0.4 MG/L (FEU) (ref 0–0.76)
DEPRECATED RDW RBC AUTO: 41.9 FL (ref 37–54)
EGFRCR SERPLBLD CKD-EPI 2021: 58.6 ML/MIN/1.73
EOSINOPHIL # BLD AUTO: 0.09 10*3/MM3 (ref 0–0.4)
EOSINOPHIL NFR BLD AUTO: 1.1 % (ref 0.3–6.2)
ERYTHROCYTE [DISTWIDTH] IN BLOOD BY AUTOMATED COUNT: 12.4 % (ref 12.3–15.4)
GLOBULIN UR ELPH-MCNC: 2.4 GM/DL
GLUCOSE SERPL-MCNC: 111 MG/DL (ref 65–99)
HCT VFR BLD AUTO: 37.5 % (ref 37.5–51)
HGB BLD-MCNC: 12.5 G/DL (ref 13–17.7)
IMM GRANULOCYTES # BLD AUTO: 0.01 10*3/MM3 (ref 0–0.05)
IMM GRANULOCYTES NFR BLD AUTO: 0.1 % (ref 0–0.5)
INR PPP: 1.62 (ref 2–3)
LYMPHOCYTES # BLD AUTO: 3.27 10*3/MM3 (ref 0.7–3.1)
LYMPHOCYTES NFR BLD AUTO: 40.9 % (ref 19.6–45.3)
MCH RBC QN AUTO: 30.9 PG (ref 26.6–33)
MCHC RBC AUTO-ENTMCNC: 33.3 G/DL (ref 31.5–35.7)
MCV RBC AUTO: 92.6 FL (ref 79–97)
MONOCYTES # BLD AUTO: 0.47 10*3/MM3 (ref 0.1–0.9)
MONOCYTES NFR BLD AUTO: 5.9 % (ref 5–12)
NEUTROPHILS NFR BLD AUTO: 4.09 10*3/MM3 (ref 1.7–7)
NEUTROPHILS NFR BLD AUTO: 51.1 % (ref 42.7–76)
NRBC BLD AUTO-RTO: 0 /100 WBC (ref 0–0.2)
PLATELET # BLD AUTO: 233 10*3/MM3 (ref 140–450)
PMV BLD AUTO: 9.4 FL (ref 6–12)
POTASSIUM SERPL-SCNC: 4.4 MMOL/L (ref 3.5–5.2)
PROT SERPL-MCNC: 6.9 G/DL (ref 6–8.5)
PROTHROMBIN TIME: 17 SECONDS (ref 19.4–28.5)
RBC # BLD AUTO: 4.05 10*6/MM3 (ref 4.14–5.8)
SODIUM SERPL-SCNC: 142 MMOL/L (ref 136–145)
WBC NRBC COR # BLD AUTO: 8 10*3/MM3 (ref 3.4–10.8)

## 2024-01-26 PROCEDURE — 85610 PROTHROMBIN TIME: CPT

## 2024-01-26 PROCEDURE — 85379 FIBRIN DEGRADATION QUANT: CPT

## 2024-01-26 PROCEDURE — 85025 COMPLETE CBC W/AUTO DIFF WBC: CPT

## 2024-01-26 PROCEDURE — 80053 COMPREHEN METABOLIC PANEL: CPT

## 2024-01-26 PROCEDURE — 36415 COLL VENOUS BLD VENIPUNCTURE: CPT

## 2024-01-26 NOTE — TELEPHONE ENCOUNTER
"  Caller: Benoit Newberry Jr. \"Elias or Kemal\"    Relationship: Self    What was the call regarding: PATIENT CANCELLED APPT TODAY BECAUSE THEY ARE GOING TO DR DUE TO DIFFICULTY BREATHING       "

## 2024-01-26 NOTE — TELEPHONE ENCOUNTER
Patient called c/o soa - states he has noticed it increasing the last 2 days when he goes up stairs. C/o mild dizziness  Patient states the last time this was happening he was told to go to ER and he was having a heart attack.   Patient denies any cp, swelling, or heart palpitations.     Made apt for Monday 1/29/24 @ 1110a. Advised patient if symptoms worsen or he feels he needs to be seen today that he needs to go to ER or urgent care.   Patient states he is also going to call his PCP.

## 2024-01-26 NOTE — PROGRESS NOTES
1/26/24 Pt missed warfarin again this week on Monday. advised pt take 7.5mg today then resume normal plan recheck in 1 week apLPN

## 2024-01-30 ENCOUNTER — TREATMENT (OUTPATIENT)
Dept: PHYSICAL THERAPY | Facility: CLINIC | Age: 79
End: 2024-01-30
Payer: MEDICARE

## 2024-01-30 DIAGNOSIS — M54.16 LUMBAR RADICULOPATHY, CHRONIC: Primary | ICD-10-CM

## 2024-01-30 DIAGNOSIS — R26.2 DIFFICULTY WALKING: ICD-10-CM

## 2024-01-30 PROCEDURE — 97112 NEUROMUSCULAR REEDUCATION: CPT | Performed by: PHYSICAL THERAPIST

## 2024-01-30 PROCEDURE — 97110 THERAPEUTIC EXERCISES: CPT | Performed by: PHYSICAL THERAPIST

## 2024-01-30 NOTE — PROGRESS NOTES
Physical Therapy Daily Note  Office: [address] [facility] [address], [place] [address]  P: [phone]  F: [phone]    Patient: Benoit Newberry Jr.   : 1945  Diagnosis/ICD-10 Code:  Lumbar radiculopathy, chronic [M54.16]  Referring practitioner: Josh Stoll MD  Today's Date: 2024  Patient seen for 10 sessions                                                                                                                                                                                                                                                                                                                               VISIT#: 10/10 sessions authorized per insurance (PN due: 2024/Recert due 3/18/2024)     Precautions/Restrictions: s/p L4-S1 R Laminotomy, medial facetectomy, foraminotomies on 2023     Subjective  Benoit Newberry reports that he is still having some trouble breathing. Cancelled last session last week because he went up the stairs and then couldn't catch his breath. Last time that happened he had a heart attack. Saw PCP and they ran tests which were all normal. He has been riding bicycle at home because surgeon cleared him to do that last week and it hasn't made him winded. Seems to be more with walking.       Objective  Amb with antalgic gait pattern, fwd trunk lean and lateral shift   Vitals: 100% O2, HR 70 bpm    See Exercise, Manual, and Modality Logs for complete treatment.     Home Exercises:  [id number]     Assessment/Plan   Held nustep this date d/t pt's recent c/o SOA. O2 sats were taken at start of session and were WNL. Pt has some SOA with exertion, however improved quickly with short rest break as it has in the past. Pt instructed to continue monitoring vitals at home and to return to MD if they worsen again.     Progress per Plan of Care and Progress strengthening /stabilization /functional activity            Timed:         Manual Therapy:         mins   04621;     Therapeutic Exercise:    15     mins  10428;     Neuromuscular Juan:    8    mins  94897;    Therapeutic Activity:          mins  80780;     Gait Training:           mins  67255;         Un-Timed:  Ice pack:    10     mins  34468 ( );      Timed Treatment:   23   mins   Total Treatment:     33   mins (pt in clinic for ~50 mins total)     Yusra Maurer, PT, DPT, OCS     [place] License # [id number]

## 2024-01-31 ENCOUNTER — TELEPHONE (OUTPATIENT)
Dept: CARDIOLOGY | Facility: CLINIC | Age: 79
End: 2024-01-31
Payer: MEDICARE

## 2024-01-31 NOTE — TELEPHONE ENCOUNTER
Caller: WYATT CLEMENTS    Phone: [phone]    INR Number Being Reported: 1.6 LAST WEEK      Day of the Week  Monday Tuesday Wednesday Thursday Friday Saturday Sunday     Dose Taken               PT'S WIFE WANTED TO SEE IF HE CAN TAKE THE WARFARIN IN THE MORNING AND TAKE PLAVIX AT NIGHT INSTEAD BECAUSE HE'S FORGETTING DOSES. SHE JUST WANTED TO SEE IF IT WOULD MESS WITH HIS NUMBERS. SHE SAID HE NEVER MISSES A MORNING DOSE BUT DOES IN THE AFTERNOON. PLEASE CALL WIFE BACK

## 2024-01-31 NOTE — TELEPHONE ENCOUNTER
Advised pt wife it is okay to take both medications in the morning. Explained we would rather the pt take it in the morning versus not taking it.

## 2024-02-02 ENCOUNTER — ANTICOAGULATION VISIT (OUTPATIENT)
Dept: CARDIOLOGY | Facility: CLINIC | Age: 79
End: 2024-02-02
Payer: MEDICARE

## 2024-02-02 DIAGNOSIS — I48.0 PAROXYSMAL ATRIAL FIBRILLATION: Primary | ICD-10-CM

## 2024-02-02 DIAGNOSIS — Z79.01 LONG TERM (CURRENT) USE OF ANTICOAGULANTS: ICD-10-CM

## 2024-02-02 LAB — INR PPP: 2.8

## 2024-02-06 ENCOUNTER — TREATMENT (OUTPATIENT)
Dept: PHYSICAL THERAPY | Facility: CLINIC | Age: 79
End: 2024-02-06
Payer: MEDICARE

## 2024-02-06 DIAGNOSIS — R26.2 DIFFICULTY WALKING: ICD-10-CM

## 2024-02-06 DIAGNOSIS — M54.16 LUMBAR RADICULOPATHY, CHRONIC: Primary | ICD-10-CM

## 2024-02-06 PROCEDURE — 97110 THERAPEUTIC EXERCISES: CPT | Performed by: PHYSICAL THERAPIST

## 2024-02-06 PROCEDURE — 97112 NEUROMUSCULAR REEDUCATION: CPT | Performed by: PHYSICAL THERAPIST

## 2024-02-06 PROCEDURE — 97530 THERAPEUTIC ACTIVITIES: CPT | Performed by: PHYSICAL THERAPIST

## 2024-02-06 NOTE — PROGRESS NOTES
Physical Therapy Daily Note  Office: [address] [facility] [address], [place] [address]  P: [phone]  F: [phone]    Patient: Benoit Newberry Jr.   : 1945  Diagnosis/ICD-10 Code:  Lumbar radiculopathy, chronic [M54.16]  Referring practitioner: Josh Stoll MD  Today's Date: 2024  Patient seen for 11 sessions                                                                                                                                                                                                                                                                                                                               VISIT#:  sessions authorized per insurance (PN due: 2024/Recert due 3/18/2024)     Precautions/Restrictions: s/p L4-S1 R Laminotomy, medial facetectomy, foraminotomies on 2023     Subjective  Benoit Newberry reports that he has still been having some trouble with breathing when walking or going up and down steps. Hasn't done anything since the last time he was here because a good friend of his passed away. Has the  today. Does have a large blister on the L lower leg that popped up a few days ago. Not sure why. Did see MD about it yesterday and they told him to just put some cream on it when it pops naturally and that it was probably from pt's weight and age.       Objective  Amb with antalgic gait pattern, fwd trunk lean and lateral shift   Vitals: 100% O2, HR 70 bpm    See Exercise, Manual, and Modality Logs for complete treatment.     Home Exercises:  [id number]     Assessment/Plan   Pt did have some SOA with exercises, however breathing returned to normal with short rest break. Mod fatigue in the LE's with exercises. No c/o increased pain. Pt is progressing well overall. Encouraged to do exercises at home and continue to monitor O2 sats.     Progress per Plan of Care and Progress strengthening /stabilization /functional activity            Timed:          Manual Therapy:         mins  77027;     Therapeutic Exercise:    15     mins  04537;     Neuromuscular Juan:    15    mins  43467;    Therapeutic Activity:     8     mins  59735;     Gait Training:           mins  63668;         Un-Timed:  Ice pack:    10     mins  42827 ( );      Timed Treatment:   38   mins   Total Treatment:     48   mins     Yusra Maurer PT, DPT, OCS     [place] License # [id number]

## 2024-02-09 ENCOUNTER — TREATMENT (OUTPATIENT)
Dept: PHYSICAL THERAPY | Facility: CLINIC | Age: 79
End: 2024-02-09
Payer: MEDICARE

## 2024-02-09 DIAGNOSIS — R26.2 DIFFICULTY WALKING: ICD-10-CM

## 2024-02-09 DIAGNOSIS — M54.16 LUMBAR RADICULOPATHY, CHRONIC: Primary | ICD-10-CM

## 2024-02-09 PROCEDURE — 97530 THERAPEUTIC ACTIVITIES: CPT | Performed by: PHYSICAL THERAPIST

## 2024-02-09 PROCEDURE — 97110 THERAPEUTIC EXERCISES: CPT | Performed by: PHYSICAL THERAPIST

## 2024-02-09 PROCEDURE — 97112 NEUROMUSCULAR REEDUCATION: CPT | Performed by: PHYSICAL THERAPIST

## 2024-02-09 NOTE — PROGRESS NOTES
Physical Therapy Daily Note  Office: [address] [facility] [address], [place] [address]  P: [phone]  F: [phone]    Patient: Benoit Newberry Jr.   : 1945  Diagnosis/ICD-10 Code:  Lumbar radiculopathy, chronic [M54.16]  Referring practitioner: Josh Stoll MD  Today's Date: 2024  Patient seen for 12 sessions                                                                                                                                                                                                                                                                                                                               VISIT#:  sessions authorized per insurance (PN due: 2024/Recert due 3/18/2024)     Precautions/Restrictions: s/p L4-S1 R Laminotomy, medial facetectomy, foraminotomies on 2023     Subjective  Benoit Newberry reports that his pain is doing well. No new complaints with it. Continues to have trouble with breathing if he walks for very long. Feels like he tires out quickly. Has x-rays for back next week and then follow-up with surgeon the week after that.        Objective  Amb with antalgic gait pattern, fwd trunk lean and lateral shift   Vitals: 100% O2, HR 70 bpm    See Exercise, Manual, and Modality Logs for complete treatment.     Home Exercises:  [id number]     Assessment/Plan   Pt continues to make good progress overall with less back and leg pain with activities. However, still has decreased endurance. Most noted with step-ups and any amb. Improved balance with decreased use of the counter for support.     Progress per Plan of Care and Progress strengthening /stabilization /functional activity            Timed:         Manual Therapy:         mins  91092;     Therapeutic Exercise:    15     mins  87295;     Neuromuscular Juan:    15    mins  30219;    Therapeutic Activity:     8     mins  32373;     Gait Training:           mins  66043;         Un-Timed:  Ice  pack:    10     mins  53413 ( );      Timed Treatment:   38   mins   Total Treatment:     48   mins     Yusra Maurer, PT, DPT, OCS     [place] License # [id number]

## 2024-02-12 ENCOUNTER — ANTICOAGULATION VISIT (OUTPATIENT)
Dept: CARDIOLOGY | Facility: CLINIC | Age: 79
End: 2024-02-12
Payer: MEDICARE

## 2024-02-12 DIAGNOSIS — Z79.01 LONG TERM (CURRENT) USE OF ANTICOAGULANTS: ICD-10-CM

## 2024-02-12 DIAGNOSIS — I48.0 PAROXYSMAL ATRIAL FIBRILLATION: Primary | ICD-10-CM

## 2024-02-12 LAB — INR PPP: 3.3

## 2024-02-13 ENCOUNTER — TREATMENT (OUTPATIENT)
Dept: PHYSICAL THERAPY | Facility: CLINIC | Age: 79
End: 2024-02-13
Payer: MEDICARE

## 2024-02-13 DIAGNOSIS — R26.2 DIFFICULTY WALKING: ICD-10-CM

## 2024-02-13 DIAGNOSIS — M54.16 LUMBAR RADICULOPATHY, CHRONIC: Primary | ICD-10-CM

## 2024-02-13 PROCEDURE — 97530 THERAPEUTIC ACTIVITIES: CPT | Performed by: PHYSICAL THERAPIST

## 2024-02-13 PROCEDURE — 97112 NEUROMUSCULAR REEDUCATION: CPT | Performed by: PHYSICAL THERAPIST

## 2024-02-13 PROCEDURE — 97110 THERAPEUTIC EXERCISES: CPT | Performed by: PHYSICAL THERAPIST

## 2024-02-16 ENCOUNTER — TREATMENT (OUTPATIENT)
Dept: PHYSICAL THERAPY | Facility: CLINIC | Age: 79
End: 2024-02-16
Payer: MEDICARE

## 2024-02-16 DIAGNOSIS — M54.16 LUMBAR RADICULOPATHY, CHRONIC: Primary | ICD-10-CM

## 2024-02-16 DIAGNOSIS — R26.2 DIFFICULTY WALKING: ICD-10-CM

## 2024-02-16 PROCEDURE — 97110 THERAPEUTIC EXERCISES: CPT | Performed by: PHYSICAL THERAPIST

## 2024-02-16 PROCEDURE — 97530 THERAPEUTIC ACTIVITIES: CPT | Performed by: PHYSICAL THERAPIST

## 2024-02-16 PROCEDURE — 97112 NEUROMUSCULAR REEDUCATION: CPT | Performed by: PHYSICAL THERAPIST

## 2024-02-20 ENCOUNTER — TREATMENT (OUTPATIENT)
Dept: PHYSICAL THERAPY | Facility: CLINIC | Age: 79
End: 2024-02-20
Payer: MEDICARE

## 2024-02-20 DIAGNOSIS — R26.2 DIFFICULTY WALKING: ICD-10-CM

## 2024-02-20 DIAGNOSIS — M54.16 LUMBAR RADICULOPATHY, CHRONIC: Primary | ICD-10-CM

## 2024-02-20 PROCEDURE — 97530 THERAPEUTIC ACTIVITIES: CPT | Performed by: PHYSICAL THERAPIST

## 2024-02-20 PROCEDURE — 97112 NEUROMUSCULAR REEDUCATION: CPT | Performed by: PHYSICAL THERAPIST

## 2024-02-21 NOTE — PROGRESS NOTES
Physical Therapy Daily Note  Office: [address] [facility] [address], [place] [address]  P: [phone]  F: [phone]    Patient: Benoit Newberry Jr.   : 1945  Diagnosis/ICD-10 Code:  Lumbar radiculopathy, chronic [M54.16]  Referring practitioner: Josh Stoll MD  Today's Date: 2024  Patient seen for 15 sessions                                                                                                                                                                                                                                                                                                                               VISIT#: 15/ sessions authorized per insurance (PN due: 2024/Recert due 3/18/2024)     Precautions/Restrictions: s/p L4-S1 R Laminotomy, medial facetectomy, foraminotomies on 2023     Subjective  Benoit Newberry reports his back and legs are doing fine. Just fatigues quickly still and gets out of breath any time he walks very far.     Objective  Amb with antalgic gait pattern, fwd trunk lean and lateral shift   Vitals: 100% O2, HR 71 bpm    See Exercise, Manual, and Modality Logs for complete treatment.     Home Exercises:  [id number]     Assessment/Plan   Pt demonstrates good progress with exercises, however still fatigues by end of each exercise and requires a seated rest break. Some unsteadiness when pt begins to fatigue that requires SBA-CGA for safety. Pt is doing well overall.     Progress per Plan of Care and Progress strengthening /stabilization /functional activity            Timed:         Manual Therapy:         mins  91711;     Therapeutic Exercise:         mins  61400;     Neuromuscular Juan:    15    mins  98572;    Therapeutic Activity:     8     mins  47056;     Gait Training:           mins  37732;         Un-Timed:  Ice pack:    10     mins  45796 ( );      Timed Treatment:   23   mins   Total Treatment:     23   mins (pt in clinic for ~45 mins  total)     Yusra Maurer, PT, DPT, OCS     [place] License # [id number]

## 2024-02-23 ENCOUNTER — TREATMENT (OUTPATIENT)
Dept: PHYSICAL THERAPY | Facility: CLINIC | Age: 79
End: 2024-02-23
Payer: MEDICARE

## 2024-02-23 ENCOUNTER — ANTICOAGULATION VISIT (OUTPATIENT)
Dept: CARDIOLOGY | Facility: CLINIC | Age: 79
End: 2024-02-23
Payer: MEDICARE

## 2024-02-23 DIAGNOSIS — R26.2 DIFFICULTY WALKING: ICD-10-CM

## 2024-02-23 DIAGNOSIS — I48.0 PAROXYSMAL ATRIAL FIBRILLATION: Primary | ICD-10-CM

## 2024-02-23 DIAGNOSIS — Z79.01 LONG TERM (CURRENT) USE OF ANTICOAGULANTS: ICD-10-CM

## 2024-02-23 DIAGNOSIS — M54.16 LUMBAR RADICULOPATHY, CHRONIC: Primary | ICD-10-CM

## 2024-02-23 LAB — INR PPP: 3.5

## 2024-02-23 PROCEDURE — 97530 THERAPEUTIC ACTIVITIES: CPT | Performed by: PHYSICAL THERAPIST

## 2024-02-23 PROCEDURE — 97110 THERAPEUTIC EXERCISES: CPT | Performed by: PHYSICAL THERAPIST

## 2024-02-23 PROCEDURE — 97112 NEUROMUSCULAR REEDUCATION: CPT | Performed by: PHYSICAL THERAPIST

## 2024-02-23 NOTE — PROGRESS NOTES
Re-Assessment/Progress Note  Office: [address] [facility] [address], [place] [address]  P: [phone]   F: [phone]        Patient: Benoit Newberry Jr.   : 1945  Diagnosis/ICD-10 Code:  Lumbar radiculopathy, chronic [M54.16]  Referring practitioner: Josh Stoll MD  Date of Initial Visit: Type: THERAPY  Noted: 2023  Today's Date: 2024  Patient seen for 16 sessions      Subjective:   Benoit Newberry reports that he is doing well. Feels like his back is doing well. Breathing is getting better also. Not wearing out as quickly. Is having some pain in the back of the R leg today because he has been sleeping in different bed. They had a water leak and he can't sleep where he usually does.     Pain: Current: 5/10, Best: 0/10, Worst: 6/10    Subjective Questionnaire: Oswestry: 44% impairment   Clinical Progress: improved  Home Program Compliance: Yes  Treatment has included: therapeutic exercise, neuromuscular re-education, manual therapy, therapeutic activity, gait training, and cryotherapy    Objective          Active Range of Motion     Lumbar   Flexion: WFL  Left lateral flexion: WFL  Right lateral flexion: WFL  Left rotation: WFL  Right rotation: WFL    Additional Active Range of Motion Details  Pt unable to achieve neutral extension, tightness in B hip flexors noted as well as decreased lumbar ext    Strength/Myotome Testing     Left Hip   Planes of Motion   Flexion: 4+  Extension: 4  Abduction: 4  Adduction: 4+    Right Hip   Planes of Motion   Flexion: 4+  Extension: 4  Abduction: 4  Adduction: 4+    Left Knee   Flexion: 5  Extension: 5    Right Knee   Flexion: 5  Extension: 5      Assessment & Plan       Assessment  Impairments: abnormal coordination, abnormal gait, abnormal muscle firing, abnormal muscle tone, abnormal or restricted ROM, activity intolerance, impaired balance, impaired physical strength, lacks appropriate home exercise program and pain with function   Functional  limitations: carrying objects, lifting, sleeping, walking, uncomfortable because of pain, moving in bed, sitting, standing and stooping   Assessment details: Pt is s/p L4-S1 R Laminotomy, medial facetectomy, foraminotomies on 12/4/2023.    Prognosis: good    Goals  Plan Goals: STG (3 weeks):  Pt will demonstrate increased activation of core stabilizers during activities/exercises to decrease load on spine. - mostly met  Pt will display improved ROM of lumbar spine to WFL with min to no pain to assist with functional tasks. - met    LTG (6 weeks):  Pt will be independent with home exercises to assist with improved strength and continue to improve function. - met for current program   Pt will demonstrate decreased score on the Modified Oswestry to 24% to demonstrate decreased overall impairment. - partially met  Pt will be able to amb without AD in community for >15 mins with little to no pain in lumbar spine or R LE. - progressing  Pt will demonstrate improved hip and core strength to 4+/5 overall to assist with function. - progressing       Plan  Therapy options: will be seen for skilled therapy services  Planned modality interventions: TENS, cryotherapy and thermotherapy (hydrocollator packs)  Planned therapy interventions: abdominal trunk stabilization, ADL retraining, balance/weight-bearing training, body mechanics training, flexibility, functional ROM exercises, gait training, home exercise program, joint mobilization, manual therapy, motor coordination training, neuromuscular re-education, postural training, soft tissue mobilization, spinal/joint mobilization, strengthening, stretching and therapeutic activities  Frequency: 2x week  Duration in weeks: 12  Treatment plan discussed with: patient      Progress toward previous goals: Partially Met        Recommendations: Continue as planned  Timeframe: 6 weeks  Prognosis to achieve goals: good          Timed:         Manual Therapy:         mins  64435;      Therapeutic Exercise:    8     mins  35255;     Neuromuscular Juan:    15    mins  30690;    Therapeutic Activity:     15     mins  38149;       Un-Timed:  Ice pack:             10     mins  61558 ( );      Timed Treatment:   38   mins   Total Treatment:     48   mins      PT Signature: Yusra Maurer, PT, DPT, OCS     [place] License # [id number]

## 2024-02-27 ENCOUNTER — TREATMENT (OUTPATIENT)
Dept: PHYSICAL THERAPY | Facility: CLINIC | Age: 79
End: 2024-02-27
Payer: MEDICARE

## 2024-02-27 DIAGNOSIS — R26.2 DIFFICULTY WALKING: ICD-10-CM

## 2024-02-27 DIAGNOSIS — M54.16 LUMBAR RADICULOPATHY, CHRONIC: Primary | ICD-10-CM

## 2024-02-27 PROCEDURE — 97110 THERAPEUTIC EXERCISES: CPT | Performed by: PHYSICAL THERAPIST

## 2024-02-27 PROCEDURE — 97112 NEUROMUSCULAR REEDUCATION: CPT | Performed by: PHYSICAL THERAPIST

## 2024-02-27 PROCEDURE — 97530 THERAPEUTIC ACTIVITIES: CPT | Performed by: PHYSICAL THERAPIST

## 2024-02-27 NOTE — PROGRESS NOTES
Physical Therapy Daily Note  Office: [address] [facility] [address], [place] [address]  P: [phone]  F: [phone]    Patient: Benoit Newberry Jr.   : 1945  Diagnosis/ICD-10 Code:  Lumbar radiculopathy, chronic [M54.16]  Referring practitioner: Josh Stoll MD  Today's Date: 2024  Patient seen for 17 sessions                                                                                                                                                                                                                                                                                                                               VISIT#:  sessions authorized per insurance (PN due: 3/23/2024)    Precautions/Restrictions: s/p L4-S1 R Laminotomy, medial facetectomy, foraminotomies on 2023     Subjective  Benoit Newberry reports that he is doing well today. Still some pain in the R leg but not bad. Felt fine after last session. Exercised in PT or at his home every day last week but rested on Saturday. Then did at home on  and Monday, exercises and riding his bike.     Objective  Amb with antalgic gait pattern, fwd trunk lean and lateral shift     See Exercise, Manual, and Modality Logs for complete treatment.     Home Exercises:  [id number]     Assessment/Plan   Pt demonstrates good progress with exercises in standing. Improved posture awareness with less cuing. Still has some difficulty with standing upright due to decreased lumbar ext and tightness in hip flexors. Attempted supine SKTC stretch, however pt did have more discomfort in lumbar spine and R LE afterwards due to positioning. Improved after ice.     Progress per Plan of Care and Progress strengthening /stabilization /functional activity            Timed:         Manual Therapy:         mins  80465;     Therapeutic Exercise:    15     mins  54603;     Neuromuscular Juan:    15    mins  17178;    Therapeutic Activity:     8     mins   07463;     Gait Training:           mins  94837;         Un-Timed:  Ice pack:    10     mins  01646 ( );      Timed Treatment:   38   mins   Total Treatment:     48   mins     Yusra Maurer PT, DPT, OCS     [place] License # [id number]

## 2024-02-28 ENCOUNTER — TELEPHONE (OUTPATIENT)
Dept: NEUROSURGERY | Facility: CLINIC | Age: 79
End: 2024-02-28
Payer: MEDICARE

## 2024-02-28 NOTE — TELEPHONE ENCOUNTER
Caller: PATIENT     Relationship: SELF    Best call back number: [phone]    What orders are you requesting (i.e. lab or imaging): X-RAY SPINE LUMBAR COMPLETE WITH FLEX EXT    In what timeframe would the patient need to come in: ASAP    Where will you receive your lab/imaging services: [facility]     Additional notes: PATIENT CALLED AND STATES THAT HE IS NEEDING TO HAVE HIS ORDER FOR X-RAYS RE FAXED TO [facility]-PT ADVISED THAT HE HAS AN APPT. THERE TOMORROW AT 5:15 IN THE EVENING AND WOULD LIKE TO SEE IF OUR OFFICE CAN GET THE ORDERS THERE ASAP-SENDING TO OFFICE TO ADVISE THANK YOU

## 2024-02-28 NOTE — PROGRESS NOTES
Neurosurgical Consultation      Benoit Newberry Jr. is a 78 y.o. male is here today for a post op follow-up from surgery on 12/4/23 with new X-Rays. Today patient reports his right buttock and leg pain has improved.    Chief Complaint   Patient presents with    Post-op        Previous treatment:LUMBAR FOUR TO SACRAL ONE RIGHT SIDED LAMINOTOMY, MEDIAL  FACETECTOMY, FORAMINOTOMIES. Physical Therapy,Robaxin    HPI: This is a 78-year-old gentleman who underwent a right-sided L4-S1 laminotomy, medial facetectomy, foraminotomies for decompression of multiple nerve roots which were resulting in chronic radiculopathy.  He is approximately 3 months removed from surgical intervention.  He continues to do quite well.  His radiculopathy has almost completely resolved.  He does have some chronic back pain which is quite tolerable.  He does comment that there is a small scab on the lower aspect of his incision.  I did interrogate this and removed the scab without any difficulty.  There is no indication of breakdown or infection.  He has engaged with physical therapy and I encouraged completion of the full course of physical therapy and engaging with his home exercise program.    Past Medical History:   Diagnosis Date    Anemia Runs in family    Arthritis     Asymptomatic stenosis of right carotid artery     Atrial fibrillation     Bradycardia     Brain concussion     Buttock pain     rt cheek( lower)    Cataract     Clotting disorder Take blood thinner    Congenital heart disease     Coronary artery disease     Deep vein thrombosis     Diabetes mellitus 08/25/2011    Diabetes mellitus, type 2     Erectile dysfunction 2005    Heart attack     x2   with stent placement     2015/2018    Hyperlipidemia     Hypertension     Hypoglycemia     Leg pain     aches    Low back pain 2014    Lumbar radiculopathy, chronic 10/20/2023    Myocardial infarction     Nonintractable headache 02/24/2022    Obesity 45 years    Other cervical disc  degeneration at C6-C7 level 08/29/2017    Pacemaker     Pulmonary arterial hypertension 1967    Scoliosis 20 years    Sleep apnea     Visual impairment Last 15 years        Past Surgical History:   Procedure Laterality Date    ABLATION OF DYSRHYTHMIC FOCUS      APPENDECTOMY  1956    BACK SURGERY  12/04/2023    CARDIAC ABLATION  12/2018    x 1     CARDIAC CATHETERIZATION Left 02/25/2022    Procedure: Left Heart Cath and coronary angiogram;  Surgeon: Karolina Saldaña MD;  Location: [facility] CATH INVASIVE LOCATION;  Service: Cardiovascular;  Laterality: Left;    CARDIAC CATHETERIZATION  02/25/2022    Procedure: Saphenous Vein Graft;  Surgeon: Karolina Saldaña MD;  Location: [facility] CATH INVASIVE LOCATION;  Service: Cardiovascular;;    CARDIOVERSION  06/2018    Cardioversion 6/2018; x3  12/2018    CORONARY ANGIOPLASTY Left 11/04/2015    Distal left main and in the mid circumflex artery     CORONARY ANGIOPLASTY Right 11/02/2015    Right coronary artery     CORONARY ARTERY BYPASS GRAFT  03/1998    CORONARY STENT PLACEMENT      EYE SURGERY      FOOT SURGERY  2010    HERNIA REPAIR  2005    INSERT / REPLACE / REMOVE PACEMAKER      LUMBAR LAMINECTOMY Right 12/04/2023    Procedure: LUMBAR FOUR TO SACRAL ONE RIGHT SIDED LAMINOTOMY, MEDIAL  FACETECTOMY, FORAMINOTOMIES;  Surgeon: Josh Stoll MD;  Location: [facility] MAIN OR;  Service: Neurosurgery;  Laterality: Right;    OTHER SURGICAL HISTORY  05/2019    Stent     PACEMAKER IMPLANTATION  01/2019    Dr. Saldaña     REPLACEMENT TOTAL KNEE BILATERAL  2001    SKIN BIOPSY          Current Outpatient Medications on File Prior to Visit   Medication Sig Dispense Refill    Alcohol Swabs 70 % pads USE WHEN TESTING BLOOD GLUCOSE FOUR TIMES DAILY      amLODIPine (NORVASC) 5 MG tablet Take 1 tablet by mouth Daily. 90 tablet 3    atorvastatin (LIPITOR) 40 MG tablet 1 tablet p.o. daily. 90 tablet 3    cetirizine (zyrTEC) 10 MG tablet Take 1 tablet by mouth Daily As Needed for Allergies.       Cholecalciferol (VITAMIN D) 1000 units tablet Take 0.5 tablets by mouth Daily.      clopidogrel (PLAVIX) 75 MG tablet Take 1 tablet by mouth Daily. Can restart 7 days post surgery.      Coenzyme Q10 (CO Q 10 PO) Take 1 capsule by mouth Daily.      docusate sodium (Colace) 100 MG capsule Take 1 capsule by mouth 2 (Two) Times a Day. 10 capsule 0    ferrous sulfate 325 (65 FE) MG tablet TAKE 1 TABLET ORALLY ONCE PER DAY FOR 30 DAYS      furosemide (LASIX) 40 MG tablet Take 1 tablet by mouth Daily. 90 tablet 3    Insulin Aspart (novoLOG) 100 UNIT/ML injection For using insulin pump.  Max daily dose is 100 units/day. 30 mL 3    Insulin Disposable Pump (Omnipod 5 G6 Intro, Gen 5,) kit Use 1 package Daily. 1 kit 0    Insulin Disposable Pump (Omnipod 5 G6 Pod, Gen 5,) misc Use 1 package Daily. 30 each 6    Krill Oil 1000 MG capsule Take 1 capsule by mouth Daily.      lisinopril (PRINIVIL,ZESTRIL) 40 MG tablet 1 tablet p.o. daily 90 tablet 3    metFORMIN (GLUCOPHAGE) 1000 MG tablet Take 1 tablet by mouth 2 (Two) Times a Day With Meals. 180 tablet 3    methocarbamol (ROBAXIN) 750 MG tablet Take 1 tablet by mouth 3 (Three) Times a Day As Needed for Muscle Spasms. 60 tablet 0    metoprolol succinate XL (TOPROL-XL) 25 MG 24 hr tablet Take 2 tablets by mouth every morning and take 1 tablet by mouth every evening (Patient taking differently: Take 4 tablets by mouth Daily. Take 2 tablets by mouth every morning and take 1 tablet by mouth every evening) 270 tablet 3    Multiple Vitamins-Minerals (MULTI VITAMIN/MINERALS) tablet Take 1 tablet by mouth Daily.      mupirocin (BACTROBAN) 2 % ointment APPLY TO WOUND ON LEFT LEG EVERY DAY      naloxone (NARCAN) 4 MG/0.1ML nasal spray Call 911. Don't prime. Tangent in 1 nostril for overdose. Repeat in 2-3 minutes in other nostril if no or minimal breathing/responsiveness. 2 each 0    triamcinolone (KENALOG) 0.025 % cream APPLY TO LEFT LEG EVERY DAY      vitamin C (ASCORBIC ACID) 500 MG tablet  "Take 1 tablet by mouth Daily.      vitamin E 400 UNIT capsule Take 1 capsule by mouth Daily.      warfarin (COUMADIN) 5 MG tablet 1 tablet daily       No current facility-administered medications on file prior to visit.        Allergies   Allergen Reactions    Vancomycin Rash        Social History     Socioeconomic History    Marital status:      Spouse name: Maude    Number of children: 3    Years of education: 14   Tobacco Use    Smoking status: Former     Current packs/day: 0.00     Average packs/day: 2.0 packs/day for 8.3 years (16.7 ttl pk-yrs)     Types: Cigarettes     Start date: 1965     Quit date: 12/3/1973     Years since quittin.2     Passive exposure: Past    Smokeless tobacco: Never    Tobacco comments:     Haven’t smoked in almost 50 years   Vaping Use    Vaping status: Never Used   Substance and Sexual Activity    Alcohol use: Not Currently     Comment: Maybe 1 drink this year    Drug use: No    Sexual activity: Not Currently     Partners: Female     Comment: 74 years old past time          Review of Systems   Constitutional:  Positive for activity change.   HENT: Negative.     Eyes: Negative.    Respiratory: Negative.     Cardiovascular: Negative.    Gastrointestinal: Negative.    Endocrine: Negative.    Genitourinary: Negative.    Musculoskeletal:  Positive for arthralgias, back pain and myalgias.   Skin: Negative.    Allergic/Immunologic: Negative.    Neurological:  Positive for numbness (tingling/feet).   Hematological: Negative.    Psychiatric/Behavioral: Negative.          Physical Examination:     Vitals:    24 0825   BP: 137/85   Pulse: 70   Weight: 114 kg (251 lb)  Comment: patient stated   Height: 175.3 cm (69\")   PainSc:   5        Physical Exam     Neurological Exam   Neurological examination is stable compared to my previous evaluation.  No new red flag signs appreciated    Result Review  The following data was reviewed by: Josh Stoll MD on 2024:    Data " reviewed : Radiologic studies flexion-extension x-rays of the lumbar spine do not suggest any new dynamic spondylolisthesis.    Assessment/plan:  This is a 78-year-old gentleman who underwent a right-sided L4-S1 decompression for chronic radiculopathy.  He has done quite well with respect to the radiculopathy.  His incision is adequately healing.  He is now 3 months removed.  His flexion-extension x-rays do not suggest any new dynamic instability.  I recommend returning to see me in 1 year for clinical reevaluation.  I have encouraged him to call with any questions or concerns.      Diagnoses and all orders for this visit:    1. Lumbar radiculopathy, chronic (Primary)         Return in about 1 year (around 3/8/2025).            Josh Stoll MD

## 2024-02-28 NOTE — TELEPHONE ENCOUNTER
Re-faxed the patient's XR order. Called the patient to let him know that his order was re-faxed to [facility].

## 2024-03-01 ENCOUNTER — TREATMENT (OUTPATIENT)
Dept: PHYSICAL THERAPY | Facility: CLINIC | Age: 79
End: 2024-03-01
Payer: MEDICARE

## 2024-03-01 DIAGNOSIS — M54.16 LUMBAR RADICULOPATHY, CHRONIC: Primary | ICD-10-CM

## 2024-03-01 DIAGNOSIS — M54.16 LUMBAR RADICULOPATHY, CHRONIC: ICD-10-CM

## 2024-03-01 DIAGNOSIS — R26.2 DIFFICULTY WALKING: ICD-10-CM

## 2024-03-01 PROCEDURE — 97530 THERAPEUTIC ACTIVITIES: CPT | Performed by: PHYSICAL THERAPIST

## 2024-03-01 PROCEDURE — 97112 NEUROMUSCULAR REEDUCATION: CPT | Performed by: PHYSICAL THERAPIST

## 2024-03-01 PROCEDURE — 97110 THERAPEUTIC EXERCISES: CPT | Performed by: PHYSICAL THERAPIST

## 2024-03-01 NOTE — PROGRESS NOTES
Physical Therapy Daily Note  Office: [address] [facility] [address], [place] [address]  P: [phone]  F: [phone]    Patient: Benoit Newberry Jr.   : 1945  Diagnosis/ICD-10 Code:  Lumbar radiculopathy, chronic [M54.16]  Referring practitioner: Josh Stoll MD  Today's Date: 3/1/2024  Patient seen for 18 sessions                                                                                                                                                                                                                                                                                                                               VISIT#:  sessions authorized per insurance (PN due: 3/23/2024)    Precautions/Restrictions: s/p L4-S1 R Laminotomy, medial facetectomy, foraminotomies on 2023     Subjective  Benoit Newberry reports that he had to lie down for x-rays for a while yesterday so his back and R leg is hurting some today.     Objective  Amb with antalgic gait pattern, fwd trunk lean and lateral shift     See Exercise, Manual, and Modality Logs for complete treatment.     Home Exercises:  [id number]     Assessment/Plan   Pt demonstrates good progress with step-ups. Did have more fatigue with exercises this date. Had some pain in the R LE after nustep, however improved with seated nerve glides. Pt continues to progress well overall.     Progress per Plan of Care and Progress strengthening /stabilization /functional activity            Timed:         Manual Therapy:         mins  86410;     Therapeutic Exercise:    15     mins  98047;     Neuromuscular Juan:    15    mins  28152;    Therapeutic Activity:     8     mins  28257;     Gait Training:           mins  63139;         Un-Timed:  Ice pack:    10     mins  82240 ( );      Timed Treatment:   38   mins   Total Treatment:     48   mins     Yusra Maurer, PT, DPT, OCS     [place] License # [id number]

## 2024-03-05 ENCOUNTER — TREATMENT (OUTPATIENT)
Dept: PHYSICAL THERAPY | Facility: CLINIC | Age: 79
End: 2024-03-05
Payer: MEDICARE

## 2024-03-05 DIAGNOSIS — M54.16 LUMBAR RADICULOPATHY, CHRONIC: Primary | ICD-10-CM

## 2024-03-05 DIAGNOSIS — R26.2 DIFFICULTY WALKING: ICD-10-CM

## 2024-03-05 PROCEDURE — 97112 NEUROMUSCULAR REEDUCATION: CPT | Performed by: PHYSICAL THERAPIST

## 2024-03-05 PROCEDURE — 97530 THERAPEUTIC ACTIVITIES: CPT | Performed by: PHYSICAL THERAPIST

## 2024-03-05 NOTE — PROGRESS NOTES
Physical Therapy Daily Note  Office: [address] [facility] [address], [place] [address]  P: [phone]  F: [phone]    Patient: Benoit Newberry Jr.   : 1945  Diagnosis/ICD-10 Code:  Lumbar radiculopathy, chronic [M54.16]  Referring practitioner: Josh Stoll MD  Today's Date: 3/5/2024  Patient seen for 19 sessions                                                                                                                                                                                                                                                                                                                               VISIT#:  sessions authorized per insurance (PN due: 3/23/2024)    Precautions/Restrictions: s/p L4-S1 R Laminotomy, medial facetectomy, foraminotomies on 2023     Subjective  Benoit Newberry reports that he is doing better. Had US for cardiovascular screening and found some stenosis of arteries. Is to follow-up with Cardiologist about findings. Did have x-rays for flex/ext of lumbar spine which showed normal findings.     Objective  Amb with antalgic gait pattern, fwd trunk lean and lateral shift     See Exercise, Manual, and Modality Logs for complete treatment.     Home Exercises:  [id number]     Assessment/Plan   Pt demonstrates good progress with exercises. Mod fatigue with exercises. Improved balance overall. Continues to have forward trunk lean and is able to correct some with min cuing.     Progress per Plan of Care and Progress strengthening /stabilization /functional activity            Timed:         Manual Therapy:         mins  86988;     Therapeutic Exercise:         mins  82061;     Neuromuscular Juan:    15    mins  08398;    Therapeutic Activity:     8     mins  27978;     Gait Training:           mins  61858;         Un-Timed:  Ice pack:    10     mins  81952 ( );      Timed Treatment:   23   mins   Total Treatment:     33   mins (pt in clinic for  ~55 mins total)     Yusra Maurer, PT, DPT, OCS     [place] License # [id number]

## 2024-03-07 ENCOUNTER — ANTICOAGULATION VISIT (OUTPATIENT)
Dept: CARDIOLOGY | Facility: CLINIC | Age: 79
End: 2024-03-07
Payer: MEDICARE

## 2024-03-07 DIAGNOSIS — I48.0 PAROXYSMAL ATRIAL FIBRILLATION: Primary | ICD-10-CM

## 2024-03-07 DIAGNOSIS — Z79.01 LONG TERM (CURRENT) USE OF ANTICOAGULANTS: ICD-10-CM

## 2024-03-07 LAB — INR PPP: 3.1

## 2024-03-08 ENCOUNTER — OFFICE VISIT (OUTPATIENT)
Dept: NEUROSURGERY | Facility: CLINIC | Age: 79
End: 2024-03-08
Payer: MEDICARE

## 2024-03-08 ENCOUNTER — TREATMENT (OUTPATIENT)
Dept: PHYSICAL THERAPY | Facility: CLINIC | Age: 79
End: 2024-03-08
Payer: MEDICARE

## 2024-03-08 VITALS
WEIGHT: 251 LBS | HEART RATE: 70 BPM | SYSTOLIC BLOOD PRESSURE: 137 MMHG | DIASTOLIC BLOOD PRESSURE: 85 MMHG | BODY MASS INDEX: 37.18 KG/M2 | HEIGHT: 69 IN

## 2024-03-08 DIAGNOSIS — R26.2 DIFFICULTY WALKING: ICD-10-CM

## 2024-03-08 DIAGNOSIS — M54.16 LUMBAR RADICULOPATHY, CHRONIC: Primary | ICD-10-CM

## 2024-03-08 PROCEDURE — 97112 NEUROMUSCULAR REEDUCATION: CPT | Performed by: PHYSICAL THERAPIST

## 2024-03-08 PROCEDURE — 97530 THERAPEUTIC ACTIVITIES: CPT | Performed by: PHYSICAL THERAPIST

## 2024-03-08 RX ORDER — FERROUS SULFATE 325(65) MG
TABLET ORAL
COMMUNITY
Start: 2024-01-26

## 2024-03-08 NOTE — PROGRESS NOTES
Physical Therapy Daily Note  Office: [address] [address], [place] [address]  P: [phone]  F: [phone]    Patient: Benoit Newberry Jr.   : 1945  Diagnosis/ICD-10 Code:  Lumbar radiculopathy, chronic [M54.16]  Referring practitioner: Josh Stoll MD  Today's Date: 3/8/2024  Patient seen for 20 sessions                                                                                                                                                                                                                                                                                                                               VISIT#:  sessions authorized per insurance (PN due: 3/23/2024)    Precautions/Restrictions: s/p L4-S1 R Laminotomy, medial facetectomy, foraminotomies on 2023     Subjective  Benoit Newberry reports he saw spine surgeon and he said that everything looked good. X-rays were good as well.     Objective  Amb with antalgic gait pattern, fwd trunk lean and lateral shift     See Exercise, Manual, and Modality Logs for complete treatment.     Home Exercises:  [id number]     Assessment/Plan   Pt demonstrates good progress with exercises with less fatigue noted and improved balance. Min cuing for upright posture throughout session. Mod fatigue by end of session with no c/o increased pain.     Progress per Plan of Care and Progress strengthening /stabilization /functional activity            Timed:         Manual Therapy:         mins  84558;     Therapeutic Exercise:         mins  39471;     Neuromuscular Juan:    15    mins  76997;    Therapeutic Activity:     8     mins  03593;     Gait Training:           mins  10726;         Un-Timed:  Ice pack:    10     mins  28783 ( );      Timed Treatment:   23   mins   Total Treatment:     33   mins (pt in clinic for ~55 mins total)     Yusra Maurer, PT, DPT, OCS     [place] License # [id number]

## 2024-03-12 ENCOUNTER — TREATMENT (OUTPATIENT)
Dept: PHYSICAL THERAPY | Facility: CLINIC | Age: 79
End: 2024-03-12
Payer: MEDICARE

## 2024-03-12 DIAGNOSIS — M54.16 LUMBAR RADICULOPATHY, CHRONIC: Primary | ICD-10-CM

## 2024-03-12 DIAGNOSIS — R26.2 DIFFICULTY WALKING: ICD-10-CM

## 2024-03-12 PROCEDURE — 97530 THERAPEUTIC ACTIVITIES: CPT | Performed by: PHYSICAL THERAPIST

## 2024-03-12 PROCEDURE — 97112 NEUROMUSCULAR REEDUCATION: CPT | Performed by: PHYSICAL THERAPIST

## 2024-03-12 PROCEDURE — 97110 THERAPEUTIC EXERCISES: CPT | Performed by: PHYSICAL THERAPIST

## 2024-03-12 NOTE — PROGRESS NOTES
Physical Therapy Daily Note  Office: [address] [facility] [address], [place] [address]  P: [phone]  F: [phone]    Patient: Benoit Newberry Jr.   : 1945  Diagnosis/ICD-10 Code:  Lumbar radiculopathy, chronic [M54.16]  Referring practitioner: Josh Stoll MD  Today's Date: 3/12/2024  Patient seen for 21 sessions                                                                                                                                                                                                                                                                                                                               VISIT#:  sessions authorized per insurance (PN due: 3/23/2024)    Precautions/Restrictions: s/p L4-S1 R Laminotomy, medial facetectomy, foraminotomies on 2023     Subjective  Benoit Newberry reports that his back and leg are doing better. Still feels like he gets tired quickly but he has been doing his bike at home almost every day.     Objective  Amb with antalgic gait pattern, fwd trunk lean and lateral shift     See Exercise, Manual, and Modality Logs for complete treatment.     Home Exercises:  [id number]     Assessment/Plan   Pt demonstrates mod fatigue with exercises, requiring rest breaks between each exercise. Pt displays improved overall balance and able to do some step ups onto the foam without holding onto the counter. Pt continues to progress overall with improved balance, endurance and LE strength.     Progress per Plan of Care and Progress strengthening /stabilization /functional activity            Timed:         Manual Therapy:         mins  76305;     Therapeutic Exercise:    15     mins  62711;     Neuromuscular Juan:    15    mins  69305;    Therapeutic Activity:     8     mins  67210;     Gait Training:           mins  89411;         Un-Timed:  Ice pack:    10     mins  27957 ( );      Timed Treatment:   38   mins   Total Treatment:     48    mins (pt in clinic for ~55 mins total)     Yusra Maurer, PT, DPT, OCS     [place] License # [id number]

## 2024-03-15 ENCOUNTER — TREATMENT (OUTPATIENT)
Dept: PHYSICAL THERAPY | Facility: CLINIC | Age: 79
End: 2024-03-15
Payer: MEDICARE

## 2024-03-15 DIAGNOSIS — R26.2 DIFFICULTY WALKING: ICD-10-CM

## 2024-03-15 DIAGNOSIS — M54.16 LUMBAR RADICULOPATHY, CHRONIC: Primary | ICD-10-CM

## 2024-03-15 PROCEDURE — 97530 THERAPEUTIC ACTIVITIES: CPT | Performed by: PHYSICAL THERAPIST

## 2024-03-15 PROCEDURE — 97110 THERAPEUTIC EXERCISES: CPT | Performed by: PHYSICAL THERAPIST

## 2024-03-15 PROCEDURE — 97112 NEUROMUSCULAR REEDUCATION: CPT | Performed by: PHYSICAL THERAPIST

## 2024-03-15 NOTE — PROGRESS NOTES
Physical Therapy Daily Note  Office: [address] [facility] [address], [place] [address]  P: [phone]  F: [phone]    Patient: Benoit Newberry Jr.   : 1945  Diagnosis/ICD-10 Code:  Lumbar radiculopathy, chronic [M54.16]  Referring practitioner: Josh Stoll MD  Today's Date: 3/15/2024  Patient seen for 22 sessions                                                                                                                                                                                                                                                                                                                               VISIT#:  sessions authorized per insurance (PN due: 3/23/2024)    Precautions/Restrictions: s/p L4-S1 R Laminotomy, medial facetectomy, foraminotomies on 2023     Subjective  Benoit Newberry reports that he is tired from putting things back together in his basement, including carpet.     Objective  Amb with antalgic gait pattern, fwd trunk lean and lateral shift     See Exercise, Manual, and Modality Logs for complete treatment.     Home Exercises:  [id number]     Assessment/Plan   Pt demonstrates more fatigue with exercises this date, just required a few more standing rest breaks. Pt is progressing well overall with strength and endurance. No c/o increased pain with any exercises.     Progress per Plan of Care and Progress strengthening /stabilization /functional activity            Timed:         Manual Therapy:         mins  70543;     Therapeutic Exercise:    15     mins  91374;     Neuromuscular Juan:    15    mins  97035;    Therapeutic Activity:     8     mins  01059;     Gait Training:           mins  53380;         Un-Timed:  Ice pack:    10     mins  78764 ( );      Timed Treatment:   38   mins   Total Treatment:     48   mins (pt in clinic for ~55 mins total)     Yusra Maurer, PT, DPT, OCS     [place] License # [id number]

## 2024-03-18 ENCOUNTER — TREATMENT (OUTPATIENT)
Dept: PHYSICAL THERAPY | Facility: CLINIC | Age: 79
End: 2024-03-18
Payer: MEDICARE

## 2024-03-18 DIAGNOSIS — R26.2 DIFFICULTY WALKING: ICD-10-CM

## 2024-03-18 DIAGNOSIS — M54.16 LUMBAR RADICULOPATHY, CHRONIC: Primary | ICD-10-CM

## 2024-03-18 PROCEDURE — 97112 NEUROMUSCULAR REEDUCATION: CPT | Performed by: PHYSICAL THERAPIST

## 2024-03-18 PROCEDURE — 97530 THERAPEUTIC ACTIVITIES: CPT | Performed by: PHYSICAL THERAPIST

## 2024-03-18 NOTE — PROGRESS NOTES
Physical Therapy Daily Note  Office: [address] [facility] [address], [place] [address]  P: [phone]  F: [phone]    Patient: Benoit Newberry Jr.   : 1945  Diagnosis/ICD-10 Code:  Lumbar radiculopathy, chronic [M54.16]  Referring practitioner: Josh Stoll MD  Today's Date: 3/18/2024  Patient seen for 23 sessions                                                                                                                                                                                                                                                                                                                               VISIT#:  sessions authorized per insurance (PN due: 3/23/2024)    Precautions/Restrictions: s/p L4-S1 R Laminotomy, medial facetectomy, foraminotomies on 2023     Subjective  Benoit Newberry reports that he is doing better with his back. Still getting short of breath easily with walking/stairs. Has appt to get carotid arteries checked today and then going to see Cardiologist on May 28 but might get in sooner depending on results of the tests. Is seeing PCP on Mar 25.     Objective  Amb with antalgic gait pattern, fwd trunk lean and lateral shift     See Exercise, Manual, and Modality Logs for complete treatment.     Home Exercises:  [id number]     Assessment/Plan   Pt demonstrates improved balance with exercises as well as improved posture. Continues to have forward trunk lean as well as lat shift, however improved from start of PT. Continues to fatigue with ea exercise, requiring rest break. Pt encouraged to cont going to gym and exercising as long as he isn't getting any symptoms and not short of air. Pt had to leave after ~35 mins of treatment due to another appt.     Progress per Plan of Care and Progress strengthening /stabilization /functional activity            Timed:         Manual Therapy:         mins  58159;     Therapeutic Exercise:         mins  03308;      Neuromuscular Juan:    15    mins  47519;    Therapeutic Activity:     10     mins  93174;     Gait Training:           mins  25153;         Un-Timed:  Ice pack:         mins  43186 ( );      Timed Treatment:   25   mins   Total Treatment:     25   mins (pt in clinic for ~35 mins total)     Yusra Maurer, PT, DPT, OCS     [place] License # [id number]

## 2024-03-21 ENCOUNTER — TREATMENT (OUTPATIENT)
Dept: PHYSICAL THERAPY | Facility: CLINIC | Age: 79
End: 2024-03-21
Payer: MEDICARE

## 2024-03-21 DIAGNOSIS — M54.16 LUMBAR RADICULOPATHY, CHRONIC: Primary | ICD-10-CM

## 2024-03-21 DIAGNOSIS — R26.2 DIFFICULTY WALKING: ICD-10-CM

## 2024-03-21 PROCEDURE — 97112 NEUROMUSCULAR REEDUCATION: CPT | Performed by: PHYSICAL THERAPIST

## 2024-03-21 PROCEDURE — 97530 THERAPEUTIC ACTIVITIES: CPT | Performed by: PHYSICAL THERAPIST

## 2024-03-21 PROCEDURE — 97110 THERAPEUTIC EXERCISES: CPT | Performed by: PHYSICAL THERAPIST

## 2024-03-21 NOTE — PROGRESS NOTES
Re-Assessment/Progress Note  Office: [address] [facility] [address], [place] [address]  P: [phone]   F: [phone]        Patient: Benoit Newberry Jr.   : 1945  Diagnosis/ICD-10 Code:  Lumbar radiculopathy, chronic [M54.16]  Referring practitioner: Josh Stoll MD  Date of Initial Visit: Type: THERAPY  Noted: 2023  Today's Date: 3/21/2024  Patient seen for 24 sessions      Subjective:   Benoit Newberry reports that pain is doing better. More intermittent and is slowly going away. Feels like it's regulated better. Mostly just having trouble with breathing still. Had carotid arteries checked and just received message today that they want to do more testing on him. Is agreeable to d/c from PT with HEP at this time.     Pain: Current: 4/10, Best: 0/10, Worst: 6/10    Subjective Questionnaire: Oswestry: 44% impairment   Clinical Progress: improved  Home Program Compliance: Yes  Treatment has included: therapeutic exercise, neuromuscular re-education, manual therapy, therapeutic activity, gait training, and cryotherapy    Objective          Active Range of Motion     Lumbar   Flexion: WFL  Left lateral flexion: WFL  Right lateral flexion: WFL  Left rotation: WFL  Right rotation: WFL    Additional Active Range of Motion Details  Pt unable to achieve neutral extension, tightness in B hip flexors noted as well as decreased lumbar ext    Strength/Myotome Testing     Left Hip   Planes of Motion   Flexion: 4+  Extension: 4  Abduction: 4  Adduction: 4+    Right Hip   Planes of Motion   Flexion: 4+  Extension: 4  Abduction: 4  Adduction: 4+    Left Knee   Flexion: 5  Extension: 5    Right Knee   Flexion: 5  Extension: 5      Assessment & Plan       Assessment  Assessment details: Pt is s/p L4-S1 R Laminotomy, medial facetectomy, foraminotomies on 2023.  Pt demonstrates improved overall function with min limitation from lumbar spine or R LE pain. Able to ambulate up and down steps, ambulate longer  distances and perform partial squatting without increased pain. Is limited with overall function from SOA, however min from lumbar spine. Pt is receiving further testing to determine cause of SOA. Pt has met all goals for lumbar spine and will be discharged from PT at this time. Pt encouraged to call with any questions or concerns.     Goals  Plan Goals: STG (3 weeks):  Pt will demonstrate increased activation of core stabilizers during activities/exercises to decrease load on spine. - met  Pt will display improved ROM of lumbar spine to WFL with min to no pain to assist with functional tasks. - met    LTG (6 weeks):  Pt will be independent with home exercises to assist with improved strength and continue to improve function. - met for current program   Pt will demonstrate decreased score on the Modified Oswestry to 24% to demonstrate decreased overall impairment. - partially met  Pt will be able to amb without AD in community for >15 mins with little to no pain in lumbar spine or R LE. - mostly met, limited by SOA  Pt will demonstrate improved hip and core strength to 4+/5 overall to assist with function. - met       Progress toward previous goals: All Met; pt did not meet Oswestry goal, however met all others         Recommendations: Discharge            Timed:         Manual Therapy:         mins  10758;     Therapeutic Exercise:    8     mins  30345;     Neuromuscular Juan:    15    mins  95312;    Therapeutic Activity:     15     mins  60286;       Un-Timed:  Ice pack:             10     mins  84934 ( );      Timed Treatment:   38   mins   Total Treatment:     48   mins      PT Signature: Yusra Maurer, PT, DPT, OCS     [place] License # [id number]

## 2024-03-22 ENCOUNTER — ANTICOAGULATION VISIT (OUTPATIENT)
Dept: CARDIOLOGY | Facility: CLINIC | Age: 79
End: 2024-03-22
Payer: MEDICARE

## 2024-03-22 DIAGNOSIS — I48.0 PAROXYSMAL ATRIAL FIBRILLATION: Primary | ICD-10-CM

## 2024-03-22 DIAGNOSIS — Z79.01 LONG TERM (CURRENT) USE OF ANTICOAGULANTS: ICD-10-CM

## 2024-03-22 LAB — INR PPP: 2.5

## 2024-03-26 ENCOUNTER — TRANSCRIBE ORDERS (OUTPATIENT)
Dept: ADMINISTRATIVE | Facility: HOSPITAL | Age: 79
End: 2024-03-26
Payer: MEDICARE

## 2024-03-26 DIAGNOSIS — I25.10 DISEASE OF CARDIOVASCULAR SYSTEM: ICD-10-CM

## 2024-03-26 DIAGNOSIS — I65.23 BILATERAL CAROTID ARTERY OCCLUSION: Primary | ICD-10-CM

## 2024-04-03 ENCOUNTER — HOSPITAL ENCOUNTER (OUTPATIENT)
Dept: NUCLEAR MEDICINE | Facility: HOSPITAL | Age: 79
Discharge: HOME | End: 2024-04-03
Payer: MEDICARE

## 2024-04-03 DIAGNOSIS — I25.10 DISEASE OF CARDIOVASCULAR SYSTEM: ICD-10-CM

## 2024-04-03 DIAGNOSIS — I65.23 BILATERAL CAROTID ARTERY OCCLUSION: ICD-10-CM

## 2024-04-03 LAB
BH CV REST NUCLEAR ISOTOPE DOSE: 11 MCI
BH CV STRESS BP STAGE 1: NORMAL
BH CV STRESS BP STAGE 2: NORMAL
BH CV STRESS COMMENTS STAGE 1: NORMAL
BH CV STRESS COMMENTS STAGE 2: NORMAL
BH CV STRESS DOSE REGADENOSON STAGE 1: 0.4
BH CV STRESS DURATION MIN STAGE 1: 0
BH CV STRESS DURATION MIN STAGE 2: 4
BH CV STRESS DURATION SEC STAGE 1: 10
BH CV STRESS DURATION SEC STAGE 2: 0
BH CV STRESS HR STAGE 1: 70
BH CV STRESS HR STAGE 2: 72
BH CV STRESS NUCLEAR ISOTOPE DOSE: 33 MCI
BH CV STRESS PROTOCOL 1: NORMAL
BH CV STRESS RECOVERY BP: NORMAL MMHG
BH CV STRESS RECOVERY HR: 72 BPM
BH CV STRESS STAGE 1: 1
BH CV STRESS STAGE 2: 2
MAXIMAL PREDICTED HEART RATE: 144 BPM
PERCENT MAX PREDICTED HR: 50.69 %
STRESS BASELINE BP: NORMAL MMHG
STRESS BASELINE HR: 70 BPM
STRESS PERCENT HR: 60 %
STRESS POST PEAK BP: NORMAL MMHG
STRESS POST PEAK HR: 73 BPM
STRESS TARGET HR: 122 BPM

## 2024-04-03 PROCEDURE — A9502 TC99M TETROFOSMIN: HCPCS | Performed by: INTERNAL MEDICINE

## 2024-04-03 PROCEDURE — 0 TECHNETIUM TETROFOSMIN KIT: Performed by: INTERNAL MEDICINE

## 2024-04-03 PROCEDURE — 93017 CV STRESS TEST TRACING ONLY: CPT

## 2024-04-03 PROCEDURE — 78452 HT MUSCLE IMAGE SPECT MULT: CPT

## 2024-04-03 PROCEDURE — 25010000002 REGADENOSON 0.4 MG/5ML SOLUTION: Performed by: INTERNAL MEDICINE

## 2024-04-03 RX ORDER — REGADENOSON 0.08 MG/ML
0.4 INJECTION, SOLUTION INTRAVENOUS
Status: COMPLETED | OUTPATIENT
Start: 2024-04-03 | End: 2024-04-03

## 2024-04-03 RX ADMIN — TETROFOSMIN 1 DOSE: 1.38 INJECTION, POWDER, LYOPHILIZED, FOR SOLUTION INTRAVENOUS at 10:52

## 2024-04-03 RX ADMIN — TETROFOSMIN 1 DOSE: 1.38 INJECTION, POWDER, LYOPHILIZED, FOR SOLUTION INTRAVENOUS at 12:20

## 2024-04-03 RX ADMIN — REGADENOSON 0.4 MG: 0.08 INJECTION, SOLUTION INTRAVENOUS at 12:20

## 2024-04-04 ENCOUNTER — ANTICOAGULATION VISIT (OUTPATIENT)
Dept: CARDIOLOGY | Facility: CLINIC | Age: 79
End: 2024-04-04
Payer: MEDICARE

## 2024-04-04 DIAGNOSIS — I48.0 PAROXYSMAL ATRIAL FIBRILLATION: Primary | ICD-10-CM

## 2024-04-04 DIAGNOSIS — Z79.01 LONG TERM (CURRENT) USE OF ANTICOAGULANTS: ICD-10-CM

## 2024-04-04 LAB — INR PPP: 1.7

## 2024-04-22 ENCOUNTER — ANTICOAGULATION VISIT (OUTPATIENT)
Dept: CARDIOLOGY | Facility: CLINIC | Age: 79
End: 2024-04-22
Payer: MEDICARE

## 2024-04-22 ENCOUNTER — TELEPHONE (OUTPATIENT)
Dept: CARDIOLOGY | Facility: CLINIC | Age: 79
End: 2024-04-22
Payer: MEDICARE

## 2024-04-22 DIAGNOSIS — Z79.01 LONG TERM (CURRENT) USE OF ANTICOAGULANTS: ICD-10-CM

## 2024-04-22 DIAGNOSIS — I48.0 PAROXYSMAL ATRIAL FIBRILLATION: Primary | ICD-10-CM

## 2024-04-22 LAB — INR PPP: 1.8

## 2024-04-22 NOTE — TELEPHONE ENCOUNTER
Pt states his PCP Dr Morgan Gallagher advised he should start 81mg ASA because of his recent stress test and Carotid Ultrasound done at [facility] on 4/17/24       Pt has not started ASA. He was going to see you first.   I will call and get Records from [facility].       Please advise

## 2024-05-02 ENCOUNTER — ANTICOAGULATION VISIT (OUTPATIENT)
Dept: CARDIOLOGY | Facility: CLINIC | Age: 79
End: 2024-05-02
Payer: MEDICARE

## 2024-05-02 DIAGNOSIS — I48.0 PAROXYSMAL ATRIAL FIBRILLATION: Primary | ICD-10-CM

## 2024-05-02 DIAGNOSIS — Z79.01 LONG TERM (CURRENT) USE OF ANTICOAGULANTS: ICD-10-CM

## 2024-05-02 LAB — INR PPP: 1.7

## 2024-05-16 ENCOUNTER — ANTICOAGULATION VISIT (OUTPATIENT)
Dept: CARDIOLOGY | Facility: CLINIC | Age: 79
End: 2024-05-16
Payer: MEDICARE

## 2024-05-16 DIAGNOSIS — Z79.01 LONG TERM (CURRENT) USE OF ANTICOAGULANTS: ICD-10-CM

## 2024-05-16 DIAGNOSIS — I48.0 PAROXYSMAL ATRIAL FIBRILLATION: Primary | ICD-10-CM

## 2024-05-16 LAB — INR PPP: 1.5

## 2024-05-23 ENCOUNTER — ANTICOAGULATION VISIT (OUTPATIENT)
Dept: CARDIOLOGY | Facility: CLINIC | Age: 79
End: 2024-05-23
Payer: MEDICARE

## 2024-05-23 DIAGNOSIS — Z79.01 LONG TERM (CURRENT) USE OF ANTICOAGULANTS: ICD-10-CM

## 2024-05-23 DIAGNOSIS — I48.0 PAROXYSMAL ATRIAL FIBRILLATION: Primary | ICD-10-CM

## 2024-05-23 LAB — INR PPP: 2.2

## 2024-05-28 ENCOUNTER — CLINICAL SUPPORT NO REQUIREMENTS (OUTPATIENT)
Dept: CARDIOLOGY | Facility: CLINIC | Age: 79
End: 2024-05-28
Payer: MEDICARE

## 2024-05-28 ENCOUNTER — OFFICE VISIT (OUTPATIENT)
Dept: CARDIOLOGY | Facility: CLINIC | Age: 79
End: 2024-05-28
Payer: MEDICARE

## 2024-05-28 VITALS
HEART RATE: 70 BPM | SYSTOLIC BLOOD PRESSURE: 140 MMHG | BODY MASS INDEX: 37.33 KG/M2 | WEIGHT: 252 LBS | HEIGHT: 69 IN | DIASTOLIC BLOOD PRESSURE: 67 MMHG | OXYGEN SATURATION: 99 %

## 2024-05-28 DIAGNOSIS — Z95.1 HX OF CABG: ICD-10-CM

## 2024-05-28 DIAGNOSIS — I25.10 CHRONIC CORONARY ARTERY DISEASE: ICD-10-CM

## 2024-05-28 DIAGNOSIS — I48.91 ATRIAL FIBRILLATION, UNSPECIFIED TYPE: ICD-10-CM

## 2024-05-28 DIAGNOSIS — I48.0 PAROXYSMAL ATRIAL FIBRILLATION: Primary | ICD-10-CM

## 2024-05-28 DIAGNOSIS — I49.5 TACHYCARDIA-BRADYCARDIA: ICD-10-CM

## 2024-05-28 DIAGNOSIS — Z79.01 CHRONIC ANTICOAGULATION: ICD-10-CM

## 2024-05-28 DIAGNOSIS — Z79.01 LONG TERM (CURRENT) USE OF ANTICOAGULANTS: ICD-10-CM

## 2024-05-28 DIAGNOSIS — I10 ESSENTIAL HYPERTENSION: ICD-10-CM

## 2024-05-28 DIAGNOSIS — Z95.5 STATUS POST CORONARY ARTERY STENT PLACEMENT: ICD-10-CM

## 2024-05-28 DIAGNOSIS — E78.2 MIXED HYPERLIPIDEMIA: ICD-10-CM

## 2024-05-28 DIAGNOSIS — I48.11 LONGSTANDING PERSISTENT ATRIAL FIBRILLATION: ICD-10-CM

## 2024-05-28 DIAGNOSIS — Z95.0 PRESENCE OF CARDIAC PACEMAKER: ICD-10-CM

## 2024-05-28 DIAGNOSIS — Z95.0 PRESENCE OF CARDIAC PACEMAKER: Primary | ICD-10-CM

## 2024-05-28 NOTE — PROGRESS NOTES
Encounter Date:05/28/2024  Last seen 11/7/2023      Patient ID: Benoit Newberry Jr. is a 78 y.o. male.    Chief Complaint:  Status post CABG  Status post stent  Anticoagulation management.  History of atrial fibrillation  Status post pacemaker     History of Present Illness  Since I have last seen, the patient has been without any chest discomfort ,shortness of breath, palpitations, dizziness or syncope.  Denies having any headache ,abdominal pain ,nausea, vomiting , diarrhea constipation, loss of weight or loss of appetite.  Denies having any excessive bruising ,hematuria or blood in the stool.    Review of all systems negative except as indicated.    Reviewed ROS.  Assessment and Plan         [[[]]]]]]]]]]]]]]]]]]]]  History  ===================     -status post permanent dual-chamber pacemaker implantation ([employer]  MRI compatible) 12/04/2018      - atrial fibrillation.   status post Ablation 10/16/2018  Patient has converted to sinus  sinus bradycardia.   Patient had a recurrence of atrial fibrillation.  Status post cardioversion 06/13/2018 and 08/01/2018 09/05/2018.  Patient has converted but did not stay in sinus rhythm.  Patient was on Tikosyn but off now due to maintaining sinus rhythm.     -anticoagulation Patient is competent.      -status post CABG  3/98      -Acute inferior myocardial infarction.  Status post stent placement to SVG to RCA for total occlusion .  11/02/2015   - Status post stent placement to totally occluded SVG to RCA11/02/2015 and stent placement to left main and mid circumflex coronary artery 11/04/2015.  Status post stent to PDA distal to SVG and native LAD beyond LIMA.  05/25/2018     Cardiac catheterization February 25, 2022 revealed  Left ventricle angiogram was not performed due to difficulty in crossing the aortic valve.  Left main coronary artery is normal.  Left anterior descending artery is totally occluded in the proximal segment and LIMA is filling the  LAD.  Circumflex coronary artery has proximal 20 to 30% disease.  Ramus intermedius is totally occluded.  Filling from SVG.  Right coronary artery is totally occluded in the proximal segment.  LIMA to LAD is patent.  SVG to diagonal branch is patent.  SVG to ramus intermedius is patent.  SVG to PDA is patent      - diabetes dyslipidemia and hypertension   - status post impending inferior myocardial infarction prior to surgery    -family history of coronary artery disease   - History of asymptomatic right carotid bruit.  ===   Plan  ===  Status post CABG.  Patient is not having any angina pectoris or congestive heart failure.  EKG was not performed today.     Anticoagulation status reviewed.  Continue Coumadin.   PT/INR on a monthly basis.  INR today 2.2.    Patient is on Plavix.  Continue Coumadin.  Hold aspirin since patient is on Plavix as well as Coumadin.    Elevated blood pressure.  140/76  Continue metoprolol to 2 tablets of 25 mg twice daily.     History of atrial fibrillation-patient is maintaining sinus rhythm.  Patient is off Tikosyn.  Patient did not have any recurrence of atrial fibrillation.  EKG- atrial sensed ventricular paced rhythm-4/25/2023  Continue to hold Tikosyn since patient has been maintaining sinus rhythm without Tikosyn at this time.      Status post pacemaker.  Interrogation of the pacemaker revealed excellent pacing parameters.-5/28/2024.  Patient is in atrial fibrillation.  Battery status is 1.5 years.  Pacemaker site looks normal.    Follow-up in the office in 6 months with pacemaker interrogation.      Further plan will depend on patient's progress     Reviewed and updated-5/28/2024.  ]]]]]]]]]]]]]]]                   Diagnosis Plan   1. Paroxysmal atrial fibrillation        2. Tachycardia-bradycardia        3. Chronic anticoagulation        4. Mixed hyperlipidemia        5. Long term (current) use of anticoagulants        6. Hx of CABG        7. Status post coronary artery stent  placement        8. Chronic coronary artery disease        9. Presence of cardiac pacemaker        10. Essential hypertension        11. Longstanding persistent atrial fibrillation        12. Atrial fibrillation, unspecified type        LAB RESULTS (LAST 7 DAYS)    CBC        BMP        CMP         BNP        TROPONIN        CoAg  Results from last 7 days   Lab Units 05/23/24  0000   INR  2.20       Creatinine Clearance  CrCl cannot be calculated (Patient's most recent lab result is older than the maximum 30 days allowed.).    ABG        Radiology  No radiology results for the last day                The following portions of the patient's history were reviewed and updated as appropriate: allergies, current medications, past family history, past medical history, past social history, past surgical history, and problem list.    Review of Systems   Constitutional: Negative for malaise/fatigue.   Cardiovascular:  Negative for chest pain, leg swelling, palpitations and syncope.   Respiratory:  Positive for shortness of breath.    Skin:  Negative for rash.   Gastrointestinal:  Negative for nausea and vomiting.   Neurological:  Negative for dizziness, light-headedness and numbness.   All other systems reviewed and are negative.        Current Outpatient Medications:     Alcohol Swabs 70 % pads, USE WHEN TESTING BLOOD GLUCOSE FOUR TIMES DAILY, Disp: , Rfl:     amLODIPine (NORVASC) 5 MG tablet, Take 1 tablet by mouth Daily., Disp: 90 tablet, Rfl: 3    atorvastatin (LIPITOR) 40 MG tablet, 1 tablet p.o. daily., Disp: 90 tablet, Rfl: 3    cetirizine (zyrTEC) 10 MG tablet, Take 1 tablet by mouth Daily As Needed for Allergies., Disp: , Rfl:     Cholecalciferol (VITAMIN D) 1000 units tablet, Take 0.5 tablets by mouth Daily., Disp: , Rfl:     clopidogrel (PLAVIX) 75 MG tablet, Take 1 tablet by mouth Daily. Can restart 7 days post surgery., Disp: , Rfl:     Coenzyme Q10 (CO Q 10 PO), Take 1 capsule by mouth Daily., Disp: , Rfl:      docusate sodium (Colace) 100 MG capsule, Take 1 capsule by mouth 2 (Two) Times a Day., Disp: 10 capsule, Rfl: 0    ferrous sulfate 325 (65 FE) MG tablet, TAKE 1 TABLET ORALLY ONCE PER DAY FOR 30 DAYS, Disp: , Rfl:     furosemide (LASIX) 40 MG tablet, Take 1 tablet by mouth Daily., Disp: 90 tablet, Rfl: 3    Insulin Aspart (novoLOG) 100 UNIT/ML injection, For using insulin pump.  Max daily dose is 100 units/day., Disp: 30 mL, Rfl: 3    Insulin Disposable Pump (Omnipod 5 G6 Intro, Gen 5,) kit, Use 1 package Daily., Disp: 1 kit, Rfl: 0    Insulin Disposable Pump (Omnipod 5 G6 Pod, Gen 5,) misc, Use 1 package Daily., Disp: 30 each, Rfl: 6    Krill Oil 1000 MG capsule, Take 1 capsule by mouth Daily., Disp: , Rfl:     lisinopril (PRINIVIL,ZESTRIL) 40 MG tablet, 1 tablet p.o. daily, Disp: 90 tablet, Rfl: 3    metFORMIN (GLUCOPHAGE) 1000 MG tablet, Take 1 tablet by mouth 2 (Two) Times a Day With Meals., Disp: 180 tablet, Rfl: 3    methocarbamol (ROBAXIN) 750 MG tablet, Take 1 tablet by mouth 3 (Three) Times a Day As Needed for Muscle Spasms., Disp: 60 tablet, Rfl: 0    metoprolol succinate XL (TOPROL-XL) 25 MG 24 hr tablet, Take 2 tablets by mouth every morning and take 1 tablet by mouth every evening (Patient taking differently: Take 4 tablets by mouth Daily. Take 2 tablets by mouth every morning and take 1 tablet by mouth every evening), Disp: 270 tablet, Rfl: 3    Multiple Vitamins-Minerals (MULTI VITAMIN/MINERALS) tablet, Take 1 tablet by mouth Daily., Disp: , Rfl:     naloxone (NARCAN) 4 MG/0.1ML nasal spray, Call 911. Don't prime. [place] in 1 nostril for overdose. Repeat in 2-3 minutes in other nostril if no or minimal breathing/responsiveness., Disp: 2 each, Rfl: 0    vitamin C (ASCORBIC ACID) 500 MG tablet, Take 1 tablet by mouth Daily., Disp: , Rfl:     vitamin E 400 UNIT capsule, Take 1 capsule by mouth Daily., Disp: , Rfl:     warfarin (COUMADIN) 5 MG tablet, 1 tablet daily, Disp: , Rfl:     Allergies   Allergen  Reactions    Vancomycin Rash       Family History   Problem Relation Age of Onset    Heart disease Mother          of heart failure age of 84    Hypertension Mother     Hyperlipidemia Mother     Arthritis Mother     Anemia Mother     Obesity Mother     Heart disease Father          of heart attack age of 68    Hypertension Father     Hyperlipidemia Father     Alcohol abuse Father     Anemia Father     Cancer Sister     Heart disease Sister         Heart problems    Hypertension Sister     Hyperlipidemia Sister     Diabetes Sister     Anemia Sister     Obesity Sister     Heart disease Brother         Heart problems two different times with heart problems    Hypertension Brother     Hyperlipidemia Brother     Alcohol abuse Brother     Diabetes Brother     Anemia Brother     Obesity Brother     Diabetes Maternal Grandmother     Obesity Maternal Grandmother     Stroke Maternal Grandfather     Obesity Maternal Grandfather        Past Surgical History:   Procedure Laterality Date    ABLATION OF DYSRHYTHMIC FOCUS      APPENDECTOMY      BACK SURGERY  2023    CARDIAC ABLATION  12/2018    x 1     CARDIAC CATHETERIZATION Left 2022    Procedure: Left Heart Cath and coronary angiogram;  Surgeon: Karolina Saldaña MD;  Location: [facility] CATH INVASIVE LOCATION;  Service: Cardiovascular;  Laterality: Left;    CARDIAC CATHETERIZATION  2022    Procedure: Saphenous Vein Graft;  Surgeon: Karolina Saldaña MD;  Location: [facility] CATH INVASIVE LOCATION;  Service: Cardiovascular;;    CARDIOVERSION  2018    Cardioversion 2018; x3  2018    CORONARY ANGIOPLASTY Left 2015    Distal left main and in the mid circumflex artery     CORONARY ANGIOPLASTY Right 2015    Right coronary artery     CORONARY ARTERY BYPASS GRAFT  1998    CORONARY STENT PLACEMENT      EYE SURGERY      FOOT SURGERY      HERNIA REPAIR      INSERT / REPLACE / REMOVE PACEMAKER      LUMBAR LAMINECTOMY Right 2023     Procedure: LUMBAR FOUR TO SACRAL ONE RIGHT SIDED LAMINOTOMY, MEDIAL  FACETECTOMY, FORAMINOTOMIES;  Surgeon: Josh Stoll MD;  Location: [facility] MAIN OR;  Service: Neurosurgery;  Laterality: Right;    OTHER SURGICAL HISTORY  2019    Stent     PACEMAKER IMPLANTATION  2019    Dr. Saldaña     REPLACEMENT TOTAL KNEE BILATERAL      SKIN BIOPSY         Past Medical History:   Diagnosis Date    Anemia Runs in family    Arthritis     Asymptomatic stenosis of right carotid artery     Atrial fibrillation     Bradycardia     Brain concussion     Buttock pain     rt cheek( lower)    Cataract     Clotting disorder Take blood thinner    Congenital heart disease     Coronary artery disease     Deep vein thrombosis     Diabetes mellitus 2011    Diabetes mellitus, type 2     Erectile dysfunction     Heart attack     x2   with stent placement         Hyperlipidemia     Hypertension     Hypoglycemia     Leg pain     aches    Low back pain     Lumbar radiculopathy, chronic 10/20/2023    Myocardial infarction     Nonintractable headache 2022    Obesity 45 years    Other cervical disc degeneration at C6-C7 level 2017    Pacemaker     Pulmonary arterial hypertension 1967    Scoliosis 20 years    Sleep apnea     Visual impairment Last 15 years       Family History   Problem Relation Age of Onset    Heart disease Mother          of heart failure age of 84    Hypertension Mother     Hyperlipidemia Mother     Arthritis Mother     Anemia Mother     Obesity Mother     Heart disease Father          of heart attack age of 68    Hypertension Father     Hyperlipidemia Father     Alcohol abuse Father     Anemia Father     Cancer Sister     Heart disease Sister         Heart problems    Hypertension Sister     Hyperlipidemia Sister     Diabetes Sister     Anemia Sister     Obesity Sister     Heart disease Brother         Heart problems two different times with heart problems    Hypertension  "Brother     Hyperlipidemia Brother     Alcohol abuse Brother     Diabetes Brother     Anemia Brother     Obesity Brother     Diabetes Maternal Grandmother     Obesity Maternal Grandmother     Stroke Maternal Grandfather     Obesity Maternal Grandfather        Social History     Socioeconomic History    Marital status:      Spouse name: Maude    Number of children: 3    Years of education: 14   Tobacco Use    Smoking status: Former     Current packs/day: 0.00     Average packs/day: 2.0 packs/day for 8.3 years (16.7 ttl pk-yrs)     Types: Cigarettes     Start date: 1965     Quit date: 12/3/1973     Years since quittin.5     Passive exposure: Past    Smokeless tobacco: Never    Tobacco comments:     Haven’t smoked in almost 50 years   Vaping Use    Vaping status: Never Used   Substance and Sexual Activity    Alcohol use: Not Currently     Comment: Maybe 1 drink this year    Drug use: No    Sexual activity: Not Currently     Partners: Female     Comment: 74 years old past time         Procedures      Objective:       Physical Exam    /67 (BP Location: Right arm, Patient Position: Sitting, Cuff Size: Adult) Comment (BP Location): LOWER ARM  Pulse 70   Ht 175.3 cm (69\")   Wt 114 kg (252 lb)   SpO2 99% Comment: RA  BMI 37.21 kg/m²   The patient is alert, oriented and in no distress.    Vital signs as noted above.    Head and neck revealed no carotid bruits or jugular venous distension.  No thyromegaly or lymphadenopathy is present.    Lungs clear.  No wheezing.  Breath sounds are normal bilaterally.    Heart normal first and second heart sounds.  No murmur..  No pericardial rub is present.  No gallop is present.    Abdomen soft and nontender.  No organomegaly is present.    Extremities revealed good peripheral pulses without any pedal edema.    Skin warm and dry.  Pacemaker site looks normal.    Musculoskeletal system-kyphoscoliosis.    CNS grossly normal.    Reviewed and updated.        "

## 2024-06-05 NOTE — PROGRESS NOTES
"Chief Complaint  NEW PATIENT    Wilmer Newberry Jr. presents to [facility]  History of Present Illness  New patient referred by Dr. Gallagher  He sleeps with CPAP machine for the past 8 years   HST was 8 years ago he wears full face mask.    History of snoring, fatigue, history of diabetes and HTN    Sleep schedule: Bedtime:11 , gets out of bed at 6-7 am, sleep latency: 30min, Gets about 8 hours of sleep.    EPWORTH SLEEPINESS SCALE  Sitting and reading 1 WatchingTV 1  Sitting, inactive, in a public place 1  As a passenger in a car for 1 hour w/o a break  0  Lying down to rest in the afternoon  2  Sitting and talking to someone  0  Sitting quietly after a lunch  2  In a car, while stopped for traffic or a light  0  Total 7    Review of Systems   Constitutional:  Positive for fatigue.   HENT:  Positive for hearing loss. Negative for ear pain.    Eyes:  Positive for itching and visual disturbance.   Musculoskeletal:  Positive for arthralgias, back pain and gait problem.   Psychiatric/Behavioral:  The patient is nervous/anxious. The patient is not hyperactive.    All other systems reviewed and are negative.     Objective   Vital Signs:   /75 (BP Location: Left arm, Patient Position: Sitting, Cuff Size: Adult)   Pulse 70   Ht 175.3 cm (69\")   Wt 113 kg (250 lb)   BMI 36.92 kg/m²     Physical Exam  Vitals reviewed.   Constitutional:       Appearance: Normal appearance.   HENT:      Mouth/Throat:      Mouth: Mucous membranes are moist.   Eyes:      Extraocular Movements: Extraocular movements intact.   Pulmonary:      Effort: Pulmonary effort is normal. No respiratory distress.   Musculoskeletal:      Right lower leg: No edema.      Left lower leg: No edema.   Neurological:      General: No focal deficit present.      Mental Status: He is alert and oriented to person, place, and time.   Psychiatric:         Mood and Affect: Mood normal.         Behavior: Behavior " normal.        Result Review :                 Assessment and Plan    Diagnoses and all orders for this visit:    1. Obstructive sleep apnea syndrome (Primary)    Will obtain HST and obtain a CPAP as indicated.       Follow Up   Return for Follow Up visit 30 to 90 days after obtaining PAP.  Patient was given instructions and counseling regarding his condition or for health maintenance advice. Please see specific information pulled into the AVS if appropriate.       This document has been electronically signed by Joseph Seipel, MD on June 6, 2024 16:43 EDT

## 2024-06-06 ENCOUNTER — OFFICE VISIT (OUTPATIENT)
Dept: NEUROLOGY | Facility: CLINIC | Age: 79
End: 2024-06-06
Payer: MEDICARE

## 2024-06-06 ENCOUNTER — ANTICOAGULATION VISIT (OUTPATIENT)
Dept: CARDIOLOGY | Facility: CLINIC | Age: 79
End: 2024-06-06
Payer: MEDICARE

## 2024-06-06 VITALS
HEART RATE: 70 BPM | DIASTOLIC BLOOD PRESSURE: 75 MMHG | WEIGHT: 250 LBS | HEIGHT: 69 IN | SYSTOLIC BLOOD PRESSURE: 145 MMHG | BODY MASS INDEX: 37.03 KG/M2

## 2024-06-06 DIAGNOSIS — I48.0 PAROXYSMAL ATRIAL FIBRILLATION: Primary | ICD-10-CM

## 2024-06-06 DIAGNOSIS — Z79.01 LONG TERM (CURRENT) USE OF ANTICOAGULANTS: ICD-10-CM

## 2024-06-06 DIAGNOSIS — G47.33 OBSTRUCTIVE SLEEP APNEA SYNDROME: Primary | ICD-10-CM

## 2024-06-06 LAB — INR PPP: 1.5

## 2024-06-06 PROCEDURE — 1160F RVW MEDS BY RX/DR IN RCRD: CPT | Performed by: PSYCHIATRY & NEUROLOGY

## 2024-06-06 PROCEDURE — 1159F MED LIST DOCD IN RCRD: CPT | Performed by: PSYCHIATRY & NEUROLOGY

## 2024-06-06 PROCEDURE — 3078F DIAST BP <80 MM HG: CPT | Performed by: PSYCHIATRY & NEUROLOGY

## 2024-06-06 PROCEDURE — 3077F SYST BP >= 140 MM HG: CPT | Performed by: PSYCHIATRY & NEUROLOGY

## 2024-06-06 PROCEDURE — 99204 OFFICE O/P NEW MOD 45 MIN: CPT | Performed by: PSYCHIATRY & NEUROLOGY

## 2024-06-11 ENCOUNTER — HOSPITAL ENCOUNTER (OUTPATIENT)
Dept: SLEEP MEDICINE | Facility: HOSPITAL | Age: 79
Discharge: HOME | End: 2024-06-11
Admitting: PSYCHIATRY & NEUROLOGY
Payer: MEDICARE

## 2024-06-11 DIAGNOSIS — G47.33 OBSTRUCTIVE SLEEP APNEA SYNDROME: ICD-10-CM

## 2024-06-11 PROCEDURE — 95806 SLEEP STUDY UNATT&RESP EFFT: CPT

## 2024-06-11 PROCEDURE — 95806 SLEEP STUDY UNATT&RESP EFFT: CPT | Performed by: PSYCHIATRY & NEUROLOGY

## 2024-06-13 DIAGNOSIS — Z79.4 TYPE 2 DIABETES MELLITUS WITH HYPERGLYCEMIA, WITH LONG-TERM CURRENT USE OF INSULIN: Primary | ICD-10-CM

## 2024-06-13 DIAGNOSIS — E11.65 TYPE 2 DIABETES MELLITUS WITH HYPERGLYCEMIA, WITH LONG-TERM CURRENT USE OF INSULIN: Primary | ICD-10-CM

## 2024-06-13 RX ORDER — INSULIN PMP CART,AUT,G6/7,CNTR
1 EACH SUBCUTANEOUS DAILY
Qty: 30 EACH | Refills: 6 | Status: SHIPPED | OUTPATIENT
Start: 2024-06-13

## 2024-06-13 RX ORDER — INSULIN ASPART 100 [IU]/ML
INJECTION, SOLUTION INTRAVENOUS; SUBCUTANEOUS
Qty: 90 ML | Refills: 3 | Status: SHIPPED | OUTPATIENT
Start: 2024-06-13

## 2024-06-19 DIAGNOSIS — Z79.4 TYPE 2 DIABETES MELLITUS WITH HYPERGLYCEMIA, WITH LONG-TERM CURRENT USE OF INSULIN: ICD-10-CM

## 2024-06-19 DIAGNOSIS — E11.65 TYPE 2 DIABETES MELLITUS WITH HYPERGLYCEMIA, WITH LONG-TERM CURRENT USE OF INSULIN: ICD-10-CM

## 2024-06-19 NOTE — TELEPHONE ENCOUNTER
Humalog is not covered they want to switch to Fiasp pt is asking if you are OK with him taking this med. Please advise.

## 2024-06-20 ENCOUNTER — ANTICOAGULATION VISIT (OUTPATIENT)
Dept: CARDIOLOGY | Facility: CLINIC | Age: 79
End: 2024-06-20
Payer: MEDICARE

## 2024-06-20 DIAGNOSIS — I48.0 PAROXYSMAL ATRIAL FIBRILLATION: Primary | ICD-10-CM

## 2024-06-20 DIAGNOSIS — Z79.01 LONG TERM (CURRENT) USE OF ANTICOAGULANTS: ICD-10-CM

## 2024-06-20 LAB — INR PPP: 2.1

## 2024-06-20 RX ORDER — INSULIN PMP CART,AUT,G6/7,CNTR
1 EACH SUBCUTANEOUS
Start: 2024-06-20

## 2024-07-01 ENCOUNTER — TELEPHONE (OUTPATIENT)
Dept: NEUROLOGY | Facility: CLINIC | Age: 79
End: 2024-07-01
Payer: MEDICARE

## 2024-07-01 DIAGNOSIS — G47.33 OBSTRUCTIVE SLEEP APNEA: Primary | ICD-10-CM

## 2024-07-01 NOTE — TELEPHONE ENCOUNTER
"  Caller: Benoit Newberry Jr. \"Elias or Kemal\"    Relationship: Self    Best call back number: [phone]    What is the best time to reach you: ANYTIME    What was the call regarding: PATIENT STATED HIS C PAP MACHINE IS OLD AND THERE IS ONLY ONE MASK LEFT. HE SCHEDULED AN APPT FOR 7/26/24 WITH ISAIAH TOLBERT BUT HE WANTS TO SEE IF DR SEIPEL CAN ORDER HIM ONE.     PT ALSO HAD A SLEEP STUDY ON 6/11/24    Is it okay if the provider responds through [facility]: YES  "

## 2024-07-02 ENCOUNTER — LAB (OUTPATIENT)
Dept: LAB | Facility: HOSPITAL | Age: 79
End: 2024-07-02
Payer: MEDICARE

## 2024-07-02 DIAGNOSIS — Z79.4 TYPE 2 DIABETES MELLITUS WITH HYPERGLYCEMIA, WITH LONG-TERM CURRENT USE OF INSULIN: ICD-10-CM

## 2024-07-02 DIAGNOSIS — E11.65 TYPE 2 DIABETES MELLITUS WITH HYPERGLYCEMIA, WITH LONG-TERM CURRENT USE OF INSULIN: ICD-10-CM

## 2024-07-02 LAB
ALBUMIN SERPL-MCNC: 4.2 G/DL (ref 3.5–5.2)
ALBUMIN UR-MCNC: 16.5 MG/DL
ALBUMIN/GLOB SERPL: 1.8 G/DL
ALP SERPL-CCNC: 72 U/L (ref 39–117)
ALT SERPL W P-5'-P-CCNC: 42 U/L (ref 1–41)
ANION GAP SERPL CALCULATED.3IONS-SCNC: 9.2 MMOL/L (ref 5–15)
AST SERPL-CCNC: 27 U/L (ref 1–40)
BILIRUB SERPL-MCNC: 0.6 MG/DL (ref 0–1.2)
BUN SERPL-MCNC: 24 MG/DL (ref 8–23)
BUN/CREAT SERPL: 20.3 (ref 7–25)
CALCIUM SPEC-SCNC: 9.5 MG/DL (ref 8.6–10.5)
CHLORIDE SERPL-SCNC: 105 MMOL/L (ref 98–107)
CHOLEST SERPL-MCNC: 111 MG/DL (ref 0–200)
CO2 SERPL-SCNC: 26.8 MMOL/L (ref 22–29)
CREAT SERPL-MCNC: 1.18 MG/DL (ref 0.76–1.27)
CREAT UR-MCNC: 112 MG/DL
EGFRCR SERPLBLD CKD-EPI 2021: 64 ML/MIN/1.73
GLOBULIN UR ELPH-MCNC: 2.3 GM/DL
GLUCOSE SERPL-MCNC: 122 MG/DL (ref 65–99)
HBA1C MFR BLD: 6.75 % (ref 4.8–5.6)
HDLC SERPL-MCNC: 42 MG/DL (ref 40–60)
LDLC SERPL CALC-MCNC: 58 MG/DL (ref 0–100)
LDLC/HDLC SERPL: 1.45 {RATIO}
MICROALBUMIN/CREAT UR: 147.3 MG/G (ref 0–29)
POTASSIUM SERPL-SCNC: 5.2 MMOL/L (ref 3.5–5.2)
PROT SERPL-MCNC: 6.5 G/DL (ref 6–8.5)
SODIUM SERPL-SCNC: 141 MMOL/L (ref 136–145)
TRIGL SERPL-MCNC: 41 MG/DL (ref 0–150)
VLDLC SERPL-MCNC: 11 MG/DL (ref 5–40)

## 2024-07-02 PROCEDURE — 36415 COLL VENOUS BLD VENIPUNCTURE: CPT

## 2024-07-02 PROCEDURE — 80053 COMPREHEN METABOLIC PANEL: CPT

## 2024-07-02 PROCEDURE — 83036 HEMOGLOBIN GLYCOSYLATED A1C: CPT

## 2024-07-02 PROCEDURE — 82570 ASSAY OF URINE CREATININE: CPT

## 2024-07-02 PROCEDURE — 82043 UR ALBUMIN QUANTITATIVE: CPT

## 2024-07-02 PROCEDURE — 80061 LIPID PANEL: CPT

## 2024-07-04 LAB — INR PPP: 1.7

## 2024-07-05 ENCOUNTER — ANTICOAGULATION VISIT (OUTPATIENT)
Dept: CARDIOLOGY | Facility: CLINIC | Age: 79
End: 2024-07-05
Payer: MEDICARE

## 2024-07-05 ENCOUNTER — TELEPHONE (OUTPATIENT)
Dept: ENDOCRINOLOGY | Facility: CLINIC | Age: 79
End: 2024-07-05
Payer: MEDICARE

## 2024-07-05 DIAGNOSIS — I48.0 PAROXYSMAL ATRIAL FIBRILLATION: Primary | ICD-10-CM

## 2024-07-05 DIAGNOSIS — Z79.01 LONG TERM (CURRENT) USE OF ANTICOAGULANTS: ICD-10-CM

## 2024-07-05 NOTE — TELEPHONE ENCOUNTER
Called and spoke with patient, he needs orders for a new CPAP sent to [facility] based off recent HST. Orders pending for signature. Cancelled 7/26 appt, he'll call to [facility] once he gets PAP

## 2024-07-05 NOTE — PROGRESS NOTES
Pt reports missing a dose of Warfarin on Monday, advised him to take 7.5 mgs today, then resume his current dosage. Recheck as contracted in 2 weeks. Boy

## 2024-07-09 ENCOUNTER — OFFICE VISIT (OUTPATIENT)
Dept: ENDOCRINOLOGY | Facility: CLINIC | Age: 79
End: 2024-07-09
Payer: MEDICARE

## 2024-07-09 VITALS
HEIGHT: 69 IN | BODY MASS INDEX: 37.47 KG/M2 | WEIGHT: 253 LBS | DIASTOLIC BLOOD PRESSURE: 75 MMHG | SYSTOLIC BLOOD PRESSURE: 135 MMHG | OXYGEN SATURATION: 97 % | HEART RATE: 70 BPM

## 2024-07-09 DIAGNOSIS — E78.2 MIXED HYPERLIPIDEMIA: ICD-10-CM

## 2024-07-09 DIAGNOSIS — E11.65 TYPE 2 DIABETES MELLITUS WITH HYPERGLYCEMIA, WITH LONG-TERM CURRENT USE OF INSULIN: Primary | ICD-10-CM

## 2024-07-09 DIAGNOSIS — Z79.4 TYPE 2 DIABETES MELLITUS WITH HYPERGLYCEMIA, WITH LONG-TERM CURRENT USE OF INSULIN: Primary | ICD-10-CM

## 2024-07-09 DIAGNOSIS — I10 PRIMARY HYPERTENSION: ICD-10-CM

## 2024-07-09 PROCEDURE — 1159F MED LIST DOCD IN RCRD: CPT | Performed by: INTERNAL MEDICINE

## 2024-07-09 PROCEDURE — 3078F DIAST BP <80 MM HG: CPT | Performed by: INTERNAL MEDICINE

## 2024-07-09 PROCEDURE — 3075F SYST BP GE 130 - 139MM HG: CPT | Performed by: INTERNAL MEDICINE

## 2024-07-09 PROCEDURE — 95251 CONT GLUC MNTR ANALYSIS I&R: CPT | Performed by: INTERNAL MEDICINE

## 2024-07-09 PROCEDURE — 1160F RVW MEDS BY RX/DR IN RCRD: CPT | Performed by: INTERNAL MEDICINE

## 2024-07-09 PROCEDURE — 99214 OFFICE O/P EST MOD 30 MIN: CPT | Performed by: INTERNAL MEDICINE

## 2024-07-09 RX ORDER — CHLORCYCLIZINE HYDROCHLORIDE AND PSEUDOEPHEDRINE HYDROCHLORIDE 25; 60 MG/1; MG/1
TABLET ORAL
COMMUNITY
Start: 2024-07-01

## 2024-07-09 RX ORDER — METOPROLOL SUCCINATE 100 MG/1
TABLET, EXTENDED RELEASE ORAL
COMMUNITY
Start: 2024-06-11

## 2024-07-09 RX ORDER — INSULIN ASPART 100 [IU]/ML
INJECTION, SOLUTION INTRAVENOUS; SUBCUTANEOUS
Qty: 90 ML | Refills: 3 | Status: SHIPPED | OUTPATIENT
Start: 2024-07-09

## 2024-07-09 NOTE — PROGRESS NOTES
Endocrine Progress Note Outpatient     Patient Care Team:  Clark Gallagher MD as PCP - General (Internal Medicine)  Karolina Saldaña MD as Consulting Physician (Cardiology)    Chief Complaint: Follow-up type 2 diabetes    HPI: This is a 78-year-old male history of type 2 diabetes, hypertension, hyperlipidemia, CAD and obesity is here for follow-up.    For type 2 diabetes: He is now on Omni pod 5 insulin pump along with Dexcom monitoring system.  He has been using insulin pump since February 2022.  He is using NovoLog insulin the pump.      His current pump settings are as follows basal rate midnight is 1.0 units/h.  At 3 PM is 0.90 units/h.  His insulin carb ratio is 1 unit for each 4 g of carbohydrate.  Correction scale is 1 unit for each 30 mg blood sugar with a target blood sugar of 140 and active insulin is 4 hours.  Also on metformin 1000 mg twice a day.    Hypertension: Fair control    Hyperlipidemia: Currently on atorvastatin.    Past Medical History:   Diagnosis Date    Anemia Runs in family    Arthritis     Asymptomatic stenosis of right carotid artery     Atrial fibrillation     Bradycardia     Brain concussion     Buttock pain     rt cheek( lower)    Cataract     Clotting disorder Take blood thinner    Congenital heart disease     Coronary artery disease     Deep vein thrombosis     Diabetes mellitus 08/25/2011    Diabetes mellitus, type 2     Erectile dysfunction 2005    Heart attack     x2   with stent placement     2015/2018    Hyperlipidemia     Hypertension     Hypoglycemia     Leg pain     aches    Low back pain 2014    Lumbar radiculopathy, chronic 10/20/2023    Myocardial infarction     Nonintractable headache 02/24/2022    Obesity 45 years    Other cervical disc degeneration at C6-C7 level 08/29/2017    Pacemaker     Pulmonary arterial hypertension 1967    Scoliosis 20 years    Sleep apnea     Visual impairment Last 15 years       Social History     Socioeconomic History    Marital  status:      Spouse name: Maude    Number of children: 3    Years of education: 14   Tobacco Use    Smoking status: Former     Current packs/day: 0.00     Average packs/day: 2.0 packs/day for 8.3 years (16.7 ttl pk-yrs)     Types: Cigarettes     Start date: 1965     Quit date: 12/3/1973     Years since quittin.6     Passive exposure: Past    Smokeless tobacco: Never    Tobacco comments:     Haven’t smoked in almost 50 years   Vaping Use    Vaping status: Never Used   Substance and Sexual Activity    Alcohol use: Not Currently     Comment: Maybe 1 drink this year    Drug use: No    Sexual activity: Not Currently     Partners: Female     Comment: 74 years old past time       Family History   Problem Relation Age of Onset    Heart disease Mother          of heart failure age of 84    Hypertension Mother     Hyperlipidemia Mother     Arthritis Mother     Anemia Mother     Obesity Mother     Heart disease Father          of heart attack age of 68    Hypertension Father     Hyperlipidemia Father     Alcohol abuse Father     Anemia Father     Cancer Sister     Heart disease Sister         Heart problems    Hypertension Sister     Hyperlipidemia Sister     Diabetes Sister     Anemia Sister     Obesity Sister     Heart disease Brother         Heart problems two different times with heart problems    Hypertension Brother     Hyperlipidemia Brother     Alcohol abuse Brother     Diabetes Brother     Anemia Brother     Obesity Brother     Diabetes Maternal Grandmother     Obesity Maternal Grandmother     Stroke Maternal Grandfather     Obesity Maternal Grandfather        Allergies   Allergen Reactions    Vancomycin Rash       ROS:   Constitutional:  Denies fatigue, tiredness.    Eyes:  Denies change in visual acuity   HENT:  Denies nasal congestion or sore throat   Respiratory: denies cough, shortness of breath.   Cardiovascular:  denies chest pain, edema   GI:  Denies abdominal pain, nausea, vomiting.    Musculoskeletal:  Denies back pain or joint pain   Integument:  Denies dry skin and rash   Neurologic:  Denies headache, focal weakness or sensory changes   Endocrine:  Denies polyuria or polydipsia   Psychiatric:  Denies depression or anxiety      Vitals:    07/09/24 0746   BP: 135/75   Pulse: 70   SpO2: 97%     Body mass index is 37.36 kg/m².     Physical Exam:  GEN: NAD, conversant  EYES: EOMI,   NECK: no thyromegaly  CV: RRR,  LUNG: CTA  PSYCH: AOX3, appropriate mood, affect normal      Results Review:     I reviewed the patient's new clinical results.    Lab Results   Component Value Date    HGBA1C 6.75 (H) 07/02/2024    HGBA1C 6.60 (H) 09/11/2023    HGBA1C 6.90 (H) 06/06/2023      Lab Results   Component Value Date    GLUCOSE 122 (H) 07/02/2024    BUN 24 (H) 07/02/2024    CREATININE 1.18 07/02/2024    EGFRIFNONA 74 02/24/2022    BCR 20.3 07/02/2024    K 5.2 07/02/2024    CO2 26.8 07/02/2024    CALCIUM 9.5 07/02/2024    ALBUMIN 4.2 07/02/2024    LABIL2 1.6 10/15/2018    AST 27 07/02/2024    ALT 42 (H) 07/02/2024    CHOL 111 07/02/2024    TRIG 41 07/02/2024    LDL 58 07/02/2024    HDL 42 07/02/2024     Lab Results   Component Value Date    TSH 1.640 11/25/2020     Dexcom download interpretation: Dates covered was between June 26, 2024 to July 9, 2024.  Average blood sugar was 175.  Spending 59% in the range and 41% above range and 0% below range.  Glucose graph did not show significant fluctuations.      Medication Review: Reviewed.       Current Outpatient Medications:     Alcohol Swabs 70 % pads, USE WHEN TESTING BLOOD GLUCOSE FOUR TIMES DAILY, Disp: , Rfl:     amLODIPine (NORVASC) 5 MG tablet, Take 1 tablet by mouth Daily., Disp: 90 tablet, Rfl: 3    atorvastatin (LIPITOR) 40 MG tablet, 1 tablet p.o. daily., Disp: 90 tablet, Rfl: 3    cetirizine (zyrTEC) 10 MG tablet, Take 1 tablet by mouth Daily As Needed for Allergies., Disp: , Rfl:     Cholecalciferol (VITAMIN D) 1000 units tablet, Take 0.5 tablets by  mouth Daily., Disp: , Rfl:     clopidogrel (PLAVIX) 75 MG tablet, Take 1 tablet by mouth Daily. Can restart 7 days post surgery., Disp: , Rfl:     Coenzyme Q10 (CO Q 10 PO), Take 1 capsule by mouth Daily., Disp: , Rfl:     docusate sodium (Colace) 100 MG capsule, Take 1 capsule by mouth 2 (Two) Times a Day., Disp: 10 capsule, Rfl: 0    ferrous sulfate 325 (65 FE) MG tablet, TAKE 1 TABLET ORALLY ONCE PER DAY FOR 30 DAYS, Disp: , Rfl:     furosemide (LASIX) 40 MG tablet, Take 1 tablet by mouth Daily., Disp: 90 tablet, Rfl: 3    Insulin Aspart (novoLOG) 100 UNIT/ML injection, For using insulin pump.  Max daily dose is 100 units/day., Disp: 90 mL, Rfl: 3    Insulin Disposable Pump (Omnipod 5 G6 Pods, Gen 5,) misc, Use 1 each Every 3 (Three) Days., Disp: , Rfl:     Krill Oil 1000 MG capsule, Take 1 capsule by mouth Daily., Disp: , Rfl:     lisinopril (PRINIVIL,ZESTRIL) 40 MG tablet, 1 tablet p.o. daily, Disp: 90 tablet, Rfl: 3    metFORMIN (GLUCOPHAGE) 1000 MG tablet, Take 1 tablet by mouth 2 (Two) Times a Day With Meals., Disp: 180 tablet, Rfl: 3    methocarbamol (ROBAXIN) 750 MG tablet, Take 1 tablet by mouth 3 (Three) Times a Day As Needed for Muscle Spasms., Disp: 60 tablet, Rfl: 0    metoprolol succinate XL (TOPROL-XL) 100 MG 24 hr tablet, , Disp: , Rfl:     metoprolol succinate XL (TOPROL-XL) 25 MG 24 hr tablet, Take 2 tablets by mouth every morning and take 1 tablet by mouth every evening (Patient taking differently: Take 4 tablets by mouth Daily. Take 1 tablet daily), Disp: 270 tablet, Rfl: 3    Multiple Vitamins-Minerals (MULTI VITAMIN/MINERALS) tablet, Take 1 tablet by mouth Daily., Disp: , Rfl:     naloxone (NARCAN) 4 MG/0.1ML nasal spray, Call 911. Don't prime. [place] in 1 nostril for overdose. Repeat in 2-3 minutes in other nostril if no or minimal breathing/responsiveness., Disp: 2 each, Rfl: 0    Stahist AD 25-60 MG tablet, TAKE 1 TABLET ORALLY TWICE PER DAY FOR 10 DAYS, Disp: , Rfl:     vitamin C (ASCORBIC  ACID) 500 MG tablet, Take 1 tablet by mouth Daily., Disp: , Rfl:     vitamin E 400 UNIT capsule, Take 1 capsule by mouth Daily., Disp: , Rfl:     warfarin (COUMADIN) 5 MG tablet, 1 tablet daily, Disp: , Rfl:           Assessment and plan:  Diabetes mellitus type 2 with Hyperglycemia: Overall doing well with A1c of 6.75.  He is going to resume his pod as soon as he gets home today.  Will not make any changes to his insulin pump settings.  Advised to always keep glucose source in case of low blood sugars.    Hypertension: Well-controlled, continue current medications.    Hyperlipidemia: Currently on atorvastatin 40 mg p.o. daily.      Assessment and plan from December 12, 2023 reviewed and updated.        John Tolentino MD FACE.

## 2024-07-16 ENCOUNTER — TELEPHONE (OUTPATIENT)
Dept: ENDOCRINOLOGY | Facility: CLINIC | Age: 79
End: 2024-07-16
Payer: MEDICARE

## 2024-07-16 DIAGNOSIS — Z79.4 TYPE 2 DIABETES MELLITUS WITH HYPERGLYCEMIA, WITH LONG-TERM CURRENT USE OF INSULIN: Primary | ICD-10-CM

## 2024-07-16 DIAGNOSIS — E11.65 TYPE 2 DIABETES MELLITUS WITH HYPERGLYCEMIA, WITH LONG-TERM CURRENT USE OF INSULIN: Primary | ICD-10-CM

## 2024-07-16 RX ORDER — INSULIN ASPART INJECTION 100 [IU]/ML
70 INJECTION, SOLUTION SUBCUTANEOUS DAILY
Qty: 90 ML | Refills: 3 | Status: SHIPPED | OUTPATIENT
Start: 2024-07-16

## 2024-07-16 NOTE — TELEPHONE ENCOUNTER
Pt called back and still wants [facility]. Informed pt his pump warranty could void if not using humalog or novolog and pt voiced understanding. Also informed pt fiasp is on backorder. [facility]

## 2024-07-16 NOTE — TELEPHONE ENCOUNTER
Pt called stating insurance will not cover Novolog and needs to be switched to Fiasp. Pt on insulin pump, submitted PA for novolog through covermymeds (Key: [id number])

## 2024-07-16 NOTE — TELEPHONE ENCOUNTER
PA denied: Denied. NOVOLOG Solution is a drug that is delivered using a pump. Medicare Part B covers external pumps and the drug used in the pump. Medicare Part D covers insulin that is injected with a syringe or pen device. This request confirmed that you use a pump to deliver the drug. We cannot pay for drugs under Medicare Part D if they are covered under Medicare Part A or B. We did not decide whether NOVOLOG Solution is medically necessary. We made our decision only on the fact that we cannot pay for the drug under Medicare Part D. For more information, talk to your prescriber or call 1-800-MEDICARE.    Spoke with our pharmacy and they suggested send to [facility] because they will process rx to medicare part b. Called patient and left VM for him to call back and let us know which local [facility] he would like rx sent.

## 2024-07-18 ENCOUNTER — ANTICOAGULATION VISIT (OUTPATIENT)
Dept: CARDIOLOGY | Facility: CLINIC | Age: 79
End: 2024-07-18
Payer: MEDICARE

## 2024-07-18 DIAGNOSIS — Z79.01 LONG TERM (CURRENT) USE OF ANTICOAGULANTS: ICD-10-CM

## 2024-07-18 DIAGNOSIS — I48.0 PAROXYSMAL ATRIAL FIBRILLATION: Primary | ICD-10-CM

## 2024-07-18 LAB — INR PPP: 3.8

## 2024-07-18 NOTE — PROGRESS NOTES
Pts INR was high at 3.8. He reports taking decongestants over the past week. Advised pt to hold Warfarin for 1 day, then resume regular dosage of Warfarin and recheck as contracted. Boy

## 2024-08-01 ENCOUNTER — ANTICOAGULATION VISIT (OUTPATIENT)
Dept: CARDIOLOGY | Facility: CLINIC | Age: 79
End: 2024-08-01
Payer: MEDICARE

## 2024-08-01 DIAGNOSIS — Z79.01 LONG TERM (CURRENT) USE OF ANTICOAGULANTS: ICD-10-CM

## 2024-08-01 DIAGNOSIS — I48.0 PAROXYSMAL ATRIAL FIBRILLATION: Primary | ICD-10-CM

## 2024-08-01 LAB — INR PPP: 2

## 2024-08-15 ENCOUNTER — ANTICOAGULATION VISIT (OUTPATIENT)
Dept: CARDIOLOGY | Facility: CLINIC | Age: 79
End: 2024-08-15
Payer: MEDICARE

## 2024-08-15 DIAGNOSIS — Z79.01 LONG TERM (CURRENT) USE OF ANTICOAGULANTS: ICD-10-CM

## 2024-08-15 DIAGNOSIS — I48.0 PAROXYSMAL ATRIAL FIBRILLATION: Primary | ICD-10-CM

## 2024-08-15 LAB — INR PPP: 1.8

## 2024-08-29 ENCOUNTER — ANTICOAGULATION VISIT (OUTPATIENT)
Dept: CARDIOLOGY | Facility: CLINIC | Age: 79
End: 2024-08-29
Payer: MEDICARE

## 2024-08-29 DIAGNOSIS — I48.0 PAROXYSMAL ATRIAL FIBRILLATION: Primary | ICD-10-CM

## 2024-08-29 DIAGNOSIS — Z79.01 LONG TERM (CURRENT) USE OF ANTICOAGULANTS: ICD-10-CM

## 2024-08-29 LAB — INR PPP: 2

## 2024-09-12 ENCOUNTER — ANTICOAGULATION VISIT (OUTPATIENT)
Dept: CARDIOLOGY | Facility: CLINIC | Age: 79
End: 2024-09-12
Payer: MEDICARE

## 2024-09-12 DIAGNOSIS — Z79.01 LONG TERM (CURRENT) USE OF ANTICOAGULANTS: ICD-10-CM

## 2024-09-12 DIAGNOSIS — I48.0 PAROXYSMAL ATRIAL FIBRILLATION: Primary | ICD-10-CM

## 2024-09-12 LAB — INR PPP: 1.9

## 2024-09-26 ENCOUNTER — ANTICOAGULATION VISIT (OUTPATIENT)
Dept: CARDIOLOGY | Facility: CLINIC | Age: 79
End: 2024-09-26
Payer: MEDICARE

## 2024-09-26 DIAGNOSIS — Z79.01 LONG TERM (CURRENT) USE OF ANTICOAGULANTS: ICD-10-CM

## 2024-09-26 DIAGNOSIS — I48.0 PAROXYSMAL ATRIAL FIBRILLATION: Primary | ICD-10-CM

## 2024-09-26 LAB — INR PPP: 2.2

## 2024-10-11 ENCOUNTER — ANTICOAGULATION VISIT (OUTPATIENT)
Dept: CARDIOLOGY | Facility: CLINIC | Age: 79
End: 2024-10-11
Payer: MEDICARE

## 2024-10-11 DIAGNOSIS — I48.0 PAROXYSMAL ATRIAL FIBRILLATION: Primary | ICD-10-CM

## 2024-10-11 DIAGNOSIS — Z79.01 LONG TERM (CURRENT) USE OF ANTICOAGULANTS: ICD-10-CM

## 2024-10-11 LAB — INR PPP: 2

## 2024-10-21 DIAGNOSIS — E11.65 TYPE 2 DIABETES MELLITUS WITH HYPERGLYCEMIA, WITH LONG-TERM CURRENT USE OF INSULIN: ICD-10-CM

## 2024-10-21 DIAGNOSIS — Z79.4 TYPE 2 DIABETES MELLITUS WITH HYPERGLYCEMIA, WITH LONG-TERM CURRENT USE OF INSULIN: ICD-10-CM

## 2024-10-21 RX ORDER — INSULIN PMP CART,AUT,G6/7,CNTR
EACH SUBCUTANEOUS DAILY
Qty: 30 EACH | Refills: 3 | Status: SHIPPED | OUTPATIENT
Start: 2024-10-21

## 2024-10-24 DIAGNOSIS — Z79.4 TYPE 2 DIABETES MELLITUS WITH HYPERGLYCEMIA, WITH LONG-TERM CURRENT USE OF INSULIN: ICD-10-CM

## 2024-10-24 DIAGNOSIS — E11.65 TYPE 2 DIABETES MELLITUS WITH HYPERGLYCEMIA, WITH LONG-TERM CURRENT USE OF INSULIN: ICD-10-CM

## 2024-10-24 RX ORDER — INSULIN ASPART INJECTION 100 [IU]/ML
70 INJECTION, SOLUTION SUBCUTANEOUS DAILY
Qty: 90 ML | Refills: 3 | Status: SHIPPED | OUTPATIENT
Start: 2024-10-24

## 2024-10-25 ENCOUNTER — ANTICOAGULATION VISIT (OUTPATIENT)
Dept: CARDIOLOGY | Facility: CLINIC | Age: 79
End: 2024-10-25
Payer: MEDICARE

## 2024-10-25 DIAGNOSIS — I48.0 PAROXYSMAL ATRIAL FIBRILLATION: Primary | ICD-10-CM

## 2024-10-25 DIAGNOSIS — Z79.01 LONG TERM (CURRENT) USE OF ANTICOAGULANTS: ICD-10-CM

## 2024-10-25 LAB — INR PPP: 2.7 (ref 2–3)

## 2024-11-04 ENCOUNTER — TELEPHONE (OUTPATIENT)
Dept: ENDOCRINOLOGY | Facility: CLINIC | Age: 79
End: 2024-11-04
Payer: MEDICARE

## 2024-11-05 RX ORDER — INSULIN ASPART 100 [IU]/ML
INJECTION, SOLUTION INTRAVENOUS; SUBCUTANEOUS
Qty: 30 ML | Refills: 3 | Status: SHIPPED | OUTPATIENT
Start: 2024-11-05

## 2024-11-05 RX ORDER — INSULIN ASPART 100 [IU]/ML
INJECTION, SOLUTION INTRAVENOUS; SUBCUTANEOUS
Qty: 30 ML | Refills: 3 | Status: SHIPPED | OUTPATIENT
Start: 2024-11-05 | End: 2024-11-05 | Stop reason: SDUPTHER

## 2024-11-07 ENCOUNTER — TELEPHONE (OUTPATIENT)
Dept: ENDOCRINOLOGY | Facility: CLINIC | Age: 79
End: 2024-11-07
Payer: MEDICARE

## 2024-11-08 ENCOUNTER — ANTICOAGULATION VISIT (OUTPATIENT)
Dept: CARDIOLOGY | Facility: CLINIC | Age: 79
End: 2024-11-08
Payer: MEDICARE

## 2024-11-08 DIAGNOSIS — I48.0 PAROXYSMAL ATRIAL FIBRILLATION: Primary | ICD-10-CM

## 2024-11-08 DIAGNOSIS — Z79.01 LONG TERM (CURRENT) USE OF ANTICOAGULANTS: ICD-10-CM

## 2024-11-08 LAB — INR PPP: 2.6

## 2024-11-22 ENCOUNTER — ANTICOAGULATION VISIT (OUTPATIENT)
Dept: CARDIOLOGY | Facility: CLINIC | Age: 79
End: 2024-11-22
Payer: MEDICARE

## 2024-11-22 DIAGNOSIS — I48.0 PAROXYSMAL ATRIAL FIBRILLATION: Primary | ICD-10-CM

## 2024-11-22 DIAGNOSIS — Z79.01 LONG TERM (CURRENT) USE OF ANTICOAGULANTS: ICD-10-CM

## 2024-11-22 LAB — INR PPP: 2.9

## 2024-12-02 PROCEDURE — 93296 REM INTERROG EVL PM/IDS: CPT | Performed by: INTERNAL MEDICINE

## 2024-12-02 PROCEDURE — 93294 REM INTERROG EVL PM/LDLS PM: CPT | Performed by: INTERNAL MEDICINE

## 2024-12-03 NOTE — PROGRESS NOTES
Encounter Date:12/12/2024  Last seen 5/28/2024      Patient ID: Benoit Newberry Jr. is a 78 y.o. male.    Chief Complaint:  Status post CABG  Status post stent  Anticoagulation management.  History of atrial fibrillation  Status post pacemaker     History of Present Illness  Since I have last seen, the patient has been without any chest discomfort ,shortness of breath, palpitations, dizziness or syncope.  Denies having any headache ,abdominal pain ,nausea, vomiting , diarrhea constipation, loss of weight or loss of appetite.  Denies having any excessive bruising ,hematuria or blood in the stool.    Review of all systems negative except as indicated.    Reviewed ROS.    Assessment and Plan         [[[]]]]]]]]]]]]]]]]]]]]  History  ===================     -status post permanent dual-chamber pacemaker implantation (Stem CentRx  MRI compatible) 12/04/2018      - atrial fibrillation.   status post Ablation 10/16/2018  Patient has converted to sinus  sinus bradycardia.   Patient had a recurrence of atrial fibrillation.  Status post cardioversion 06/13/2018 and 08/01/2018 09/05/2018.  Patient has converted but did not stay in sinus rhythm.  Patient was on Tikosyn but off now due to maintaining sinus rhythm.     -anticoagulation Patient is competent.      -status post CABG  3/98      -Acute inferior myocardial infarction.  Status post stent placement to SVG to RCA for total occlusion .  11/02/2015   - Status post stent placement to totally occluded SVG to RCA11/02/2015 and stent placement to left main and mid circumflex coronary artery 11/04/2015.  Status post stent to PDA distal to SVG and native LAD beyond LIMA.  05/25/2018     Cardiac catheterization February 25, 2022 revealed  Left ventricle angiogram was not performed due to difficulty in crossing the aortic valve.  Left main coronary artery is normal.  Left anterior descending artery is totally occluded in the proximal segment and LIMA is filling the  LAD.  Circumflex coronary artery has proximal 20 to 30% disease.  Ramus intermedius is totally occluded.  Filling from SVG.  Right coronary artery is totally occluded in the proximal segment.  LIMA to LAD is patent.  SVG to diagonal branch is patent.  SVG to ramus intermedius is patent.  SVG to PDA is patent      - diabetes dyslipidemia and hypertension   - status post impending inferior myocardial infarction prior to surgery    -family history of coronary artery disease   - History of asymptomatic right carotid bruit.  ===   Plan  ===  Status post CABG  Patient is not having any angina pectoris or congestive heart failure.  EKG showed dual-chamber pacemaker rhythm (atrial sensed ventricular paced.    Anticoagulation status reviewed.  Continue Coumadin.   PT/INR on a monthly basis.     Patient is on Plavix.  Continue Coumadin.  Continue to hold aspirin since patient is on Plavix as well as Coumadin.     Hypertension 140/73  Continue metoprolol to 2 tablets of 25 mg twice daily.     History of atrial fibrillation-patient is maintaining sinus rhythm.  Patient is off Tikosyn  Patient is maintaining sinus rhythm.  EKG- atrial sensed ventricular paced rhythm-4/25/2023    Status post pacemaker.  Interrogation of the pacemaker revealed excellent pacing parameters.-5/28/2024.  Patient is in atrial fibrillation.  Battery status is 11 months to RAJIV.  Observe closely for RAJIV status.  Pacemaker site looks normal.  Patient was educated regarding possibility of RAJIV status soon.    Follow-up in the office in 6 months with pacemaker interrogation.      Further plan will depend on patient's progress     Reviewed and updated 12/12/2024.  ]]]]]]]]]]]]]]]               Diagnosis Plan   1. Paroxysmal atrial fibrillation        2. Status post coronary artery stent placement        3. Essential hypertension        4. Long term (current) use of anticoagulants        5. Mixed hyperlipidemia        6. Hx of CABG        7.  Tachycardia-bradycardia        8. Chronic coronary artery disease        9. Longstanding persistent atrial fibrillation        10. Presence of cardiac pacemaker        11. Atrial fibrillation, unspecified type        LAB RESULTS (LAST 7 DAYS)    CBC        BMP        CMP         BNP        TROPONIN        CoAg        Creatinine Clearance  CrCl cannot be calculated (Patient's most recent lab result is older than the maximum 30 days allowed.).    ABG        Radiology  No radiology results for the last day                The following portions of the patient's history were reviewed and updated as appropriate: allergies, current medications, past family history, past medical history, past social history, past surgical history, and problem list.    Review of Systems   Constitutional: Negative for malaise/fatigue.   Cardiovascular:  Negative for chest pain, leg swelling, palpitations and syncope.   Respiratory:  Negative for shortness of breath.    Skin:  Negative for rash.   Gastrointestinal:  Negative for nausea and vomiting.   Neurological:  Negative for dizziness, light-headedness and numbness.   All other systems reviewed and are negative.      Current Outpatient Medications:     Alcohol Swabs 70 % pads, USE WHEN TESTING BLOOD GLUCOSE FOUR TIMES DAILY, Disp: , Rfl:     amLODIPine (NORVASC) 5 MG tablet, Take 1 tablet by mouth Daily., Disp: 90 tablet, Rfl: 3    atorvastatin (LIPITOR) 40 MG tablet, 1 tablet p.o. daily., Disp: 90 tablet, Rfl: 3    cetirizine (zyrTEC) 10 MG tablet, Take 1 tablet by mouth Daily As Needed for Allergies., Disp: , Rfl:     Cholecalciferol (VITAMIN D) 1000 units tablet, Take 0.5 tablets by mouth Daily., Disp: , Rfl:     clopidogrel (PLAVIX) 75 MG tablet, Take 1 tablet by mouth Daily. Can restart 7 days post surgery., Disp: , Rfl:     Coenzyme Q10 (CO Q 10 PO), Take 1 capsule by mouth Daily., Disp: , Rfl:     docusate sodium (Colace) 100 MG capsule, Take 1 capsule by mouth 2 (Two) Times a Day.,  Disp: 10 capsule, Rfl: 0    ferrous sulfate 325 (65 FE) MG tablet, TAKE 1 TABLET ORALLY ONCE PER DAY FOR 30 DAYS, Disp: , Rfl:     Fiasp 100 UNIT/ML solution, Inject 70 Units as directed Daily. For using insulin pump. Max daily dose is 100 units/day., Disp: 90 mL, Rfl: 3    furosemide (LASIX) 40 MG tablet, Take 1 tablet by mouth Daily., Disp: 90 tablet, Rfl: 3    Insulin Aspart (novoLOG) 100 UNIT/ML injection, For using insulin pump.  Max daily dose is 100 units/day., Disp: 90 mL, Rfl: 3    Insulin Aspart (novoLOG) 100 UNIT/ML injection, Use as directed in insulin pump.  Max daily dose is 70 units/day., Disp: 30 mL, Rfl: 3    Insulin Disposable Pump (Omnipod 5 G6 Pods, Gen 5,) misc, USE DAILY, Disp: 30 each, Rfl: 3    Krill Oil 1000 MG capsule, Take 1 capsule by mouth Daily., Disp: , Rfl:     lisinopril (PRINIVIL,ZESTRIL) 40 MG tablet, 1 tablet p.o. daily, Disp: 90 tablet, Rfl: 3    metFORMIN (GLUCOPHAGE) 1000 MG tablet, Take 1 tablet by mouth 2 (Two) Times a Day With Meals., Disp: 180 tablet, Rfl: 3    methocarbamol (ROBAXIN) 750 MG tablet, Take 1 tablet by mouth 3 (Three) Times a Day As Needed for Muscle Spasms., Disp: 60 tablet, Rfl: 0    metoprolol succinate XL (TOPROL-XL) 100 MG 24 hr tablet, , Disp: , Rfl:     metoprolol succinate XL (TOPROL-XL) 25 MG 24 hr tablet, Take 2 tablets by mouth every morning and take 1 tablet by mouth every evening (Patient taking differently: Take 4 tablets by mouth Daily. Take 1 tablet daily), Disp: 270 tablet, Rfl: 3    Multiple Vitamins-Minerals (MULTI VITAMIN/MINERALS) tablet, Take 1 tablet by mouth Daily., Disp: , Rfl:     naloxone (NARCAN) 4 MG/0.1ML nasal spray, Call 911. Don't prime. [place] in 1 nostril for overdose. Repeat in 2-3 minutes in other nostril if no or minimal breathing/responsiveness., Disp: 2 each, Rfl: 0    Stahist AD 25-60 MG tablet, TAKE 1 TABLET ORALLY TWICE PER DAY FOR 10 DAYS, Disp: , Rfl:     vitamin C (ASCORBIC ACID) 500 MG tablet, Take 1 tablet by  mouth Daily., Disp: , Rfl:     vitamin E 400 UNIT capsule, Take 1 capsule by mouth Daily., Disp: , Rfl:     warfarin (COUMADIN) 5 MG tablet, 1 tablet daily, Disp: , Rfl:     Allergies   Allergen Reactions    Vancomycin Rash       Family History   Problem Relation Age of Onset    Heart disease Mother          of heart failure age of 84    Hypertension Mother     Hyperlipidemia Mother     Arthritis Mother     Anemia Mother     Obesity Mother     Heart disease Father          of heart attack age of 68    Hypertension Father     Hyperlipidemia Father     Alcohol abuse Father     Anemia Father     Cancer Sister     Heart disease Sister         Heart problems    Hypertension Sister     Hyperlipidemia Sister     Diabetes Sister     Anemia Sister     Obesity Sister     Heart disease Brother         Heart problems two different times with heart problems    Hypertension Brother     Hyperlipidemia Brother     Alcohol abuse Brother     Diabetes Brother     Anemia Brother     Obesity Brother     Diabetes Maternal Grandmother     Obesity Maternal Grandmother     Stroke Maternal Grandfather     Obesity Maternal Grandfather        Past Surgical History:   Procedure Laterality Date    ABLATION OF DYSRHYTHMIC FOCUS      APPENDECTOMY      BACK SURGERY  2023    CARDIAC ABLATION  12/2018    x 1     CARDIAC CATHETERIZATION Left 2022    Procedure: Left Heart Cath and coronary angiogram;  Surgeon: Karolina Saldaña MD;  Location: [facility] CATH INVASIVE LOCATION;  Service: Cardiovascular;  Laterality: Left;    CARDIAC CATHETERIZATION  2022    Procedure: Saphenous Vein Graft;  Surgeon: Karolina Saldaña MD;  Location: [facility] CATH INVASIVE LOCATION;  Service: Cardiovascular;;    CARDIOVERSION  2018    Cardioversion 2018; x3  2018    CORONARY ANGIOPLASTY Left 2015    Distal left main and in the mid circumflex artery     CORONARY ANGIOPLASTY Right 2015    Right coronary artery     CORONARY ARTERY  BYPASS GRAFT  1998    CORONARY STENT PLACEMENT      EYE SURGERY      FOOT SURGERY      HERNIA REPAIR  2005    INSERT / REPLACE / REMOVE PACEMAKER      LUMBAR LAMINECTOMY Right 2023    Procedure: LUMBAR FOUR TO SACRAL ONE RIGHT SIDED LAMINOTOMY, MEDIAL  FACETECTOMY, FORAMINOTOMIES;  Surgeon: Josh Sotll MD;  Location: [facility] MAIN OR;  Service: Neurosurgery;  Laterality: Right;    OTHER SURGICAL HISTORY  2019    Stent     PACEMAKER IMPLANTATION  2019    Dr. Saldaña     REPLACEMENT TOTAL KNEE BILATERAL      SKIN BIOPSY         Past Medical History:   Diagnosis Date    Anemia Runs in family    Arthritis     Asymptomatic stenosis of right carotid artery     Atrial fibrillation     Bradycardia     Brain concussion     Buttock pain     rt cheek( lower)    Cataract     Clotting disorder Take blood thinner    Congenital heart disease     Coronary artery disease     Deep vein thrombosis     Diabetes mellitus 2011    Diabetes mellitus, type 2     Erectile dysfunction 2005    Heart attack     x2   with stent placement         Hyperlipidemia     Hypertension     Hypoglycemia     Leg pain     aches    Low back pain     Lumbar radiculopathy, chronic 10/20/2023    Myocardial infarction     Nonintractable headache 2022    Obesity 45 years    Other cervical disc degeneration at C6-C7 level 2017    Pacemaker     Pulmonary arterial hypertension 1967    Scoliosis 20 years    Sleep apnea     Visual impairment Last 15 years       Family History   Problem Relation Age of Onset    Heart disease Mother          of heart failure age of 84    Hypertension Mother     Hyperlipidemia Mother     Arthritis Mother     Anemia Mother     Obesity Mother     Heart disease Father          of heart attack age of 68    Hypertension Father     Hyperlipidemia Father     Alcohol abuse Father     Anemia Father     Cancer Sister     Heart disease Sister         Heart problems    Hypertension  "Sister     Hyperlipidemia Sister     Diabetes Sister     Anemia Sister     Obesity Sister     Heart disease Brother         Heart problems two different times with heart problems    Hypertension Brother     Hyperlipidemia Brother     Alcohol abuse Brother     Diabetes Brother     Anemia Brother     Obesity Brother     Diabetes Maternal Grandmother     Obesity Maternal Grandmother     Stroke Maternal Grandfather     Obesity Maternal Grandfather        Social History     Socioeconomic History    Marital status:      Spouse name: Maude    Number of children: 3    Years of education: 14   Tobacco Use    Smoking status: Former     Current packs/day: 0.00     Average packs/day: 2.0 packs/day for 8.3 years (16.7 ttl pk-yrs)     Types: Cigarettes     Start date: 1965     Quit date: 12/3/1973     Years since quittin.0     Passive exposure: Past    Smokeless tobacco: Never    Tobacco comments:     Haven’t smoked in almost 50 years   Vaping Use    Vaping status: Never Used   Substance and Sexual Activity    Alcohol use: Not Currently     Comment: Maybe 1 drink this year    Drug use: No    Sexual activity: Not Currently     Partners: Female     Comment: 74 years old past time           ECG 12 Lead    Date/Time: 2024 10:14 AM  Performed by: Karolina Saldaña MD    Authorized by: Karolina Saldaña MD  Comparison: compared with previous ECG   Similar to previous ECG  Comparison to previous ECG: Atrial sensed ventricular paced rhythm 66/min premature ventricular contraction nonspecific ST-T wave abnormalities no significant change from previous EKG.        Objective:       Physical Exam    /73 (BP Location: Left arm, Patient Position: Sitting, Cuff Size: Large Adult)   Pulse 66   Ht 175.3 cm (69\")   Wt 116 kg (255 lb)   SpO2 99%   BMI 37.66 kg/m²   The patient is alert, oriented and in no distress.    Vital signs as noted above.    Head and neck revealed no carotid bruits or jugular venous " distension.  No thyromegaly or lymphadenopathy is present.    Lungs clear.  No wheezing.  Breath sounds are normal bilaterally.    Heart normal first and second heart sounds.  No murmur..  No pericardial rub is present.  No gallop is present.    Abdomen soft and nontender.  No organomegaly is present.    Extremities revealed good peripheral pulses without any pedal edema.    Skin warm and dry.  Pacemaker site looks normal.    Musculoskeletal system is grossly normal.    CNS grossly normal.    Reviewed and updated.

## 2024-12-12 ENCOUNTER — OFFICE VISIT (OUTPATIENT)
Dept: CARDIOLOGY | Facility: CLINIC | Age: 79
End: 2024-12-12
Payer: MEDICARE

## 2024-12-12 ENCOUNTER — ANTICOAGULATION VISIT (OUTPATIENT)
Dept: CARDIOLOGY | Facility: CLINIC | Age: 79
End: 2024-12-12

## 2024-12-12 ENCOUNTER — CLINICAL SUPPORT NO REQUIREMENTS (OUTPATIENT)
Dept: CARDIOLOGY | Facility: CLINIC | Age: 79
End: 2024-12-12
Payer: MEDICARE

## 2024-12-12 VITALS
HEIGHT: 69 IN | HEART RATE: 66 BPM | WEIGHT: 255 LBS | SYSTOLIC BLOOD PRESSURE: 140 MMHG | OXYGEN SATURATION: 99 % | BODY MASS INDEX: 37.77 KG/M2 | DIASTOLIC BLOOD PRESSURE: 73 MMHG

## 2024-12-12 DIAGNOSIS — I48.91 ATRIAL FIBRILLATION, UNSPECIFIED TYPE: ICD-10-CM

## 2024-12-12 DIAGNOSIS — Z95.5 STATUS POST CORONARY ARTERY STENT PLACEMENT: ICD-10-CM

## 2024-12-12 DIAGNOSIS — E78.2 MIXED HYPERLIPIDEMIA: ICD-10-CM

## 2024-12-12 DIAGNOSIS — Z95.1 HX OF CABG: ICD-10-CM

## 2024-12-12 DIAGNOSIS — I49.5 TACHYCARDIA-BRADYCARDIA: ICD-10-CM

## 2024-12-12 DIAGNOSIS — I10 ESSENTIAL HYPERTENSION: ICD-10-CM

## 2024-12-12 DIAGNOSIS — I48.0 PAROXYSMAL ATRIAL FIBRILLATION: Primary | ICD-10-CM

## 2024-12-12 DIAGNOSIS — I25.10 CHRONIC CORONARY ARTERY DISEASE: ICD-10-CM

## 2024-12-12 DIAGNOSIS — Z79.01 LONG TERM (CURRENT) USE OF ANTICOAGULANTS: ICD-10-CM

## 2024-12-12 DIAGNOSIS — Z95.0 PRESENCE OF CARDIAC PACEMAKER: Primary | ICD-10-CM

## 2024-12-12 DIAGNOSIS — I48.11 LONGSTANDING PERSISTENT ATRIAL FIBRILLATION: ICD-10-CM

## 2024-12-12 DIAGNOSIS — Z95.0 PRESENCE OF CARDIAC PACEMAKER: ICD-10-CM

## 2024-12-12 LAB — INR PPP: 2.5

## 2024-12-16 ENCOUNTER — TELEPHONE (OUTPATIENT)
Dept: ENDOCRINOLOGY | Facility: CLINIC | Age: 79
End: 2024-12-16
Payer: MEDICARE

## 2024-12-23 ENCOUNTER — OFFICE VISIT (OUTPATIENT)
Dept: ENDOCRINOLOGY | Facility: CLINIC | Age: 79
End: 2024-12-23
Payer: MEDICARE

## 2024-12-23 DIAGNOSIS — E11.65 TYPE 2 DIABETES MELLITUS WITH HYPERGLYCEMIA, WITH LONG-TERM CURRENT USE OF INSULIN: Primary | ICD-10-CM

## 2024-12-23 DIAGNOSIS — Z79.4 TYPE 2 DIABETES MELLITUS WITH HYPERGLYCEMIA, WITH LONG-TERM CURRENT USE OF INSULIN: Primary | ICD-10-CM

## 2024-12-23 PROCEDURE — G0108 DIAB MANAGE TRN  PER INDIV: HCPCS

## 2024-12-23 NOTE — PROGRESS NOTES
Pt here today for 30 min from 10:15-10:45 AM for Dexcom help. Pt wears Omnipod 5 pump and has been using Dexcom G6. Received G7 and pods compatible with G6 and G7. Pt did not bring insulin to start new pod. Explained to pt that you have to start new pod when transitioning from G6 to G7. Pt also notes that he has about a month supply of Dexcom G6 left that he would like to use prior to switching to Dexcom G7. Pt plans to make another appointment if needed when ready to transition from G6 to G7.

## 2024-12-26 ENCOUNTER — ANTICOAGULATION VISIT (OUTPATIENT)
Dept: CARDIOLOGY | Facility: CLINIC | Age: 79
End: 2024-12-26
Payer: MEDICARE

## 2024-12-26 DIAGNOSIS — I48.0 PAROXYSMAL ATRIAL FIBRILLATION: Primary | ICD-10-CM

## 2024-12-26 DIAGNOSIS — Z79.01 LONG TERM (CURRENT) USE OF ANTICOAGULANTS: ICD-10-CM

## 2024-12-26 LAB — INR PPP: 3.6

## 2025-01-10 ENCOUNTER — ANTICOAGULATION VISIT (OUTPATIENT)
Dept: CARDIOLOGY | Facility: CLINIC | Age: 80
End: 2025-01-10
Payer: MEDICARE

## 2025-01-10 DIAGNOSIS — I48.0 PAROXYSMAL ATRIAL FIBRILLATION: Primary | ICD-10-CM

## 2025-01-10 DIAGNOSIS — Z79.01 LONG TERM (CURRENT) USE OF ANTICOAGULANTS: ICD-10-CM

## 2025-01-10 LAB — INR PPP: 2.7

## 2025-01-14 ENCOUNTER — LAB (OUTPATIENT)
Dept: ENDOCRINOLOGY | Facility: CLINIC | Age: 80
End: 2025-01-14
Payer: MEDICARE

## 2025-01-14 DIAGNOSIS — E11.65 TYPE 2 DIABETES MELLITUS WITH HYPERGLYCEMIA, WITH LONG-TERM CURRENT USE OF INSULIN: ICD-10-CM

## 2025-01-14 DIAGNOSIS — Z79.4 TYPE 2 DIABETES MELLITUS WITH HYPERGLYCEMIA, WITH LONG-TERM CURRENT USE OF INSULIN: ICD-10-CM

## 2025-01-15 LAB
ALBUMIN SERPL-MCNC: 4.4 G/DL (ref 3.8–4.8)
ALBUMIN/CREAT UR: 81 MG/G CREAT (ref 0–29)
ALP SERPL-CCNC: 76 IU/L (ref 44–121)
ALT SERPL-CCNC: 32 IU/L (ref 0–44)
AST SERPL-CCNC: 27 IU/L (ref 0–40)
BILIRUB SERPL-MCNC: 0.6 MG/DL (ref 0–1.2)
BUN SERPL-MCNC: 26 MG/DL (ref 8–27)
BUN/CREAT SERPL: 23 (ref 10–24)
CALCIUM SERPL-MCNC: 9.6 MG/DL (ref 8.6–10.2)
CHLORIDE SERPL-SCNC: 101 MMOL/L (ref 96–106)
CHOLEST SERPL-MCNC: 115 MG/DL (ref 100–199)
CO2 SERPL-SCNC: 27 MMOL/L (ref 20–29)
CREAT SERPL-MCNC: 1.11 MG/DL (ref 0.76–1.27)
CREAT UR-MCNC: 37.4 MG/DL
EGFRCR SERPLBLD CKD-EPI 2021: 68 ML/MIN/1.73
GLOBULIN SER CALC-MCNC: 2.1 G/DL (ref 1.5–4.5)
GLUCOSE SERPL-MCNC: 68 MG/DL (ref 70–99)
HDLC SERPL-MCNC: 38 MG/DL
LDLC SERPL CALC-MCNC: 61 MG/DL (ref 0–99)
MICROALBUMIN UR-MCNC: 30.2 UG/ML
POTASSIUM SERPL-SCNC: 4.9 MMOL/L (ref 3.5–5.2)
PROT SERPL-MCNC: 6.5 G/DL (ref 6–8.5)
SODIUM SERPL-SCNC: 139 MMOL/L (ref 134–144)
TRIGL SERPL-MCNC: 77 MG/DL (ref 0–149)
VLDLC SERPL CALC-MCNC: 16 MG/DL (ref 5–40)

## 2025-01-21 ENCOUNTER — OFFICE VISIT (OUTPATIENT)
Dept: ENDOCRINOLOGY | Facility: CLINIC | Age: 80
End: 2025-01-21
Payer: MEDICARE

## 2025-01-21 VITALS
BODY MASS INDEX: 37.77 KG/M2 | HEART RATE: 70 BPM | DIASTOLIC BLOOD PRESSURE: 75 MMHG | HEIGHT: 69 IN | SYSTOLIC BLOOD PRESSURE: 138 MMHG | OXYGEN SATURATION: 97 % | WEIGHT: 255 LBS

## 2025-01-21 DIAGNOSIS — E78.2 MIXED HYPERLIPIDEMIA: ICD-10-CM

## 2025-01-21 DIAGNOSIS — Z79.4 TYPE 2 DIABETES MELLITUS WITH HYPERGLYCEMIA, WITH LONG-TERM CURRENT USE OF INSULIN: ICD-10-CM

## 2025-01-21 DIAGNOSIS — I10 PRIMARY HYPERTENSION: ICD-10-CM

## 2025-01-21 DIAGNOSIS — Z79.4 TYPE 2 DIABETES MELLITUS WITH HYPERGLYCEMIA, WITH LONG-TERM CURRENT USE OF INSULIN: Primary | ICD-10-CM

## 2025-01-21 DIAGNOSIS — E11.65 TYPE 2 DIABETES MELLITUS WITH HYPERGLYCEMIA, WITH LONG-TERM CURRENT USE OF INSULIN: ICD-10-CM

## 2025-01-21 DIAGNOSIS — E11.65 TYPE 2 DIABETES MELLITUS WITH HYPERGLYCEMIA, WITH LONG-TERM CURRENT USE OF INSULIN: Primary | ICD-10-CM

## 2025-01-21 PROCEDURE — 3078F DIAST BP <80 MM HG: CPT | Performed by: INTERNAL MEDICINE

## 2025-01-21 PROCEDURE — 1159F MED LIST DOCD IN RCRD: CPT | Performed by: INTERNAL MEDICINE

## 2025-01-21 PROCEDURE — 3075F SYST BP GE 130 - 139MM HG: CPT | Performed by: INTERNAL MEDICINE

## 2025-01-21 PROCEDURE — 1160F RVW MEDS BY RX/DR IN RCRD: CPT | Performed by: INTERNAL MEDICINE

## 2025-01-21 PROCEDURE — 99214 OFFICE O/P EST MOD 30 MIN: CPT | Performed by: INTERNAL MEDICINE

## 2025-01-21 PROCEDURE — G2211 COMPLEX E/M VISIT ADD ON: HCPCS | Performed by: INTERNAL MEDICINE

## 2025-01-21 RX ORDER — INSULIN ASPART 100 [IU]/ML
INJECTION, SOLUTION INTRAVENOUS; SUBCUTANEOUS
Qty: 90 ML | Refills: 3 | Status: SHIPPED | OUTPATIENT
Start: 2025-01-21

## 2025-01-21 RX ORDER — ATORVASTATIN CALCIUM 40 MG/1
TABLET, FILM COATED ORAL
Qty: 90 TABLET | Refills: 3 | Status: SHIPPED | OUTPATIENT
Start: 2025-01-21

## 2025-01-21 RX ORDER — AMLODIPINE BESYLATE 5 MG/1
5 TABLET ORAL DAILY
Qty: 90 TABLET | Refills: 3 | Status: SHIPPED | OUTPATIENT
Start: 2025-01-21

## 2025-01-21 RX ORDER — LISINOPRIL 40 MG/1
TABLET ORAL
Qty: 90 TABLET | Refills: 3 | Status: SHIPPED | OUTPATIENT
Start: 2025-01-21

## 2025-01-21 NOTE — PROGRESS NOTES
Endocrine Progress Note Outpatient     Patient Care Team:  Clark Gallagher MD as PCP - General (Internal Medicine)  Karolina Saldaña MD as Consulting Physician (Cardiology)    Chief Complaint: Follow-up type 2 diabetes    HPI: This is a 79-year-old male history of type 2 diabetes, hypertension, hyperlipidemia, CAD and obesity is here for follow-up.    For type 2 diabetes: He is now on Omni pod 5 insulin pump along with Dexcom G-7 monitoring system.  He has been using insulin pump since February 2022.  He is using Fiasp insulin the pump.      His current pump settings are as follows basal rate midnight is 1.0 units/h.  At 3 PM is 0.90 units/h.  His insulin carb ratio is 1 unit for each 4 g of carbohydrate.  Correction scale is 1 unit for each 30 mg blood sugar with a target blood sugar of 140 and active insulin is 4 hours.  Also on metformin 1000 mg twice a day.    Hypertension: Fair control    Hyperlipidemia: Currently on atorvastatin.    Past Medical History:   Diagnosis Date    Anemia Runs in family    Arthritis     Asymptomatic stenosis of right carotid artery     Atrial fibrillation     Bradycardia     Brain concussion     Buttock pain     rt cheek( lower)    Cataract     Clotting disorder Take blood thinner    Congenital heart disease     Coronary artery disease     Deep vein thrombosis     Diabetes mellitus 08/25/2011    Diabetes mellitus, type 2     Erectile dysfunction 2005    Heart attack     x2   with stent placement     2015/2018    Hyperlipidemia     Hypertension     Hypoglycemia     Leg pain     aches    Low back pain 2014    Lumbar radiculopathy, chronic 10/20/2023    Myocardial infarction     Nonintractable headache 02/24/2022    Obesity 45 years    Other cervical disc degeneration at C6-C7 level 08/29/2017    Pacemaker     Pulmonary arterial hypertension 1967    Scoliosis 20 years    Sleep apnea     Visual impairment Last 15 years       Social History     Socioeconomic History    Marital  status:      Spouse name: Maude    Number of children: 3    Years of education: 14   Tobacco Use    Smoking status: Former     Current packs/day: 0.00     Average packs/day: 2.0 packs/day for 8.3 years (16.7 ttl pk-yrs)     Types: Cigarettes     Start date: 1965     Quit date: 12/3/1973     Years since quittin.1     Passive exposure: Past    Smokeless tobacco: Never    Tobacco comments:     Haven’t smoked in almost 50 years   Vaping Use    Vaping status: Never Used   Substance and Sexual Activity    Alcohol use: Not Currently     Comment: Maybe 1 drink this year    Drug use: No    Sexual activity: Not Currently     Partners: Female     Comment: 74 years old past time       Family History   Problem Relation Age of Onset    Heart disease Mother          of heart failure age of 84    Hypertension Mother     Hyperlipidemia Mother     Arthritis Mother     Anemia Mother     Obesity Mother     Heart disease Father          of heart attack age of 68    Hypertension Father     Hyperlipidemia Father     Alcohol abuse Father     Anemia Father     Cancer Sister     Heart disease Sister         Heart problems    Hypertension Sister     Hyperlipidemia Sister     Diabetes Sister     Anemia Sister     Obesity Sister     Heart disease Brother         Heart problems two different times with heart problems    Hypertension Brother     Hyperlipidemia Brother     Alcohol abuse Brother     Diabetes Brother     Anemia Brother     Obesity Brother     Diabetes Maternal Grandmother     Obesity Maternal Grandmother     Stroke Maternal Grandfather     Obesity Maternal Grandfather        Allergies   Allergen Reactions    Vancomycin Rash       ROS:   Constitutional:  Denies fatigue, tiredness.    Eyes:  Denies change in visual acuity   HENT:  Denies nasal congestion or sore throat   Respiratory: denies cough, shortness of breath.   Cardiovascular:  denies chest pain, edema   GI:  Denies abdominal pain, nausea, vomiting.    Musculoskeletal:  Denies back pain or joint pain   Integument:  Denies dry skin and rash   Neurologic:  Denies headache, focal weakness or sensory changes   Endocrine:  Denies polyuria or polydipsia   Psychiatric:  Denies depression or anxiety      Vitals:    01/21/25 1506   BP: 138/75   Pulse: 70   SpO2: 97%     Body mass index is 37.66 kg/m².     Physical Exam:  GEN: NAD, conversant  EYES: EOMI,   NECK: no thyromegaly  CV: RRR,  LUNG: CTA  PSYCH: AOX3, appropriate mood, affect normal      Results Review:     I reviewed the patient's new clinical results.    Lab Results   Component Value Date    HGBA1C 6.75 (H) 07/02/2024    HGBA1C 6.60 (H) 09/11/2023    HGBA1C 6.90 (H) 06/06/2023      Lab Results   Component Value Date    GLUCOSE 68 (L) 01/14/2025    BUN 26 01/14/2025    CREATININE 1.11 01/14/2025    EGFRIFNONA 74 02/24/2022    BCR 23 01/14/2025    K 4.9 01/14/2025    CO2 27 01/14/2025    CALCIUM 9.6 01/14/2025    PROTENTOTREF 6.5 01/14/2025    ALBUMIN 4.4 01/14/2025    LABIL2 1.6 10/15/2018    AST 27 01/14/2025    ALT 32 01/14/2025    CHOL 111 07/02/2024    TRIG 77 01/14/2025    LDL 61 01/14/2025    HDL 38 (L) 01/14/2025     Lab Results   Component Value Date    TSH 1.640 11/25/2020     Reviewed his glucose data and the dates covered was between January 8, 2025 to January 21, 2025.  Average blood sugar is 171.  He is spending about 65% in the range, 2% below range and 33% above range.        Medication Review: Reviewed.       Current Outpatient Medications:     Alcohol Swabs 70 % pads, USE WHEN TESTING BLOOD GLUCOSE FOUR TIMES DAILY, Disp: , Rfl:     amLODIPine (NORVASC) 5 MG tablet, Take 1 tablet by mouth Daily., Disp: 90 tablet, Rfl: 3    atorvastatin (LIPITOR) 40 MG tablet, 1 tablet p.o. daily., Disp: 90 tablet, Rfl: 3    Cholecalciferol (VITAMIN D) 1000 units tablet, Take 0.5 tablets by mouth Daily., Disp: , Rfl:     clopidogrel (PLAVIX) 75 MG tablet, Take 1 tablet by mouth Daily. Can restart 7 days post  surgery., Disp: , Rfl:     Coenzyme Q10 (CO Q 10 PO), Take 1 capsule by mouth Daily., Disp: , Rfl:     Fiasp 100 UNIT/ML solution, Inject 70 Units as directed Daily. For using insulin pump. Max daily dose is 100 units/day., Disp: 90 mL, Rfl: 3    furosemide (LASIX) 40 MG tablet, Take 1 tablet by mouth Daily., Disp: 90 tablet, Rfl: 3    Insulin Disposable Pump (Omnipod 5 G6 Pods, Gen 5,) misc, USE DAILY, Disp: 30 each, Rfl: 3    Krill Oil 1000 MG capsule, Take 1 capsule by mouth Daily., Disp: , Rfl:     lisinopril (PRINIVIL,ZESTRIL) 40 MG tablet, 1 tablet p.o. daily, Disp: 90 tablet, Rfl: 3    metFORMIN (GLUCOPHAGE) 1000 MG tablet, Take 1 tablet by mouth 2 (Two) Times a Day With Meals., Disp: 180 tablet, Rfl: 3    methocarbamol (ROBAXIN) 750 MG tablet, Take 1 tablet by mouth 3 (Three) Times a Day As Needed for Muscle Spasms., Disp: 60 tablet, Rfl: 0    metoprolol succinate XL (TOPROL-XL) 100 MG 24 hr tablet, , Disp: , Rfl:     Multiple Vitamins-Minerals (MULTI VITAMIN/MINERALS) tablet, Take 1 tablet by mouth Daily., Disp: , Rfl:     naloxone (NARCAN) 4 MG/0.1ML nasal spray, Call 911. Don't prime. [place] in 1 nostril for overdose. Repeat in 2-3 minutes in other nostril if no or minimal breathing/responsiveness., Disp: 2 each, Rfl: 0    vitamin C (ASCORBIC ACID) 500 MG tablet, Take 1 tablet by mouth Daily., Disp: , Rfl:     warfarin (COUMADIN) 5 MG tablet, 1 tablet daily, Disp: , Rfl:     Insulin Aspart (novoLOG) 100 UNIT/ML injection, For using insulin pump.  Max daily dose is 100 units/day. (Patient not taking: Reported on 1/21/2025), Disp: 90 mL, Rfl: 3      Assessment and plan:  Diabetes mellitus type 2 with Hyperglycemia: Overall doing well, still have some low blood sugars.  I made the following changes to his basal rate.  At midnight will be 1.0 units/h.  At 3 PM will be 0.850 units/h.  Will change to aspart insulin due to his insurance requirement.  Rest of the pump settings will stay as it is.  Advised to  always keep glucose source in case of low blood sugar.      Hypertension: Well-controlled, continue current medications.    Hyperlipidemia: Currently on atorvastatin 40 mg p.o. daily.      Assessment and plan from July 9, 2024 reviewed and updated.        John Tolentino MD FACE.

## 2025-01-21 NOTE — PATIENT INSTRUCTIONS
Please watch for low blood sugars  Always keep glucose source in case of low blood sugar  Labs before follow-up.

## 2025-01-27 ENCOUNTER — ANTICOAGULATION VISIT (OUTPATIENT)
Dept: CARDIOLOGY | Facility: CLINIC | Age: 80
End: 2025-01-27
Payer: MEDICARE

## 2025-01-27 DIAGNOSIS — Z79.01 LONG TERM (CURRENT) USE OF ANTICOAGULANTS: Primary | ICD-10-CM

## 2025-01-27 LAB — INR PPP: 2.1

## 2025-02-14 ENCOUNTER — ANTICOAGULATION VISIT (OUTPATIENT)
Dept: CARDIOLOGY | Facility: CLINIC | Age: 80
End: 2025-02-14
Payer: MEDICARE

## 2025-02-14 DIAGNOSIS — I48.0 PAROXYSMAL ATRIAL FIBRILLATION: Primary | ICD-10-CM

## 2025-02-14 DIAGNOSIS — Z79.01 LONG TERM (CURRENT) USE OF ANTICOAGULANTS: ICD-10-CM

## 2025-02-14 LAB — INR PPP: 2.6

## 2025-02-18 ENCOUNTER — DOCUMENTATION (OUTPATIENT)
Dept: PHYSICAL THERAPY | Facility: CLINIC | Age: 80
End: 2025-02-18
Payer: MEDICARE

## 2025-02-18 NOTE — PROGRESS NOTES
Discharge Summary  Discharge Summary from Physical Therapy Report      Date of Initial PT visit: 12/20/2023  Number of Visits Seen: 24     Discharge Status of Patient:   Pt is s/p L4-S1 R Laminotomy, medial facetectomy, foraminotomies on 12/4/2023. Pt demonstrates improved overall function with min limitation from lumbar spine or R LE pain. Able to ambulate up and down steps, ambulate longer distances and perform partial squatting without increased pain. Is limited with overall function from SOA, however min from lumbar spine. Pt is receiving further testing to determine cause of SOA. Pt has met all goals for lumbar spine and will be discharged from PT at this time. Pt encouraged to call with any questions or concerns.     Goals: All Met    Discharge Plan: Continue with current home exercise program as instructed      Date of Discharge: 2/18/2025      Yusra Maurer, PT, DPT, OCS     [place] License # [id number]     [place] License # [id number]

## 2025-02-28 ENCOUNTER — ANTICOAGULATION VISIT (OUTPATIENT)
Dept: CARDIOLOGY | Facility: CLINIC | Age: 80
End: 2025-02-28
Payer: MEDICARE

## 2025-02-28 DIAGNOSIS — I48.0 PAROXYSMAL ATRIAL FIBRILLATION: Primary | ICD-10-CM

## 2025-02-28 DIAGNOSIS — Z79.01 LONG TERM (CURRENT) USE OF ANTICOAGULANTS: ICD-10-CM

## 2025-02-28 LAB — INR PPP: 2.6

## 2025-03-14 ENCOUNTER — ANTICOAGULATION VISIT (OUTPATIENT)
Dept: CARDIOLOGY | Facility: CLINIC | Age: 80
End: 2025-03-14
Payer: MEDICARE

## 2025-03-14 DIAGNOSIS — Z79.01 LONG TERM (CURRENT) USE OF ANTICOAGULANTS: ICD-10-CM

## 2025-03-14 DIAGNOSIS — I48.0 PAROXYSMAL ATRIAL FIBRILLATION: Primary | ICD-10-CM

## 2025-03-14 LAB — INR PPP: 2.6

## 2025-03-28 ENCOUNTER — ANTICOAGULATION VISIT (OUTPATIENT)
Dept: CARDIOLOGY | Facility: CLINIC | Age: 80
End: 2025-03-28
Payer: MEDICARE

## 2025-03-28 DIAGNOSIS — I48.0 PAROXYSMAL ATRIAL FIBRILLATION: Primary | ICD-10-CM

## 2025-03-28 DIAGNOSIS — Z79.01 LONG TERM (CURRENT) USE OF ANTICOAGULANTS: ICD-10-CM

## 2025-03-28 LAB — INR PPP: 2.2

## 2025-04-15 ENCOUNTER — LAB (OUTPATIENT)
Dept: ENDOCRINOLOGY | Facility: CLINIC | Age: 80
End: 2025-04-15
Payer: MEDICARE

## 2025-04-15 DIAGNOSIS — E11.65 TYPE 2 DIABETES MELLITUS WITH HYPERGLYCEMIA, WITH LONG-TERM CURRENT USE OF INSULIN: ICD-10-CM

## 2025-04-15 DIAGNOSIS — Z79.4 TYPE 2 DIABETES MELLITUS WITH HYPERGLYCEMIA, WITH LONG-TERM CURRENT USE OF INSULIN: ICD-10-CM

## 2025-04-16 LAB
ALBUMIN SERPL-MCNC: 4.2 G/DL (ref 3.8–4.8)
ALBUMIN/CREAT UR: 32 MG/G CREAT (ref 0–29)
ALP SERPL-CCNC: 71 IU/L (ref 44–121)
ALT SERPL-CCNC: 28 IU/L (ref 0–44)
AST SERPL-CCNC: 25 IU/L (ref 0–40)
BILIRUB SERPL-MCNC: 0.4 MG/DL (ref 0–1.2)
BUN SERPL-MCNC: 35 MG/DL (ref 8–27)
BUN/CREAT SERPL: 29 (ref 10–24)
CALCIUM SERPL-MCNC: 9.3 MG/DL (ref 8.6–10.2)
CHLORIDE SERPL-SCNC: 101 MMOL/L (ref 96–106)
CHOLEST SERPL-MCNC: 114 MG/DL (ref 100–199)
CO2 SERPL-SCNC: 24 MMOL/L (ref 20–29)
CREAT SERPL-MCNC: 1.21 MG/DL (ref 0.76–1.27)
CREAT UR-MCNC: 55.9 MG/DL
EGFRCR SERPLBLD CKD-EPI 2021: 62 ML/MIN/1.73
GLOBULIN SER CALC-MCNC: 2.1 G/DL (ref 1.5–4.5)
GLUCOSE SERPL-MCNC: 121 MG/DL (ref 70–99)
HBA1C MFR BLD: 6.8 % (ref 4.8–5.6)
HDLC SERPL-MCNC: 36 MG/DL
LDLC SERPL CALC-MCNC: 63 MG/DL (ref 0–99)
MICROALBUMIN UR-MCNC: 17.7 UG/ML
POTASSIUM SERPL-SCNC: 4.6 MMOL/L (ref 3.5–5.2)
PROT SERPL-MCNC: 6.3 G/DL (ref 6–8.5)
SODIUM SERPL-SCNC: 140 MMOL/L (ref 134–144)
TRIGL SERPL-MCNC: 74 MG/DL (ref 0–149)
VLDLC SERPL CALC-MCNC: 15 MG/DL (ref 5–40)

## 2025-04-18 ENCOUNTER — ANTICOAGULATION VISIT (OUTPATIENT)
Dept: CARDIOLOGY | Facility: CLINIC | Age: 80
End: 2025-04-18
Payer: MEDICARE

## 2025-04-18 DIAGNOSIS — Z79.01 LONG TERM (CURRENT) USE OF ANTICOAGULANTS: ICD-10-CM

## 2025-04-18 DIAGNOSIS — I48.0 PAROXYSMAL ATRIAL FIBRILLATION: Primary | ICD-10-CM

## 2025-04-18 LAB — INR PPP: 2.9

## 2025-04-22 ENCOUNTER — OFFICE VISIT (OUTPATIENT)
Dept: ENDOCRINOLOGY | Facility: CLINIC | Age: 80
End: 2025-04-22
Payer: MEDICARE

## 2025-04-22 VITALS
WEIGHT: 251 LBS | SYSTOLIC BLOOD PRESSURE: 135 MMHG | BODY MASS INDEX: 37.18 KG/M2 | DIASTOLIC BLOOD PRESSURE: 75 MMHG | HEART RATE: 70 BPM | OXYGEN SATURATION: 97 % | HEIGHT: 69 IN

## 2025-04-22 DIAGNOSIS — I10 PRIMARY HYPERTENSION: ICD-10-CM

## 2025-04-22 DIAGNOSIS — E11.65 TYPE 2 DIABETES MELLITUS WITH HYPERGLYCEMIA, WITH LONG-TERM CURRENT USE OF INSULIN: Primary | ICD-10-CM

## 2025-04-22 DIAGNOSIS — E78.2 MIXED HYPERLIPIDEMIA: ICD-10-CM

## 2025-04-22 DIAGNOSIS — Z79.4 TYPE 2 DIABETES MELLITUS WITH HYPERGLYCEMIA, WITH LONG-TERM CURRENT USE OF INSULIN: Primary | ICD-10-CM

## 2025-04-22 PROCEDURE — 1160F RVW MEDS BY RX/DR IN RCRD: CPT | Performed by: INTERNAL MEDICINE

## 2025-04-22 PROCEDURE — 95251 CONT GLUC MNTR ANALYSIS I&R: CPT | Performed by: INTERNAL MEDICINE

## 2025-04-22 PROCEDURE — 3078F DIAST BP <80 MM HG: CPT | Performed by: INTERNAL MEDICINE

## 2025-04-22 PROCEDURE — 1159F MED LIST DOCD IN RCRD: CPT | Performed by: INTERNAL MEDICINE

## 2025-04-22 PROCEDURE — 99214 OFFICE O/P EST MOD 30 MIN: CPT | Performed by: INTERNAL MEDICINE

## 2025-04-22 PROCEDURE — 3075F SYST BP GE 130 - 139MM HG: CPT | Performed by: INTERNAL MEDICINE

## 2025-05-09 ENCOUNTER — ANTICOAGULATION VISIT (OUTPATIENT)
Dept: CARDIOLOGY | Facility: CLINIC | Age: 80
End: 2025-05-09
Payer: MEDICARE

## 2025-05-09 DIAGNOSIS — I48.0 PAROXYSMAL ATRIAL FIBRILLATION: Primary | ICD-10-CM

## 2025-05-09 DIAGNOSIS — Z79.01 LONG TERM (CURRENT) USE OF ANTICOAGULANTS: ICD-10-CM

## 2025-05-09 LAB — INR PPP: 2.2

## 2025-05-09 NOTE — PROGRESS NOTES
Patient's INR- 2.2, within therapeutic range. Continue current dosage and recheck as contracted. JacqueN

## 2025-05-22 ENCOUNTER — ANTICOAGULATION VISIT (OUTPATIENT)
Dept: CARDIOLOGY | Facility: CLINIC | Age: 80
End: 2025-05-22
Payer: MEDICARE

## 2025-05-22 DIAGNOSIS — Z79.01 LONG TERM (CURRENT) USE OF ANTICOAGULANTS: ICD-10-CM

## 2025-05-22 DIAGNOSIS — I48.0 PAROXYSMAL ATRIAL FIBRILLATION: Primary | ICD-10-CM

## 2025-05-22 LAB
INR PPP: 2.1
INR PPP: 2.1

## 2025-06-05 ENCOUNTER — ANTICOAGULATION VISIT (OUTPATIENT)
Dept: CARDIOLOGY | Facility: CLINIC | Age: 80
End: 2025-06-05
Payer: MEDICARE

## 2025-06-05 DIAGNOSIS — Z79.01 LONG TERM (CURRENT) USE OF ANTICOAGULANTS: ICD-10-CM

## 2025-06-05 DIAGNOSIS — I48.0 PAROXYSMAL ATRIAL FIBRILLATION: Primary | ICD-10-CM

## 2025-06-05 LAB — INR PPP: 2.4

## 2025-06-05 NOTE — PROGRESS NOTES
Patient's INR- 2.4, within therapeutic range. Continue current dosage and recheck in 1 month. dvLPN

## 2025-06-20 ENCOUNTER — ANTICOAGULATION VISIT (OUTPATIENT)
Dept: CARDIOLOGY | Facility: CLINIC | Age: 80
End: 2025-06-20
Payer: MEDICARE

## 2025-06-20 DIAGNOSIS — Z79.01 LONG TERM (CURRENT) USE OF ANTICOAGULANTS: ICD-10-CM

## 2025-06-20 DIAGNOSIS — I48.0 PAROXYSMAL ATRIAL FIBRILLATION: Primary | ICD-10-CM

## 2025-06-20 LAB — INR PPP: 2

## 2025-06-20 NOTE — PROGRESS NOTES
Patient's INR- 2.0, within therapeutic range. Continue current dosage and recheck as contracted. JacqueN

## 2025-06-25 ENCOUNTER — CLINICAL SUPPORT NO REQUIREMENTS (OUTPATIENT)
Dept: CARDIOLOGY | Facility: CLINIC | Age: 80
End: 2025-06-25
Payer: MEDICARE

## 2025-06-25 ENCOUNTER — OFFICE VISIT (OUTPATIENT)
Dept: CARDIOLOGY | Facility: CLINIC | Age: 80
End: 2025-06-25
Payer: MEDICARE

## 2025-06-25 VITALS
OXYGEN SATURATION: 98 % | BODY MASS INDEX: 37.29 KG/M2 | WEIGHT: 251.75 LBS | SYSTOLIC BLOOD PRESSURE: 138 MMHG | HEART RATE: 59 BPM | HEIGHT: 69 IN | DIASTOLIC BLOOD PRESSURE: 67 MMHG

## 2025-06-25 DIAGNOSIS — Z95.0 PRESENCE OF CARDIAC PACEMAKER: ICD-10-CM

## 2025-06-25 DIAGNOSIS — E78.2 MIXED HYPERLIPIDEMIA: ICD-10-CM

## 2025-06-25 DIAGNOSIS — Z95.1 HX OF CABG: ICD-10-CM

## 2025-06-25 DIAGNOSIS — I10 PRIMARY HYPERTENSION: ICD-10-CM

## 2025-06-25 DIAGNOSIS — Z95.0 PRESENCE OF CARDIAC PACEMAKER: Primary | ICD-10-CM

## 2025-06-25 DIAGNOSIS — I48.0 PAROXYSMAL ATRIAL FIBRILLATION: ICD-10-CM

## 2025-06-25 DIAGNOSIS — I25.10 CHRONIC CORONARY ARTERY DISEASE: ICD-10-CM

## 2025-06-25 DIAGNOSIS — R06.09 DYSPNEA ON EXERTION: Primary | ICD-10-CM

## 2025-06-25 DIAGNOSIS — I49.5 TACHYCARDIA-BRADYCARDIA: ICD-10-CM

## 2025-06-25 PROCEDURE — 3075F SYST BP GE 130 - 139MM HG: CPT | Performed by: NURSE PRACTITIONER

## 2025-06-25 PROCEDURE — 99214 OFFICE O/P EST MOD 30 MIN: CPT | Performed by: NURSE PRACTITIONER

## 2025-06-25 PROCEDURE — 1160F RVW MEDS BY RX/DR IN RCRD: CPT | Performed by: NURSE PRACTITIONER

## 2025-06-25 PROCEDURE — 1159F MED LIST DOCD IN RCRD: CPT | Performed by: NURSE PRACTITIONER

## 2025-06-25 PROCEDURE — 3078F DIAST BP <80 MM HG: CPT | Performed by: NURSE PRACTITIONER

## 2025-06-25 NOTE — PROGRESS NOTES
Subjective:     Encounter Date:06/25/2025      Patient ID: Benoit Newberry Jr. is a 79 y.o. male.    Chief Complaint: 6 month follow up p. Afib, hypertension, CAD  Device check    History of Present Illness    Mr. Benoit Newberry is a patient of Dr. Saldaña with PMH of :     - SSS/Tachy-bradycardia status post Pacemaker      - atrial fibrillation.   status post Ablation 10/16/2018  Patient has converted to sinus  sinus bradycardia.   Patient had a recurrence of atrial fibrillation.  Status post cardioversion 06/13/2018 and 08/01/2018 09/05/2018.  Patient has converted but did not stay in sinus rhythm.  Patient was on Tikosyn but off now due to maintaining sinus rhythm.     - [id number] SCORE   [id number] Score: 5 (6/29/2025  7:18 PM)  On warfarin      - coronary artery disease status post CABG  3/98      - Acute inferior myocardial infarction.  Status post stent placement to SVG to RCA for total occlusion .  11/02/2015   - Status post stent placement to totally occluded SVG to RCA11/02/2015 and stent placement to left main and mid circumflex coronary artery 11/04/2015.  Status post stent to PDA distal to SVG and native LAD beyond LIMA.  05/25/2018     Cardiac catheterization February 25, 2022 revealed  Left ventricle angiogram was not performed due to difficulty in crossing the aortic valve.  Left main coronary artery is normal.  Left anterior descending artery is totally occluded in the proximal segment and LIMA is filling the LAD.  Circumflex coronary artery has proximal 20 to 30% disease.  Ramus intermedius is totally occluded.  Filling from SVG.  Right coronary artery is totally occluded in the proximal segment.  LIMA to LAD is patent.  SVG to diagonal branch is patent.  SVG to ramus intermedius is patent.  SVG to PDA is patent      - diabetes dyslipidemia and hypertension   - status post impending inferior myocardial infarction prior to surgery    -family history of coronary artery disease   -  History of asymptomatic right carotid bruit.      Here for 6 month follow up and device check.  Patient denies any chest pain, but has been noticing worsening dyspnea on exertion and edema of the lower extremities.   Device check today is showing 7 months battery life left, normal device function.     Blood pressure 135/67 HR 59 oxygen 98% on room air, weight 251 lbs BMI 37           Lab Review:   4/15/2025: bun 35 creatinine 1.21 glucose 121 A1C 6.8 HDL 39 LDL 63    The following portions of the patient's history were reviewed and updated as appropriate: allergies, current medications, past family history, past medical history, past social history, past surgical history, and problem list.    Review of Systems   Constitutional: Negative for malaise/fatigue.   Cardiovascular:  Positive for dyspnea on exertion and leg swelling. Negative for chest pain and palpitations.   Respiratory:  Negative for cough and shortness of breath.    Gastrointestinal:  Negative for abdominal pain, nausea and vomiting.   Neurological:  Positive for dizziness and light-headedness. Negative for headaches, numbness and weakness.   All other systems reviewed and are negative.    Past Medical History:   Diagnosis Date    Anemia Runs in family    Ankle sprain 57 years    Arthritis     Arthritis of back 20 yrs    Asymptomatic stenosis of right carotid artery     Atrial fibrillation     Bradycardia     Brain concussion     Buttock pain     rt cheek( lower)    Cataract     Clotting disorder Take blood thinner    Congenital heart disease     Coronary artery disease     Deep vein thrombosis     Diabetes mellitus 08/25/2011    Diabetes mellitus, type 2     Erectile dysfunction 2005    Heart attack     x2   with stent placement     2015/2018    Hyperlipidemia     Hypertension     Hypoglycemia     Knee swelling 40 years    Leg pain     aches    Low back pain 2014    Lumbar radiculopathy, chronic 10/20/2023    Myocardial infarction     Neuroma of foot  "25 years    Nonintractable headache 02/24/2022    Obesity 45 years    Other cervical disc degeneration at C6-C7 level 08/29/2017    Pacemaker     Pulmonary arterial hypertension 1967    Rotator cuff syndrome 2 months    Scoliosis 20 years    Sleep apnea     Tear of meniscus of knee 1966    Visual impairment Last 15 years     Past Surgical History:   Procedure Laterality Date    ABLATION OF DYSRHYTHMIC FOCUS      APPENDECTOMY  1956    BACK SURGERY  12/04/2023    CARDIAC ABLATION  12/2018    x 1     CARDIAC CATHETERIZATION Left 02/25/2022    Procedure: Left Heart Cath and coronary angiogram;  Surgeon: Karolina Saldaña MD;  Location: [facility] CATH INVASIVE LOCATION;  Service: Cardiovascular;  Laterality: Left;    CARDIAC CATHETERIZATION  02/25/2022    Procedure: Saphenous Vein Graft;  Surgeon: Karolina Saldaña MD;  Location: [facility] CATH INVASIVE LOCATION;  Service: Cardiovascular;;    CARDIOVERSION  06/2018    Cardioversion 6/2018; x3  12/2018    CORONARY ANGIOPLASTY Left 11/04/2015    Distal left main and in the mid circumflex artery     CORONARY ANGIOPLASTY Right 11/02/2015    Right coronary artery     CORONARY ARTERY BYPASS GRAFT  03/1998    CORONARY STENT PLACEMENT      EYE SURGERY      FOOT SURGERY  2010    HERNIA REPAIR  2005    INSERT / REPLACE / REMOVE PACEMAKER      JOINT REPLACEMENT  Knees    LUMBAR LAMINECTOMY Right 12/04/2023    Procedure: LUMBAR FOUR TO SACRAL ONE RIGHT SIDED LAMINOTOMY, MEDIAL  FACETECTOMY, FORAMINOTOMIES;  Surgeon: Josh Stoll MD;  Location: [facility] MAIN OR;  Service: Neurosurgery;  Laterality: Right;    OTHER SURGICAL HISTORY  05/2019    Stent     PACEMAKER IMPLANTATION  01/2019    Dr. Saldaña     REPLACEMENT TOTAL KNEE BILATERAL  2001    SKIN BIOPSY       /67 (BP Location: Left arm, Patient Position: Sitting, Cuff Size: Adult)   Pulse 59   Ht 175.3 cm (69\")   Wt 114 kg (251 lb 12 oz)   SpO2 98%   BMI 37.18 kg/m²   Family History   Problem Relation Age of Onset    Heart " disease Mother          of heart failure age of 84    Hypertension Mother     Hyperlipidemia Mother     Arthritis Mother     Anemia Mother     Obesity Mother     Heart disease Father          of heart attack age of 68    Hypertension Father     Hyperlipidemia Father     Alcohol abuse Father     Anemia Father     Cancer Sister     Heart disease Sister         Heart problems    Hypertension Sister     Hyperlipidemia Sister     Diabetes Sister     Anemia Sister     Obesity Sister     Broken bones Sister     Heart disease Brother         Heart problems two different times with heart problems    Hypertension Brother     Hyperlipidemia Brother     Alcohol abuse Brother     Diabetes Brother     Anemia Brother     Obesity Brother     Diabetes Maternal Grandmother     Obesity Maternal Grandmother     Stroke Maternal Grandfather     Obesity Maternal Grandfather     Cancer Maternal Uncle        Current Outpatient Medications:     amLODIPine (NORVASC) 5 MG tablet, Take 1 tablet by mouth Daily., Disp: 90 tablet, Rfl: 3    atorvastatin (LIPITOR) 40 MG tablet, 1 tablet p.o. daily., Disp: 90 tablet, Rfl: 3    Cholecalciferol (VITAMIN D) 1000 units tablet, Take 0.5 tablets by mouth Daily., Disp: , Rfl:     clopidogrel (PLAVIX) 75 MG tablet, Take 1 tablet by mouth Daily. Can restart 7 days post surgery., Disp: , Rfl:     Coenzyme Q10 (CO Q 10 PO), Take 1 capsule by mouth Daily., Disp: , Rfl:     furosemide (LASIX) 40 MG tablet, Take 1 tablet by mouth Daily., Disp: 90 tablet, Rfl: 3    Insulin Disposable Pump (Omnipod 5 G6 Pods, Gen 5,) misc, USE DAILY, Disp: 30 each, Rfl: 3    Krill Oil 1000 MG capsule, Take 1 capsule by mouth Daily., Disp: , Rfl:     lisinopril (PRINIVIL,ZESTRIL) 40 MG tablet, 1 tablet p.o. daily, Disp: 90 tablet, Rfl: 3    metFORMIN (GLUCOPHAGE) 1000 MG tablet, Take 1 tablet by mouth 2 (Two) Times a Day With Meals., Disp: 180 tablet, Rfl: 3    metoprolol succinate XL (TOPROL-XL) 100 MG 24 hr tablet, , Disp:  , Rfl:     Multiple Vitamins-Minerals (MULTI VITAMIN/MINERALS) tablet, Take 1 tablet by mouth Daily., Disp: , Rfl:     vitamin C (ASCORBIC ACID) 500 MG tablet, Take 1 tablet by mouth Daily., Disp: , Rfl:     warfarin (COUMADIN) 5 MG tablet, 1 tablet daily, Disp: , Rfl:   Allergies   Allergen Reactions    Vancomycin Rash     Social History     Socioeconomic History    Marital status:      Spouse name: Maude    Number of children: 3    Years of education: 14   Tobacco Use    Smoking status: Former     Current packs/day: 0.00     Average packs/day: 2.0 packs/day for 8.3 years (16.7 ttl pk-yrs)     Types: Cigarettes     Start date: 1965     Quit date: 12/3/1973     Years since quittin.6     Passive exposure: Past    Smokeless tobacco: Never    Tobacco comments:     Haven't smoked in almost 50 years   Vaping Use    Vaping status: Never Used   Substance and Sexual Activity    Alcohol use: Not Currently     Comment: Maybe 1 drink this year    Drug use: No    Sexual activity: Not Currently     Partners: Female     Comment: 74 years old past time                Objective:     Vitals reviewed.   Constitutional:       Appearance: Not in distress. Frail.   Neck:      Vascular: No JVR. JVD normal.   Pulmonary:      Effort: Pulmonary effort is normal.      Breath sounds: Decreased breath sounds present. No wheezing. No rhonchi. No rales.   Chest:      Chest wall: Not tender to palpatation.   Cardiovascular:      PMI at left midclavicular line. Normal rate. Regular rhythm. Normal S1. Normal S2.       Murmurs: There is no murmur.      No gallop.  No click. No rub.   Pulses:     Intact distal pulses.   Edema:     Peripheral edema present.  Abdominal:      General: Bowel sounds are normal.      Palpations: Abdomen is soft.      Tenderness: There is no abdominal tenderness.   Musculoskeletal: Normal range of motion.         General: No tenderness. Skin:     General: Skin is warm and dry.   Neurological:      General:  No focal deficit present.      Mental Status: Alert and oriented to person, place and time.       Procedures                  Assessment:     [facility]       Diagnosis Plan   1. Dyspnea on exertion  Adult Transthoracic Echo Complete W/ Cont if Necessary Per Protocol      2. Paroxysmal atrial fibrillation  Adult Transthoracic Echo Complete W/ Cont if Necessary Per Protocol      3. Chronic coronary artery disease  Adult Transthoracic Echo Complete W/ Cont if Necessary Per Protocol      4. Primary hypertension        5. Mixed hyperlipidemia        6. Presence of cardiac pacemaker        7. Hx of CABG                       Plan:   Chart reviewed   Labs reviewed from 4/2025- unremarkable  lipids stable     Patient having worsening dyspnea on exertion and edema.   Will check echocardiogram--- last echocardiogram was in 2022 and unremarkable  suspect hypertensive cardiovascular disease/diastolic dysfunction     Patient's previous stress test in 2024 reviewed and unremarkable     Continue plavix, statin, co q10, beta blocker for CAD   Continue amlodipine, beta blocker lisinopril furosemide for hypertension     Continue warfarin for anticoagulation keep INR 2-3     Follow up with Dr. Saldaña in 6 months with Device check     Patient aware device has 7 months left and will need close monitoring after next check.     Electronically signed by JORGE Potts, 06/29/25, 7:33 PM EDT.              This document is intended for medical expert use only.  Reading of this document by patients and/or patient's family without participating medical staff guidance may result in misinterpretation and unintended morbidity. Any interpretation of such data is the responsibility of the patient and/or family member responsible for the patient in concert with their primary or specialist providers, not to be left for sources of online search as such as [employer], [employer] or similar queries.  Relying on these approaches to knowledge may result in  misinterpretation, misguided goals of care and even death should patient or family members try recommendations outside of the realm of professional medical care in a supervised inpatient environment.

## 2025-07-03 ENCOUNTER — ANTICOAGULATION VISIT (OUTPATIENT)
Dept: CARDIOLOGY | Facility: CLINIC | Age: 80
End: 2025-07-03
Payer: MEDICARE

## 2025-07-03 DIAGNOSIS — Z79.01 LONG TERM (CURRENT) USE OF ANTICOAGULANTS: ICD-10-CM

## 2025-07-03 DIAGNOSIS — I48.0 PAROXYSMAL ATRIAL FIBRILLATION: Primary | ICD-10-CM

## 2025-07-03 LAB — INR PPP: 2.2

## 2025-07-10 ENCOUNTER — HOSPITAL ENCOUNTER (OUTPATIENT)
Dept: CARDIOLOGY | Facility: HOSPITAL | Age: 80
Discharge: HOME | End: 2025-07-10
Admitting: NURSE PRACTITIONER
Payer: MEDICARE

## 2025-07-10 VITALS
HEART RATE: 68 BPM | HEIGHT: 69 IN | SYSTOLIC BLOOD PRESSURE: 133 MMHG | DIASTOLIC BLOOD PRESSURE: 69 MMHG | BODY MASS INDEX: 37.18 KG/M2 | WEIGHT: 251 LBS

## 2025-07-10 DIAGNOSIS — I48.0 PAROXYSMAL ATRIAL FIBRILLATION: ICD-10-CM

## 2025-07-10 DIAGNOSIS — R06.09 DYSPNEA ON EXERTION: ICD-10-CM

## 2025-07-10 DIAGNOSIS — I25.10 CHRONIC CORONARY ARTERY DISEASE: ICD-10-CM

## 2025-07-10 LAB
AORTIC DIMENSIONLESS INDEX: 0.7 (DI)
AV MEAN PRESS GRAD SYS DOP V1V2: 2.7 MMHG
AV VMAX SYS DOP: 114.8 CM/SEC
BH CV ECHO LEFT VENTRICLE GLOBAL LONGITUDINAL STRAIN: -10 %
BH CV ECHO MEAS - AO MAX PG: 5.3 MMHG
BH CV ECHO MEAS - AO ROOT DIAM: 4.2 CM
BH CV ECHO MEAS - AO V2 VTI: 27.3 CM
BH CV ECHO MEAS - AVA(I,D): 2.35 CM2
BH CV ECHO MEAS - EDV(CUBED): 131.3 ML
BH CV ECHO MEAS - EDV(MOD-SP2): 148.1 ML
BH CV ECHO MEAS - EDV(MOD-SP4): 138 ML
BH CV ECHO MEAS - EF(MOD-SP2): 47.8 %
BH CV ECHO MEAS - EF(MOD-SP4): 47.6 %
BH CV ECHO MEAS - ESV(CUBED): 54 ML
BH CV ECHO MEAS - ESV(MOD-SP2): 77.2 ML
BH CV ECHO MEAS - ESV(MOD-SP4): 72.3 ML
BH CV ECHO MEAS - FS: 25.6 %
BH CV ECHO MEAS - IVS/LVPW: 1.07 CM
BH CV ECHO MEAS - IVSD: 1.12 CM
BH CV ECHO MEAS - LA DIMENSION: 3.5 CM
BH CV ECHO MEAS - LAT PEAK E' VEL: 5.8 CM/SEC
BH CV ECHO MEAS - LV DIASTOLIC VOL/BSA (35-75): 60.7 CM2
BH CV ECHO MEAS - LV MASS(C)D: 208.8 GRAMS
BH CV ECHO MEAS - LV MAX PG: 2.9 MMHG
BH CV ECHO MEAS - LV MEAN PG: 1.49 MMHG
BH CV ECHO MEAS - LV SYSTOLIC VOL/BSA (12-30): 31.8 CM2
BH CV ECHO MEAS - LV V1 MAX: 85.3 CM/SEC
BH CV ECHO MEAS - LV V1 VTI: 19.2 CM
BH CV ECHO MEAS - LVIDD: 5.1 CM
BH CV ECHO MEAS - LVIDS: 3.8 CM
BH CV ECHO MEAS - LVOT AREA: 3.3 CM2
BH CV ECHO MEAS - LVOT DIAM: 2.06 CM
BH CV ECHO MEAS - LVPWD: 1.05 CM
BH CV ECHO MEAS - MED PEAK E' VEL: 3.1 CM/SEC
BH CV ECHO MEAS - MR MAX PG: 96.8 MMHG
BH CV ECHO MEAS - MR MAX VEL: 492 CM/SEC
BH CV ECHO MEAS - MV A MAX VEL: 53.1 CM/SEC
BH CV ECHO MEAS - MV DEC SLOPE: 1064 CM/SEC2
BH CV ECHO MEAS - MV DEC TIME: 0.1 SEC
BH CV ECHO MEAS - MV E MAX VEL: 107.2 CM/SEC
BH CV ECHO MEAS - MV E/A: 2.02
BH CV ECHO MEAS - MV MAX PG: 5.8 MMHG
BH CV ECHO MEAS - MV MEAN PG: 1.97 MMHG
BH CV ECHO MEAS - MV V2 VTI: 33.7 CM
BH CV ECHO MEAS - MVA(VTI): 1.9 CM2
BH CV ECHO MEAS - PA ACC TIME: 0.1 SEC
BH CV ECHO MEAS - PA V2 MAX: 104.6 CM/SEC
BH CV ECHO MEAS - PI END-D VEL: 117 CM/SEC
BH CV ECHO MEAS - PULM SYS VEL: 59.2 CM/SEC
BH CV ECHO MEAS - QP/QS: 2.08
BH CV ECHO MEAS - RAP SYSTOLE: 3 MMHG
BH CV ECHO MEAS - RV MAX PG: 1.81 MMHG
BH CV ECHO MEAS - RV V1 MAX: 67.2 CM/SEC
BH CV ECHO MEAS - RV V1 VTI: 15.3 CM
BH CV ECHO MEAS - RVDD: 4.6 CM
BH CV ECHO MEAS - RVOT DIAM: 3.3 CM
BH CV ECHO MEAS - RVSP: 48 MMHG
BH CV ECHO MEAS - SV(LVOT): 64 ML
BH CV ECHO MEAS - SV(MOD-SP2): 70.8 ML
BH CV ECHO MEAS - SV(MOD-SP4): 65.7 ML
BH CV ECHO MEAS - SV(RVOT): 133.1 ML
BH CV ECHO MEAS - SVI(LVOT): 28.1 ML/M2
BH CV ECHO MEAS - SVI(MOD-SP2): 31.1 ML/M2
BH CV ECHO MEAS - SVI(MOD-SP4): 28.9 ML/M2
BH CV ECHO MEAS - TAPSE (>1.6): 1.98 CM
BH CV ECHO MEAS - TR MAX PG: 44.7 MMHG
BH CV ECHO MEAS - TR MAX VEL: 334.2 CM/SEC
BH CV ECHO MEASUREMENTS AVERAGE E/E' RATIO: 24.09
LV EF BIPLANE MOD: 47.6 %

## 2025-07-10 PROCEDURE — 93306 TTE W/DOPPLER COMPLETE: CPT | Performed by: INTERNAL MEDICINE

## 2025-07-10 PROCEDURE — 93356 MYOCRD STRAIN IMG SPCKL TRCK: CPT

## 2025-07-10 PROCEDURE — 93356 MYOCRD STRAIN IMG SPCKL TRCK: CPT | Performed by: INTERNAL MEDICINE

## 2025-07-10 PROCEDURE — 93306 TTE W/DOPPLER COMPLETE: CPT

## 2025-07-21 ENCOUNTER — TELEPHONE (OUTPATIENT)
Dept: CARDIOLOGY | Facility: CLINIC | Age: 80
End: 2025-07-21
Payer: MEDICARE

## 2025-07-21 NOTE — TELEPHONE ENCOUNTER
Spoke with patient - symptoms still going on since he had echo done 7/10/25.   Advised patient to go to ER if we are unable to see him before symptoms worsen.   Patient understood.   Attempted to transfer to scheduling - all busy. When I went back to speak with patient, line was silent. Bad connection, I let him know someone would reach out, I am not sure if he was able to hear me.

## 2025-07-21 NOTE — TELEPHONE ENCOUNTER
Has been getting real short of breath. Reminds him of the first time he had a heart attack. Little dizziness at time.

## 2025-07-22 ENCOUNTER — OFFICE VISIT (OUTPATIENT)
Dept: CARDIOLOGY | Facility: CLINIC | Age: 80
End: 2025-07-22
Payer: MEDICARE

## 2025-07-22 VITALS
WEIGHT: 271 LBS | BODY MASS INDEX: 40.14 KG/M2 | DIASTOLIC BLOOD PRESSURE: 75 MMHG | OXYGEN SATURATION: 99 % | HEIGHT: 69 IN | SYSTOLIC BLOOD PRESSURE: 140 MMHG | HEART RATE: 59 BPM

## 2025-07-22 DIAGNOSIS — I48.0 PAROXYSMAL ATRIAL FIBRILLATION: ICD-10-CM

## 2025-07-22 DIAGNOSIS — I25.10 CHRONIC CORONARY ARTERY DISEASE: ICD-10-CM

## 2025-07-22 DIAGNOSIS — I49.5 TACHYCARDIA-BRADYCARDIA: ICD-10-CM

## 2025-07-22 DIAGNOSIS — Z95.0 PRESENCE OF CARDIAC PACEMAKER: ICD-10-CM

## 2025-07-22 DIAGNOSIS — Z95.1 HX OF CABG: ICD-10-CM

## 2025-07-22 DIAGNOSIS — R06.09 DYSPNEA ON EXERTION: Primary | ICD-10-CM

## 2025-07-22 DIAGNOSIS — I10 PRIMARY HYPERTENSION: ICD-10-CM

## 2025-07-22 DIAGNOSIS — Z95.5 STATUS POST CORONARY ARTERY STENT PLACEMENT: ICD-10-CM

## 2025-07-22 DIAGNOSIS — E78.2 MIXED HYPERLIPIDEMIA: ICD-10-CM

## 2025-07-22 DIAGNOSIS — I10 ESSENTIAL HYPERTENSION: ICD-10-CM

## 2025-07-22 DIAGNOSIS — Z79.01 LONG TERM (CURRENT) USE OF ANTICOAGULANTS: ICD-10-CM

## 2025-07-22 NOTE — PROGRESS NOTES
Encounter Date:07/22/2025  Last seen 12/12/2024.      Patient ID: Benoit Newberry Jr. is a 79 y.o. male.    Chief Complaint:  Status post CABG  Status post stent  Anticoagulation management.  History of atrial fibrillation  Status post pacemaker     History of Present Illness  Patient is having increased shortness of breath with activity.    Since I have last seen, the patient has been without any chest discomfort , palpitations, dizziness or syncope.  Denies having any headache ,abdominal pain ,nausea, vomiting , diarrhea constipation, loss of weight or loss of appetite.  Denies having any excessive bruising ,hematuria or blood in the stool.    Review of all systems negative except as indicated.    Reviewed ROS.  Assessment and Plan         [[[]]]]]]]]]]]]]]]]]]]]  History  ===================     -status post permanent dual-chamber pacemaker implantation ([employer]  MRI compatible) 12/04/2018      - atrial fibrillation.   status post Ablation 10/16/2018  Patient has converted to sinus  sinus bradycardia.   Patient had a recurrence of atrial fibrillation.  Status post cardioversion 06/13/2018 and 08/01/2018 09/05/2018.  Patient has converted but did not stay in sinus rhythm.  Patient was on Tikosyn but off now due to maintaining sinus rhythm.     -anticoagulation Patient is competent.      -status post CABG  3/98      -Acute inferior myocardial infarction.  Status post stent placement to SVG to RCA for total occlusion .  11/02/2015   - Status post stent placement to totally occluded SVG to RCA11/02/2015 and stent placement to left main and mid circumflex coronary artery 11/04/2015.  Status post stent to PDA distal to SVG and native LAD beyond [place].  05/25/2018    Echocardiogram 1/10/2025-Dr. Pickering    Left ventricular systolic function is moderately decreased. Left ventricular ejection fraction appears to be 36 - 40%.    The left ventricular cavity is borderline dilated.    Left ventricular diastolic  function was normal.    Estimated right ventricular systolic pressure from tricuspid regurgitation is moderately elevated (45-55 mmHg).     Cardiac catheterization February 25, 2022 revealed  Left ventricle angiogram was not performed due to difficulty in crossing the aortic valve.  Left main coronary artery is normal.  Left anterior descending artery is totally occluded in the proximal segment and LIMA is filling the LAD.  Circumflex coronary artery has proximal 20 to 30% disease.  Ramus intermedius is totally occluded.  Filling from SVG.  Right coronary artery is totally occluded in the proximal segment.  LIMA to LAD is patent.  SVG to diagonal branch is patent.  SVG to ramus intermedius is patent.  SVG to PDA is patent      - diabetes dyslipidemia and hypertension   - status post impending inferior myocardial infarction prior to surgery    -family history of coronary artery disease   - History of asymptomatic right carotid bruit.  ===   Plan  ===  Status post CABG  Patient is not having any angina pectoris or congestive heart failure.  EKG showed dual-chamber pacemaker rhythm (atrial sensed ventricular paced.    Patient is having increased shortness of breath with activity.  Patient recently had an echocardiogram.    Shortness of breath probably multifactorial  Patient does have atrial fibrillation.    Stress Cardiolite test  Patient may need cardiac catheterization.    Echocardiogram 1/10/2025-Dr. Pickering    Left ventricular systolic function is moderately decreased. Left ventricular ejection fraction appears to be 36 - 40%.    The left ventricular cavity is borderline dilated.    Left ventricular diastolic function was normal.    Estimated right ventricular systolic pressure from tricuspid regurgitation is moderately elevated (45-55 mmHg).     Anticoagulation status reviewed.  Continue Coumadin.   PT/INR on a monthly basis.     Patient is on Plavix.  Continue Coumadin.  Continue to hold aspirin since patient is on  Plavix as well as Coumadin.     Hypertension  140/75.  Continue metoprolol to 2 tablets of 25 mg twice daily.     History of atrial fibrillation-patient is maintaining sinus rhythm.  Patient is off Tikosyn  Patient is maintaining sinus rhythm.  EKG- atrial sensed ventricular paced rhythm-4/25/2023     Status post pacemaker.  Interrogation of the pacemaker revealed excellent pacing parameters.-5/28/2024.  Patient is in atrial fibrillation.  Patient was educated regarding possibility of RAJIV status soon.  Battery status 7 months 6/29/2025  Unlikely reached RAJIV status.  Observe closely for RAJIV status.     Follow-up in the office soon after the testing is completed.      Further plan will depend on patient's progress.    Reviewed and updated 7/22/2025.  ]]]]]]]]]]]]]]]            Diagnosis Plan   1. Hx of CABG        2. Chronic coronary artery disease        3. Long term (current) use of anticoagulants        4. Tachycardia-bradycardia        5. Primary hypertension        6. Paroxysmal atrial fibrillation        7. Presence of cardiac pacemaker        8. Status post coronary artery stent placement        9. Dyspnea on exertion        10. Mixed hyperlipidemia        11. Essential hypertension        LAB RESULTS (LAST 7 DAYS)    CBC        BMP        CMP         BNP        TROPONIN        CoAg        Creatinine Clearance  CrCl cannot be calculated (Patient's most recent lab result is older than the maximum 30 days allowed.).    ABG        Radiology  No radiology results for the last day                The following portions of the patient's history were reviewed and updated as appropriate: allergies, current medications, past family history, past medical history, past social history, past surgical history, and problem list.    Review of Systems   Constitutional: Negative for malaise/fatigue.   Cardiovascular:  Positive for leg swelling. Negative for chest pain, dyspnea on exertion and palpitations.   Respiratory:   Positive for shortness of breath. Negative for cough.    Gastrointestinal:  Negative for abdominal pain, nausea and vomiting.   Neurological:  Positive for dizziness and light-headedness. Negative for headaches, numbness and weakness.   All other systems reviewed and are negative.      Current Outpatient Medications:     amLODIPine (NORVASC) 5 MG tablet, Take 1 tablet by mouth Daily., Disp: 90 tablet, Rfl: 3    atorvastatin (LIPITOR) 40 MG tablet, 1 tablet p.o. daily., Disp: 90 tablet, Rfl: 3    Cholecalciferol (VITAMIN D) 1000 units tablet, Take 0.5 tablets by mouth Daily., Disp: , Rfl:     clopidogrel (PLAVIX) 75 MG tablet, Take 1 tablet by mouth Daily. Can restart 7 days post surgery., Disp: , Rfl:     Coenzyme Q10 (CO Q 10 PO), Take 1 capsule by mouth Daily., Disp: , Rfl:     furosemide (LASIX) 40 MG tablet, Take 1 tablet by mouth Daily., Disp: 90 tablet, Rfl: 3    Insulin Disposable Pump (Omnipod 5 G6 Pods, Gen 5,) misc, USE DAILY, Disp: 30 each, Rfl: 3    Krill Oil 1000 MG capsule, Take 1 capsule by mouth Daily., Disp: , Rfl:     lisinopril (PRINIVIL,ZESTRIL) 40 MG tablet, 1 tablet p.o. daily, Disp: 90 tablet, Rfl: 3    metFORMIN (GLUCOPHAGE) 1000 MG tablet, Take 1 tablet by mouth 2 (Two) Times a Day With Meals., Disp: 180 tablet, Rfl: 3    metoprolol succinate XL (TOPROL-XL) 100 MG 24 hr tablet, , Disp: , Rfl:     Multiple Vitamins-Minerals (MULTI VITAMIN/MINERALS) tablet, Take 1 tablet by mouth Daily., Disp: , Rfl:     vitamin C (ASCORBIC ACID) 500 MG tablet, Take 1 tablet by mouth Daily., Disp: , Rfl:     warfarin (COUMADIN) 5 MG tablet, 1 tablet daily, Disp: , Rfl:     Allergies   Allergen Reactions    Vancomycin Rash       Family History   Problem Relation Age of Onset    Heart disease Mother          of heart failure age of 84    Hypertension Mother     Hyperlipidemia Mother     Arthritis Mother     Anemia Mother     Obesity Mother     Heart disease Father          of heart attack age of 68     Hypertension Father     Hyperlipidemia Father     Alcohol abuse Father     Anemia Father     Cancer Sister     Heart disease Sister         Heart problems    Hypertension Sister     Hyperlipidemia Sister     Diabetes Sister     Anemia Sister     Obesity Sister     Broken bones Sister     Heart disease Brother         Heart problems two different times with heart problems    Hypertension Brother     Hyperlipidemia Brother     Alcohol abuse Brother     Diabetes Brother     Anemia Brother     Obesity Brother     Diabetes Maternal Grandmother     Obesity Maternal Grandmother     Stroke Maternal Grandfather     Obesity Maternal Grandfather     Cancer Maternal Uncle        Past Surgical History:   Procedure Laterality Date    ABLATION OF DYSRHYTHMIC FOCUS      APPENDECTOMY  1956    BACK SURGERY  12/04/2023    CARDIAC ABLATION  12/2018    x 1     CARDIAC CATHETERIZATION Left 02/25/2022    Procedure: Left Heart Cath and coronary angiogram;  Surgeon: Karolina Saldaña MD;  Location: [facility] CATH INVASIVE LOCATION;  Service: Cardiovascular;  Laterality: Left;    CARDIAC CATHETERIZATION  02/25/2022    Procedure: Saphenous Vein Graft;  Surgeon: Karolina Saldaña MD;  Location: [facility] CATH INVASIVE LOCATION;  Service: Cardiovascular;;    CARDIOVERSION  06/2018    Cardioversion 6/2018; x3  12/2018    CORONARY ANGIOPLASTY Left 11/04/2015    Distal left main and in the mid circumflex artery     CORONARY ANGIOPLASTY Right 11/02/2015    Right coronary artery     CORONARY ARTERY BYPASS GRAFT  03/1998    CORONARY STENT PLACEMENT      EYE SURGERY      FOOT SURGERY  2010    HERNIA REPAIR  2005    INSERT / REPLACE / REMOVE PACEMAKER      JOINT REPLACEMENT  Knees    LUMBAR LAMINECTOMY Right 12/04/2023    Procedure: LUMBAR FOUR TO SACRAL ONE RIGHT SIDED LAMINOTOMY, MEDIAL  FACETECTOMY, FORAMINOTOMIES;  Surgeon: Josh Stoll MD;  Location: [facility] MAIN OR;  Service: Neurosurgery;  Laterality: Right;    OTHER SURGICAL HISTORY  05/2019     Stent     PACEMAKER IMPLANTATION  2019    Dr. Saldaña     REPLACEMENT TOTAL KNEE BILATERAL      SKIN BIOPSY         Past Medical History:   Diagnosis Date    Anemia Runs in family    Ankle sprain 57 years    Arthritis     Arthritis of back 20 yrs    Asymptomatic stenosis of right carotid artery     Atrial fibrillation     Bradycardia     Brain concussion     Buttock pain     rt cheek( lower)    Cataract     Clotting disorder Take blood thinner    Congenital heart disease     Coronary artery disease     Deep vein thrombosis     Diabetes mellitus 2011    Diabetes mellitus, type 2     Erectile dysfunction 2005    Heart attack     x2   with stent placement         Hyperlipidemia     Hypertension     Hypoglycemia     Knee swelling 40 years    Leg pain     aches    Low back pain     Lumbar radiculopathy, chronic 10/20/2023    Myocardial infarction     Neuroma of foot 25 years    Nonintractable headache 2022    Obesity 47 years    Other cervical disc degeneration at C6-C7 level 2017    Pacemaker     Pulmonary arterial hypertension 1967    Rotator cuff syndrome 2 months    Scoliosis 20 years    Sleep apnea     Tear of meniscus of knee 1966    Visual impairment Last 15 years       Family History   Problem Relation Age of Onset    Heart disease Mother          of heart failure age of 84    Hypertension Mother     Hyperlipidemia Mother     Arthritis Mother     Anemia Mother     Obesity Mother     Heart disease Father          of heart attack age of 68    Hypertension Father     Hyperlipidemia Father     Alcohol abuse Father     Anemia Father     Cancer Sister     Heart disease Sister         Heart problems    Hypertension Sister     Hyperlipidemia Sister     Diabetes Sister     Anemia Sister     Obesity Sister     Broken bones Sister     Heart disease Brother         Heart problems two different times with heart problems    Hypertension Brother     Hyperlipidemia Brother     Alcohol  abuse Brother     Diabetes Brother     Anemia Brother     Obesity Brother     Diabetes Maternal Grandmother     Obesity Maternal Grandmother     Stroke Maternal Grandfather     Obesity Maternal Grandfather     Cancer Maternal Uncle        Social History     Socioeconomic History    Marital status:      Spouse name: Maude    Number of children: 3    Years of education: 14   Tobacco Use    Smoking status: Former     Current packs/day: 0.00     Average packs/day: 2.0 packs/day for 8.3 years (16.7 ttl pk-yrs)     Types: Cigarettes     Start date: 1965     Quit date: 12/3/1973     Years since quittin.6     Passive exposure: Past    Smokeless tobacco: Never    Tobacco comments:     Haven't smoked in almost 50 years   Vaping Use    Vaping status: Never Used   Substance and Sexual Activity    Alcohol use: Not Currently     Comment: Maybe 1 drink this year    Drug use: No    Sexual activity: Not Currently     Partners: Female     Comment: 74 years old past time         Procedures      Objective:       Physical Exam    There were no vitals taken for this visit.  The patient is alert, oriented and in no distress.    Vital signs as noted above.    Head and neck revealed no carotid bruits or jugular venous distension.  No thyromegaly or lymphadenopathy is present.    Lungs clear.  No wheezing.  Breath sounds are normal bilaterally.    Heart normal first and second heart sounds.  No murmur..  No pericardial rub is present.  No gallop is present.    Abdomen soft and nontender.  No organomegaly is present.    Extremities revealed good peripheral pulses without any pedal edema.    Skin warm and dry.  Pacemaker site looks normal.    Musculoskeletal system is grossly normal.    CNS grossly normal.    Reviewed and updated.

## 2025-07-25 ENCOUNTER — ANTICOAGULATION VISIT (OUTPATIENT)
Dept: CARDIOLOGY | Facility: CLINIC | Age: 80
End: 2025-07-25
Payer: MEDICARE

## 2025-07-25 DIAGNOSIS — Z79.01 LONG TERM (CURRENT) USE OF ANTICOAGULANTS: ICD-10-CM

## 2025-07-25 DIAGNOSIS — I48.0 PAROXYSMAL ATRIAL FIBRILLATION: Primary | ICD-10-CM

## 2025-07-25 LAB — INR PPP: 2.7

## 2025-08-07 ENCOUNTER — ANTICOAGULATION VISIT (OUTPATIENT)
Dept: CARDIOLOGY | Facility: CLINIC | Age: 80
End: 2025-08-07
Payer: MEDICARE

## 2025-08-07 DIAGNOSIS — I48.0 PAROXYSMAL ATRIAL FIBRILLATION: Primary | ICD-10-CM

## 2025-08-07 DIAGNOSIS — Z79.01 LONG TERM (CURRENT) USE OF ANTICOAGULANTS: ICD-10-CM

## 2025-08-07 LAB — INR PPP: 2.7 (ref 2–3)

## 2025-08-15 ENCOUNTER — HOSPITAL ENCOUNTER (OUTPATIENT)
Dept: CARDIOLOGY | Facility: HOSPITAL | Age: 80
Discharge: HOME | End: 2025-08-15
Payer: MEDICARE

## 2025-08-15 DIAGNOSIS — Z79.01 LONG TERM (CURRENT) USE OF ANTICOAGULANTS: ICD-10-CM

## 2025-08-15 DIAGNOSIS — Z95.5 STATUS POST CORONARY ARTERY STENT PLACEMENT: ICD-10-CM

## 2025-08-15 DIAGNOSIS — Z95.1 HX OF CABG: ICD-10-CM

## 2025-08-15 DIAGNOSIS — I10 PRIMARY HYPERTENSION: ICD-10-CM

## 2025-08-15 DIAGNOSIS — R06.09 DYSPNEA ON EXERTION: ICD-10-CM

## 2025-08-15 DIAGNOSIS — Z95.0 PRESENCE OF CARDIAC PACEMAKER: ICD-10-CM

## 2025-08-15 DIAGNOSIS — I48.0 PAROXYSMAL ATRIAL FIBRILLATION: ICD-10-CM

## 2025-08-15 LAB
BH CV REST NUCLEAR ISOTOPE DOSE: 11.3 MCI
BH CV STRESS COMMENTS STAGE 1: NORMAL
BH CV STRESS DOSE REGADENOSON STAGE 1: 0.4
BH CV STRESS DURATION MIN STAGE 1: 0
BH CV STRESS DURATION SEC STAGE 1: 10
BH CV STRESS NUCLEAR ISOTOPE DOSE: 33.2 MCI
BH CV STRESS PROTOCOL 1: NORMAL
BH CV STRESS STAGE 1: 1
MAXIMAL PREDICTED HEART RATE: 143 BPM
PERCENT MAX PREDICTED HR: 41.96 %
SPECT HRT GATED+EF W RNC IV: 47 %
STRESS BASELINE BP: NORMAL MMHG
STRESS BASELINE HR: 60 BPM
STRESS PERCENT HR: 49 %
STRESS POST PEAK BP: NORMAL MMHG
STRESS POST PEAK HR: 60 BPM
STRESS TARGET HR: 122 BPM

## 2025-08-15 PROCEDURE — 34310000005 TECHNETIUM SESTAMIBI: Performed by: INTERNAL MEDICINE

## 2025-08-15 PROCEDURE — A9500 TC99M SESTAMIBI: HCPCS | Performed by: INTERNAL MEDICINE

## 2025-08-15 PROCEDURE — 93017 CV STRESS TEST TRACING ONLY: CPT

## 2025-08-15 PROCEDURE — 78452 HT MUSCLE IMAGE SPECT MULT: CPT

## 2025-08-15 PROCEDURE — 25010000002 REGADENOSON 0.4 MG/5ML SOLUTION: Performed by: INTERNAL MEDICINE

## 2025-08-15 RX ORDER — REGADENOSON 0.08 MG/ML
0.4 INJECTION, SOLUTION INTRAVENOUS
Status: COMPLETED | OUTPATIENT
Start: 2025-08-15 | End: 2025-08-15

## 2025-08-15 RX ADMIN — TECHNETIUM TC 99M SESTAMIBI 1 DOSE: 1 INJECTION INTRAVENOUS at 10:23

## 2025-08-15 RX ADMIN — TECHNETIUM TC 99M SESTAMIBI 1 DOSE: 1 INJECTION INTRAVENOUS at 08:17

## 2025-08-15 RX ADMIN — REGADENOSON 0.4 MG: 0.08 INJECTION, SOLUTION INTRAVENOUS at 10:23

## 2025-08-19 ENCOUNTER — TELEPHONE (OUTPATIENT)
Dept: CARDIOLOGY | Facility: CLINIC | Age: 80
End: 2025-08-19

## 2025-08-19 ENCOUNTER — OFFICE VISIT (OUTPATIENT)
Dept: CARDIOLOGY | Facility: CLINIC | Age: 80
End: 2025-08-19
Payer: MEDICARE

## 2025-08-19 VITALS
WEIGHT: 258.75 LBS | HEIGHT: 69 IN | SYSTOLIC BLOOD PRESSURE: 153 MMHG | BODY MASS INDEX: 38.32 KG/M2 | DIASTOLIC BLOOD PRESSURE: 80 MMHG | OXYGEN SATURATION: 95 % | HEART RATE: 59 BPM

## 2025-08-19 DIAGNOSIS — I49.5 TACHYCARDIA-BRADYCARDIA: ICD-10-CM

## 2025-08-19 DIAGNOSIS — Z95.0 PRESENCE OF CARDIAC PACEMAKER: ICD-10-CM

## 2025-08-19 DIAGNOSIS — E78.2 MIXED HYPERLIPIDEMIA: ICD-10-CM

## 2025-08-19 DIAGNOSIS — I48.0 PAROXYSMAL ATRIAL FIBRILLATION: ICD-10-CM

## 2025-08-19 DIAGNOSIS — I10 ESSENTIAL HYPERTENSION: ICD-10-CM

## 2025-08-19 DIAGNOSIS — Z95.5 STATUS POST CORONARY ARTERY STENT PLACEMENT: ICD-10-CM

## 2025-08-19 DIAGNOSIS — Z95.1 HX OF CABG: Primary | ICD-10-CM

## 2025-08-19 DIAGNOSIS — I10 PRIMARY HYPERTENSION: ICD-10-CM

## 2025-08-19 DIAGNOSIS — I20.9 ANGINA PECTORIS, UNSPECIFIED: ICD-10-CM

## 2025-08-19 DIAGNOSIS — R06.09 DYSPNEA ON EXERTION: ICD-10-CM

## 2025-08-19 DIAGNOSIS — Z79.01 LONG TERM (CURRENT) USE OF ANTICOAGULANTS: ICD-10-CM

## 2025-08-19 DIAGNOSIS — I25.10 CHRONIC CORONARY ARTERY DISEASE: ICD-10-CM

## 2025-08-19 DIAGNOSIS — R94.39 ABNORMAL NUCLEAR STRESS TEST: ICD-10-CM

## 2025-08-19 RX ORDER — WARFARIN SODIUM 2.5 MG/1
2.5 TABLET ORAL
COMMUNITY

## 2025-08-19 RX ORDER — MOXIFLOXACIN 5 MG/ML
SOLUTION/ DROPS OPHTHALMIC
COMMUNITY
Start: 2025-08-07

## 2025-08-20 ENCOUNTER — ANTICOAGULATION VISIT (OUTPATIENT)
Dept: CARDIOLOGY | Facility: CLINIC | Age: 80
End: 2025-08-20
Payer: MEDICARE

## 2025-08-20 DIAGNOSIS — Z79.01 LONG TERM (CURRENT) USE OF ANTICOAGULANTS: ICD-10-CM

## 2025-08-20 DIAGNOSIS — I48.0 PAROXYSMAL ATRIAL FIBRILLATION: Primary | ICD-10-CM

## 2025-08-20 LAB — INR PPP: 2.3 (ref 2–3)

## 2025-08-28 ENCOUNTER — HOSPITAL ENCOUNTER (OUTPATIENT)
Facility: HOSPITAL | Age: 80
Setting detail: HOSPITAL OUTPATIENT SURGERY
Discharge: HOME | End: 2025-08-28
Attending: INTERNAL MEDICINE | Admitting: INTERNAL MEDICINE
Payer: MEDICARE

## 2025-08-28 VITALS
TEMPERATURE: 97.7 F | OXYGEN SATURATION: 90 % | HEART RATE: 60 BPM | HEIGHT: 67 IN | WEIGHT: 245.81 LBS | BODY MASS INDEX: 38.58 KG/M2 | DIASTOLIC BLOOD PRESSURE: 66 MMHG | RESPIRATION RATE: 17 BRPM | SYSTOLIC BLOOD PRESSURE: 130 MMHG

## 2025-08-28 DIAGNOSIS — I10 ESSENTIAL HYPERTENSION: ICD-10-CM

## 2025-08-28 DIAGNOSIS — Z95.1 HX OF CABG: ICD-10-CM

## 2025-08-28 DIAGNOSIS — Z95.0 PRESENCE OF CARDIAC PACEMAKER: ICD-10-CM

## 2025-08-28 DIAGNOSIS — I20.9 ANGINA PECTORIS, UNSPECIFIED: ICD-10-CM

## 2025-08-28 DIAGNOSIS — E78.2 MIXED HYPERLIPIDEMIA: ICD-10-CM

## 2025-08-28 DIAGNOSIS — R94.39 ABNORMAL NUCLEAR STRESS TEST: ICD-10-CM

## 2025-08-28 LAB
ANION GAP SERPL CALCULATED.3IONS-SCNC: 15.6 MMOL/L (ref 5–15)
BUN SERPL-MCNC: 35 MG/DL (ref 8–23)
BUN/CREAT SERPL: 30.4 (ref 7–25)
CALCIUM SPEC-SCNC: 9.8 MG/DL (ref 8.6–10.5)
CHLORIDE SERPL-SCNC: 102 MMOL/L (ref 98–107)
CHOLEST SERPL-MCNC: 104 MG/DL (ref 0–200)
CO2 SERPL-SCNC: 24.4 MMOL/L (ref 22–29)
CREAT SERPL-MCNC: 1.15 MG/DL (ref 0.76–1.27)
DEPRECATED RDW RBC AUTO: 46.5 FL (ref 37–54)
EGFRCR SERPLBLD CKD-EPI 2021: 65.5 ML/MIN/1.73
ERYTHROCYTE [DISTWIDTH] IN BLOOD BY AUTOMATED COUNT: 13.2 % (ref 12.3–15.4)
GLUCOSE SERPL-MCNC: 124 MG/DL (ref 65–99)
HCT VFR BLD AUTO: 38.8 % (ref 37.5–51)
HDLC SERPL-MCNC: 41 MG/DL (ref 40–60)
HGB BLD-MCNC: 12.1 G/DL (ref 13–17.7)
INR PPP: 1.24 (ref 2–3)
LDLC SERPL CALC-MCNC: 50 MG/DL (ref 0–100)
LDLC/HDLC SERPL: 1.27 {RATIO}
MCH RBC QN AUTO: 30 PG (ref 26.6–33)
MCHC RBC AUTO-ENTMCNC: 31.2 G/DL (ref 31.5–35.7)
MCV RBC AUTO: 96 FL (ref 79–97)
PLATELET # BLD AUTO: 170 10*3/MM3 (ref 140–450)
PMV BLD AUTO: 9 FL (ref 6–12)
POTASSIUM SERPL-SCNC: 4.2 MMOL/L (ref 3.5–5.2)
PROTHROMBIN TIME: 15.5 SECONDS (ref 19.4–28.5)
RBC # BLD AUTO: 4.04 10*6/MM3 (ref 4.14–5.8)
SODIUM SERPL-SCNC: 142 MMOL/L (ref 136–145)
TRIGL SERPL-MCNC: 55 MG/DL (ref 0–150)
VLDLC SERPL-MCNC: 13 MG/DL (ref 5–40)
WBC NRBC COR # BLD AUTO: 7.02 10*3/MM3 (ref 3.4–10.8)

## 2025-08-28 PROCEDURE — C1894 INTRO/SHEATH, NON-LASER: HCPCS | Performed by: INTERNAL MEDICINE

## 2025-08-28 PROCEDURE — C1769 GUIDE WIRE: HCPCS | Performed by: INTERNAL MEDICINE

## 2025-08-28 PROCEDURE — 93799 UNLISTED CV SVC/PROCEDURE: CPT | Performed by: INTERNAL MEDICINE

## 2025-08-28 PROCEDURE — C1760 CLOSURE DEV, VASC: HCPCS | Performed by: INTERNAL MEDICINE

## 2025-08-28 PROCEDURE — C1887 CATHETER, GUIDING: HCPCS | Performed by: INTERNAL MEDICINE

## 2025-08-28 PROCEDURE — 80061 LIPID PANEL: CPT | Performed by: INTERNAL MEDICINE

## 2025-08-28 PROCEDURE — 85610 PROTHROMBIN TIME: CPT | Performed by: INTERNAL MEDICINE

## 2025-08-28 PROCEDURE — 99153 MOD SED SAME PHYS/QHP EA: CPT | Performed by: INTERNAL MEDICINE

## 2025-08-28 PROCEDURE — 25510000001 IOPAMIDOL PER 1 ML: Performed by: INTERNAL MEDICINE

## 2025-08-28 PROCEDURE — 25010000002 LIDOCAINE 1 % SOLUTION: Performed by: INTERNAL MEDICINE

## 2025-08-28 PROCEDURE — 25810000003 SODIUM CHLORIDE 0.9 % SOLUTION: Performed by: INTERNAL MEDICINE

## 2025-08-28 PROCEDURE — 25010000002 FENTANYL CITRATE (PF) 100 MCG/2ML SOLUTION: Performed by: INTERNAL MEDICINE

## 2025-08-28 PROCEDURE — 93459 L HRT ART/GRFT ANGIO: CPT | Performed by: INTERNAL MEDICINE

## 2025-08-28 PROCEDURE — 99152 MOD SED SAME PHYS/QHP 5/>YRS: CPT | Performed by: INTERNAL MEDICINE

## 2025-08-28 PROCEDURE — 80048 BASIC METABOLIC PNL TOTAL CA: CPT | Performed by: INTERNAL MEDICINE

## 2025-08-28 PROCEDURE — 85027 COMPLETE CBC AUTOMATED: CPT | Performed by: INTERNAL MEDICINE

## 2025-08-28 PROCEDURE — 25010000002 HEPARIN (PORCINE) PER 1000 UNITS: Performed by: INTERNAL MEDICINE

## 2025-08-28 PROCEDURE — 25010000002 MIDAZOLAM PER 1 MG: Performed by: INTERNAL MEDICINE

## 2025-08-28 RX ORDER — SODIUM CHLORIDE 9 MG/ML
INJECTION, SOLUTION INTRAVENOUS
Status: COMPLETED | OUTPATIENT
Start: 2025-08-28 | End: 2025-08-28

## 2025-08-28 RX ORDER — NITROGLYCERIN 0.4 MG/1
0.4 TABLET SUBLINGUAL
Status: DISCONTINUED | OUTPATIENT
Start: 2025-08-28 | End: 2025-08-28 | Stop reason: HOSPADM

## 2025-08-28 RX ORDER — IOPAMIDOL 755 MG/ML
INJECTION, SOLUTION INTRAVASCULAR
Status: DISCONTINUED | OUTPATIENT
Start: 2025-08-28 | End: 2025-08-28 | Stop reason: HOSPADM

## 2025-08-28 RX ORDER — ONDANSETRON 2 MG/ML
4 INJECTION INTRAMUSCULAR; INTRAVENOUS EVERY 6 HOURS PRN
Status: DISCONTINUED | OUTPATIENT
Start: 2025-08-28 | End: 2025-08-28 | Stop reason: HOSPADM

## 2025-08-28 RX ORDER — ACETAMINOPHEN 325 MG/1
650 TABLET ORAL EVERY 4 HOURS PRN
Status: DISCONTINUED | OUTPATIENT
Start: 2025-08-28 | End: 2025-08-28 | Stop reason: HOSPADM

## 2025-08-28 RX ORDER — LIDOCAINE HYDROCHLORIDE 10 MG/ML
INJECTION, SOLUTION INFILTRATION; PERINEURAL
Status: DISCONTINUED | OUTPATIENT
Start: 2025-08-28 | End: 2025-08-28 | Stop reason: HOSPADM

## 2025-08-28 RX ORDER — MIDAZOLAM HYDROCHLORIDE 1 MG/ML
INJECTION, SOLUTION INTRAMUSCULAR; INTRAVENOUS
Status: DISCONTINUED | OUTPATIENT
Start: 2025-08-28 | End: 2025-08-28 | Stop reason: HOSPADM

## 2025-08-28 RX ORDER — ONDANSETRON 4 MG/1
4 TABLET, ORALLY DISINTEGRATING ORAL EVERY 6 HOURS PRN
Status: DISCONTINUED | OUTPATIENT
Start: 2025-08-28 | End: 2025-08-28 | Stop reason: HOSPADM

## 2025-08-28 RX ORDER — AMLODIPINE BESYLATE 10 MG/1
10 TABLET ORAL DAILY
Qty: 90 TABLET | Refills: 3 | Status: SHIPPED | OUTPATIENT
Start: 2025-08-28

## 2025-08-28 RX ORDER — DIPHENHYDRAMINE HCL 25 MG
25 CAPSULE ORAL EVERY 6 HOURS PRN
Status: DISCONTINUED | OUTPATIENT
Start: 2025-08-28 | End: 2025-08-28 | Stop reason: HOSPADM

## 2025-08-28 RX ORDER — FENTANYL CITRATE 50 UG/ML
INJECTION, SOLUTION INTRAMUSCULAR; INTRAVENOUS
Status: DISCONTINUED | OUTPATIENT
Start: 2025-08-28 | End: 2025-08-28 | Stop reason: HOSPADM

## 2025-08-28 RX ORDER — HEPARIN SODIUM 1000 [USP'U]/ML
INJECTION, SOLUTION INTRAVENOUS; SUBCUTANEOUS
Status: DISCONTINUED | OUTPATIENT
Start: 2025-08-28 | End: 2025-08-28 | Stop reason: HOSPADM

## (undated) DEVICE — DGW .035 FC J3MM 260CM TEF: Brand: EMERALD

## (undated) DEVICE — Device: Brand: OMNIWIRE PRESSURE GUIDE WIRE

## (undated) DEVICE — PK TRY HEART CATH 50

## (undated) DEVICE — CATH IV INSYTE AUTOGARD 14G 1 1/2IN ORNG

## (undated) DEVICE — Device

## (undated) DEVICE — SOLUTION,WATER,IRRIGATION,1000ML,STERILE: Brand: MEDLINE

## (undated) DEVICE — ELECTRD BLD EZ CLN MOD 4IN

## (undated) DEVICE — UNDERGLV SURG BIOGEL INDICATOR LF PF 7.5

## (undated) DEVICE — DRP OPMI 326071

## (undated) DEVICE — PINNACLE INTRODUCER SHEATH: Brand: PINNACLE

## (undated) DEVICE — ANTIBACTERIAL UNDYED BRAIDED (POLYGLACTIN 910), SYNTHETIC ABSORBABLE SUTURE: Brand: COATED VICRYL

## (undated) DEVICE — CATH DIAG IMPULSE IMT 5F 100CM

## (undated) DEVICE — ANGIO-SEAL VIP VASCULAR CLOSURE DEVICE: Brand: ANGIO-SEAL

## (undated) DEVICE — KT SURG TURNOVER 050

## (undated) DEVICE — CATH DIAG IMPULSE FR4 6F 100CM

## (undated) DEVICE — CATH DIAG IMPULSE MPA2 SH 5F 100CM

## (undated) DEVICE — PK BASIC SPINE 50

## (undated) DEVICE — CATH DIAG IMPULSE FL4 6F 100CM

## (undated) DEVICE — CATH DIAG IMPULSE PIG 5F 100CM

## (undated) DEVICE — ELECTRD DEFIB M/FUNC PROPADZ RADIOL 2PK

## (undated) DEVICE — SMOKE EVACUATION TUBING WITH 7/8 IN TO 1/4 IN REDUCER: Brand: BUFFALO FILTER

## (undated) DEVICE — CATH DIAG IMPULSE IM 5F 100CM

## (undated) DEVICE — DECANTER: Brand: UNBRANDED

## (undated) DEVICE — GW EMR FIX EXCHG J STD .035 3MM 260CM

## (undated) DEVICE — CATH DIAG IMPULSE FL4 5F 100CM

## (undated) DEVICE — DEV INFL COMPAK W/ACCESSPLUS IN4530

## (undated) DEVICE — CVR PROB ULTRASND CIVFLEX GEN/PURP TELESCP/FOLD 5.5X96IN LF

## (undated) DEVICE — DESTINATION RENAL GUIDING SHEATH: Brand: DESTINATION

## (undated) DEVICE — INTENDED FOR TISSUE SEPARATION, AND OTHER PROCEDURES THAT REQUIRE A SHARP SURGICAL BLADE TO PUNCTURE OR CUT.: Brand: BARD-PARKER ® CARBON RIB-BACK BLADES

## (undated) DEVICE — GW PTFE EMERALD HEPCOAT FC J TIP STD .035 3MM 150CM

## (undated) DEVICE — COVADERM: Brand: DEROYAL

## (undated) DEVICE — ST ACC MICROPUNCTURE STFF/CANN PLAT/TP 4F 21G 40CM

## (undated) DEVICE — GLV SURG SENSICARE SLT PF LF 7 STRL

## (undated) DEVICE — DGW .035 FC J3MM 150CM TEF HEP: Brand: EMERALD

## (undated) DEVICE — 3.0MM PRECISION NEURO (MATCH HEAD)

## (undated) DEVICE — UNDRGLV SURG BIOGEL PIMICROINDICATOR SYNTH SZ7 LF STRL

## (undated) DEVICE — TUBING, SUCTION, 1/4" X 12', STRAIGHT: Brand: MEDLINE

## (undated) DEVICE — GLV SURG SIGNATURE ESSENTIAL PF LTX SZ7.5

## (undated) DEVICE — CATH DIAG IMPULSE FR4 5F 100CM